# Patient Record
Sex: MALE | Race: WHITE | HISPANIC OR LATINO | Employment: FULL TIME | URBAN - METROPOLITAN AREA
[De-identification: names, ages, dates, MRNs, and addresses within clinical notes are randomized per-mention and may not be internally consistent; named-entity substitution may affect disease eponyms.]

---

## 2017-01-12 ENCOUNTER — APPOINTMENT (OUTPATIENT)
Dept: LAB | Facility: HOSPITAL | Age: 57
End: 2017-01-12
Payer: COMMERCIAL

## 2017-01-12 DIAGNOSIS — I10 ESSENTIAL (PRIMARY) HYPERTENSION: ICD-10-CM

## 2017-01-12 LAB
ALBUMIN SERPL BCP-MCNC: 3.4 G/DL (ref 3.5–5)
ALP SERPL-CCNC: 100 U/L (ref 46–116)
ALT SERPL W P-5'-P-CCNC: 37 U/L (ref 12–78)
ANION GAP SERPL CALCULATED.3IONS-SCNC: 10 MMOL/L (ref 4–13)
AST SERPL W P-5'-P-CCNC: 29 U/L (ref 5–45)
BASOPHILS # BLD AUTO: 0.02 THOUSANDS/ΜL (ref 0–0.1)
BASOPHILS NFR BLD AUTO: 1 % (ref 0–1)
BILIRUB SERPL-MCNC: 0.62 MG/DL (ref 0.2–1)
BUN SERPL-MCNC: 15 MG/DL (ref 5–25)
CALCIUM SERPL-MCNC: 8.8 MG/DL (ref 8.3–10.1)
CHLORIDE SERPL-SCNC: 109 MMOL/L (ref 100–108)
CHOLEST SERPL-MCNC: 141 MG/DL (ref 50–200)
CO2 SERPL-SCNC: 24 MMOL/L (ref 21–32)
CREAT SERPL-MCNC: 1.18 MG/DL (ref 0.6–1.3)
EOSINOPHIL # BLD AUTO: 0.15 THOUSAND/ΜL (ref 0–0.61)
EOSINOPHIL NFR BLD AUTO: 4 % (ref 0–6)
ERYTHROCYTE [DISTWIDTH] IN BLOOD BY AUTOMATED COUNT: 14.9 % (ref 11.6–15.1)
GFR SERPL CREATININE-BSD FRML MDRD: >60 ML/MIN/1.73SQ M
GLUCOSE SERPL-MCNC: 86 MG/DL (ref 65–140)
HCT VFR BLD AUTO: 44.4 % (ref 36.5–49.3)
HDLC SERPL-MCNC: 47 MG/DL (ref 40–60)
HGB BLD-MCNC: 15 G/DL (ref 12–17)
LDLC SERPL CALC-MCNC: 68 MG/DL (ref 0–100)
LYMPHOCYTES # BLD AUTO: 2.01 THOUSANDS/ΜL (ref 0.6–4.47)
LYMPHOCYTES NFR BLD AUTO: 51 % (ref 14–44)
MCH RBC QN AUTO: 30.7 PG (ref 26.8–34.3)
MCHC RBC AUTO-ENTMCNC: 33.8 G/DL (ref 31.4–37.4)
MCV RBC AUTO: 91 FL (ref 82–98)
MONOCYTES # BLD AUTO: 0.49 THOUSAND/ΜL (ref 0.17–1.22)
MONOCYTES NFR BLD AUTO: 12 % (ref 4–12)
NEUTROPHILS # BLD AUTO: 1.27 THOUSANDS/ΜL (ref 1.85–7.62)
NEUTS SEG NFR BLD AUTO: 32 % (ref 43–75)
PLATELET # BLD AUTO: 253 THOUSANDS/UL (ref 149–390)
PMV BLD AUTO: 11.3 FL (ref 8.9–12.7)
POTASSIUM SERPL-SCNC: 4.3 MMOL/L (ref 3.5–5.3)
PROT SERPL-MCNC: 7.1 G/DL (ref 6.4–8.2)
RBC # BLD AUTO: 4.89 MILLION/UL (ref 3.88–5.62)
SODIUM SERPL-SCNC: 143 MMOL/L (ref 136–145)
TRIGL SERPL-MCNC: 130 MG/DL
WBC # BLD AUTO: 3.94 THOUSAND/UL (ref 4.31–10.16)

## 2017-01-12 PROCEDURE — 36415 COLL VENOUS BLD VENIPUNCTURE: CPT

## 2017-01-12 PROCEDURE — 80061 LIPID PANEL: CPT

## 2017-01-12 PROCEDURE — 80053 COMPREHEN METABOLIC PANEL: CPT

## 2017-01-12 PROCEDURE — 85025 COMPLETE CBC W/AUTO DIFF WBC: CPT

## 2017-01-16 ENCOUNTER — TRANSCRIBE ORDERS (OUTPATIENT)
Dept: ADMINISTRATIVE | Facility: HOSPITAL | Age: 57
End: 2017-01-16

## 2017-01-16 ENCOUNTER — ALLSCRIPTS OFFICE VISIT (OUTPATIENT)
Dept: OTHER | Facility: OTHER | Age: 57
End: 2017-01-16

## 2017-01-16 DIAGNOSIS — I10 ESSENTIAL HYPERTENSION, MALIGNANT: ICD-10-CM

## 2017-01-16 DIAGNOSIS — R07.9 CHEST PAIN, UNSPECIFIED: Primary | ICD-10-CM

## 2017-01-16 DIAGNOSIS — R06.9 ABNORMAL RESPIRATORY RATE: ICD-10-CM

## 2017-01-24 ENCOUNTER — HOSPITAL ENCOUNTER (OUTPATIENT)
Dept: MRI IMAGING | Facility: HOSPITAL | Age: 57
Discharge: HOME | End: 2017-01-24
Payer: COMMERCIAL

## 2017-01-24 DIAGNOSIS — M75.42 IMPINGEMENT SYNDROME OF LEFT SHOULDER: ICD-10-CM

## 2017-01-24 PROCEDURE — 73221 MRI JOINT UPR EXTREM W/O DYE: CPT

## 2017-02-01 ENCOUNTER — HOSPITAL ENCOUNTER (OUTPATIENT)
Dept: NON INVASIVE DIAGNOSTICS | Facility: CLINIC | Age: 57
Discharge: HOME | End: 2017-02-01
Payer: COMMERCIAL

## 2017-02-01 DIAGNOSIS — R07.9 CHEST PAIN, UNSPECIFIED: ICD-10-CM

## 2017-02-01 DIAGNOSIS — R06.9 ABNORMAL RESPIRATORY RATE: ICD-10-CM

## 2017-02-01 DIAGNOSIS — I10 ESSENTIAL HYPERTENSION, MALIGNANT: ICD-10-CM

## 2017-02-01 PROCEDURE — 93350 STRESS TTE ONLY: CPT

## 2017-02-03 ENCOUNTER — HOSPITAL ENCOUNTER (EMERGENCY)
Facility: HOSPITAL | Age: 57
Discharge: HOME | End: 2017-02-03
Attending: EMERGENCY MEDICINE | Admitting: EMERGENCY MEDICINE
Payer: COMMERCIAL

## 2017-02-03 ENCOUNTER — APPOINTMENT (EMERGENCY)
Dept: RADIOLOGY | Facility: HOSPITAL | Age: 57
End: 2017-02-03
Payer: COMMERCIAL

## 2017-02-03 VITALS
DIASTOLIC BLOOD PRESSURE: 90 MMHG | RESPIRATION RATE: 15 BRPM | TEMPERATURE: 97.7 F | SYSTOLIC BLOOD PRESSURE: 154 MMHG | HEART RATE: 72 BPM | WEIGHT: 135 LBS | OXYGEN SATURATION: 98 %

## 2017-02-03 DIAGNOSIS — R07.9 CHEST PAIN: Primary | ICD-10-CM

## 2017-02-03 LAB
ANION GAP SERPL CALCULATED.3IONS-SCNC: 6 MMOL/L (ref 4–13)
ATRIAL RATE: 63 BPM
BASOPHILS # BLD AUTO: 0.01 THOUSANDS/ΜL (ref 0–0.1)
BASOPHILS NFR BLD AUTO: 0 % (ref 0–1)
BUN SERPL-MCNC: 21 MG/DL (ref 5–25)
CALCIUM SERPL-MCNC: 8.8 MG/DL (ref 8.3–10.1)
CHLORIDE SERPL-SCNC: 108 MMOL/L (ref 100–108)
CO2 SERPL-SCNC: 27 MMOL/L (ref 21–32)
CREAT SERPL-MCNC: 1.14 MG/DL (ref 0.6–1.3)
EOSINOPHIL # BLD AUTO: 0.13 THOUSAND/ΜL (ref 0–0.61)
EOSINOPHIL NFR BLD AUTO: 3 % (ref 0–6)
ERYTHROCYTE [DISTWIDTH] IN BLOOD BY AUTOMATED COUNT: 14.5 % (ref 11.6–15.1)
GFR SERPL CREATININE-BSD FRML MDRD: >60 ML/MIN/1.73SQ M
GLUCOSE SERPL-MCNC: 103 MG/DL (ref 65–140)
HCT VFR BLD AUTO: 40.4 % (ref 36.5–49.3)
HGB BLD-MCNC: 14.1 G/DL (ref 12–17)
LYMPHOCYTES # BLD AUTO: 1.73 THOUSANDS/ΜL (ref 0.6–4.47)
LYMPHOCYTES NFR BLD AUTO: 44 % (ref 14–44)
MCH RBC QN AUTO: 31.5 PG (ref 26.8–34.3)
MCHC RBC AUTO-ENTMCNC: 34.9 G/DL (ref 31.4–37.4)
MCV RBC AUTO: 90 FL (ref 82–98)
MONOCYTES # BLD AUTO: 0.36 THOUSAND/ΜL (ref 0.17–1.22)
MONOCYTES NFR BLD AUTO: 9 % (ref 4–12)
NEUTROPHILS # BLD AUTO: 1.75 THOUSANDS/ΜL (ref 1.85–7.62)
NEUTS SEG NFR BLD AUTO: 44 % (ref 43–75)
NRBC BLD AUTO-RTO: 0 /100 WBCS
P AXIS: 49 DEGREES
PLATELET # BLD AUTO: 217 THOUSANDS/UL (ref 149–390)
PMV BLD AUTO: 11 FL (ref 8.9–12.7)
POTASSIUM SERPL-SCNC: 4 MMOL/L (ref 3.5–5.3)
PR INTERVAL: 200 MS
QRS AXIS: 44 DEGREES
QRSD INTERVAL: 78 MS
QT INTERVAL: 400 MS
QTC INTERVAL: 409 MS
RBC # BLD AUTO: 4.48 MILLION/UL (ref 3.88–5.62)
SODIUM SERPL-SCNC: 141 MMOL/L (ref 136–145)
SPECIMEN SOURCE: NORMAL
T WAVE AXIS: 50 DEGREES
TROPONIN I BLD-MCNC: 0.02 NG/ML (ref 0–0.08)
VENTRICULAR RATE: 63 BPM
WBC # BLD AUTO: 3.98 THOUSAND/UL (ref 4.31–10.16)

## 2017-02-03 PROCEDURE — 71020 HB CHEST X-RAY 2VW FRONTAL&LATL: CPT

## 2017-02-03 PROCEDURE — 84484 ASSAY OF TROPONIN QUANT: CPT

## 2017-02-03 PROCEDURE — 36415 COLL VENOUS BLD VENIPUNCTURE: CPT | Performed by: EMERGENCY MEDICINE

## 2017-02-03 PROCEDURE — 93005 ELECTROCARDIOGRAM TRACING: CPT | Performed by: EMERGENCY MEDICINE

## 2017-02-03 PROCEDURE — 80048 BASIC METABOLIC PNL TOTAL CA: CPT | Performed by: EMERGENCY MEDICINE

## 2017-02-03 PROCEDURE — 85025 COMPLETE CBC W/AUTO DIFF WBC: CPT | Performed by: EMERGENCY MEDICINE

## 2017-02-03 PROCEDURE — 99285 EMERGENCY DEPT VISIT HI MDM: CPT

## 2017-03-24 ENCOUNTER — ALLSCRIPTS OFFICE VISIT (OUTPATIENT)
Dept: OTHER | Facility: OTHER | Age: 57
End: 2017-03-24

## 2017-03-24 DIAGNOSIS — S76.319A STRAIN OF MUSCLE, FASCIA AND TENDON OF THE POSTERIOR MUSCLE GROUP AT THIGH LEVEL, UNSPECIFIED THIGH, INITIAL ENCOUNTER: ICD-10-CM

## 2017-03-24 DIAGNOSIS — K63.5 POLYP OF COLON: ICD-10-CM

## 2017-03-24 DIAGNOSIS — H81.10 BENIGN PAROXYSMAL VERTIGO: ICD-10-CM

## 2017-04-10 ENCOUNTER — ALLSCRIPTS OFFICE VISIT (OUTPATIENT)
Dept: OTHER | Facility: OTHER | Age: 57
End: 2017-04-10

## 2017-05-09 ENCOUNTER — GENERIC CONVERSION - ENCOUNTER (OUTPATIENT)
Dept: OTHER | Facility: OTHER | Age: 57
End: 2017-05-09

## 2017-05-12 ENCOUNTER — APPOINTMENT (EMERGENCY)
Dept: RADIOLOGY | Facility: HOSPITAL | Age: 57
End: 2017-05-12
Payer: COMMERCIAL

## 2017-05-12 ENCOUNTER — HOSPITAL ENCOUNTER (EMERGENCY)
Facility: HOSPITAL | Age: 57
Discharge: HOME | End: 2017-05-12
Admitting: EMERGENCY MEDICINE
Payer: COMMERCIAL

## 2017-05-12 VITALS
OXYGEN SATURATION: 99 % | HEART RATE: 58 BPM | RESPIRATION RATE: 14 BRPM | WEIGHT: 130 LBS | DIASTOLIC BLOOD PRESSURE: 76 MMHG | TEMPERATURE: 97.9 F | SYSTOLIC BLOOD PRESSURE: 127 MMHG

## 2017-05-12 DIAGNOSIS — R07.9 CHEST PAIN, UNSPECIFIED TYPE: Primary | ICD-10-CM

## 2017-05-12 LAB
ALBUMIN SERPL BCP-MCNC: 3.3 G/DL (ref 3.5–5)
ALP SERPL-CCNC: 105 U/L (ref 46–116)
ALT SERPL W P-5'-P-CCNC: 31 U/L (ref 12–78)
AMPHETAMINES SERPL QL SCN: NEGATIVE
ANION GAP SERPL CALCULATED.3IONS-SCNC: 9 MMOL/L (ref 4–13)
APTT PPP: 31 SECONDS (ref 23–35)
AST SERPL W P-5'-P-CCNC: 29 U/L (ref 5–45)
BARBITURATES UR QL: NEGATIVE
BASOPHILS # BLD AUTO: 0.02 THOUSANDS/ΜL (ref 0–0.1)
BASOPHILS NFR BLD AUTO: 1 % (ref 0–1)
BENZODIAZ UR QL: NEGATIVE
BILIRUB SERPL-MCNC: 0.39 MG/DL (ref 0.2–1)
BUN SERPL-MCNC: 13 MG/DL (ref 5–25)
CALCIUM SERPL-MCNC: 8.8 MG/DL (ref 8.3–10.1)
CHLORIDE SERPL-SCNC: 109 MMOL/L (ref 100–108)
CK SERPL-CCNC: 109 U/L (ref 39–308)
CO2 SERPL-SCNC: 24 MMOL/L (ref 21–32)
COCAINE UR QL: NEGATIVE
CREAT SERPL-MCNC: 1.15 MG/DL (ref 0.6–1.3)
DEPRECATED D DIMER PPP: <270 NG/ML (FEU) (ref 0–424)
EOSINOPHIL # BLD AUTO: 0.16 THOUSAND/ΜL (ref 0–0.61)
EOSINOPHIL NFR BLD AUTO: 4 % (ref 0–6)
ERYTHROCYTE [DISTWIDTH] IN BLOOD BY AUTOMATED COUNT: 14.6 % (ref 11.6–15.1)
GFR SERPL CREATININE-BSD FRML MDRD: >60 ML/MIN/1.73SQ M
GLUCOSE SERPL-MCNC: 102 MG/DL (ref 65–140)
HCT VFR BLD AUTO: 46 % (ref 36.5–49.3)
HGB BLD-MCNC: 15.1 G/DL (ref 12–17)
INR PPP: 0.9 (ref 0.86–1.16)
LYMPHOCYTES # BLD AUTO: 2.11 THOUSANDS/ΜL (ref 0.6–4.47)
LYMPHOCYTES NFR BLD AUTO: 48 % (ref 14–44)
MAGNESIUM SERPL-MCNC: 1.9 MG/DL (ref 1.6–2.6)
MCH RBC QN AUTO: 30.9 PG (ref 26.8–34.3)
MCHC RBC AUTO-ENTMCNC: 32.8 G/DL (ref 31.4–37.4)
MCV RBC AUTO: 94 FL (ref 82–98)
METHADONE UR QL: NEGATIVE
MONOCYTES # BLD AUTO: 0.41 THOUSAND/ΜL (ref 0.17–1.22)
MONOCYTES NFR BLD AUTO: 9 % (ref 4–12)
NEUTROPHILS # BLD AUTO: 1.65 THOUSANDS/ΜL (ref 1.85–7.62)
NEUTS SEG NFR BLD AUTO: 38 % (ref 43–75)
NRBC BLD AUTO-RTO: 0 /100 WBCS
OPIATES UR QL SCN: NEGATIVE
PCP UR QL: NEGATIVE
PLATELET # BLD AUTO: 223 THOUSANDS/UL (ref 149–390)
PMV BLD AUTO: 11.9 FL (ref 8.9–12.7)
POTASSIUM SERPL-SCNC: 4 MMOL/L (ref 3.5–5.3)
PROT SERPL-MCNC: 6.9 G/DL (ref 6.4–8.2)
PROTHROMBIN TIME: 12.1 SECONDS (ref 12.1–14.4)
RBC # BLD AUTO: 4.88 MILLION/UL (ref 3.88–5.62)
SODIUM SERPL-SCNC: 142 MMOL/L (ref 136–145)
THC UR QL: NEGATIVE
TROPONIN I SERPL-MCNC: <0.02 NG/ML
TROPONIN I SERPL-MCNC: <0.02 NG/ML
WBC # BLD AUTO: 4.35 THOUSAND/UL (ref 4.31–10.16)

## 2017-05-12 PROCEDURE — 93005 ELECTROCARDIOGRAM TRACING: CPT | Performed by: NURSE PRACTITIONER

## 2017-05-12 PROCEDURE — 82550 ASSAY OF CK (CPK): CPT | Performed by: NURSE PRACTITIONER

## 2017-05-12 PROCEDURE — 85730 THROMBOPLASTIN TIME PARTIAL: CPT | Performed by: NURSE PRACTITIONER

## 2017-05-12 PROCEDURE — 71020 HB CHEST X-RAY 2VW FRONTAL&LATL: CPT

## 2017-05-12 PROCEDURE — 85025 COMPLETE CBC W/AUTO DIFF WBC: CPT | Performed by: NURSE PRACTITIONER

## 2017-05-12 PROCEDURE — 83735 ASSAY OF MAGNESIUM: CPT | Performed by: NURSE PRACTITIONER

## 2017-05-12 PROCEDURE — 85379 FIBRIN DEGRADATION QUANT: CPT | Performed by: NURSE PRACTITIONER

## 2017-05-12 PROCEDURE — 80053 COMPREHEN METABOLIC PANEL: CPT | Performed by: NURSE PRACTITIONER

## 2017-05-12 PROCEDURE — 85610 PROTHROMBIN TIME: CPT | Performed by: NURSE PRACTITIONER

## 2017-05-12 PROCEDURE — 80307 DRUG TEST PRSMV CHEM ANLYZR: CPT | Performed by: NURSE PRACTITIONER

## 2017-05-12 PROCEDURE — 36415 COLL VENOUS BLD VENIPUNCTURE: CPT | Performed by: NURSE PRACTITIONER

## 2017-05-12 PROCEDURE — 84484 ASSAY OF TROPONIN QUANT: CPT | Performed by: NURSE PRACTITIONER

## 2017-05-12 PROCEDURE — 99285 EMERGENCY DEPT VISIT HI MDM: CPT

## 2017-05-12 RX ORDER — ASPIRIN 81 MG/1
324 TABLET, CHEWABLE ORAL ONCE
Status: COMPLETED | OUTPATIENT
Start: 2017-05-12 | End: 2017-05-12

## 2017-05-12 RX ADMIN — ASPIRIN 324 MG: 81 TABLET, CHEWABLE ORAL at 19:58

## 2017-05-13 LAB
ATRIAL RATE: 61 BPM
P AXIS: 51 DEGREES
PR INTERVAL: 200 MS
QRS AXIS: 59 DEGREES
QRSD INTERVAL: 84 MS
QT INTERVAL: 402 MS
QTC INTERVAL: 404 MS
T WAVE AXIS: 68 DEGREES
VENTRICULAR RATE: 61 BPM

## 2017-05-15 ENCOUNTER — ALLSCRIPTS OFFICE VISIT (OUTPATIENT)
Dept: OTHER | Facility: OTHER | Age: 57
End: 2017-05-15

## 2017-05-15 DIAGNOSIS — M25.512 PAIN IN LEFT SHOULDER: ICD-10-CM

## 2017-05-15 DIAGNOSIS — I10 ESSENTIAL (PRIMARY) HYPERTENSION: ICD-10-CM

## 2017-05-15 LAB
BILIRUB UR QL STRIP: NEGATIVE
CLARITY UR: NORMAL
COLOR UR: YELLOW
GLUCOSE (HISTORICAL): NEGATIVE
HGB UR QL STRIP.AUTO: NORMAL
KETONES UR STRIP-MCNC: NEGATIVE MG/DL
LEUKOCYTE ESTERASE UR QL STRIP: NEGATIVE
NITRITE UR QL STRIP: NEGATIVE
PH UR STRIP.AUTO: 7 [PH]
PROT UR STRIP-MCNC: NORMAL MG/DL
SP GR UR STRIP.AUTO: 1.01
UROBILINOGEN UR QL STRIP.AUTO: 0.2

## 2017-06-01 ENCOUNTER — APPOINTMENT (OUTPATIENT)
Dept: LAB | Facility: CLINIC | Age: 57
End: 2017-06-01
Payer: COMMERCIAL

## 2017-06-01 ENCOUNTER — TRANSCRIBE ORDERS (OUTPATIENT)
Dept: LAB | Facility: CLINIC | Age: 57
End: 2017-06-01

## 2017-06-01 DIAGNOSIS — I10 ESSENTIAL (PRIMARY) HYPERTENSION: ICD-10-CM

## 2017-06-01 LAB
ALBUMIN SERPL BCP-MCNC: 3.4 G/DL (ref 3.5–5)
ALP SERPL-CCNC: 89 U/L (ref 46–116)
ALT SERPL W P-5'-P-CCNC: 34 U/L (ref 12–78)
ANION GAP SERPL CALCULATED.3IONS-SCNC: 7 MMOL/L (ref 4–13)
AST SERPL W P-5'-P-CCNC: 30 U/L (ref 5–45)
BASOPHILS # BLD AUTO: 0.03 THOUSANDS/ΜL (ref 0–0.1)
BASOPHILS NFR BLD AUTO: 1 % (ref 0–1)
BILIRUB SERPL-MCNC: 0.75 MG/DL (ref 0.2–1)
BUN SERPL-MCNC: 19 MG/DL (ref 5–25)
CALCIUM SERPL-MCNC: 8.9 MG/DL (ref 8.3–10.1)
CHLORIDE SERPL-SCNC: 109 MMOL/L (ref 100–108)
CHOLEST SERPL-MCNC: 132 MG/DL (ref 50–200)
CO2 SERPL-SCNC: 26 MMOL/L (ref 21–32)
CREAT SERPL-MCNC: 1.09 MG/DL (ref 0.6–1.3)
EOSINOPHIL # BLD AUTO: 0.21 THOUSAND/ΜL (ref 0–0.61)
EOSINOPHIL NFR BLD AUTO: 6 % (ref 0–6)
ERYTHROCYTE [DISTWIDTH] IN BLOOD BY AUTOMATED COUNT: 15 % (ref 11.6–15.1)
GFR SERPL CREATININE-BSD FRML MDRD: >60 ML/MIN/1.73SQ M
GLUCOSE P FAST SERPL-MCNC: 72 MG/DL (ref 65–99)
HCT VFR BLD AUTO: 42.4 % (ref 36.5–49.3)
HDLC SERPL-MCNC: 48 MG/DL (ref 40–60)
HGB BLD-MCNC: 14.3 G/DL (ref 12–17)
LDLC SERPL CALC-MCNC: 68 MG/DL (ref 0–100)
LYMPHOCYTES # BLD AUTO: 1.87 THOUSANDS/ΜL (ref 0.6–4.47)
LYMPHOCYTES NFR BLD AUTO: 49 % (ref 14–44)
MCH RBC QN AUTO: 30.8 PG (ref 26.8–34.3)
MCHC RBC AUTO-ENTMCNC: 33.7 G/DL (ref 31.4–37.4)
MCV RBC AUTO: 91 FL (ref 82–98)
MONOCYTES # BLD AUTO: 0.43 THOUSAND/ΜL (ref 0.17–1.22)
MONOCYTES NFR BLD AUTO: 11 % (ref 4–12)
NEUTROPHILS # BLD AUTO: 1.23 THOUSANDS/ΜL (ref 1.85–7.62)
NEUTS SEG NFR BLD AUTO: 33 % (ref 43–75)
NRBC BLD AUTO-RTO: 0 /100 WBCS
PLATELET # BLD AUTO: 245 THOUSANDS/UL (ref 149–390)
PMV BLD AUTO: 11.7 FL (ref 8.9–12.7)
POTASSIUM SERPL-SCNC: 4.4 MMOL/L (ref 3.5–5.3)
PROT SERPL-MCNC: 7.1 G/DL (ref 6.4–8.2)
RBC # BLD AUTO: 4.65 MILLION/UL (ref 3.88–5.62)
SODIUM SERPL-SCNC: 142 MMOL/L (ref 136–145)
TRIGL SERPL-MCNC: 80 MG/DL
WBC # BLD AUTO: 3.77 THOUSAND/UL (ref 4.31–10.16)

## 2017-06-01 PROCEDURE — 36415 COLL VENOUS BLD VENIPUNCTURE: CPT

## 2017-06-01 PROCEDURE — 80061 LIPID PANEL: CPT

## 2017-06-01 PROCEDURE — 80053 COMPREHEN METABOLIC PANEL: CPT

## 2017-06-01 PROCEDURE — 85025 COMPLETE CBC W/AUTO DIFF WBC: CPT

## 2017-06-07 ENCOUNTER — ALLSCRIPTS OFFICE VISIT (OUTPATIENT)
Dept: OTHER | Facility: OTHER | Age: 57
End: 2017-06-07

## 2017-06-07 ENCOUNTER — HOSPITAL ENCOUNTER (OUTPATIENT)
Dept: RADIOLOGY | Facility: OTHER | Age: 57
Discharge: HOME | End: 2017-06-07
Payer: COMMERCIAL

## 2017-06-07 DIAGNOSIS — M25.512 PAIN IN LEFT SHOULDER: ICD-10-CM

## 2017-06-07 PROCEDURE — 73030 X-RAY EXAM OF SHOULDER: CPT

## 2017-06-13 ENCOUNTER — ALLSCRIPTS OFFICE VISIT (OUTPATIENT)
Dept: OTHER | Facility: OTHER | Age: 57
End: 2017-06-13

## 2017-06-13 ENCOUNTER — TRANSCRIBE ORDERS (OUTPATIENT)
Dept: ADMINISTRATIVE | Facility: HOSPITAL | Age: 57
End: 2017-06-13

## 2017-06-13 DIAGNOSIS — R10.2 PERINEUM PAIN, MALE: Primary | ICD-10-CM

## 2017-06-13 LAB
CLARITY UR: NORMAL
COLOR UR: YELLOW
GLUCOSE (HISTORICAL): NORMAL
HGB UR QL STRIP.AUTO: NORMAL
KETONES UR STRIP-MCNC: NORMAL MG/DL
LEUKOCYTE ESTERASE UR QL STRIP: NORMAL
NITRITE UR QL STRIP: NORMAL
PH UR STRIP.AUTO: 6.5 [PH]
PROT UR STRIP-MCNC: NORMAL MG/DL
SP GR UR STRIP.AUTO: 1.02

## 2017-06-16 ENCOUNTER — HOSPITAL ENCOUNTER (OUTPATIENT)
Dept: ULTRASOUND IMAGING | Facility: HOSPITAL | Age: 57
Discharge: HOME | End: 2017-06-16
Payer: COMMERCIAL

## 2017-06-16 ENCOUNTER — APPOINTMENT (OUTPATIENT)
Dept: PHYSICAL THERAPY | Facility: CLINIC | Age: 57
End: 2017-06-16
Payer: COMMERCIAL

## 2017-06-16 DIAGNOSIS — R10.2 PERINEUM PAIN, MALE: ICD-10-CM

## 2017-06-16 PROCEDURE — 97112 NEUROMUSCULAR REEDUCATION: CPT

## 2017-06-16 PROCEDURE — 97110 THERAPEUTIC EXERCISES: CPT

## 2017-06-16 PROCEDURE — 97010 HOT OR COLD PACKS THERAPY: CPT

## 2017-06-16 PROCEDURE — 51798 US URINE CAPACITY MEASURE: CPT

## 2017-06-16 PROCEDURE — 97162 PT EVAL MOD COMPLEX 30 MIN: CPT

## 2017-06-23 ENCOUNTER — APPOINTMENT (OUTPATIENT)
Dept: PHYSICAL THERAPY | Facility: CLINIC | Age: 57
End: 2017-06-23
Payer: COMMERCIAL

## 2017-06-23 ENCOUNTER — GENERIC CONVERSION - ENCOUNTER (OUTPATIENT)
Dept: OTHER | Facility: OTHER | Age: 57
End: 2017-06-23

## 2017-06-23 PROCEDURE — 97110 THERAPEUTIC EXERCISES: CPT

## 2017-06-30 ENCOUNTER — APPOINTMENT (OUTPATIENT)
Dept: PHYSICAL THERAPY | Facility: CLINIC | Age: 57
End: 2017-06-30
Payer: COMMERCIAL

## 2017-06-30 PROCEDURE — 97140 MANUAL THERAPY 1/> REGIONS: CPT

## 2017-06-30 PROCEDURE — 97110 THERAPEUTIC EXERCISES: CPT

## 2017-07-07 ENCOUNTER — ALLSCRIPTS OFFICE VISIT (OUTPATIENT)
Dept: OTHER | Facility: OTHER | Age: 57
End: 2017-07-07

## 2017-07-20 ENCOUNTER — ALLSCRIPTS OFFICE VISIT (OUTPATIENT)
Dept: OTHER | Facility: OTHER | Age: 57
End: 2017-07-20

## 2017-07-21 ENCOUNTER — APPOINTMENT (OUTPATIENT)
Dept: PHYSICAL THERAPY | Facility: CLINIC | Age: 57
End: 2017-07-21
Payer: COMMERCIAL

## 2017-07-21 PROCEDURE — 97110 THERAPEUTIC EXERCISES: CPT

## 2017-07-28 ENCOUNTER — APPOINTMENT (OUTPATIENT)
Dept: PHYSICAL THERAPY | Facility: CLINIC | Age: 57
End: 2017-07-28
Payer: COMMERCIAL

## 2017-07-28 PROCEDURE — 97110 THERAPEUTIC EXERCISES: CPT

## 2017-07-28 PROCEDURE — 97140 MANUAL THERAPY 1/> REGIONS: CPT

## 2017-08-07 ENCOUNTER — ALLSCRIPTS OFFICE VISIT (OUTPATIENT)
Dept: OTHER | Facility: OTHER | Age: 57
End: 2017-08-07

## 2017-08-07 DIAGNOSIS — N50.819 PAIN IN TESTICLE: ICD-10-CM

## 2017-08-07 DIAGNOSIS — M54.16 RADICULOPATHY OF LUMBAR REGION: ICD-10-CM

## 2017-08-07 LAB
BILIRUB UR QL STRIP: NORMAL
CLARITY UR: NORMAL
COLOR UR: YELLOW
GLUCOSE (HISTORICAL): NORMAL
HGB UR QL STRIP.AUTO: NORMAL
KETONES UR STRIP-MCNC: NORMAL MG/DL
LEUKOCYTE ESTERASE UR QL STRIP: NORMAL
NITRITE UR QL STRIP: NORMAL
PH UR STRIP.AUTO: 5 [PH]
PROT UR STRIP-MCNC: NORMAL MG/DL
SP GR UR STRIP.AUTO: 1.02
UROBILINOGEN UR QL STRIP.AUTO: 0.2

## 2017-08-09 ENCOUNTER — TRANSCRIBE ORDERS (OUTPATIENT)
Dept: ADMINISTRATIVE | Facility: HOSPITAL | Age: 57
End: 2017-08-09

## 2017-08-09 DIAGNOSIS — N50.819 ORCHIALGIA: ICD-10-CM

## 2017-08-09 DIAGNOSIS — M54.16 LUMBAR RADICULOPATHY: Primary | ICD-10-CM

## 2017-08-09 DIAGNOSIS — N50.819 TESTES PAIN: ICD-10-CM

## 2017-08-18 ENCOUNTER — HOSPITAL ENCOUNTER (OUTPATIENT)
Dept: MRI IMAGING | Facility: HOSPITAL | Age: 57
Discharge: HOME | End: 2017-08-18
Payer: COMMERCIAL

## 2017-08-18 DIAGNOSIS — N50.819 PAIN IN TESTICLE: ICD-10-CM

## 2017-08-18 DIAGNOSIS — M54.16 RADICULOPATHY OF LUMBAR REGION: ICD-10-CM

## 2017-08-18 PROCEDURE — 72148 MRI LUMBAR SPINE W/O DYE: CPT

## 2017-08-30 ENCOUNTER — HOSPITAL ENCOUNTER (EMERGENCY)
Facility: HOSPITAL | Age: 57
Discharge: HOME | End: 2017-08-30
Admitting: EMERGENCY MEDICINE
Payer: COMMERCIAL

## 2017-08-30 VITALS
RESPIRATION RATE: 16 BRPM | SYSTOLIC BLOOD PRESSURE: 126 MMHG | TEMPERATURE: 98.1 F | OXYGEN SATURATION: 98 % | HEART RATE: 66 BPM | DIASTOLIC BLOOD PRESSURE: 70 MMHG

## 2017-08-30 DIAGNOSIS — M54.32 LEFT SIDED SCIATICA: Primary | ICD-10-CM

## 2017-08-30 PROCEDURE — 99283 EMERGENCY DEPT VISIT LOW MDM: CPT

## 2017-08-30 PROCEDURE — 96372 THER/PROPH/DIAG INJ SC/IM: CPT

## 2017-08-30 RX ORDER — KETOROLAC TROMETHAMINE 30 MG/ML
30 INJECTION, SOLUTION INTRAMUSCULAR; INTRAVENOUS ONCE
Status: COMPLETED | OUTPATIENT
Start: 2017-08-30 | End: 2017-08-30

## 2017-08-30 RX ORDER — CYCLOBENZAPRINE HCL 10 MG
10 TABLET ORAL ONCE
Status: COMPLETED | OUTPATIENT
Start: 2017-08-30 | End: 2017-08-30

## 2017-08-30 RX ORDER — NAPROXEN 500 MG/1
500 TABLET ORAL 2 TIMES DAILY WITH MEALS
Qty: 14 TABLET | Refills: 0 | Status: SHIPPED | OUTPATIENT
Start: 2017-08-30 | End: 2018-04-05

## 2017-08-30 RX ADMIN — CYCLOBENZAPRINE HYDROCHLORIDE 10 MG: 10 TABLET, FILM COATED ORAL at 18:37

## 2017-08-30 RX ADMIN — KETOROLAC TROMETHAMINE 30 MG: 30 INJECTION, SOLUTION INTRAMUSCULAR at 18:37

## 2017-08-31 ENCOUNTER — GENERIC CONVERSION - ENCOUNTER (OUTPATIENT)
Dept: OTHER | Facility: OTHER | Age: 57
End: 2017-08-31

## 2017-09-29 ENCOUNTER — GENERIC CONVERSION - ENCOUNTER (OUTPATIENT)
Dept: OTHER | Facility: OTHER | Age: 57
End: 2017-09-29

## 2017-10-12 ENCOUNTER — GENERIC CONVERSION - ENCOUNTER (OUTPATIENT)
Dept: OTHER | Facility: OTHER | Age: 57
End: 2017-10-12

## 2017-11-21 ENCOUNTER — ALLSCRIPTS OFFICE VISIT (OUTPATIENT)
Dept: OTHER | Facility: OTHER | Age: 57
End: 2017-11-21

## 2017-11-30 ENCOUNTER — TRANSCRIBE ORDERS (OUTPATIENT)
Dept: LAB | Facility: CLINIC | Age: 57
End: 2017-11-30

## 2017-11-30 ENCOUNTER — APPOINTMENT (OUTPATIENT)
Dept: LAB | Facility: CLINIC | Age: 57
End: 2017-11-30
Payer: COMMERCIAL

## 2017-11-30 DIAGNOSIS — I10 ESSENTIAL HYPERTENSION, MALIGNANT: ICD-10-CM

## 2017-11-30 DIAGNOSIS — N40.0 BENIGN PROSTATIC HYPERPLASIA, UNSPECIFIED WHETHER LOWER URINARY TRACT SYMPTOMS PRESENT: Primary | ICD-10-CM

## 2017-11-30 DIAGNOSIS — N40.0 BENIGN PROSTATIC HYPERPLASIA, UNSPECIFIED WHETHER LOWER URINARY TRACT SYMPTOMS PRESENT: ICD-10-CM

## 2017-11-30 DIAGNOSIS — R73.09 OTHER ABNORMAL GLUCOSE: Primary | ICD-10-CM

## 2017-11-30 DIAGNOSIS — R31.29 MICROSCOPIC HEMATURIA: ICD-10-CM

## 2017-11-30 DIAGNOSIS — Z12.5 SPECIAL SCREENING FOR MALIGNANT NEOPLASM OF PROSTATE: ICD-10-CM

## 2017-11-30 DIAGNOSIS — R31.29 HEMATURIA, MICROSCOPIC: ICD-10-CM

## 2017-11-30 LAB
ALBUMIN SERPL BCP-MCNC: 3.3 G/DL (ref 3.5–5)
ALP SERPL-CCNC: 92 U/L (ref 46–116)
ALT SERPL W P-5'-P-CCNC: 37 U/L (ref 12–78)
ANION GAP SERPL CALCULATED.3IONS-SCNC: 5 MMOL/L (ref 4–13)
AST SERPL W P-5'-P-CCNC: 27 U/L (ref 5–45)
BACTERIA UR QL AUTO: ABNORMAL /HPF
BASOPHILS # BLD AUTO: 0.01 THOUSANDS/ΜL (ref 0–0.1)
BASOPHILS NFR BLD AUTO: 0 % (ref 0–1)
BILIRUB SERPL-MCNC: 0.75 MG/DL (ref 0.2–1)
BILIRUB UR QL STRIP: NEGATIVE
BUN SERPL-MCNC: 22 MG/DL (ref 5–25)
CALCIUM SERPL-MCNC: 8.5 MG/DL (ref 8.3–10.1)
CHLORIDE SERPL-SCNC: 110 MMOL/L (ref 100–108)
CHOLEST SERPL-MCNC: 138 MG/DL (ref 50–200)
CLARITY UR: CLEAR
CO2 SERPL-SCNC: 25 MMOL/L (ref 21–32)
COLOR UR: YELLOW
CREAT SERPL-MCNC: 1.15 MG/DL (ref 0.6–1.3)
EOSINOPHIL # BLD AUTO: 0.17 THOUSAND/ΜL (ref 0–0.61)
EOSINOPHIL NFR BLD AUTO: 5 % (ref 0–6)
ERYTHROCYTE [DISTWIDTH] IN BLOOD BY AUTOMATED COUNT: 14.4 % (ref 11.6–15.1)
GFR SERPL CREATININE-BSD FRML MDRD: 70 ML/MIN/1.73SQ M
GLUCOSE P FAST SERPL-MCNC: 83 MG/DL (ref 65–99)
GLUCOSE UR STRIP-MCNC: NEGATIVE MG/DL
HCT VFR BLD AUTO: 41.4 % (ref 36.5–49.3)
HDLC SERPL-MCNC: 53 MG/DL (ref 40–60)
HGB BLD-MCNC: 14.2 G/DL (ref 12–17)
HGB UR QL STRIP.AUTO: NEGATIVE
HYALINE CASTS #/AREA URNS LPF: ABNORMAL /LPF
KETONES UR STRIP-MCNC: NEGATIVE MG/DL
LDLC SERPL CALC-MCNC: 71 MG/DL (ref 0–100)
LEUKOCYTE ESTERASE UR QL STRIP: NEGATIVE
LYMPHOCYTES # BLD AUTO: 2.02 THOUSANDS/ΜL (ref 0.6–4.47)
LYMPHOCYTES NFR BLD AUTO: 59 % (ref 14–44)
MCH RBC QN AUTO: 30.5 PG (ref 26.8–34.3)
MCHC RBC AUTO-ENTMCNC: 34.3 G/DL (ref 31.4–37.4)
MCV RBC AUTO: 89 FL (ref 82–98)
MONOCYTES # BLD AUTO: 0.31 THOUSAND/ΜL (ref 0.17–1.22)
MONOCYTES NFR BLD AUTO: 9 % (ref 4–12)
NEUTROPHILS # BLD AUTO: 0.91 THOUSANDS/ΜL (ref 1.85–7.62)
NEUTS SEG NFR BLD AUTO: 27 % (ref 43–75)
NITRITE UR QL STRIP: NEGATIVE
NON-SQ EPI CELLS URNS QL MICRO: ABNORMAL /HPF
NRBC BLD AUTO-RTO: 0 /100 WBCS
PH UR STRIP.AUTO: 6 [PH] (ref 4.5–8)
PLATELET # BLD AUTO: 263 THOUSANDS/UL (ref 149–390)
PMV BLD AUTO: 11.1 FL (ref 8.9–12.7)
POTASSIUM SERPL-SCNC: 4 MMOL/L (ref 3.5–5.3)
PROT SERPL-MCNC: 7 G/DL (ref 6.4–8.2)
PROT UR STRIP-MCNC: ABNORMAL MG/DL
PSA SERPL-MCNC: 0.4 NG/ML (ref 0–4)
RBC # BLD AUTO: 4.66 MILLION/UL (ref 3.88–5.62)
RBC #/AREA URNS AUTO: ABNORMAL /HPF
SODIUM SERPL-SCNC: 140 MMOL/L (ref 136–145)
SP GR UR STRIP.AUTO: 1.03 (ref 1–1.03)
TRIGL SERPL-MCNC: 68 MG/DL
UROBILINOGEN UR QL STRIP.AUTO: 0.2 E.U./DL
WBC # BLD AUTO: 3.42 THOUSAND/UL (ref 4.31–10.16)
WBC #/AREA URNS AUTO: ABNORMAL /HPF

## 2017-11-30 PROCEDURE — 36415 COLL VENOUS BLD VENIPUNCTURE: CPT

## 2017-11-30 PROCEDURE — 88112 CYTOPATH CELL ENHANCE TECH: CPT

## 2017-11-30 PROCEDURE — 85025 COMPLETE CBC W/AUTO DIFF WBC: CPT

## 2017-11-30 PROCEDURE — 80053 COMPREHEN METABOLIC PANEL: CPT

## 2017-11-30 PROCEDURE — 80061 LIPID PANEL: CPT

## 2017-11-30 PROCEDURE — 81001 URINALYSIS AUTO W/SCOPE: CPT

## 2017-11-30 PROCEDURE — G0103 PSA SCREENING: HCPCS

## 2017-12-06 ENCOUNTER — ALLSCRIPTS OFFICE VISIT (OUTPATIENT)
Dept: OTHER | Facility: OTHER | Age: 57
End: 2017-12-06

## 2017-12-07 ENCOUNTER — GENERIC CONVERSION - ENCOUNTER (OUTPATIENT)
Dept: OTHER | Facility: OTHER | Age: 57
End: 2017-12-07

## 2018-01-11 NOTE — MISCELLANEOUS
Provider Comments  Provider Comments:   Patient was a no show for his 10:40 am appointment today 7/13/2016        Signatures   Electronically signed by : TRUDI Rush; Jul 13 2016 11:10AM EST                       (Author)    Electronically signed by : JACOBO Church ; Jul 13 2016  3:25PM EST

## 2018-01-12 VITALS
SYSTOLIC BLOOD PRESSURE: 112 MMHG | DIASTOLIC BLOOD PRESSURE: 80 MMHG | BODY MASS INDEX: 25.52 KG/M2 | WEIGHT: 144 LBS | TEMPERATURE: 97.5 F | HEIGHT: 63 IN | HEART RATE: 78 BPM | OXYGEN SATURATION: 99 % | RESPIRATION RATE: 18 BRPM

## 2018-01-12 VITALS
HEART RATE: 84 BPM | HEIGHT: 63 IN | SYSTOLIC BLOOD PRESSURE: 110 MMHG | RESPIRATION RATE: 18 BRPM | TEMPERATURE: 97.6 F | DIASTOLIC BLOOD PRESSURE: 74 MMHG | WEIGHT: 140.31 LBS | BODY MASS INDEX: 24.86 KG/M2 | OXYGEN SATURATION: 98 %

## 2018-01-12 NOTE — PROGRESS NOTES
Assessment    1  Varicocele (456 4) (I86 1)   2  Pain in joint of left shoulder (719 41) (M25 512)    Plan  Pain in joint of left shoulder    · *1 - [facility]  Consult  Status:  Active  Requested for: 57ZSU2544  Care Summary provided  : Yes    Discussion/Summary    Recommend PT of shoulder  I explained to him that PT can evaluate his shoulder and recommend exercises based on exam  I do not recommend MRI at this  I discussed with him that there was a small varicocele on the left  I explained to him what it was  He wants to have this surgically corrected  I discussed with him that it is not often corrected unless fertility is an issue  He will discuss this further with urology next week  The patient was counseled regarding instructions for management  Chief Complaint  follow up testicular pain and review labs      History of Present Illness  HPI: 64 y/o M here for follow up of testicle heaviness  He was found to have a small varicocele on the left shoulder pain  His left shoulder was evaluated by work and they didn't think it was connected to his job  He notes pain in anterior shoulder and has had pain in the shoulder blade in the past  NO numbness and no weakness  He has an appointment with urology next week for testicular pain  He notes it feel uncomfortable  No problem with urination  He feels a deep ache and heavy sensation of both testicles  Hospital Based Practices Required Assessment:   Pain Assessment   the patient states they do not have pain  Abuse And Domestic Violence Screen    Yes, the patient is safe at home  The patient states no one is hurting them  Depression And Suicide Screen  No, the patient has not had thoughts of hurting themself  No, the patient has not felt depressed in the past 7 days  Review of Systems    Constitutional: no fever or chills, feels well, no tiredness, no recent weight loss or weight gain     ENT: no complaints of earache, no loss of hearing, no nosebleeds or nasal discharge, no sore throat or hoarseness  Cardiovascular: no complaints of slow or fast heart rate, no chest pain, no palpitations, no leg claudication or lower extremity edema  Respiratory: no complaints of shortness of breath, no wheezing or cough, no dyspnea on exertion, no orthopnea or PND  Gastrointestinal: no complaints of abdominal pain, no constipation, no nausea or vomiting, no diarrhea or bloody stools  Genitourinary: as noted in HPI  Musculoskeletal: as noted in HPI  Integumentary: no complaints of skin rash or lesion, no itching or dry skin, no skin wounds  Neurological: no complaints of headache, no confusion, no numbness or tingling, no dizziness or fainting  Active Problems    1  Abnormal glucose (790 29) (R73 09)   2  Abnormal weight loss (783 21) (R63 4)   3  Anxiety disorder (300 00) (F41 9)   4  Benign essential hypertension (401 1) (I10)   5  Cervicalgia (723 1) (M54 2)   6  Chest pain (786 50) (R07 9)   7  Chronic constipation (564 00) (K59 00)   8  Cough (786 2) (R05)   9  Fatigue (780 79) (R53 83)   10  Headache (784 0) (R51)   11  Hematuria (599 70) (R31 9)   12  Hypertension (401 9) (I10)   13  Impingement syndrome, shoulder, left (726 2) (M75 42)   14  Laboratory examination ordered as part of a routine general medical examination    (V72 62) (Z00 00)   15  Low back pain (724 2) (M54 5)   16  Lumbar radiculopathy (724 4) (M54 16)   17  Lyme disease (088 81) (A69 20)   18  Medial epicondylitis (726 31) (M77 00)   19  Microscopic hematuria (599 72) (R31 2)   20  Mild vitamin D deficiency (268 9) (E55 9)   21  Need for pneumococcal vaccine (V03 82) (Z23)   22  Need for prophylactic vaccination and inoculation against influenza (V04 81) (Z23)   23  Numbness (782 0) (R20 0)   24  Pain in joint of left shoulder (719 41) (M25 512)   25  Pain in joint of right hip (719 45) (M25 551)   26   Palpitations (785 1) (R00 2)   27  Proteinuria (791 0) (R80 9)   28  Renal calculus, right (592 0) (N20 0)   29  Tension type headache (339 10) (G44 209)   30  Testicle pain (608 9) (N50 8)   31  Tinea corporis (110 5) (B35 4)   32  Upper back pain on left side (724 5) (M54 9)   33  Urinary frequency (788 41) (R35 0)    Family History    1  No pertinent family history    2  Family history of Diabetes Mellitus (V18 0)    3  Family history of Diabetes Mellitus (V18 0)   4  Denied: Family history of arthritis   5  Denied: Family history of malignant neoplasm   6  Denied: Family history of Osteopetrosis    Social History    · Former smoker (Z62 99) (U25 527)    Surgical History    1  History of Diagnostic Cystoscopy   2  Denied: History Of Prior Surgery   3  History of Oral Surgery Tooth Extraction    Current Meds   1  Amitriptyline HCl - 25 MG Oral Tablet; TAKE 1 TABLET DAILY AT BEDTIME; Therapy: 27WYR1673 to (Evaluate:31Jan2016)  Requested for: 41Pug5791; Last   Rx:38Hfm0505 Ordered   2  Blood Pressure Monitor KIT; Check blood pressure regularly  Blood pressure should be   under 140/80; Therapy: 46TZW7733 to (Evaluate:04Nov2016); Last Rx:05Nov2015 Ordered   3  Lisinopril 5 MG Oral Tablet; Take 1 tablet daily; Therapy: 04DQD8157 to (Nikki Plummer)  Requested for: 04MWI5674; Last   Rx:23Nov2015 Ordered   4  Vitamin D3 2000 UNIT Oral Capsule; TAKE 1 CAPSULE DAILY IN THE MORNING; Therapy: 28IPQ1304 to (Evaluate:68Rzq8505)  Requested for: 73EAX4444; Last   Rx:10Jun2015 Ordered    Allergies    1  No Known Drug Allergies    Vitals   Recorded: 12Jan2016 10:16AM   Temperature 97 F   Heart Rate 71   Respiration 18   Systolic 487   Diastolic 70   Height 5 ft 3 in   Weight 138 lb    BMI Calculated 24 45   BSA Calculated 1 65   O2 Saturation 99     Physical Exam    Constitutional   General appearance: No acute distress, well appearing and well nourished  Eyes   Conjunctiva and lids: No swelling, erythema, or discharge      Ears, Nose, Mouth, and Throat   External inspection of ears and nose: Normal     Pulmonary   Respiratory effort: No increased work of breathing or signs of respiratory distress  Auscultation of lungs: Clear to auscultation, equal breath sounds bilaterally, no wheezes, no rales, no rhonci  Cardiovascular   Palpation of heart: Normal PMI, no thrills  Auscultation of heart: Normal rate and rhythm, normal S1 and S2, without murmurs  Lymphatic   Palpation of lymph nodes in neck: No lymphadenopathy  Musculoskeletal   Gait and station: Normal     Inspection/palpation of joints, bones, and muscles: Abnormal   (full rom left shoulder with no tenderness, - empty can and drop arm, - yergasons)   Skin   Skin and subcutaneous tissue: Normal without rashes or lesions      Psychiatric   Orientation to person, place and time: Normal     Mood and affect: Normal          Future Appointments    Date/Time Provider Specialty Site   01/19/2016 09:45 AM Stacey [facility] [address]   12/09/2016 09:45 AM Stacey [facility] [address]     Signatures   Electronically signed by : TRUDI Mcdaniel; Jan 12 2016 11:50AM EST                       (Author)    Electronically signed by : JACOBO Cuadra ; Jan 12 2016 12:44PM EST

## 2018-01-13 VITALS
HEIGHT: 63 IN | OXYGEN SATURATION: 99 % | DIASTOLIC BLOOD PRESSURE: 80 MMHG | SYSTOLIC BLOOD PRESSURE: 112 MMHG | RESPIRATION RATE: 18 BRPM | BODY MASS INDEX: 24.89 KG/M2 | TEMPERATURE: 97 F | WEIGHT: 140.5 LBS | HEART RATE: 89 BPM

## 2018-01-13 VITALS
BODY MASS INDEX: 24.88 KG/M2 | HEART RATE: 70 BPM | WEIGHT: 140.38 LBS | HEIGHT: 63 IN | SYSTOLIC BLOOD PRESSURE: 128 MMHG | DIASTOLIC BLOOD PRESSURE: 76 MMHG

## 2018-01-14 VITALS
HEART RATE: 60 BPM | WEIGHT: 140 LBS | BODY MASS INDEX: 24.8 KG/M2 | SYSTOLIC BLOOD PRESSURE: 118 MMHG | DIASTOLIC BLOOD PRESSURE: 82 MMHG | HEIGHT: 63 IN

## 2018-01-14 VITALS
BODY MASS INDEX: 25.52 KG/M2 | RESPIRATION RATE: 16 BRPM | DIASTOLIC BLOOD PRESSURE: 72 MMHG | TEMPERATURE: 97.9 F | OXYGEN SATURATION: 97 % | SYSTOLIC BLOOD PRESSURE: 116 MMHG | HEIGHT: 63 IN | WEIGHT: 144.06 LBS | HEART RATE: 80 BPM

## 2018-01-14 VITALS
SYSTOLIC BLOOD PRESSURE: 134 MMHG | BODY MASS INDEX: 25.25 KG/M2 | WEIGHT: 142.5 LBS | HEART RATE: 57 BPM | HEIGHT: 63 IN | DIASTOLIC BLOOD PRESSURE: 82 MMHG

## 2018-01-14 VITALS
TEMPERATURE: 96.6 F | RESPIRATION RATE: 18 BRPM | HEART RATE: 72 BPM | OXYGEN SATURATION: 99 % | WEIGHT: 139 LBS | DIASTOLIC BLOOD PRESSURE: 78 MMHG | SYSTOLIC BLOOD PRESSURE: 112 MMHG | HEIGHT: 63 IN | BODY MASS INDEX: 24.63 KG/M2

## 2018-01-14 VITALS
WEIGHT: 146 LBS | DIASTOLIC BLOOD PRESSURE: 90 MMHG | RESPIRATION RATE: 19 BRPM | SYSTOLIC BLOOD PRESSURE: 120 MMHG | HEART RATE: 79 BPM | BODY MASS INDEX: 25.86 KG/M2

## 2018-01-15 NOTE — MISCELLANEOUS
Provider Comments  Provider Comments:   Patient was a now show for his appointment  Signatures   Electronically signed by : TRUDI Pablo;  Aug 31 2016 11:58AM EST                       (Author)

## 2018-01-17 NOTE — PROGRESS NOTES
Assessment    1  Encounter for preventive health examination (V70 0) (Z00 00)   2  Atypical chest pain (786 59) (R07 89)   3  Benign essential hypertension (401 1) (I10)    Plan  Atypical chest pain    · EKG/ECG- POC; Status:Complete - Retrospective By Protocol Authorization;   Done:  09ELA0597 11:44AM    Discussion/Summary  health maintenance visit eats an adequate diet Currently, he has an inadequate exercise regimen  Prostate cancer screening: PSA was ordered  Testicular cancer screening: testicular cancer screening is current  Colorectal cancer screening: the next colonoscopy is due 5/2019  Screening lab work includes glucose and lipid profile  Advice and education were given regarding nutrition and aerobic exercise  EKG normal and likely due to anxiety  Discussed diet changes and making sure to exercise  Possible side effects of new medications were reviewed with the patient/guardian today  The treatment plan was reviewed with the patient/guardian  The patient/guardian understands and agrees with the treatment plan     Self Referrals: No      Chief Complaint  Patient presents today for his yearly physical       History of Present Illness  HM, Adult Male: The patient is being seen for a health maintenance evaluation  The last health maintenance visit was 1 year(s) ago  General Health: The patient's health since the last visit is described as good  He has regular dental visits  He denies vision problems  He denies hearing loss  Immunizations status: up to date  Lifestyle:  He does not have a healthy diet  He does not have any weight concerns  He does not exercise regularly  He does not use tobacco  He denies alcohol use  He denies drug use  Screening: cancer screening reviewed and current  metabolic screening reviewed and current  risk screening reviewed and current  HPI: 60 y/o M here for annual physical  C/o chest pain and wants to get his heart checked   He has had this chest pain for many years and it is unchanged  Stress tests, EKGs and ECHOs all normal      Review of Systems    Constitutional: No fever or chills, feels well, no tiredness, no recent weight gain or weight loss  Eyes: No complaints of eye pain, no red eyes, no discharge from eyes, no itchy eyes  ENT: no complaints of earache, no hearing loss, no nosebleeds, no nasal discharge, no sore throat, no hoarseness  Cardiovascular: No complaints of slow heart rate, no fast heart rate, no chest pain, no palpitations, no leg claudication, no lower extremity  Respiratory: No complaints of shortness of breath, no wheezing, no cough, no SOB on exertion, no orthopnea or PND  Gastrointestinal: No complaints of abdominal pain, no constipation, no nausea or vomiting, no diarrhea or bloody stools  Genitourinary: No complaints of dysuria, no incontinence, no hesitancy, no nocturia, no genital lesion, no testicular pain  Musculoskeletal: No complaints of arthralgia, no myalgias, no joint swelling or stiffness, no limb pain or swelling  Integumentary: a rash, but No complaints of skin rash or skin lesions, no itching, no skin wound, no dry skin  Neurological: No compliants of headache, no confusion, no convulsions, no numbness or tingling, no dizziness or fainting, no limb weakness, no difficulty walking  Psychiatric: Is not suicidal, no sleep disturbances, no anxiety or depression, no change in personality, no emotional problems  Endocrine: No complaints of proptosis, no hot flashes, no muscle weakness, no erectile dysfunction, no deepening of the voice, no feelings of weakness  Hematologic/Lymphatic: No complaints of swollen glands, no swollen glands in the neck, does not bleed easily, no easy bruising  Active Problems    1  Abnormal glucose (790 29) (R73 09)   2  Abnormal weight loss (783 21) (R63 4)   3  Anxiety disorder (300 00) (F41 9)   4  Atypical chest pain (786 59) (R07 89)   5  Benign colon polyp (211 3) (K63 5)   6  Benign essential hypertension (401 1) (I10)   7  Contact dermatitis (692 9) (L25 9)   8  Depression screening (V79 0) (Z13 89)   9  Encounter for prostate cancer screening (V76 44) (Z12 5)   10  Enlarged prostate without lower urinary tract symptoms (luts) (600 00) (N40 0)   11  Hematuria (599 70) (R31 9)   12  History of chronic constipation (V12 79) (Z87 19)   13  History of neck pain (V13 59) (Z87 39)   14  Hypertension (401 9) (I10)   15  Impingement syndrome, shoulder, left (726 2) (M75 42)   16  Laboratory examination ordered as part of a routine general medical examination    (V72 62) (Z00 00)   17  Left shoulder pain (719 41) (M25 512)   18  Lyme disease (088 81) (A69 20)   19  Medial epicondylitis (726 31) (M77 00)   20  Microscopic hematuria (599 72) (R31 29)   21  Mild vitamin D deficiency (268 9) (E55 9)   22  Need for influenza vaccination (V04 81) (Z23)   23  Need for pneumococcal vaccine (V03 82) (Z23)   24  Need for prophylactic vaccination and inoculation against influenza (V04 81) (Z23)   25  Numbness (782 0) (R20 0)   26  Numbness and tingling in left upper extremity (782 0) (R20 0,R20 2)   27  Otitis externa of left ear (380 10) (H60 92)   28  Pain in joint of left shoulder (719 41) (M25 512)   29  Pain in joint of right hip (719 45) (M25 551)   30  Palpitations (785 1) (R00 2)   31  Proteinuria (791 0) (R80 9)   32  Pustular folliculitis (300 4) (R52 37)   33  Renal calculus, right (592 0) (N20 0)   34  Skin rash (782 1) (R21)   35  Suprapubic pressure (789 09) (R10 2)   36  Tension type headache (339 10) (G44 209)   37  Testicle pain (608 9) (N50 819)   38  Tinea corporis (110 5) (B35 4)   39  Urinary frequency (788 41) (R35 0)   40  Urinary hesitancy (788 64) (R39 11)   41   Varicocele (456 4) (I86 1)    Past Medical History    · History of chronic constipation (V12 79) (Z87 19)   · History of neck pain (V13 59) (Z87 39)    Surgical History    · History of Diagnostic Cystoscopy   · Denied: History Of Prior Surgery   · History of Oral Surgery Tooth Extraction    Family History  Mother    · Denied: Family history of substance abuse   · No family history of mental disorder  Father    · Denied: Family history of substance abuse   · No family history of mental disorder  Paternal Grandmother    · Family history of Diabetes Mellitus (V18 0)  Family History    · Family history of Diabetes Mellitus (V18 0)   · Denied: Family history of arthritis   · Denied: Family history of malignant neoplasm   · Denied: Family history of Osteopetrosis    Social History    · Former smoker (V15 82) (U39 733)   · No alcohol use   · No drug use   · No preference on Spiritism beliefs   · Social history reviewed, unchanged    Current Meds   1  Betamethasone Dipropionate 0 05 % External Cream; APPLY SPARINGLY TO   AFFECTED AREA(S) TWICE DAILY; Therapy: 27VCQ1801 to (Evaluate:89Vpn4789)  Requested for: 97WPF7367; Last   Rx:23Yri4208 Ordered   2  HydrOXYzine HCl - 25 MG Oral Tablet; TAKE 1 TABLET 3 TO 4 TIMES DAILY AS   NEEDED FOR ITCHING; Therapy: 23JTG7773 to (Evaluate:30Nov2017)  Requested for: 09TQV4243; Last   Rx:14Nov2017 Ordered   3  Lisinopril 5 MG Oral Tablet; Take 1 tablet daily; Therapy: 53KOE6642 to (Evaluate:11Nov2017)  Requested for: 26ODP5391; Last   Rx:94Jar6896 Ordered   4  Mupirocin 2 % External Ointment; APPLY SPARINGLY TO AFFECTED AREA(S) 3 TIMES   A DAY FOR 10 DAYS; Therapy: 75SEN6312 to (Ama Escobedo)  Requested for: 20FLH4251; Last   Rx:27Oct2017 Ordered   5  Naproxen 500 MG Oral Tablet; TAKE 1 TABLET TWICE DAILY AFTER MEALS AS   NEEDED; Therapy: 09Gvy1051 to (Evaluate:80Pvf0373)  Requested for: 90Suk5055; Last   Rx:88Jmi9896 Ordered    Allergies    1   No Known Drug Allergies    Vitals   Recorded: 30TGK3830 11:01AM   Temperature 98 2 F   Heart Rate 84   Respiration 18   Systolic 907   Diastolic 64   Height 5 ft 3 in   Weight 134 lb 7 oz   BMI Calculated 23 81   BSA Calculated 1 63   O2 Saturation 97     Physical Exam    Constitutional   General appearance: No acute distress, well appearing and well nourished  Eyes   Conjunctiva and lids: No swelling, erythema, or discharge  Pupils and irises: Equal, round and reactive to light  Ears, Nose, Mouth, and Throat   External inspection of ears and nose: Normal     Otoscopic examination: Tympanic membrance translucent with normal light reflex  Canals patent without erythema  Oropharynx: Normal with no erythema, edema, exudate or lesions  Pulmonary   Respiratory effort: No increased work of breathing or signs of respiratory distress  Auscultation of lungs: Clear to auscultation  Cardiovascular   Palpation of heart: Normal PMI, no thrills  Auscultation of heart: Normal rate and rhythm, normal S1 and S2, without murmurs  Examination of extremities for edema and/or varicosities: Normal     Abdomen   Abdomen: Non-tender, no masses  Liver and spleen: No hepatomegaly or splenomegaly  Lymphatic   Palpation of lymph nodes in neck: No lymphadenopathy  Musculoskeletal   Gait and station: Normal     Digits and nails: Normal without clubbing or cyanosis  Inspection/palpation of joints, bones, and muscles: Normal     Skin   Skin and subcutaneous tissue: Normal without rashes or lesions  Neurologic   Cranial nerves: Cranial nerves 2-12 intact  Sensation: No sensory loss  Psychiatric   Orientation to person, place and time: Normal     Mood and affect: Normal        Results/Data  EKG/ECG- POC 00MOB6592 11:44AM Torsten Scott     Test Name Result Flag Reference   EKG/ECG 11/21/2017       A 12 lead ECG was performed and was normal  No acute ischemia  Rhythm and rate: normal sinus rhythm        Future Appointments    Date/Time Provider Specialty Site   07/11/2018 09:45 AM [facility]uro Shilo, [address] [facility]     Signatures   Electronically signed by : TRUDI Paz; Nov 21 2017 12:55PM EST (Author)    Electronically signed by : JACOBO Danielle ; Nov 21 2017  1:55PM EST

## 2018-01-21 PROBLEM — M25.512 LEFT SHOULDER PAIN: Status: ACTIVE | Noted: 2017-06-07

## 2018-01-21 PROBLEM — K63.5 BENIGN COLON POLYP: Status: ACTIVE | Noted: 2017-03-24

## 2018-01-22 VITALS
BODY MASS INDEX: 23.82 KG/M2 | HEIGHT: 63 IN | OXYGEN SATURATION: 97 % | TEMPERATURE: 98.2 F | HEART RATE: 84 BPM | SYSTOLIC BLOOD PRESSURE: 112 MMHG | DIASTOLIC BLOOD PRESSURE: 64 MMHG | WEIGHT: 134.44 LBS | RESPIRATION RATE: 18 BRPM

## 2018-01-22 VITALS
SYSTOLIC BLOOD PRESSURE: 110 MMHG | RESPIRATION RATE: 18 BRPM | HEIGHT: 63 IN | HEART RATE: 78 BPM | DIASTOLIC BLOOD PRESSURE: 64 MMHG | WEIGHT: 138 LBS | OXYGEN SATURATION: 99 % | TEMPERATURE: 96.7 F | BODY MASS INDEX: 24.45 KG/M2

## 2018-01-22 VITALS
TEMPERATURE: 97.8 F | BODY MASS INDEX: 24.1 KG/M2 | HEART RATE: 78 BPM | RESPIRATION RATE: 18 BRPM | DIASTOLIC BLOOD PRESSURE: 64 MMHG | SYSTOLIC BLOOD PRESSURE: 112 MMHG | HEIGHT: 63 IN | WEIGHT: 136 LBS | OXYGEN SATURATION: 100 %

## 2018-01-23 NOTE — MISCELLANEOUS
SECOND NO-SHOW WARNING LETTER  GEMA AVI DE "NO-SHOW"  Dear Rod Sonoma :    Robbi Jacome have had _2_ No-Show appointments at our office  10/07/16 340PM, 12/06/17 1040AM   A No-Show is defined as any patient who fails to arrive for a scheduled appointment without canceling the appointment prior to the scheduled time  A patient who has more than  THREE No-Shows may be dismissed from an [address] [place] [address] practice  Please call in advance to cancel appointments  If you continue to No-Show for scheduled appointments, you may be dismissed from our practice  Thank You  Usted ha tenido _ 2_ No-Show citas en nuestra oficina 10/07/16 340PM, 12/06/17 1040AM  Un No-Show se define melvin cualquier paciente que no llega a aggie najma programada sin cancelar  la najma antes de la hora programada  Un paciente que tiene más de KRISTINA No-Show puede ser despedido de un [facility] práctica  Por favor llame  con anticipación para cancelar citas  Si continúa la "No-Show" para las citas programadas, usted puede ser despedido de nuestra práctica  Riley

## 2018-01-24 ENCOUNTER — OFFICE VISIT (OUTPATIENT)
Dept: FAMILY MEDICINE CLINIC | Facility: CLINIC | Age: 58
End: 2018-01-24
Payer: COMMERCIAL

## 2018-01-24 VITALS
RESPIRATION RATE: 18 BRPM | BODY MASS INDEX: 25.23 KG/M2 | TEMPERATURE: 97.5 F | SYSTOLIC BLOOD PRESSURE: 108 MMHG | HEIGHT: 63 IN | OXYGEN SATURATION: 98 % | HEART RATE: 80 BPM | DIASTOLIC BLOOD PRESSURE: 64 MMHG | WEIGHT: 142.4 LBS

## 2018-01-24 DIAGNOSIS — M25.562 ACUTE PAIN OF BOTH KNEES: Primary | ICD-10-CM

## 2018-01-24 DIAGNOSIS — M25.561 ACUTE PAIN OF BOTH KNEES: Primary | ICD-10-CM

## 2018-01-24 DIAGNOSIS — R51.9 ACUTE NONINTRACTABLE HEADACHE, UNSPECIFIED HEADACHE TYPE: ICD-10-CM

## 2018-01-24 PROCEDURE — 99214 OFFICE O/P EST MOD 30 MIN: CPT | Performed by: PHYSICIAN ASSISTANT

## 2018-01-24 RX ORDER — MELOXICAM 15 MG/1
15 TABLET ORAL DAILY
Qty: 30 TABLET | Refills: 1 | Status: SHIPPED | OUTPATIENT
Start: 2018-01-24 | End: 2018-04-05

## 2018-01-24 NOTE — PROGRESS NOTES
Assessment/Plan:    No problem-specific Assessment & Plan notes found for this encounter  Problem List Items Addressed This Visit     Acute pain of both knees - Primary    Relevant Medications    meloxicam (MOBIC) 15 mg tablet    Other Relevant Orders    Ambulatory referral to Physical Therapy            Subjective:      Patient ID: Bill John is a 61 y o  male      HPI    ***    Review of Systems      Objective:     Physical Exam

## 2018-01-24 NOTE — PATIENT INSTRUCTIONS
Start physical therapy for knee pain  Take meloxicam daily if having pain    Recommend eye exam and if vision is good we will have you see neurology for headaches

## 2018-01-24 NOTE — PROGRESS NOTES
Assessment/Plan:     No problem-specific Assessment & Plan notes found for this encounter  Diagnoses and all orders for this visit:    Acute pain of both knees  -     Ambulatory referral to Physical Therapy; Future  -     meloxicam (MOBIC) 15 mg tablet; Take 1 tablet by mouth daily for 30 days    Acute nonintractable headache, unspecified headache type          Subjective:      Patient ID: La Wilson is a 61 y o  male  62 y/o M here for knee pain and headache  Both have been going on for 1 month  Pain is knees is bilateral and daily  No injury  No swelling  Pain feeels equal in both knees  He has not tried anything for pain  He is worried because he gets kicked often in soccer  He also notes that he has headache in the back of his head for 1 month  Headache is daily and constant  NO meds tried  Headache radiates from occipital area to right eye  No photophobia, n/v   No vision changes  He does wear glasses and last eye exam 9/16  He did have an old head injury but has also had normal studies after this  Knee Pain    There was no injury mechanism  The pain is present in the right knee and left knee  The quality of the pain is described as aching  The pain is at a severity of 7/10  The pain is moderate  The pain has been constant since onset  Pertinent negatives include no inability to bear weight, loss of motion, loss of sensation, muscle weakness, numbness or tingling  The symptoms are aggravated by movement and weight bearing  He has tried nothing for the symptoms  Headache    This is a new problem  The current episode started more than 1 month ago  The problem occurs constantly  The problem has been unchanged  The pain is located in the occipital and bilateral region  Radiates to: Right retroobital area  The pain quality is not similar to prior headaches  The quality of the pain is described as boring and aching  The pain is at a severity of 7/10  The pain is moderate   Associated symptoms include eye pain, neck pain and scalp tenderness  Pertinent negatives include no coughing, fever, nausea, numbness, phonophobia, photophobia, sinus pressure, tingling, visual change or vomiting  Nothing aggravates the symptoms  He has tried nothing for the symptoms  His past medical history is significant for hypertension  The following portions of the patient's history were reviewed and updated as appropriate:   He  has a past medical history of History of chronic constipation; History of neck pain (11/21/2017); and Hypertension  He  does not have any pertinent problems on file  He  has a past surgical history that includes Cystoscopy (05/29/2014) and Tooth extraction  His family history includes Arthritis in his family; Cancer in his family and sister; Diabetes in his family and paternal grandmother  He  reports that he has quit smoking  He does not have any smokeless tobacco history on file  He reports that he drinks alcohol  He reports that he does not use drugs  Current Outpatient Prescriptions   Medication Sig Dispense Refill    lisinopril (ZESTRIL) 5 mg tablet Take 5 mg by mouth daily   meloxicam (MOBIC) 15 mg tablet Take 1 tablet by mouth daily for 30 days 30 tablet 1    naproxen (EC NAPROSYN) 500 MG EC tablet Take 1 tablet by mouth 2 (two) times a day with meals for 7 days 14 tablet 0     No current facility-administered medications for this visit  Current Outpatient Prescriptions on File Prior to Visit   Medication Sig    lisinopril (ZESTRIL) 5 mg tablet Take 5 mg by mouth daily   naproxen (EC NAPROSYN) 500 MG EC tablet Take 1 tablet by mouth 2 (two) times a day with meals for 7 days     No current facility-administered medications on file prior to visit  He has No Known Allergies       Review of Systems   Constitutional: Negative for chills, fever and unexpected weight change  HENT: Negative for congestion and sinus pressure  Eyes: Positive for pain   Negative for photophobia  Respiratory: Negative for cough  Cardiovascular: Positive for chest pain  Gastrointestinal: Negative for nausea and vomiting  Genitourinary: Positive for testicular pain  Musculoskeletal: Positive for neck pain  Negative for joint swelling  Shoulder pain   Neurological: Positive for headaches  Negative for tingling and numbness  Objective:     Physical Exam   Constitutional: He is oriented to person, place, and time  He appears well-developed and well-nourished  HENT:   Head: Normocephalic and atraumatic  Eyes: Conjunctivae and EOM are normal  Pupils are equal, round, and reactive to light  Neck: Normal range of motion  Neck supple  No thyromegaly present  Cardiovascular: Normal rate, regular rhythm and normal heart sounds  No murmur heard  Pulmonary/Chest: Effort normal and breath sounds normal    Musculoskeletal: Normal range of motion  He exhibits tenderness  Right knee: He exhibits normal range of motion, no swelling, no effusion, no deformity and no bony tenderness  Tenderness found  Medial joint line tenderness noted  Left knee: He exhibits normal range of motion, no swelling, no effusion, no ecchymosis, no deformity and normal alignment  No tenderness found  Lymphadenopathy:     He has no cervical adenopathy  Neurological: He is alert and oriented to person, place, and time  Skin: Skin is warm  Psychiatric: He has a normal mood and affect  His behavior is normal  Judgment and thought content normal    Nursing note and vitals reviewed

## 2018-01-24 NOTE — PROGRESS NOTES
Assessment    1  Testicle pain (608 9) (N50 819)   2  Suprapubic pressure (789 09) (R10 2)    Plan  Suprapubic pressure    · US KIDNEY AND BLADDER WITH PVR; Status:Active; Requested DGI:[id number];    Perform:[facility]; Order Comments:and b/l jets please; Due:18Jun2017;Ordered; For:Suprapubic pressure; Ordered By:Giovanni Escobar;   · Follow-up visit in 2 weeks Evaluation and Treatment  Follow-up  Status: Hold For -  Scheduling  Requested for: 58WXQ3895   Ordered; For: Suprapubic pressure; Ordered By: Adis To Performed:  Due: 56CJH1535  Testicle pain    · Urine Dip Non-Automated- POC; Status:Complete - Retrospective By Protocol  Authorization;   Done: 40MDM3874 08:31AM   Performed: In Office; Due:13Jun2018; Last Updated By:Chris Mccullough; 6/13/2017 8:32:48 AM;Ordered; For:Testicle pain; Ordered By:Giovanni Escobar; Discussion/Summary  Discussion Summary:   Deena Villasenor is a 61year old gentleman here for suprapubic discomfort managed by Dr Jerson Upton  Minimal discomfort with varicocele, only with touching  Advised compression, warm soaks and NSAIDs  Increase water intake  UA does not demonstrate infection  He will proceed with an ultrasound of the kidney and bladder  If stones or other concerns we may need to perform CT scan  He will follow up in 1-2 weeks to discuss the results  He knows to call with any problems or concerns  Chief Complaint  Chief Complaint Free Text Note Form: patient presents for testicular pain; h/o L varicocele      History of Present Illness  HPI: Deena Villasenor is a 61year old gentleman who presents for groin pain  He complains of urgency, dysuria and incomplete bladder emptying  He states that he has discomfort around his bladder constantly for the past 2-3 months  Denies any hematuria  Stream is fair to poor  Does complains of some leakage of urine  Denies any nocturia  Still has some intermittent scrotal pain  No interval health changes        Review of Systems  Complete-Male Urology:   Constitutional: No fever or chills, feels well, no tiredness, no recent weight gain or weight loss  Respiratory: No complaints of shortness of breath, no wheezing, no cough, no SOB on exertion, no orthopnea or PND  Cardiovascular: No complaints of slow heart rate, no fast heart rate, no chest pain, no palpitations, no leg claudication, no lower extremity  Gastrointestinal: No complaints of abdominal pain, no constipation, no nausea or vomiting, no diarrhea or bloody stools  Genitourinary: dysuria, urinary hesitancy, incontinence, feelings of urinary urgency and stream quality fair, but No complaints of dysuria, no incontinence, no hesitancy, no nocturia, no genital lesion, no testicular pain, no hematuria, no empty sensation and no nocturia  Musculoskeletal: No complaints of arthralgia, no myalgias, no joint swelling or stiffness, no limb pain or swelling  Integumentary: No complaints of skin rash or skin lesions, no itching, no skin wound, no dry skin  Hematologic/Lymphatic: No complaints of swollen glands, no swollen glands in the neck, does not bleed easily, no easy bruising  Neurological: No compliants of headache, no confusion, no convulsions, no numbness or tingling, no dizziness or fainting, no limb weakness, no difficulty walking  ROS Reviewed:   ROS reviewed  Active Problems    1  Abnormal glucose (790 29) (R73 09)   2  Abnormal weight loss (783 21) (R63 4)   3  Acute bronchitis (466 0) (J20 9)   4  Anxiety disorder (300 00) (F41 9)   5  Atypical chest pain (786 59) (R07 89)   6  Benign colon polyp (211 3) (K63 5)   7  Benign essential hypertension (401 1) (I10)   8  Benign positional vertigo (386 11) (H81 10)   9  Bilateral hearing loss (389 9) (H91 93)   10  Bilateral impacted cerumen (380 4) (H61 23)   11  Cervicalgia (723 1) (M54 2)   12  Chronic constipation (564 00) (K59 09)   13  Cough (786 2) (R05)   14  Depression screening (V79 0) (Z13 89)   15   Dyspnea on exertion (786 09) (R06 09)   16  Dysuria (788 1) (R30 0)   17  Encounter for prostate cancer screening (V76 44) (Z12 5)   18  Enlarged prostate without lower urinary tract symptoms (luts) (600 00) (N40 0)   19  Fatigue (780 79) (R53 83)   20  Hamstring strain (843 8) (S76 319A)   21  Headache (784 0) (R51)   22  Hematuria (599 70) (R31 9)   23  Hypertension (401 9) (I10)   24  Impingement syndrome, shoulder, left (726 2) (M75 42)   25  Laboratory examination ordered as part of a routine general medical examination    (V72 62) (Z00 00)   26  Left shoulder pain (719 41) (M25 512)   27  Low back pain (724 2) (M54 5)   28  Lumbar radiculopathy (724 4) (M54 16)   29  Lyme disease (088 81) (A69 20)   30  Medial epicondylitis (726 31) (M77 00)   31  Microscopic hematuria (599 72) (R31 29)   32  Mild vitamin D deficiency (268 9) (E55 9)   33  Need for influenza vaccination (V04 81) (Z23)   34  Need for pneumococcal vaccine (V03 82) (Z23)   35  Need for prophylactic vaccination and inoculation against influenza (V04 81) (Z23)   36  Numbness (782 0) (R20 0)   37  Numbness and tingling in left upper extremity (782 0) (R20 0,R20 2)   38  Pain in joint of left shoulder (719 41) (M25 512)   39  Pain in joint of right hip (719 45) (M25 551)   40  Palpitations (785 1) (R00 2)   41  Proteinuria (791 0) (R80 9)   42  Renal calculus, right (592 0) (N20 0)   43  Tension type headache (339 10) (G44 209)   44  Testicle pain (608 9) (N50 819)   45  Tinea corporis (110 5) (B35 4)   46  Upper back pain on left side (724 5) (M54 9)   47  Urinary frequency (788 41) (R35 0)   48  Urinary hesitancy (788 64) (R39 11)   49  Varicocele (456 4) (I86 1)    Past Medical History  Active Problems And Past Medical History Reviewed: The active problems and past medical history were reviewed and updated today  Surgical History    1  History of Diagnostic Cystoscopy   2  Denied: History Of Prior Surgery   3   History of Oral Surgery Tooth Extraction  Surgical History Reviewed: The surgical history was reviewed and updated today  Family History  Mother    1  Denied: Family history of substance abuse   2  No family history of mental disorder  Father    3  Denied: Family history of substance abuse   4  No family history of mental disorder  Paternal Grandmother    11  Family history of Diabetes Mellitus (V18 0)  Family History    6  Family history of Diabetes Mellitus (V18 0)   7  Denied: Family history of arthritis   8  Denied: Family history of malignant neoplasm   9  Denied: Family history of Osteopetrosis  Family History Reviewed: The family history was reviewed and updated today  Social History    · Former smoker (J15 20) (I15 997)   · No alcohol use   · No drug use   · No preference on Judaism beliefs   · Social history reviewed, unchanged  Social History Reviewed: The social history was reviewed and updated today  The social history was reviewed and is unchanged  Current Meds   1  Lisinopril 5 MG Oral Tablet; Take 1 tablet daily; Therapy: [id number] to (Evaluate:11Nov2017)  Requested for: 99XCU8418; Last   Rx:[id number] Ordered  Medication List Reviewed: The medication list was reviewed and updated today  Allergies    1  No Known Drug Allergies    Vitals  Vital Signs    Recorded: 13Jun2017 08:29AM   Heart Rate 60   Systolic 320   Diastolic 82   Height 5 ft 3 in   Weight 140 lb    BMI Calculated 24 8   BSA Calculated 1 66     Physical Exam    Constitutional   General appearance: No acute distress, well appearing and well nourished  Head and Face   Head and face: Normal     Eyes   Conjunctiva and lids: No erythema, swelling or discharge  Ears, Nose, Mouth, and Throat   Hearing: Normal     Pulmonary   Respiratory effort: No increased work of breathing or signs of respiratory distress  Cardiovascular   Examination of extremities for edema and/or varicosities: Normal     Abdomen   Abdomen: Non-tender, no masses  Examination for hernias: No hernias appreciated  Genitourinary   Scrotum contents: Normal size, no masses  Epididymis: Normal, no masses  Testes: Normal testes, no masses  Urethral meatus: Normal, no lesions  Penis: Normal, no lesions  uncircumcised  Musculoskeletal   Gait and station: Normal     Skin   Skin and subcutaneous tissue: Normal without rashes or lesions  Neurologic   Cranial nerves: Cranial nerves 2-12 intact  Psychiatric   Judgment and insight: Normal     Orientation to person, place and time: Normal     Recent and remote memory: Intact  Mood and affect: Normal        Results/Data  AUA Symptom Score 16YWY0008 08:25AM User, s     Test Name Result Flag Reference   AUA Symptom Score (for prostate disease) 25     Incomplete emptying: Almost always (5)  Frequency: Almost always (5)  Intermittency: Not at all (0)  Urgency: Almost always (5)  Weak-stream: Almost always (5)  Straining: Almost always (5)  Nocturia: Not at all (0)   AUA Symptom Score (for prostate disease) - Quality of Life Due to Urinary Symptoms Unhappy     AUA Symptom Score (for prostate disease) - Score Category Severe         Procedure    Procedure: Bladder Ultrasound Post Void Residual    Test indication: urinary frequency and dysuria  The procedure's were discussed with the patient  Equipment And Procedure: The patient voided  U/S Findings: bladder scan = 17ml  Future Appointments    Date/Time Provider Specialty Site   07/26/2017 09:10 AM JACOBO Clemons   Orthopedic Surgery [facility]   12/04/2017 01:15 PM Darwin Paz [facility] [address]     Signatures   Electronically signed by : Oliverio Vincent [facility]; Jun 13 2017  8:51AM EST                       (Author)    Electronically signed by : JACOBO Bedoya ; Jun 14 2017 11:19PM EST

## 2018-02-09 ENCOUNTER — OFFICE VISIT (OUTPATIENT)
Dept: FAMILY MEDICINE CLINIC | Facility: CLINIC | Age: 58
End: 2018-02-09
Payer: COMMERCIAL

## 2018-02-09 VITALS
OXYGEN SATURATION: 97 % | SYSTOLIC BLOOD PRESSURE: 98 MMHG | BODY MASS INDEX: 26.15 KG/M2 | DIASTOLIC BLOOD PRESSURE: 70 MMHG | TEMPERATURE: 97.6 F | HEIGHT: 63 IN | HEART RATE: 70 BPM | RESPIRATION RATE: 18 BRPM | WEIGHT: 147.6 LBS

## 2018-02-09 DIAGNOSIS — I10 BENIGN ESSENTIAL HYPERTENSION: Primary | ICD-10-CM

## 2018-02-09 PROCEDURE — 3074F SYST BP LT 130 MM HG: CPT | Performed by: PHYSICIAN ASSISTANT

## 2018-02-09 PROCEDURE — 99213 OFFICE O/P EST LOW 20 MIN: CPT | Performed by: PHYSICIAN ASSISTANT

## 2018-02-09 PROCEDURE — 3078F DIAST BP <80 MM HG: CPT | Performed by: PHYSICIAN ASSISTANT

## 2018-02-09 NOTE — PROGRESS NOTES
Assessment/Plan:    No problem-specific Assessment & Plan notes found for this encounter  Problem List Items Addressed This Visit     Benign essential hypertension - Primary          Blood pressure controlled  Could have been inaccurate pharmacy reading  Recommend increasing sleep at nighttime for fatigue  Subjective:      Patient ID: Arnold Gallagher is a 61 y o  male  60 y/o M here for follow up of HTN  He took BP yesterday at a pharmacy and it was elevated 170/90s  He did not feel any symptoms at the time  He did not feel anxiety or anger at the time  No diet changes  He notes he does feel tired all the time  Sleeping 4 5-5 hours  He sleeps well at night and does not snore  When he gets tired it is typicaly after work when he is comfortable and he takes a nap  The following portions of the patient's history were reviewed and updated as appropriate:   He  has a past medical history of History of chronic constipation; History of neck pain (11/21/2017); and Hypertension  He  does not have any pertinent problems on file  He  has a past surgical history that includes Cystoscopy (05/29/2014) and Tooth extraction  His family history includes Arthritis in his family; Cancer in his family and sister; Diabetes in his family and paternal grandmother  He  reports that he has quit smoking  He has never used smokeless tobacco  He reports that he drinks alcohol  He reports that he does not use drugs  Current Outpatient Prescriptions   Medication Sig Dispense Refill    lisinopril (ZESTRIL) 5 mg tablet Take 5 mg by mouth daily   meloxicam (MOBIC) 15 mg tablet Take 1 tablet by mouth daily for 30 days 30 tablet 1    naproxen (EC NAPROSYN) 500 MG EC tablet Take 1 tablet by mouth 2 (two) times a day with meals for 7 days 14 tablet 0     No current facility-administered medications for this visit        Current Outpatient Prescriptions on File Prior to Visit   Medication Sig    lisinopril (ZESTRIL) 5 mg tablet Take 5 mg by mouth daily   meloxicam (MOBIC) 15 mg tablet Take 1 tablet by mouth daily for 30 days    naproxen (EC NAPROSYN) 500 MG EC tablet Take 1 tablet by mouth 2 (two) times a day with meals for 7 days     No current facility-administered medications on file prior to visit  He has No Known Allergies       Review of Systems   Constitutional: Positive for fatigue  Negative for activity change, appetite change, fever and unexpected weight change  HENT: Negative for dental problem, ear pain, hearing loss and sore throat  Eyes: Negative for visual disturbance  Respiratory: Negative for cough and wheezing  Gastrointestinal: Negative for abdominal pain, constipation, diarrhea and vomiting  Genitourinary: Negative for difficulty urinating and dysuria  Musculoskeletal: Negative for arthralgias, back pain and myalgias  Skin: Negative for rash  Neurological: Negative for dizziness and headaches  Psychiatric/Behavioral: Negative for behavioral problems  Objective:    Vitals:    02/09/18 1452   BP: 98/70   Pulse: 70   Resp: 18   Temp: 97 6 °F (36 4 °C)   SpO2: 97%        Physical Exam   Constitutional: He is oriented to person, place, and time  He appears well-developed and well-nourished  HENT:   Head: Normocephalic and atraumatic  Eyes: Conjunctivae are normal  Pupils are equal, round, and reactive to light  Neck: Normal range of motion  Neck supple  Cardiovascular: Normal rate and regular rhythm  Pulmonary/Chest: Effort normal and breath sounds normal    Neurological: He is alert and oriented to person, place, and time  Skin: Skin is warm and dry  Psychiatric: He has a normal mood and affect  His behavior is normal    Nursing note and vitals reviewed

## 2018-02-09 NOTE — PATIENT INSTRUCTIONS
Insomnio   LO QUE NECESITA SABER:   El insomnio es aggie afección que hace difícil conciliar el sueño o mantenerse dormido  La falta de sueño puede conllevar a problemas de atención o de la memoria demond el día  Usted también podría estar de malhumor, deprimido, torpe o tener fe de [place]  INSTRUCCIONES SOBRE EL KARLA HOSPITALARIA:   Pregúntele a harrison [place] vitaminas y minerales son adecuados para usted  · Berenice síntomas no mejoran o [place]  · Lanora Nestle a usar drogas o alcohol para conciliar el sueño  · Usted tiene preguntas o inquietudes acerca de harrison condición o cuidado  Medicamentos:   · Medicamentos,  podrían ayudarle a dormir con más regularidad o ayudarlo a sentirse menos ansioso  · [place] berenice medicamentos melvin se le haya indicado  Consulte con harrison médico si usted jade que harrison medicamento no le está ayudando o si presenta efectos secundarios  Infórmele si es alérgico a cualquier medicamento  Mantenga aggie lista actualizada de los [place], las vitaminas y los productos herbales que yogi  Incluya los siguientes datos de los medicamentos: cantidad, frecuencia y motivo de administración  Traiga con usted la lista o los envases de la píldoras a berenice citas de seguimiento  Lleve la lista de los medicamentos con usted en gail de aggie emergencia  Qué puede hacer para mejorar el sueño:   · Establezca un horario para dormir  [place] le ayudará a formar aggie rutina de sueño  Mantenga un registro de los patrones de sueño y cualquier problema para dormir que tenga  Lleve el registro a las citas de seguimiento con berenice médicos  · No tome siestas  Las siestas podrían dificultarle que se quede dormido a la hora de acostarse por la noche  · Mantenga harrison habitación fresca, sin ruidos y [place]  [facility] un ruido o fab reese melvin el del ventilador, para que lo relaje  No utilice harrison cama para ninguna actividad que lo mantenga despierto   No eze ni beto ejercicio, ni coma ni evans televisión en harrison habitación  · [place] de la cama si no se queda dormido dentro de 20 minutos  [facility] a otra habitación y beto algo que lo relaje hasta que le de sueño  · Limite el consumo de cafeína, alcohol y de alimentos muy temprano en el día  Solo tome café en la mañana  No tome alcohol dentro de las 6 horas antes de [place]  No coma alimentos pesados melissa antes de acostarse  · Realice actividad física con regularidad  El ejercicio diario podría ayudarlo a dormir mejor  No se ejercite dentro de las 4 horas antes de [place]  Acuda a dewey consultas de control con harrison médico según le indicaron  Harrison médico podría referirlo a terapia cognitiva conductual  Un terapeuta de la conducta podría ayudarlo a encontrar [place], de disminuir el estrés y de mejorar el sueño  Anote dewey preguntas para que se acuerde de hacerlas demond dewey visitas  © 2017 [employer]  Information is for End User's use only and may not be sold, redistributed or otherwise used for commercial purposes  All illustrations and images included in CareNotes® are the copyrighted property of [employer]  or Leo Ewing  Esta información es sólo para uso en educación  Harrison intención no es darle un consejo médico sobre enfermedades o tratamientos  Colsulte con harrison [place] Ke[place] farmacéutico antes de seguir cualquier régimen médico para saber si es seguro y efectivo para usted

## 2018-03-19 ENCOUNTER — TELEPHONE (OUTPATIENT)
Dept: FAMILY MEDICINE CLINIC | Facility: CLINIC | Age: 58
End: 2018-03-19

## 2018-03-19 DIAGNOSIS — R07.89 OTHER CHEST PAIN: Primary | ICD-10-CM

## 2018-04-05 ENCOUNTER — OFFICE VISIT (OUTPATIENT)
Dept: CARDIOLOGY CLINIC | Facility: CLINIC | Age: 58
End: 2018-04-05
Payer: COMMERCIAL

## 2018-04-05 VITALS
HEART RATE: 61 BPM | DIASTOLIC BLOOD PRESSURE: 84 MMHG | BODY MASS INDEX: 26.4 KG/M2 | HEIGHT: 63 IN | WEIGHT: 149 LBS | RESPIRATION RATE: 16 BRPM | SYSTOLIC BLOOD PRESSURE: 112 MMHG

## 2018-04-05 DIAGNOSIS — R07.89 OTHER CHEST PAIN: Primary | ICD-10-CM

## 2018-04-05 PROCEDURE — 99213 OFFICE O/P EST LOW 20 MIN: CPT | Performed by: INTERNAL MEDICINE

## 2018-04-05 PROCEDURE — 93000 ELECTROCARDIOGRAM COMPLETE: CPT | Performed by: INTERNAL MEDICINE

## 2018-04-05 NOTE — PROGRESS NOTES
Cardiology Follow Up        Lena Coyle      1960      [id number]      Discussion/Summary:    1  Atypical chest pain:  Suspect noncardiac, with unremarkable multiple stress tests over the past several years with last stress echocardiogram 01/2017 unremarkable  Would not repeat ischemic evaluation, especially in light of low pretest probability and prior normal stress tests  May consider costochondritis on the differential diagnosis  Follow-up with PCP from here on  Heart healthy diet, exercise, and tobacco avoidance is recommended  Interval History: This is a 58-year-old male with chronic atypical chest pain  He was evaluated by Dr Delonte Zhao 01/2017  He has had stress tests in 2014, 2015, and thereafter in 2017 after evaluation which were all unremarkable  He presents today for follow-up  He has chest pain has not improved, now current almost daily on occasion  It is left-sided, always occurs at rest, never with physical exertion, and resolved spontaneously  Sometimes he has to pound or hit his chest with his fist for relief  It occasionally radiates into the left shoulder and back of his neck without any other symptoms  Vitals:  Vitals:    04/05/18 1531   BP: 112/84   BP Location: Left arm   Patient Position: Sitting   Cuff Size: Standard   Pulse: 61   Resp: 16   Weight: 67 6 kg (149 lb)   Height: 5' 3" (1 6 m)         Past Medical History:   Diagnosis Date    History of chronic constipation     History of neck pain 11/21/2017    Hypertension      Social History     Social History    Marital status: Single     Spouse name: N/A    Number of children: N/A    Years of education: N/A     Occupational History    Not on file       Social History Main Topics    Smoking status: Former Smoker    Smokeless tobacco: Never Used    Alcohol use Yes      Comment: weekly    Drug use: No    Sexual activity: Not on file     Other Topics Concern    Not on file     Social History Narrative    No preference or Evangelical beliefs      Family History   Problem Relation Age of Onset    Cancer Sister      malignant neoplasm of breast    Diabetes Paternal Grandmother     Diabetes Family     Arthritis Family     Cancer Family      malignant neoplasm    Substance Abuse Neg Hx      denied Mother and Father    Mental illness Neg Hx      no family history Mother and Father    Other Neg Hx      denied family history of Osteopertrosis     Past Surgical History:   Procedure Laterality Date    CYSTOSCOPY  05/29/2014    diagnostic managed by Jalen Mora    TOOTH EXTRACTION         Current Outpatient Prescriptions:     lisinopril (ZESTRIL) 5 mg tablet, Take 5 mg by mouth daily  , Disp: , Rfl:         Review of Systems:  Review of Systems   Respiratory: Negative  Cardiovascular: Positive for chest pain  All other systems reviewed and are negative  Physical Exam:  Physical Exam   Constitutional: He is oriented to person, place, and time  He appears well-developed and well-nourished  No distress  HENT:   Head: Normocephalic and atraumatic  Eyes: EOM are normal  Pupils are equal, round, and reactive to light  Right eye exhibits no discharge  No scleral icterus  Neck: Normal range of motion  Neck supple  No thyromegaly present  Cardiovascular: Normal rate, regular rhythm and normal heart sounds  Exam reveals no gallop and no friction rub  No murmur heard  Pulmonary/Chest: Effort normal and breath sounds normal    Abdominal: He exhibits no distension  There is no tenderness  There is no rebound and no guarding  Musculoskeletal: Normal range of motion  He exhibits no edema  Neurological: He is alert and oriented to person, place, and time  Coordination normal    Skin: Skin is warm and dry  No rash noted  He is not diaphoretic  No erythema  No pallor  Psychiatric: He has a normal mood and affect   His behavior is normal  Judgment and thought content normal

## 2018-05-11 ENCOUNTER — OFFICE VISIT (OUTPATIENT)
Dept: FAMILY MEDICINE CLINIC | Facility: CLINIC | Age: 58
End: 2018-05-11

## 2018-05-11 VITALS
HEIGHT: 63 IN | OXYGEN SATURATION: 99 % | RESPIRATION RATE: 18 BRPM | WEIGHT: 147.19 LBS | DIASTOLIC BLOOD PRESSURE: 70 MMHG | BODY MASS INDEX: 26.08 KG/M2 | HEART RATE: 86 BPM | TEMPERATURE: 97.8 F | SYSTOLIC BLOOD PRESSURE: 124 MMHG

## 2018-05-11 DIAGNOSIS — R53.83 FATIGUE, UNSPECIFIED TYPE: Primary | ICD-10-CM

## 2018-05-11 DIAGNOSIS — E55.9 VITAMIN D DEFICIENCY: ICD-10-CM

## 2018-05-11 DIAGNOSIS — B35.1 TOENAIL FUNGUS: ICD-10-CM

## 2018-05-11 PROCEDURE — 99213 OFFICE O/P EST LOW 20 MIN: CPT | Performed by: PHYSICIAN ASSISTANT

## 2018-05-11 NOTE — PROGRESS NOTES
Assessment/Plan:    No problem-specific Assessment & Plan notes found for this encounter  Problem List Items Addressed This Visit     None      Visit Diagnoses     Fatigue, unspecified type    -  Primary    Relevant Orders    CBC and differential    Comprehensive metabolic panel    TSH, 3rd generation with T4 reflex    Vitamin B12    Vitamin D 25 hydroxy    Toenail fungus        Relevant Medications    Efinaconazole (JUBLIA) 10 % SOLN    Vitamin D deficiency        Relevant Orders    Vitamin D 25 hydroxy            Subjective:      Patient ID: Nenita Billy is a 61 y o  male  HPI here for fatigue x 1 month  He wakes up with energy from sleep  He is now sleeping 8 hours a day  No activity changes, med changes or diet changes  Mood has been good  He has had toenail fungus  He has had dark, cracking and thick nails with white powder d/c under nails on both feet  Worse on great toes  Present a long time but getting worse    The following portions of the patient's history were reviewed and updated as appropriate:   He  has a past medical history of History of chronic constipation; History of neck pain (11/21/2017); and Hypertension  He   Patient Active Problem List    Diagnosis Date Noted    Acute pain of both knees 01/24/2018    Acute nonintractable headache 01/24/2018    Left shoulder pain 06/07/2017    Benign colon polyp 03/24/2017    Enlarged prostate without lower urinary tract symptoms (luts) 07/18/2016    Varicocele 01/12/2016    Mild vitamin D deficiency 06/10/2015    Testicle pain 07/02/2014    Impingement syndrome, shoulder, left 05/16/2014    Pain in joint of right hip 02/04/2014    Microscopic hematuria 11/19/2013    Renal calculus, right 11/19/2013    Atypical chest pain 06/19/2013    Benign essential hypertension 10/10/2012    Abnormal glucose 10/10/2012     He  has a past surgical history that includes Cystoscopy (05/29/2014) and Tooth extraction    His family history includes Arthritis in his family; Cancer in his family and sister; Diabetes in his family and paternal grandmother  He  reports that he has quit smoking  He has never used smokeless tobacco  He reports that he drinks alcohol  He reports that he does not use drugs  Current Outpatient Prescriptions   Medication Sig Dispense Refill    lisinopril (ZESTRIL) 5 mg tablet Take 5 mg by mouth daily   Efinaconazole (JUBLIA) 10 % SOLN Apply to nails once a day for 48 weeks  1 Bottle 2     No current facility-administered medications for this visit  Current Outpatient Prescriptions on File Prior to Visit   Medication Sig    lisinopril (ZESTRIL) 5 mg tablet Take 5 mg by mouth daily  No current facility-administered medications on file prior to visit  He has No Known Allergies       Review of Systems   Constitutional: Positive for fatigue  Negative for activity change, appetite change, fever and unexpected weight change  HENT: Negative for dental problem, ear pain, hearing loss and sore throat  Eyes: Negative for visual disturbance  Respiratory: Negative for cough and wheezing  Gastrointestinal: Negative for abdominal pain, constipation, diarrhea and vomiting  Genitourinary: Negative for difficulty urinating and dysuria  Musculoskeletal: Negative for arthralgias, back pain and myalgias  Skin: Negative for rash  Neurological: Negative for dizziness and headaches  Psychiatric/Behavioral: Negative for behavioral problems  Objective:      /70 (BP Location: Left arm, Patient Position: Sitting, Cuff Size: Standard)   Pulse 86   Temp 97 8 °F (36 6 °C) (Tympanic)   Resp 18   Ht 5' 3" (1 6 m)   Wt 66 8 kg (147 lb 3 oz)   SpO2 99%   BMI 26 07 kg/m²          Physical Exam   Constitutional: He is oriented to person, place, and time  He appears well-developed and well-nourished  No distress  HENT:   Head: Normocephalic and atraumatic     Right Ear: External ear normal    Left Ear: External ear normal    Eyes: Conjunctivae are normal    Neck: Normal range of motion  Neck supple  Cardiovascular: Normal rate, regular rhythm and normal heart sounds  No murmur heard  Pulmonary/Chest: Effort normal and breath sounds normal  No respiratory distress  He has no wheezes  Lymphadenopathy:     He has no cervical adenopathy  Neurological: He is alert and oriented to person, place, and time  Skin:   Only 5th toe without thick, yellow/grey crusted nails   Psychiatric: He has a normal mood and affect  His behavior is normal    Nursing note and vitals reviewed

## 2018-05-11 NOTE — PATIENT INSTRUCTIONS
Next colonoscopy due at the earliest 5/16/2019  Last one with Dr Yamil Shultz:   Ever Patel es un agotamiento mental y físico que no mejora con el descanso  La fatiga puede dificultar las actividades cotidianas o causar somnolencia extrema  Es normal sentirse cansado algunas veces, damian la fatiga por un tiempo prolongado puede ser un signo de aggie enfermedad grave  INSTRUCCIONES [facility]BRE [facility]:   Busque atención médica de inmediato si:   · Usted tiene dolor en el pecho  · Usted tiene dificultad para respirar  Pregúntele a harrison [employer] vitaminas y minerales son adecuados para usted  · Usted tiene tos que empeora o no desaparece  · Usted nota demetra en harrison orina o en dewey deposiciones  · Usted tiene la sensación de adormecimiento o cosquilleo alrededor de harrison boca o en un brazo o pierna  · Usted se desmaya, se siente mareado o tiene [employer] visión  · Usted tiene los ganglios linfáticos inflamados  · Usted es aggie mary y tiene sangrado vaginal que no es normal para usted o que no esperaba  · Usted pierde peso sin proponérselo o tiene dificultad para comer  · Usted se siente débil o tiene [employer]  · Usted tiene dolor o inflamación en las articulaciones  · Usted tiene preguntas o inquietudes acerca de harrison condición o cuidado  Acuda a dewey consultas de control con harrison médico según le indicaron  Es posible que necesite más exámenes  Harrison médico también podría derivarlo a un especialista  Anote dewey preguntas para que se acuerde de hacerlas demond dewey visitas  Controle la fatiga:   · Mantenga un diario de la fatiga  Incluya cualquier cosa que lo beto sentir más cansado o menos cansado  Lleve harrison diario a las consultas de seguimiento con harrison médico     · Ejercítese según indicaciones  El ejercicio puede ayudarlo a sentirse más alerta  El ejercicio físico también puede ayudarlo a controlar el estrés o aliviar la depresión  Trate de hacer unos 30 minutos de actividad física la mayoría de los días de la [place]  · Mantenga un horario regular de sueño  Acuéstese y levántese a la misma hora todos los días  [place] siestas de aggie hora cada día  Aggie siesta puede mejorar la fatiga, damian aggie siesta larga puede dificultar dormir a la noche  · Planifique y limite dewey actividades  Limite la cantidad de actividades cada día, tales melvin hacer las compras y la limpieza  Si es posible, intente repartir los viajes que realiza en la semana  Planifique con anticipación para no tener que apresurarse a hacer algo  Realice únicamente actividades para las que tiene la energía suficiente  [place] [employer]  Pida ayuda cuando la necesite  Otra persona puede conducir o ayudarlo con las actividades cotidianas  · Consuma alimentos saludables y variados  Los alimentos saludables incluyen frutas, verduras, pan integral, productos lácteos bajos en grasa, frijoles, chaparro magras y pescado  Aggie buena nutrición puede ayudar a manejar la fatiga  · Limite el consumo de cafeína y alcohol  Estos productos pueden dificultar el dormir y el quedarse dormido  Las mujeres deberían limitar el consumo de alcohol a 1 bebida por día  Los hombres deberían limitar el consumo de alcohol a 2 tragos al día  Un trago equivale a 12 onzas de cerveza, 5 onzas de vino o 1 onza y ½ de licor  Pregunte a harrison médico cuánta cafeína es adecuada para usted  · No fume  La nicotina y otros químicos presentes en los cigarrillos y cigarros pueden causar daño en los pulmones e incrementar la fatiga  Pida información a harrison médico si usted actualmente fuma y necesita ayuda para dejar de fumar  Los cigarrillos electrónicos o tabaco sin humo todavía contienen nicotina  Consulte con harrison médico antes de [employer]  © 2017 [address] [employer] Information is for End User's use only and may not be sold, redistributed or otherwise used for commercial purposes  All illustrations and images included in CareNotes® are the copyrighted property of A D A M , Inc  or Leo Ewing  Esta información es sólo para uso en educación  Harrison intención no es darle un consejo médico sobre enfermedades o tratamientos  Colsulte con harrison Bhanu Schulter farmacéutico antes de seguir cualquier régimen médico para saber si es seguro y efectivo para usted

## 2018-05-17 ENCOUNTER — LAB (OUTPATIENT)
Dept: LAB | Facility: CLINIC | Age: 58
End: 2018-05-17
Payer: COMMERCIAL

## 2018-05-17 ENCOUNTER — OFFICE VISIT (OUTPATIENT)
Dept: FAMILY MEDICINE CLINIC | Facility: CLINIC | Age: 58
End: 2018-05-17

## 2018-05-17 ENCOUNTER — TRANSCRIBE ORDERS (OUTPATIENT)
Dept: LAB | Facility: CLINIC | Age: 58
End: 2018-05-17

## 2018-05-17 VITALS
OXYGEN SATURATION: 100 % | HEART RATE: 80 BPM | DIASTOLIC BLOOD PRESSURE: 82 MMHG | BODY MASS INDEX: 25.73 KG/M2 | RESPIRATION RATE: 18 BRPM | WEIGHT: 145.19 LBS | TEMPERATURE: 97.3 F | SYSTOLIC BLOOD PRESSURE: 120 MMHG | HEIGHT: 63 IN

## 2018-05-17 DIAGNOSIS — E55.9 VITAMIN D DEFICIENCY: ICD-10-CM

## 2018-05-17 DIAGNOSIS — R05.8 ALLERGIC COUGH: ICD-10-CM

## 2018-05-17 DIAGNOSIS — R49.0 HOARSENESS OF VOICE: Primary | ICD-10-CM

## 2018-05-17 DIAGNOSIS — R53.83 FATIGUE, UNSPECIFIED TYPE: ICD-10-CM

## 2018-05-17 LAB
25(OH)D3 SERPL-MCNC: 23.2 NG/ML (ref 30–100)
ALBUMIN SERPL BCP-MCNC: 3.4 G/DL (ref 3.5–5)
ALP SERPL-CCNC: 87 U/L (ref 46–116)
ALT SERPL W P-5'-P-CCNC: 34 U/L (ref 12–78)
ANION GAP SERPL CALCULATED.3IONS-SCNC: 3 MMOL/L (ref 4–13)
AST SERPL W P-5'-P-CCNC: 25 U/L (ref 5–45)
BASOPHILS # BLD AUTO: 0.02 THOUSANDS/ΜL (ref 0–0.1)
BASOPHILS NFR BLD AUTO: 1 % (ref 0–1)
BILIRUB SERPL-MCNC: 0.79 MG/DL (ref 0.2–1)
BUN SERPL-MCNC: 14 MG/DL (ref 5–25)
CALCIUM SERPL-MCNC: 9 MG/DL (ref 8.3–10.1)
CHLORIDE SERPL-SCNC: 111 MMOL/L (ref 100–108)
CO2 SERPL-SCNC: 28 MMOL/L (ref 21–32)
CREAT SERPL-MCNC: 1.19 MG/DL (ref 0.6–1.3)
EOSINOPHIL # BLD AUTO: 0.19 THOUSAND/ΜL (ref 0–0.61)
EOSINOPHIL NFR BLD AUTO: 5 % (ref 0–6)
ERYTHROCYTE [DISTWIDTH] IN BLOOD BY AUTOMATED COUNT: 14.7 % (ref 11.6–15.1)
GFR SERPL CREATININE-BSD FRML MDRD: 67 ML/MIN/1.73SQ M
GLUCOSE P FAST SERPL-MCNC: 88 MG/DL (ref 65–99)
HCT VFR BLD AUTO: 44.7 % (ref 36.5–49.3)
HGB BLD-MCNC: 14.6 G/DL (ref 12–17)
LYMPHOCYTES # BLD AUTO: 1.63 THOUSANDS/ΜL (ref 0.6–4.47)
LYMPHOCYTES NFR BLD AUTO: 44 % (ref 14–44)
MCH RBC QN AUTO: 29.9 PG (ref 26.8–34.3)
MCHC RBC AUTO-ENTMCNC: 32.7 G/DL (ref 31.4–37.4)
MCV RBC AUTO: 91 FL (ref 82–98)
MONOCYTES # BLD AUTO: 0.38 THOUSAND/ΜL (ref 0.17–1.22)
MONOCYTES NFR BLD AUTO: 10 % (ref 4–12)
NEUTROPHILS # BLD AUTO: 1.49 THOUSANDS/ΜL (ref 1.85–7.62)
NEUTS SEG NFR BLD AUTO: 40 % (ref 43–75)
NRBC BLD AUTO-RTO: 0 /100 WBCS
PLATELET # BLD AUTO: 247 THOUSANDS/UL (ref 149–390)
PMV BLD AUTO: 11.5 FL (ref 8.9–12.7)
POTASSIUM SERPL-SCNC: 4.5 MMOL/L (ref 3.5–5.3)
PROT SERPL-MCNC: 7 G/DL (ref 6.4–8.2)
RBC # BLD AUTO: 4.89 MILLION/UL (ref 3.88–5.62)
SODIUM SERPL-SCNC: 142 MMOL/L (ref 136–145)
TSH SERPL DL<=0.05 MIU/L-ACNC: 2.65 UIU/ML (ref 0.36–3.74)
VIT B12 SERPL-MCNC: 271 PG/ML (ref 100–900)
WBC # BLD AUTO: 3.72 THOUSAND/UL (ref 4.31–10.16)

## 2018-05-17 PROCEDURE — 84443 ASSAY THYROID STIM HORMONE: CPT

## 2018-05-17 PROCEDURE — 82306 VITAMIN D 25 HYDROXY: CPT

## 2018-05-17 PROCEDURE — 85025 COMPLETE CBC W/AUTO DIFF WBC: CPT

## 2018-05-17 PROCEDURE — 82607 VITAMIN B-12: CPT

## 2018-05-17 PROCEDURE — 36415 COLL VENOUS BLD VENIPUNCTURE: CPT

## 2018-05-17 PROCEDURE — 99213 OFFICE O/P EST LOW 20 MIN: CPT | Performed by: PHYSICIAN ASSISTANT

## 2018-05-17 PROCEDURE — 80053 COMPREHEN METABOLIC PANEL: CPT

## 2018-05-17 RX ORDER — LORATADINE 10 MG/1
10 TABLET ORAL DAILY
Qty: 30 TABLET | Refills: 0 | Status: SHIPPED | OUTPATIENT
Start: 2018-05-17 | End: 2018-10-21

## 2018-05-17 NOTE — PROGRESS NOTES
Assessment/Plan:    Hoarseness of voice  Trial of loratadine to see if this resolves cough and hoarseness  If no change then to schedule ENT consult         Problem List Items Addressed This Visit        Other    Hoarseness of voice - Primary     Trial of loratadine to see if this resolves cough and hoarseness  If no change then to schedule ENT consult         Relevant Medications    loratadine (CLARITIN) 10 mg tablet    Other Relevant Orders    Ambulatory Referral to Otolaryngology      Other Visit Diagnoses     Allergic cough        Relevant Medications    loratadine (CLARITIN) 10 mg tablet            Subjective:      Patient ID: Marilin Bucio is a 61 y o  male  HPI here for hoarseness x 1 month  He has been losing his voice when at work daily  He does do a lot of talking at work but has never had this problem  No acid reflux and no allergy sytmpoms but he did get a cough that is dry starting 2 weeks ago  No fever and no URI sytmposm  The following portions of the patient's history were reviewed and updated as appropriate:   He  has a past medical history of History of chronic constipation; History of neck pain (11/21/2017); and Hypertension    He   Patient Active Problem List    Diagnosis Date Noted    Hoarseness of voice 05/17/2018    Acute pain of both knees 01/24/2018    Acute nonintractable headache 01/24/2018    Left shoulder pain 06/07/2017    Benign colon polyp 03/24/2017    Enlarged prostate without lower urinary tract symptoms (luts) 07/18/2016    Varicocele 01/12/2016    Mild vitamin D deficiency 06/10/2015    Testicle pain 07/02/2014    Impingement syndrome, shoulder, left 05/16/2014    Pain in joint of right hip 02/04/2014    Microscopic hematuria 11/19/2013    Renal calculus, right 11/19/2013    Atypical chest pain 06/19/2013    Benign essential hypertension 10/10/2012    Abnormal glucose 10/10/2012     He  has a past surgical history that includes Cystoscopy (05/29/2014) and Tooth extraction  His family history includes Arthritis in his family; Cancer in his family and sister; Diabetes in his family and paternal grandmother  He  reports that he has quit smoking  He has never used smokeless tobacco  He reports that he drinks alcohol  He reports that he does not use drugs  Current Outpatient Prescriptions   Medication Sig Dispense Refill    Efinaconazole (JUBLIA) 10 % SOLN Apply to nails once a day for 48 weeks  1 Bottle 2    lisinopril (ZESTRIL) 5 mg tablet Take 5 mg by mouth daily   loratadine (CLARITIN) 10 mg tablet Take 1 tablet (10 mg total) by mouth daily 30 tablet 0     No current facility-administered medications for this visit  Current Outpatient Prescriptions on File Prior to Visit   Medication Sig    Efinaconazole (JUBLIA) 10 % SOLN Apply to nails once a day for 48 weeks   lisinopril (ZESTRIL) 5 mg tablet Take 5 mg by mouth daily  No current facility-administered medications on file prior to visit  He has No Known Allergies       Review of Systems   Constitutional: Positive for fatigue  Negative for activity change, appetite change, fever and unexpected weight change  HENT: Negative for dental problem, ear pain, hearing loss and sore throat  Eyes: Negative for visual disturbance  Respiratory: Positive for cough  Negative for wheezing  Cardiovascular: Negative for chest pain  Gastrointestinal: Negative for abdominal pain, constipation, diarrhea and vomiting  Genitourinary: Negative for difficulty urinating and dysuria  Musculoskeletal: Negative for arthralgias, back pain and myalgias  Skin: Negative for rash  Neurological: Negative for dizziness and headaches  Psychiatric/Behavioral: Negative for behavioral problems           Objective:      /82 (BP Location: Left arm, Patient Position: Sitting, Cuff Size: Standard)   Pulse 80   Temp (!) 97 3 °F (36 3 °C) (Tympanic)   Resp 18   Ht 5' 3" (1 6 m)   Wt 65 9 kg (145 lb 3 oz) SpO2 100%   BMI 25 72 kg/m²          Physical Exam   Constitutional: He is oriented to person, place, and time  He appears well-developed and well-nourished  No distress  HENT:   Head: Normocephalic and atraumatic  Right Ear: External ear normal    Left Ear: External ear normal    Eyes: Conjunctivae are normal    Neck: Normal range of motion  Neck supple  No thyromegaly present  Cardiovascular: Normal rate, regular rhythm and normal heart sounds  No murmur heard  Pulmonary/Chest: Effort normal and breath sounds normal  No respiratory distress  He has no wheezes  Lymphadenopathy:     He has no cervical adenopathy  Neurological: He is alert and oriented to person, place, and time  Psychiatric: He has a normal mood and affect  His behavior is normal    Nursing note and vitals reviewed

## 2018-05-17 NOTE — PATIENT INSTRUCTIONS
Recommend loratadine which can be bought over the counter for 2 weeks and if not getting better to call for ear, nose and throat specialist    Rinitis alérgica   LO QUE NECESITA SABER:   La rinitis alérgica o fiebre del heno es la inflamación del interior de la [place]  La inflamación es aggie reacción a los alérgenos que se encuentran en el aire  Un alérgeno puede ser cualquier cosa que cause aggie reacción alérgica  Las alergias a diferentes tipos [employer], [employer], Foradian o moho frecuentemente causan la rinitis alérgica  Los [employer] Items del polvo, las cucarachas, el pelo de las mascotas o el moho del interior de las berg también pueden causar rinitis alérgica  INSTRUCCIONES SOBRE [facility]:   Llame al 911 en gail de presentar lo siguiente:   · Usted tiene dolor en el pecho o falta de aire  Busque atención médica de inmediato si:   · Usted tiene dolor intenso  · Usted expectora demetra  Pregúntele a bhagat [place] vitaminas y minerales son adecuados para usted  · Usted tiene fiebre  · Usted tiene dolor de oído o sinusitis, o dolor de [place]  · Dewey síntomas empeoran, aún después del [place]  · A usted le sale de la nariz moco amarillo, verdoso, café o con [place]  · Bhagat nariz le sangra o le duele por dentro  · Usted tiene dificultad para dormir debido a dewey síntomas  · Usted tiene preguntas o inquietudes acerca de bhagat condición o cuidado  Medicamentos:   · Medicamentos,  ayudan a disminuir dewey síntomas y aclarar bhagat congestión nasal     · [employer] dewey medicamentos melvin se le haya indicado  Consulte con bhagat médico si usted jade que bhagat medicamento no le está ayudando o si presenta efectos secundarios  Infórmele si es alérgico a algún medicamento  Mantenga aggie lista actualizada de los [place], las vitaminas y los productos herbales que yogi  Incluya los siguientes datos de los medicamentos: cantidad, frecuencia y motivo de administración   Mandy Jen con usted la lista o los envases de la píldoras a dewey citas de seguimiento  Lleve la lista de los medicamentos con usted en gail de aggie emergencia  Cómo manejar la rinitis alérgica:  La mejor forma de manejar la rinitis alérgica es evitar alérgenos que puedan provocar dewey síntomas  Cualquiera de los siguientes puede llegar a disminuir dewey síntomas:  · Enjuáguese la Britney Achille and [place] y los senos paranasales  con aggie solución de agua con sal o use un espray nasal de agua salina  [place] ayudará a diluir la mucosidad en la nariz eliminando el polen y la suciedad  También ayudará a reducir la inflamación para que usted pueda respirar normalmente  Pregúntele a harrison médico con cuánta frecuencia debe usted enjuagarse la nariz con el espray  · Reduzca los ácaros del polvo  Francies Vail y toallas en [place] aggie vez por semana  [place] dewey almohadas y colchones con fundas libres de alérgenos  Limite el número de [address] de esvin y muñecos blandos que tiene harrison sharon  Lave periódicamente en [place] los juguetes de harrison sharon  Use aggie aspiradora que tenga filtro de aire y aspire semanalmente  Si es posible, deshágase de alfombras y munira  Estas recogen el polvo y los ácaros del polvo  · Reduzca el polen  Messedamm 28 y aruna cerradas en la casa y alexis  Permanezca adentro cuando el conteo de polen esté muy elevado  Jaziel Nicole harrison aire acondicionado en reciclar y [employer] filtros de aire con frecuencia  Báñese y lávese el keegan antes de dormir todas las noches para carlos el polen que haya acumulado en el keegan  · Reduzca la caspa animal   Si es posible, no tenga gatos, perros, pájaros u otras mascotas  Si ya tiene mascotas en el hogar, manténgalas lejos de los dormitorios y habitaciones alfombradas  Báñelos con frecuencia  · [employer]  No pase tiempo en sótanos  Compre plantas artificiales en vez de plantas de verdad  Mantenga la humedad de harrison hogar a menos de 45%  Asegúrese que no haya estanques y charcos en harrison casa o patio  · No fume  Evite estar con otras personas que fumen  Pida información a bhagat médico si usted actualmente fuma y necesita ayuda para dejar de fumar  Acuda a dewey consultas de control con bhagat médico según le indicaron  Es probable que usted tenga que gregg un especialista en alergias frecuentemente y para controlar dewey síntomas  Anote dewey preguntas para que se acuerde de hacerlas demond dewey visitas  © 2017 26[address] [employer]arma Information is for End User's use only and may not be sold, redistributed or otherwise used for commercial purposes  All illustrations and images included in CareNotes® are the copyrighted property of A D A M , Inc  or Leo Ewing  Esta información es sólo para uso en educación  Bhagat intención no es darle un consejo médico sobre enfermedades o tratamientos  Colsulte con bhagat Dewain Racer farmacéutico antes de seguir cualquier régimen médico para saber si es seguro y efectivo para usted

## 2018-05-17 NOTE — ASSESSMENT & PLAN NOTE
Trial of loratadine to see if this resolves cough and hoarseness   If no change then to schedule ENT consult

## 2018-05-18 NOTE — PROGRESS NOTES
Labs looked good except his vitamin levels  His vitamin d was low and his vitamin B12 while it was normal is getting lwoer  Recommend multivitamin daily to increase his vitamin b12 and taking 2000 IU vitamin d daily  This could be reason for him being tired

## 2018-05-27 DIAGNOSIS — I10 BENIGN HYPERTENSION: Primary | ICD-10-CM

## 2018-05-28 RX ORDER — LISINOPRIL 5 MG/1
TABLET ORAL
Qty: 90 TABLET | Refills: 1 | Status: SHIPPED | OUTPATIENT
Start: 2018-05-28 | End: 2018-11-21 | Stop reason: SDUPTHER

## 2018-05-30 ENCOUNTER — TELEPHONE (OUTPATIENT)
Dept: FAMILY MEDICINE CLINIC | Facility: CLINIC | Age: 58
End: 2018-05-30

## 2018-05-30 NOTE — TELEPHONE ENCOUNTER
Pt called stating he has not received a call from pharmacy to  his medications  As per pharmacy they were on hold but processed when I called  Pt has to pay out of pocket for loratadine/ Claritan $12   Lisinopril will be ready in 2 hours      Left pt mode NIXON

## 2018-05-31 ENCOUNTER — OFFICE VISIT (OUTPATIENT)
Dept: URGENT CARE | Facility: MEDICAL CENTER | Age: 58
End: 2018-05-31
Payer: COMMERCIAL

## 2018-05-31 VITALS
BODY MASS INDEX: 26.22 KG/M2 | DIASTOLIC BLOOD PRESSURE: 79 MMHG | HEART RATE: 65 BPM | SYSTOLIC BLOOD PRESSURE: 126 MMHG | RESPIRATION RATE: 16 BRPM | WEIGHT: 148 LBS | OXYGEN SATURATION: 100 % | TEMPERATURE: 97.2 F | HEIGHT: 63 IN

## 2018-05-31 DIAGNOSIS — S39.012A STRAIN OF LUMBAR REGION, INITIAL ENCOUNTER: Primary | ICD-10-CM

## 2018-05-31 PROCEDURE — 99213 OFFICE O/P EST LOW 20 MIN: CPT | Performed by: FAMILY MEDICINE

## 2018-05-31 RX ORDER — CYCLOBENZAPRINE HCL 10 MG
10 TABLET ORAL
Qty: 30 TABLET | Refills: 0 | Status: SHIPPED | OUTPATIENT
Start: 2018-05-31 | End: 2018-06-28 | Stop reason: SDUPTHER

## 2018-05-31 RX ORDER — PREDNISONE 20 MG/1
TABLET ORAL
Qty: 18 TABLET | Refills: 0 | Status: SHIPPED | OUTPATIENT
Start: 2018-05-31 | End: 2018-06-28

## 2018-05-31 NOTE — PROGRESS NOTES
[facility] [employer] [facility]        NAME: Joseline Dc is a 61 y o  male  : 1960    MRN: [id number]  DATE: May 31, 2018  TIME: 11:34 AM    Assessment and Plan   Strain of lumbar region, initial encounter [S39 012A]  1  Strain of lumbar region, initial encounter  cyclobenzaprine (FLEXERIL) 10 mg tablet    predniSONE (DELTASONE) 20 mg tablet         Patient Instructions       Follow up with PCP in 3-5 days  Proceed to  ER if symptoms worsen  Chief Complaint     Chief Complaint   Patient presents with    Back Pain     Patient relates started with left lower back pain radiating to left buttock since Saturday  Denies injury  Had same pain x1 year ago seen in ER told muscular  Denies fever  Denies urinary symptoms  History of Present Illness       Patient complaining of low back pain for the last 5 days  Pain seems to be located in the left lower back and radiates the left buttocks  Denies any tingling at the present time  He had similar problem last year and was seen in the emergency room  He has been taking some over-the-counter ibuprofen with mild improvement  Symptoms seem to be aggravated when sitting up standing for prolonged periods of time  Feels better when he lies supine  Describes the pain at the present time is constant  Denies any recent fall or heavy lifting which could have aggravated the symptoms  He currently works as a fork  and operates the machine standing  Review of Systems   Review of Systems   Constitutional: Negative  Musculoskeletal: Positive for arthralgias  Neurological: Negative  Current Medications       Current Outpatient Prescriptions:     Efinaconazole (JUBLIA) 10 % SOLN, Apply to nails once a day for 48 weeks  , Disp: 1 Bottle, Rfl: 2    lisinopril (ZESTRIL) 5 mg tablet, TAKE 1 TABLET BY MOUTH EVERY DAY, Disp: 90 tablet, Rfl: 1    loratadine (CLARITIN) 10 mg tablet, Take 1 tablet (10 mg total) by mouth daily, Disp: 30 tablet, Rfl: 0    cyclobenzaprine (FLEXERIL) 10 mg tablet, Take 1 tablet (10 mg total) by mouth daily at bedtime, Disp: 30 tablet, Rfl: 0    predniSONE (DELTASONE) 20 mg tablet, Take 3 tablets for 3 days then 2 tablets for 3 days then 1 tablet for 3 days then stop, Disp: 18 tablet, Rfl: 0    Current Allergies     Allergies as of 05/31/2018    (No Known Allergies)            The following portions of the patient's history were reviewed and updated as appropriate: allergies, current medications, past family history, past medical history, past social history, past surgical history and problem list      Past Medical History:   Diagnosis Date    History of chronic constipation     History of neck pain 11/21/2017    Hypertension        Past Surgical History:   Procedure Laterality Date    CYSTOSCOPY  05/29/2014    diagnostic managed by Jalen Mora    TOOTH EXTRACTION         Family History   Problem Relation Age of Onset    Cancer Sister      malignant neoplasm of breast    Diabetes Paternal Grandmother     Diabetes Family     Arthritis Family     Cancer Family      malignant neoplasm    Substance Abuse Neg Hx      denied Mother and Father    Mental illness Neg Hx      no family history Mother and Father    Other Neg Hx      denied family history of Osteopertrosis         Medications have been verified  Objective   /79   Pulse 65   Temp (!) 97 2 °F (36 2 °C) (Tympanic)   Resp 16   Ht 5' 3" (1 6 m)   Wt 67 1 kg (148 lb)   SpO2 100%   BMI 26 22 kg/m²        Physical Exam     Physical Exam   Constitutional: He is oriented to person, place, and time  Musculoskeletal: Normal range of motion  LS spine-flexion to 45°, extension to 20°, lateral rotation and side bending 45° bilaterally  Minimal tenderness upon palpation of the left paraspinal muscle  Extremities:  Lower-good strength and tone, 5/5  Neurological: He is alert and oriented to person, place, and time  He displays normal reflexes  Nursing note and vitals reviewed

## 2018-05-31 NOTE — PATIENT INSTRUCTIONS
History and physical today suggests lumbar strain with mild radicular pain  I placed patient cyclobenzaprine 10 mg q h s , prednisone tapering from 60 down to 20 mg over 9 days  He was advised to apply heating pad to affected area perform light stretching exercises  Strongly advised patient to follow-up per primary care provider to request physical therapy  He expressed understanding  Ejercicios para fortalecer los músculos del tronco   LO QUE NECESITA SABER:   El tronco incluye los músculos de la parte baja de la espalda, la cadera, la pelvis y el abdomen  Los ejercicios para fortalecer los músculos del tronco ayudan a sanar y fortalecer estos músculos  [place] ayuda a prevenir otra lesión y [place] pelvis, la columna vertebral y la cadera en la posición correcta  INSTRUCCIONES SOBRE EL [facility]ITAL[facility]:   Pregúntele a harrison Wenda Gamma vitaminas y minerales son adecuados para usted  · Tiene dolor sukhdeep o creciente cuando hace ejercico o está en reposo  · Tiene preguntas o inquietudes acerca de los ejercicios de hombros   Consejos de seguridad:  Consulte a harrison médico antes de empezar un régimen de ejercicios  Un fisioterapeuta puede enseñarle a hacer ejercicios para fortalecer los músculos del tronco de forma nielsen  · [employer] ejercicios sobre aggie colchoneta o superficie firme  Aggie superficie sostendrá la columna vertebral y evitará el dolor de espalda  No beto estos ejercicios en aggie cama  · Muévase lenta y suavemente  Evite movimientos rápidos o bruscos  · Deténgase si siente dolor  Los ejercicios para fortalecer los músculos del tronco no deben ser dolorosos  Deténgase si siente dolor  · Respire normalmente demond los ejercicios  No contenga la respiración  [place] puede aumentar la presión arterial y evitar el fortalecimiento muscular  Harrison médico le dirá cuándo inhalar y exhalar demond el ejercicio       · Comience todos dewey ejercicios con tonificación abdominal   La tonificación abdominal ayuda a calentar los músculos del tronco  Usted también puede practicar la tonificación o fortalecimiento abdominal demond todo el día mientras está sentado o de pie  Acuéstese boca arriba con dewey rodillas dobladas y dewey pies planos sobre el piso  Coloque dewey brazos en aggie posición relajada junto a harrison cuerpo  Ponga tensos dewey músculos abdominales  Contraiga el ombligo hacia adentro en dirección a la columna  Sostenga está posición por 5 segundos  Relaje dewey músculos  Repita 10 veces  Ejercicios para fortalecer los músculos del tronco:  Harrison médico le indicará con que frecuencia hacer estos ejercicios  También le dirá cuántas repeticiones de cada ejercicio debe hacer  Veronica cada ejercicio demond 5 segundos o según lo indicado  A medida que se fortalezca, aumente a 10 a 15 segundos  Puede hacer algunos de estos ejercicios sobre aggie pelota de estabilidad o con un peso  Pregunte a harrison médico cómo utilizar aggie pelota de estabilidad o un peso para estos ejercicios:  · Banuelos lateral con pierna doblada:  Acuéstese de lado con dewey piernas, caderas y hombros en línea recta  Apóyese en harrison antebrazo a fin de que harrison codo esté directamente debajo de harrison hombro  Doble las rodillas hasta 90°  Levante las caderas del suelo  Balancéese sobre el antebrazo y sobre el lado de la rodilla  Mantenga esta posición  Repita en el otro lado  · Banuelos lateral con pierna estirada:  Acuéstese de lado con dewey piernas, caderas y hombros en línea recta  Apóyese en harrison antebrazo a fin de que harrison codo esté directamente debajo de harrison hombro  Puede apoyar la pierna de arriba frente a la pierna de abajo para el apoyo  Levante las caderas del suelo  Balancéese sobre el antebrazo y sobre la parte externa del pie flexionado  No permita que harrison tobillo se doble de lado  Mantenga esta posición  Repita en el otro lado  · Superman:  Acuéstese boca abajo  Extienda los brazos hacia adelante en el piso   Apriete los músculos abdominales y levante la mano derecha y la pierna izquierda del suelo  Mantenga esta posición  Despacio regrese a la posición inicial  Apriete los músculos abdominales y levante la mano izquierda y la pierna derecha del suelo  Mantenga esta posición  Despacio regrese a la posición inicial            · Posición de acurrucarse:  Acuéstese boca arriba con berenice rodillas dobladas y berenice pies planos sobre el piso  Coloque las yissel con las leif hacia abajo por debajo de la parte baja de harrison espalda  Después, con los codos [employer], levante los hombros y el pecho de 2 a 3 pulgadas del piso  Mantenga harrison denis a la misma altura de berenice hombros  Mantenga esta posición  Despacio regrese a la posición inicial            · Levantamiento de la pierna estirada:  Acuéstese boca arriba con aggie pierna estirada  Doble la otra rodilla y coloque el pie en posición plana sobre el piso  Ponga tensos berenice músculos abdominales  Mantenga la pierna recta y levántela lentamente de 6 a 12 pulgadas del piso  Mantenga esta posición  Baje harrison pierna lentamente  Veronica tantas repeticiones melvin le indicaron de amanda lado  Repita el ejercicio con la otra pierna  · Tablón:  Acuéstese boca abajo  Doble los codos y [employer] antebrazos en posición plana sobre el suelo  Levante harrison pecho, el estómago y las rodillas del suelo  Asegúrese de [employer] codos estén por debajo de los hombros  Harrison cuerpo debe estar en línea recta  No deje que las caderas o la parte baja de la espalda se hundan hacia el suelo  Contraiga los músculos abdominales y sostenga demond 15 segundos  Para hacer amanda ejercicio más difícil, sostenga demond 30 segundos o levante 1 pierna a la vez  · Bicicletas:  Acuéstese boca arriba  Doble ambas rodillas y llévelas hacia harrison pecho  Berenice pantorrillas deben estar paralelas al [employer]  [address] [place] [address] en la parte posterior de la denis  Estire la pierna derecha y manténgala levantada 2 pulgadas del piso [place] la denis y los hombros del piso y gire hacia la izquierda  Mantenga la denis y los hombros levantados  Doble la rodilla derecha mientras endereza la pierna izquierda  Mantenga la pierna izquierda 2 pulgadas sobre el piso  Gire la denis y el pecho hacia la pierna izquierda  Siga enderezando 1 pierna a la vez y gire  Acuda a dewey consultas de control con bhagat médico según le indicaron  Anote dewey preguntas para que se acuerde de hacerlas demond dewey visitas  © 2017 [address] [employer] Information is for End User's use only and may not be sold, redistributed or otherwise used for commercial purposes  All illustrations and images included in CareNotes® are the copyrighted property of A D A M , Inc  or Leo Ewing  Esta información es sólo para uso en educación  Bhagat intención no es darle un consejo médico sobre enfermedades o tratamientos  Colsulte con bhagat Deborah Cobos farmacéutico antes de seguir cualquier régimen médico para saber si es seguro y efectivo para usted  Dolor sukhdeep en la parte baja de la espalda, cuidados ambulatorios   INFORMACIÓN GENERAL:   El dolor sukhdeep en la parte baja de la espalda  es aggie molestia en el área baja de la espalda que dura menos de 12 semanas  La palabra sukhdeep se [place] para describir un dolor que empieza repentinamente, empeora rápidamente y dura por un periodo corto de [place]    Síntomas comunes incluyen los siguientes:   · Rigidez o espasmos en la espalda    · Dolor que baja por la espalda o el lado de aggie pierna    · Usted se mantiene en aggie posición o postura inusual para disminuir el dolor de espalda    · Usted no puede encontrar aggie posición cómoda para sentarse     · Bhagat dolor aumenta lentamente de 24 a 50 horas después de ventura forzado bhagat espalda    · Sensibilidad en la parte baja de la espalda o dolor sukhdeep cuando mueve bhagat espalda  Busque cuidados inmediatos para los siguientes síntomas:   · Dolor sukhdeep    · Derrek Kil y pesadez repentina en ambos glúteos y bajando a ambas piernas     · Adormecimiento o debilidad en aggie pierna o dolor en ambas piernas    · Adormecimiento en dewey genitales o a través de la parte baja de harrison espalda    · Usted no puede controlar harrison orina o heces  El tratamiento para el dolor sukhdeep en la parte baja de la espalda  puede incluir cualquiera de los siguientes:  · Medicamentos:      ¨ Los antiiflamatorios no esteroideos (AINEs) ayudan a reducir inflamación y dolor o fiebre  Amanda medicamento esta disponible con o sin aggie receta médica  Los AINEs podrían causar sangrado estomacal o problemas en los riñones en [employer]  Si usted esta tomando un anticoágulante, siempre  pregunte a harrison proveedor de cecy si los AINEs son seguros para usted  Antes de [employer], eze siempre la etiqueta de información y siga dewey indicaciones  ¨ Los relajantes musculares  ayudan a disminuir el dolor de los espasmos musculares  ¨ Los medicamentos para el dolor con receta  también podrían ser [employer]  Pregunte cómo beatriz amanda medicamento de aggie forma nielsen  · La cirugía  puede ser necesaria si harrison dolor es sukhdeep y otros tratamientos no funcionan  Es probable que se necesite de [place] para condiciones de la columna dorsal, melvin un disco herniado o estenosis de la columna dorsal   Maneje dewey síntomas:   · Duerma sobre un colchón firme  Si usted no tiene un [employer], pídale a alguien que pase harrison colchón al suelo por unos días  También puede colocar aggie lámina de palma delgada por debajo del colchón para que éste sea [place]  · Aplique hielo  a la parte baja de harrison espalda por 15 a 20 minutos cada hora o según indicaciones  Use aggie compresa fría o ponga hielo triturado en aggie [place] la bolsa con aggie toalla  El hielo ayuda a prevenir daños a los tejidos y disminuye la inflamación y el dolor  Usted puede [place] frío y calor      · Aplique calor  a la parte baja de harrison espalda por 20 a 30 minutos cada 2 horas por los días indicados  El calor ayuda a disminuir el dolor y los espasmos musculares  · Vaya a terapia física  Un terapeuta físico le enseñará ejercicios para ayudar a mejorar el movimiento y la fuerza, y también para disminuir el dolor  Prevenga el dolor sukhdeep en la parte baja de la espalda:   · Use movimientos correctos para harrison cuerpo  ¨ Cuando alza [place], dóblese a nivel de dewey caderas y rodillas  No se doble a nivel de la cintura  Use los músculos de dewey piernas cuando [employer]  No use harrison espalda  Mantenga el objeto cerca de harrison pecho mientras lo levanta  Trate de no doblarse o levantar nada por encima de harrison cintura  ¨ [place] de posición frecuentemente cuando está de pie por períodos largos de tiempo  Descanse un pie sobre aggie caja pequeña o banco y luego cambie al otro pie con frecuencia  ¨ Trate de no sentarse por largos periodos de tiempo  Cuando sí lo tenga que [place], siéntese en aggie silla con el respaldar recto con dewey pies planos sobre el suelo  No trate de alcanzar, jalar o empujar algo mientras está sentado  · Ejercítese con regularidad  [place] antes de ejercitarse  Veronica ejercicios que fortalecen los músculos de harrison espalda  Pregunte sobre el mejor plan de ejercicios para usted  · Mantenga un peso saludable  Pregúntele a harrison proveedor de cecy cuánto debe pesar usted y pídale que le ayude a crear un plan de pérdida de peso si usted tiene [employer]  Programe aggie najma con harrison proveedor de cecy según indicaciones:  Regrese a harrison najma de seguimiento si usted todavía tiene dolor después de 1 a 3 semanas de [address]  Es probable que necesite ir donde un ortopédico si harrison dolor de espalda dura más de 6 a 12 semanas  Anote dewey preguntas para que las recuerde demond dewey citas  ACUERDOS SOBRE HARRISON CUIDADO:   Usted tiene el derecho de participar en la planificación de harrison cuidado  Aprenda todo lo que pueda sobre harrison condición y melvin darle tratamiento   [address] con Courtney [place] opciones de tratamiento para juntos decidir el cuidado que usted quiere recibir  Usted siempre tiene el derecho a rechazar bhagat tratamiento  Esta información es sólo para uso en educación  Bhagat intención no es darle un consejo médico sobre enfermedades o tratamientos  Colsulte con bhagat [place] farmacéutico antes de seguir cualquier régimen médico para saber si es seguro y efectivo para usted  © 2014 [address] is for End User's use only and may not be sold, redistributed or otherwise used for commercial purposes  All illustrations and images included in CareNotes® are the copyrighted property of [employer] CH[employer]STIANSON [employer]  or Leo Ewing

## 2018-05-31 NOTE — LETTER
May 31, 2018     Patient: Vipin Pelaez   YOB: 1960   Date of Visit: 5/31/2018       To Whom It May Concern: It is my medical opinion that Rubin Plata may return to work on 6/2/2018  If you have any questions or concerns, please don't hesitate to call           Sincerely,        Katlyn Babcock MD    CC: No Recipients

## 2018-06-28 ENCOUNTER — TELEPHONE (OUTPATIENT)
Dept: FAMILY MEDICINE CLINIC | Facility: CLINIC | Age: 58
End: 2018-06-28

## 2018-06-28 ENCOUNTER — OFFICE VISIT (OUTPATIENT)
Dept: FAMILY MEDICINE CLINIC | Facility: CLINIC | Age: 58
End: 2018-06-28

## 2018-06-28 ENCOUNTER — HOSPITAL ENCOUNTER (EMERGENCY)
Facility: HOSPITAL | Age: 58
Discharge: HOME | End: 2018-06-28
Payer: COMMERCIAL

## 2018-06-28 VITALS
SYSTOLIC BLOOD PRESSURE: 113 MMHG | OXYGEN SATURATION: 99 % | BODY MASS INDEX: 25.69 KG/M2 | HEART RATE: 80 BPM | DIASTOLIC BLOOD PRESSURE: 60 MMHG | TEMPERATURE: 98.1 F | RESPIRATION RATE: 16 BRPM | WEIGHT: 145 LBS

## 2018-06-28 VITALS
TEMPERATURE: 98 F | DIASTOLIC BLOOD PRESSURE: 60 MMHG | BODY MASS INDEX: 25.16 KG/M2 | RESPIRATION RATE: 18 BRPM | WEIGHT: 142 LBS | HEART RATE: 100 BPM | SYSTOLIC BLOOD PRESSURE: 106 MMHG | HEIGHT: 63 IN

## 2018-06-28 DIAGNOSIS — S39.012A STRAIN OF LUMBAR REGION, INITIAL ENCOUNTER: ICD-10-CM

## 2018-06-28 DIAGNOSIS — M54.41 RIGHT-SIDED LOW BACK PAIN WITH RIGHT-SIDED SCIATICA, UNSPECIFIED CHRONICITY: Primary | ICD-10-CM

## 2018-06-28 DIAGNOSIS — M54.41 RIGHT-SIDED LOW BACK PAIN WITH RIGHT-SIDED SCIATICA: Primary | ICD-10-CM

## 2018-06-28 LAB
BACTERIA UR QL AUTO: ABNORMAL /HPF
BILIRUB UR QL STRIP: NEGATIVE
CLARITY UR: CLEAR
COLOR UR: YELLOW
GLUCOSE UR STRIP-MCNC: NEGATIVE MG/DL
HGB UR QL STRIP.AUTO: ABNORMAL
KETONES UR STRIP-MCNC: NEGATIVE MG/DL
LEUKOCYTE ESTERASE UR QL STRIP: NEGATIVE
NITRITE UR QL STRIP: NEGATIVE
NON-SQ EPI CELLS URNS QL MICRO: ABNORMAL /HPF
PH UR STRIP.AUTO: 5 [PH] (ref 4.5–8)
PROT UR STRIP-MCNC: ABNORMAL MG/DL
RBC #/AREA URNS AUTO: ABNORMAL /HPF
SP GR UR STRIP.AUTO: 1.02 (ref 1–1.03)
UROBILINOGEN UR QL STRIP.AUTO: 0.2 E.U./DL
WBC #/AREA URNS AUTO: ABNORMAL /HPF

## 2018-06-28 PROCEDURE — 81001 URINALYSIS AUTO W/SCOPE: CPT

## 2018-06-28 PROCEDURE — 99214 OFFICE O/P EST MOD 30 MIN: CPT | Performed by: PHYSICIAN ASSISTANT

## 2018-06-28 PROCEDURE — 99283 EMERGENCY DEPT VISIT LOW MDM: CPT

## 2018-06-28 RX ORDER — LIDOCAINE 50 MG/G
1 PATCH TOPICAL DAILY
Qty: 30 PATCH | Refills: 0 | Status: SHIPPED | OUTPATIENT
Start: 2018-06-28 | End: 2018-10-21

## 2018-06-28 RX ORDER — CYCLOBENZAPRINE HCL 10 MG
10 TABLET ORAL
Qty: 30 TABLET | Refills: 0 | Status: SHIPPED | OUTPATIENT
Start: 2018-06-28 | End: 2018-07-03

## 2018-06-28 RX ORDER — METHOCARBAMOL 750 MG/1
750 TABLET, FILM COATED ORAL EVERY 6 HOURS PRN
Qty: 30 TABLET | Refills: 0 | Status: SHIPPED | OUTPATIENT
Start: 2018-06-28 | End: 2018-10-21

## 2018-06-28 RX ORDER — IBUPROFEN 400 MG/1
400 TABLET ORAL EVERY 6 HOURS PRN
Qty: 30 TABLET | Refills: 0 | Status: SHIPPED | OUTPATIENT
Start: 2018-06-28 | End: 2018-10-21

## 2018-06-28 RX ORDER — ACETAMINOPHEN 500 MG
500 TABLET ORAL EVERY 6 HOURS PRN
Qty: 30 TABLET | Refills: 0 | Status: SHIPPED | OUTPATIENT
Start: 2018-06-28 | End: 2018-10-21

## 2018-06-28 RX ORDER — ACETAMINOPHEN 500 MG
500 TABLET ORAL EVERY 6 HOURS PRN
Qty: 30 TABLET | Refills: 0 | Status: SHIPPED | OUTPATIENT
Start: 2018-06-28 | End: 2018-06-28

## 2018-06-28 NOTE — TELEPHONE ENCOUNTER
Pt stated the Flexiril is not working- he had been taking the medication previously  He said if there is any other medication you can order and if you can give him an order for an xray or other analysis for his back pain?      Pt was seen by you today

## 2018-06-28 NOTE — ED PROVIDER NOTES
History  Chief Complaint   Patient presents with    Back Pain     Pt c/o left sided back pain started since Saturday, reports pain radiates down right leg  Denies any injury  Pt denies any blood in urine but does report pain with urination  Denies fevers at home  Reports " I want to know if it is a kidney stone"       Back Pain   Location:  Gluteal region and lumbar spine  Quality:  Aching  Radiates to:  R posterior upper leg and R foot  Pain severity:  Moderate  Onset quality:  Gradual  Timing:  Constant  Progression:  Unchanged  Chronicity:  Chronic  Relieved by:  Nothing  Worsened by:  Nothing  Ineffective treatments:  None tried  Associated symptoms: leg pain    Associated symptoms: no abdominal pain, no bladder incontinence, no bowel incontinence, no dysuria, no fever, no numbness, no perianal numbness, no tingling and no weakness    Risk factors: no hx of cancer        Prior to Admission Medications   Prescriptions Last Dose Informant Patient Reported? Taking?    cyclobenzaprine (FLEXERIL) 10 mg tablet   No Yes   Sig: Take 1 tablet (10 mg total) by mouth daily at bedtime   lisinopril (ZESTRIL) 5 mg tablet   No Yes   Sig: TAKE 1 TABLET BY MOUTH EVERY DAY   loratadine (CLARITIN) 10 mg tablet   No Yes   Sig: Take 1 tablet (10 mg total) by mouth daily      Facility-Administered Medications: None       Past Medical History:   Diagnosis Date    History of chronic constipation     History of neck pain 11/21/2017    Hypertension        Past Surgical History:   Procedure Laterality Date    CYSTOSCOPY  05/29/2014    diagnostic managed by Jalen Mora    TOOTH EXTRACTION         Family History   Problem Relation Age of Onset    Cancer Sister         malignant neoplasm of breast    Diabetes Paternal Grandmother     Diabetes Family     Arthritis Family     Cancer Family         malignant neoplasm    Substance Abuse Neg Hx         denied Mother and Father    Mental illness Neg Hx         no family history Mother and Father    Other Neg Hx         denied family history of Osteopertrosis     I have reviewed and agree with the history as documented  Social History   Substance Use Topics    Smoking status: Former Smoker    Smokeless tobacco: Never Used    Alcohol use Yes      Comment: weekly        Review of Systems   Constitutional: Negative for fever  HENT: Negative for dental problem  Eyes: Negative for pain  Respiratory: Negative for cough  Gastrointestinal: Negative for abdominal pain and bowel incontinence  Endocrine: Negative for polydipsia  Genitourinary: Negative for bladder incontinence, dysuria and enuresis  Musculoskeletal: Positive for back pain  Allergic/Immunologic: Negative for food allergies  Neurological: Negative for tingling, weakness and numbness  Hematological: Negative for adenopathy  Psychiatric/Behavioral: Negative for behavioral problems  Physical Exam  Physical Exam   Constitutional: He is oriented to person, place, and time  He appears well-developed and well-nourished  HENT:   Head: Normocephalic and atraumatic  Eyes: Conjunctivae are normal    Neck: Neck supple  No JVD present  Cardiovascular: Normal rate  Pulmonary/Chest: Effort normal    Abdominal: He exhibits no distension  Genitourinary:   Genitourinary Comments: No complaints deferred  Musculoskeletal: He exhibits no edema, tenderness or deformity  Back:    Lymphadenopathy:     He has no cervical adenopathy  Neurological: He is alert and oriented to person, place, and time  Normal gait on exam   No evidence of footdrop  Skin: Skin is dry  Capillary refill takes less than 2 seconds         Vital Signs  ED Triage Vitals [06/28/18 1908]   Temperature Pulse Respirations Blood Pressure SpO2   98 1 °F (36 7 °C) 80 16 113/60 99 %      Temp Source Heart Rate Source Patient Position - Orthostatic VS BP Location FiO2 (%)   Temporal Monitor Sitting Right arm --      Pain Score       8 Vitals:    06/28/18 1908   BP: 113/60   Pulse: 80   Patient Position - Orthostatic VS: Sitting       Visual Acuity      ED Medications  Medications - No data to display    Diagnostic Studies  Results Reviewed     Procedure Component Value Units Date/Time    Urine Microscopic [[id number]]  (Abnormal) Collected:  06/28/18 2000    Lab Status:  Final result Specimen:  Urine from Urine, Clean Catch Updated:  06/28/18 2015     RBC, UA None Seen /hpf      WBC, UA 0-1 (A) /hpf      Epithelial Cells None Seen /hpf      Bacteria, UA Occasional /hpf     POCT urinalysis dipstick [[id number]]  (Abnormal) Resulted:  06/28/18 1955    Lab Status:  Final result Specimen:  Urine Updated:  06/28/18 1956    ED Urine Macroscopic [[id number]]  (Abnormal) Collected:  06/28/18 2000    Lab Status:  Final result Specimen:  Urine Updated:  06/28/18 1955     Color, UA Yellow     Clarity, UA Clear     pH, UA 5 0     Leukocytes, UA Negative     Nitrite, UA Negative     Protein, UA 30 (1+) (A) mg/dl      Glucose, UA Negative mg/dl      Ketones, UA Negative mg/dl      Urobilinogen, UA 0 2 E U /dl      Bilirubin, UA Negative     Blood, UA Small (A)     Specific [place], UA 1 020    Narrative:       CLINITEK RESULT                 No orders to display              Procedures  Procedures       Phone Contacts  ED Phone Contact    ED Course                               MDM  Number of Diagnoses or Management Options  Right-sided low back pain with right-sided sciatica:   Diagnosis management comments: Low back pain right side radiates right buttock  Patient does admit to intermittent right radicular symptoms into the lateral right foot  No foot drop  Patient actually seen evaluated by his primary care this day  Will refer patient to comprehensive Pain Specialist   Educated patient of diagnosis and home management  Encourage patient to follow up with his known primary care  Also encourage patient to call pain management    Educated patient of diagnosis and home management encourage close follow-up  Educated on persistent or worsening signs symptoms and to follow up with primary care pain management Sports Medicine and/or return to the emergency department  Patient admits understanding and agreement  Patient was discharged in ambulatory stable condition  Did not feel lab or radiographic data would aid in diagnosis or management  CritCare Time    Disposition  Final diagnoses:   Right-sided low back pain with right-sided sciatica     Time reflects when diagnosis was documented in both MDM as applicable and the Disposition within this note     Time User Action Codes Description Comment    6/28/2018  8:27 PM Queta Shankar Add [M54 41] Right-sided low back pain with right-sided sciatica       ED Disposition     ED Disposition Condition Comment    Discharge  Joseline Dao discharge to home/self care      Condition at discharge: Stable        Follow-up Information     Follow up With Specialties Details Why Contact Scott Kee DO Pain Medicine   [address] [place] [address]  [phone]            Discharge Medication List as of 6/28/2018  8:36 PM      START taking these medications    Details   ibuprofen (MOTRIN) 400 mg tablet Take 1 tablet (400 mg total) by mouth every 6 (six) hours as needed for mild pain, Starting u 6/28/2018, Print      lidocaine (LIDODERM) 5 % Place 1 patch on the skin daily Remove & Discard patch within 12 hours or as directed by MD, Starting Thu 6/28/2018, Print      methocarbamol (ROBAXIN) 750 mg tablet Take 1 tablet (750 mg total) by mouth every 6 (six) hours as needed for muscle spasms, Starting u 6/28/2018, Print      acetaminophen (TYLENOL) 500 mg tablet Take 1 tablet (500 mg total) by mouth every 6 (six) hours as needed for mild pain, moderate pain, headaches or fever, Starting Thu 6/28/2018, Normal         CONTINUE these medications which have NOT CHANGED    Details   cyclobenzaprine (FLEXERIL) 10 mg tablet Take 1 tablet (10 mg total) by mouth daily at bedtime, Starting Thu 6/28/2018, Normal      lisinopril (ZESTRIL) 5 mg tablet TAKE 1 TABLET BY MOUTH EVERY DAY, Normal      loratadine (CLARITIN) 10 mg tablet Take 1 tablet (10 mg total) by mouth daily, Starting Thu 5/17/2018, Normal           No discharge procedures on file      ED Provider  Electronically Signed by           Tano Wisdom PA-C  06/29/18 2054

## 2018-06-28 NOTE — PROGRESS NOTES
Assessment/Plan:    Patient Instructions   Flexeril 3 times daily as needed as directed  Avoid the medication with working or driving because the drowsy side effects  Heat 3 times daily for 10 min as needed  Recommend physical therapy in the importance of doing a home exercise program has also been discussed  Follow-up if any symptoms increase or there is no improvement with physical therapy in 6-8 weeks  M*Modal software was used to dictate this note  It may contain errors with dictating incorrect words/spelling  Please contact provider directly for any questions  Diagnoses and all orders for this visit:    Right-sided low back pain with right-sided sciatica, unspecified chronicity  -     Ambulatory referral to Physical Therapy; Future  -     cyclobenzaprine (FLEXERIL) 10 mg tablet; Take 1 tablet (10 mg total) by mouth daily at bedtime    Strain of lumbar region, initial encounter          Subjective:      Patient ID: Landy Alarcon is a 61 y o  male  Patient presents today for an acute visit with right-sided low back pain that started about a week ago  He denies any specific injury  He states he does notice some radiation of pain down his right posterior calf to his knee  He denies any numbness or tingling or loss of bladder or bowel control  Denies any saddle paresthesias  He states he is taking medication over-the-counter at this point but can't remember the name of the medicine  He states he had similar pain maybe about a year ago  He has never had any form of physical therapy for his pain  The following portions of the patient's history were reviewed and updated as appropriate:   He  has a past medical history of History of chronic constipation; History of neck pain (11/21/2017); and Hypertension    He   Patient Active Problem List    Diagnosis Date Noted    Right-sided low back pain with right-sided sciatica 06/28/2018    Hoarseness of voice 05/17/2018    Acute pain of both knees 01/24/2018    Acute nonintractable headache 01/24/2018    Left shoulder pain 06/07/2017    Benign colon polyp 03/24/2017    Enlarged prostate without lower urinary tract symptoms (luts) 07/18/2016    Varicocele 01/12/2016    Mild vitamin D deficiency 06/10/2015    Testicle pain 07/02/2014    Impingement syndrome, shoulder, left 05/16/2014    Pain in joint of right hip 02/04/2014    Microscopic hematuria 11/19/2013    Renal calculus, right 11/19/2013    Atypical chest pain 06/19/2013    Benign essential hypertension 10/10/2012    Abnormal glucose 10/10/2012     He  has a past surgical history that includes Cystoscopy (05/29/2014) and Tooth extraction  His family history includes Arthritis in his family; Cancer in his family and sister; Diabetes in his family and paternal grandmother  He  reports that he has quit smoking  He has never used smokeless tobacco  He reports that he drinks alcohol  He reports that he does not use drugs  Current Outpatient Prescriptions   Medication Sig Dispense Refill    cyclobenzaprine (FLEXERIL) 10 mg tablet Take 1 tablet (10 mg total) by mouth daily at bedtime 30 tablet 0    Efinaconazole (JUBLIA) 10 % SOLN Apply to nails once a day for 48 weeks  1 Bottle 2    lisinopril (ZESTRIL) 5 mg tablet TAKE 1 TABLET BY MOUTH EVERY DAY 90 tablet 1    loratadine (CLARITIN) 10 mg tablet Take 1 tablet (10 mg total) by mouth daily 30 tablet 0    predniSONE (DELTASONE) 20 mg tablet Take 3 tablets for 3 days then 2 tablets for 3 days then 1 tablet for 3 days then stop 18 tablet 0     No current facility-administered medications for this visit  Current Outpatient Prescriptions on File Prior to Visit   Medication Sig    Efinaconazole (JUBLIA) 10 % SOLN Apply to nails once a day for 48 weeks      lisinopril (ZESTRIL) 5 mg tablet TAKE 1 TABLET BY MOUTH EVERY DAY    loratadine (CLARITIN) 10 mg tablet Take 1 tablet (10 mg total) by mouth daily    predniSONE (DELTASONE) 20 mg tablet Take 3 tablets for 3 days then 2 tablets for 3 days then 1 tablet for 3 days then stop    [DISCONTINUED] cyclobenzaprine (FLEXERIL) 10 mg tablet Take 1 tablet (10 mg total) by mouth daily at bedtime     No current facility-administered medications on file prior to visit  He has No Known Allergies       Review of Systems   Gastrointestinal:        As stated in HPI   Genitourinary:        As stated in HPI   Musculoskeletal:        As stated in HPI         Objective:      /60   Pulse 100   Temp 98 °F (36 7 °C)   Resp 18   Ht 5' 3" (1 6 m)   Wt 64 4 kg (142 lb)   BMI 25 15 kg/m²          Physical Exam   Constitutional: He appears well-developed and well-nourished  No distress  Musculoskeletal:   Lumbar spine:  No specific tenderness, pain with forward flexion, negative straight leg raise bilaterally  Strength of the lower extremities is 5+/5 and equal bilaterally  Reflexes are 2+ at the knees and ankles

## 2018-06-28 NOTE — PATIENT INSTRUCTIONS
Flexeril 3 times daily as needed as directed  Avoid the medication with working or driving because the drowsy side effects  Over-the-counter extra-strength Tylenol as needed for pain  Heat 3 times daily for 10 min as needed  Recommend physical therapy in the importance of doing a home exercise program has also been discussed  Follow-up if any symptoms increase or there is no improvement with physical therapy in 6-8 weeks

## 2018-06-29 NOTE — DISCHARGE INSTRUCTIONS
Radiculopatía lumbar   LO QUE NECESITA SABER:   La radiculopatía lumbar es aggie enfermedad dolorosa que sucede cuando un nervio de harrison ludivina lumbar (parte baja de la espalda) se pinza o se irrita  Los nervios controlan las sensaciones y movimientos de harrison cuerpo  Usted podría tener adormecimiento o dolor desde la parte inferior de la espalda hacia dweey pies  INSTRUCCIONES SOBRE EL [facility]ITAL[facility]IA:   Medicamentos:   · Medicamentos:     ¨ AINEs (Analgésicos antiinflamatorios no esteroides) melvin el ibuprofeno, ayudan a disminuir la inflamación, el dolor y la fiebre  Amanda medicamento esta disponible con o sin aggie receta médica  Los AINEs pueden causar sangrado estomacal o problemas renales en ciertas personas  Si usted yogi un medicamento anticoagulante, siempre pregúntele a harrison médico si los JOSHUA son seguros para usted  Siempre eze la etiqueta de amanda medicamento y [place] instrucciones  ¨ Relajantes musculares  ayudan a reducir dolor y espasmos musculares  ¨ Opioides: Estos son medicamentos muy florian que se administran para reducir el dolor severo  También se les conoce melvin medicamentos narcóticos para el dolor  [place] el medicamento exactamente melvin le indicó harrison médico     ¨ Esteroides orales: Los esteroides se administran para reducir la inflamación y el dolor  ¨ [place] dewey medicamentos melvin se le haya indicado  Consulte con harrison médico si usted jade que harrison medicamento no le está ayudando o si presenta efectos secundarios  Infórmele si es alérgico a algún medicamento  Mantenga aggie lista actualizada de los [place], las vitaminas y los productos herbales que yogi  Incluya los siguientes datos de los medicamentos: cantidad, frecuencia y motivo de administración  Traiga con usted la lista o los envases de la píldoras a dewey citas de seguimiento  Lleve la lista de los medicamentos con usted en gail de aggie emergencia      Programe aggie najma con harrison médico o especialista en columna dentro de 1 a 3 semanas: Después de harrison primera najma de seguimiento, vuelva a harrison médico o especialista en columna cada 2 semanas hasta que se haya curado  Solicitar información acerca de fisioterapia para harrison problema  Anote dewey preguntas para que se acuerde de hacerlas demond dewey visitas  Fisioterapia:  Usted podría necesitar fisioterapia para ayudar a mejorar harrison condición  Harrison fisioterapeuta le podría enseñar ejercicios para ayudar a mejorar harrison postura (la forma que usted se para y se sienta), flexibilidad, y fuerza en la parte inferior de harrison espalda  Cuidado personal:   · Manténgase activo: Es mejor ser activo cuando se padece de radiculopatía lumbar  Harrison fisioterapeuta o médico probablemente le recomendarán que salga a caminar para poco a poco regresar a harrison rutina diaria  Evite guardar cama por largos periodos de [place]  Guardar cama podría empeorar dewey síntomas  No se mueva en formas que empeoran harrison dolor  Pregúntele a harrison médico por más información sobre la mejor forma de permanecer activo  · Uso de compresas frías o calientes:  Use compresas frías o calientes en el área adolorida para disminuir el dolor y la inflamación  Ponga hielo en aggie bolsa plástica y cúbrala con aggie toalla y colóquela sobre la parte de abajo de harrison espalda  [place] las compresas calientes con aggie toalla para evitar quemaduras  Use el hielo melvin se lo indican  · Evite levantar objetos pesados: Harrison condición podría empeorar si usted levanta cosas pesadas  Si es posible evite alzar del todo  · Mantenga un peso saludable:  El peso en exceso podría torcer harrison espalda  Hable con harrison médico sobre formas de adelgazar si usted sufre de [place]  Comuníquese con harrison médico o especialista en columna si:   · El dolor que siente no mejora dentro de 1 a 3 semanas después del [place]  · El dolor y la debilidad que siente evitan que usted lleve a cabo dewey actividades normales en el [place], casa o escuela      · Usted baja más de 10 libras en 6 meses sin proponérselo  · Usted está deprimido o harish debido al dolor que siente  · Usted tiene preguntas o inquietudes acerca de bhagat condición o cuidado  Regrese a la dana de emergencias si:   · Tiene temperatura de más de 100 4°F por más de 2 días  · Tiene un dolor de espalda o pierna severo que no sentía antes, o el dolor se propaga a ambas piernas  · Presenta señales nuevas de adormecimiento o debilidad, sobre todo en la parte de abajo de la espalda, piernas, brazos o genitales  · Tiene problemas nuevos para controlar la [place] y heces  · Usted siente melvin si bhagat vejiga no se vaciara cuando orina  © 2017 [employer] Information is for End User's use only and may not be sold, redistributed or otherwise used for commercial purposes  All illustrations and images included in CareNotes® are the copyrighted property of A D A M , Inc  or Leo Ewing  Esta información es sólo para uso en educación  Bhagat intención no es darle un consejo médico sobre enfermedades o tratamientos  Colsulte con bhagat [place] farmacéutico antes de seguir cualquier régimen médico para saber si es seguro y efectivo para usted

## 2018-07-03 ENCOUNTER — TELEPHONE (OUTPATIENT)
Dept: FAMILY MEDICINE CLINIC | Facility: CLINIC | Age: 58
End: 2018-07-03

## 2018-07-03 DIAGNOSIS — M54.41 RIGHT-SIDED LOW BACK PAIN WITH RIGHT-SIDED SCIATICA, UNSPECIFIED CHRONICITY: Primary | ICD-10-CM

## 2018-07-03 RX ORDER — BACLOFEN 10 MG/1
10 TABLET ORAL 3 TIMES DAILY
Qty: 30 TABLET | Refills: 1 | Status: SHIPPED | OUTPATIENT
Start: 2018-07-03 | End: 2018-10-21

## 2018-07-12 ENCOUNTER — EVALUATION (OUTPATIENT)
Dept: PHYSICAL THERAPY | Facility: CLINIC | Age: 58
End: 2018-07-12
Payer: COMMERCIAL

## 2018-07-12 DIAGNOSIS — M54.41 RIGHT-SIDED LOW BACK PAIN WITH RIGHT-SIDED SCIATICA, UNSPECIFIED CHRONICITY: Primary | ICD-10-CM

## 2018-07-12 PROCEDURE — 97110 THERAPEUTIC EXERCISES: CPT | Performed by: PHYSICAL THERAPIST

## 2018-07-12 PROCEDURE — 97161 PT EVAL LOW COMPLEX 20 MIN: CPT | Performed by: PHYSICAL THERAPIST

## 2018-07-12 PROCEDURE — G8990 OTHER PT/OT CURRENT STATUS: HCPCS | Performed by: PHYSICAL THERAPIST

## 2018-07-12 PROCEDURE — G8991 OTHER PT/OT GOAL STATUS: HCPCS | Performed by: PHYSICAL THERAPIST

## 2018-07-12 NOTE — PROGRESS NOTES
PT Evaluation     Today's date: 2018  Patient name: Maricel Oro  : 1960  MRN: [id number]  Referring provider: Bronson Saleem PA-C  Dx:   Encounter Diagnosis     ICD-10-CM    1  Right-sided low back pain with right-sided sciatica, unspecified chronicity M54 41 Ambulatory referral to Physical Therapy                  Assessment  Impairments: abnormal coordination, abnormal or restricted ROM, activity intolerance, impaired physical strength and pain with function    Assessment details: Pt is a pleasant 61 y o  male presenting to outpatient physical therapy with Right-sided low back pain with right-sided sciatica, unspecified chronicity  (primary encounter diagnosis)   Pt presents with pain, decreased range of motion, decreased strength, and decreased tolerance to activity  Displays probable movement impairment diagnosis of right LE adverse neural tension  Responds favorably to to manual interventions  Issued HEP  Pt would benefit from skilled physical therapy to address limitations and to achieve goals  Thank you for this referral    Understanding of Dx/Px/POC: good   Prognosis: good    Goals  ST  Patient will report 25% decrease in pain in 4 weeks  2  Patient will demonstrate 25% improvement in ROM in 4 weeks  3  Patient will demonstrate 1/2 grade improvement in strength in 4 weeks  LT  Patient will be able to perform IADLS without restriction or pain by discharge  2  Patient will be independent in HEP by discharge  3  Patient will be able to return to recreational/work duties without restriction or pain by discharge        Plan  Patient would benefit from: PT eval and skilled PT  Planned modality interventions: cryotherapy and thermotherapy: hydrocollator packs  Planned therapy interventions: IADL retraining, body mechanics training, flexibility, functional ROM exercises, home exercise program, neuromuscular re-education, manual therapy, postural training, strengthening, stretching, therapeutic activities, therapeutic exercise and joint mobilization  Frequency: 2x week  Duration in visits: 8  Duration in weeks: 4  Treatment plan discussed with: patient        Subjective Evaluation    History of Present Illness  Mechanism of injury: Pt reports several month history of low back and right LE pain  Pt reports low back has been feeling better lately, however, continues to have pain down posterior right LE  Reports he also experiences numbness in right foot when sitting and watching TV  Denies diagnostic scans  Pt denies bowel or bladder dysfunction, saddle anesthesia, fever, chills, nausea, or vomiting  Pt also denies pain at night or recent unexplained weight loss  He always has discomfort in leg, however, wost when lifting leg  Denies increase in pain with walking, standing, or performing IADLS     Pain  No pain reported  Current pain ratin  At best pain ratin  At worst pain rating: 10  Location: posterior right LE  Quality: sharp      Diagnostic Tests  No diagnostic tests performed  Patient Goals  Patient goals for therapy: independence with ADLs/IADLs, decreased pain and increased motion          Objective     Special Questions  Negative for night pain, disturbed sleep, bladder dysfunction and bowel dysfunction    Postural Observations  Seated posture: good  Standing posture: good        Neurological Testing     Additional Neurological Details  LOWER EXTREMITY MYOTOMES: WNL, symmetrical, and intact L1-S2       Active Range of Motion     Lumbar   Flexion: 70 degrees with pain  Extension: 35 degrees with pain  Left lateral flexion: 30 degrees   Right lateral flexion: 20 degrees     Additional Active Range of Motion Details  LUMBAR AROM -  Flexion, extension, bilat lateral flexion measured with bubble inclinometer at L1; Rotation measured with goniometer and patient seated      Strength/Myotome Testing     Left Hip   Planes of Motion   Flexion: 5  Extension: 4+  Abduction: 4+  External rotation: 5  Internal rotation: 5    Right Hip   Planes of Motion   Flexion: 4  Extension: 4+  Abduction: 4+  External rotation: 4+  Internal rotation: 4+    Tests       Thoracic   Positive slump  Lumbar   Positive slumped  Left   Negative crossed SLR and passive SLR  Right   Positive passive SLR  Negative crossed SLR  Left Pelvic Girdle/Sacrum   Negative: sacrum compression and gapping  Right Pelvic Girdle/Sacrum   Negative: sacrum compression and gapping  Right Hip   Negative EDGARDO, EUGENIA, Dayo, piriformis, scour, SI compression, SI distraction and sign of the buttock       Additional Tests Details  At IE: ( - ) EDGARDO REED Mobility testing - mild-mod hypomobility L2-5, denies pain  SLR 70 deg bilat          Precautions: HTN    Daily Treatment Diary     Manual  7/12            Sidelying lumbar rot mobs Gr IV                                                                    Exercise Diary              bike                          Slump sliders 30 x1            Seated H/S str                          Piriformis str 20"X4            90/90 sliders 30 pumps x1            H/S Strap str             SLR flex, abd                                                                                                                                                                Modalities

## 2018-07-13 ENCOUNTER — TELEPHONE (OUTPATIENT)
Dept: FAMILY MEDICINE CLINIC | Facility: CLINIC | Age: 58
End: 2018-07-13

## 2018-07-16 ENCOUNTER — OFFICE VISIT (OUTPATIENT)
Dept: PHYSICAL THERAPY | Facility: CLINIC | Age: 58
End: 2018-07-16
Payer: COMMERCIAL

## 2018-07-16 DIAGNOSIS — M54.41 RIGHT-SIDED LOW BACK PAIN WITH RIGHT-SIDED SCIATICA, UNSPECIFIED CHRONICITY: Primary | ICD-10-CM

## 2018-07-16 PROCEDURE — 97110 THERAPEUTIC EXERCISES: CPT | Performed by: PHYSICAL THERAPIST

## 2018-07-16 PROCEDURE — 97140 MANUAL THERAPY 1/> REGIONS: CPT | Performed by: PHYSICAL THERAPIST

## 2018-07-16 NOTE — PROGRESS NOTES
Daily Note     Today's date: 2018  Patient name: Shelli Mercer  : 1960  MRN: [id number]  Referring provider: Jacob Haas PA-C  Dx:   Encounter Diagnosis     ICD-10-CM    1  Right-sided low back pain with right-sided sciatica, unspecified chronicity M54 41                   Subjective: Pt comes to therapy denying significant changes since last session  Reports he feels discomfort in back when performing lifting activities  Objective: See treatment diary below    Precautions: HTN    Daily Treatment Diary     Manual             Sidelying lumbar rot mobs Gr IV                                                                    Exercise Diary            bike  5 min                      Slump sliders 30 x1 30 pumps x2          Seated H/S str                        Piriformis str 20"X4 30"x3          90/90 sliders 30 pumps x1 30 pumps x2          H/S Strap str  30"x3          SLR flex, abd  3"x15                      LTR  15"x5          SKTC  15"x5                                                                                                              Modalities                                                         Assessment: Tolerated treatment well  Patient exhibited good technique with therapeutic exercises and would benefit from continued PT      Plan: Progress treatment as tolerated

## 2018-07-17 ENCOUNTER — APPOINTMENT (OUTPATIENT)
Dept: PHYSICAL THERAPY | Facility: CLINIC | Age: 58
End: 2018-07-17
Payer: COMMERCIAL

## 2018-07-23 ENCOUNTER — APPOINTMENT (OUTPATIENT)
Dept: PHYSICAL THERAPY | Facility: CLINIC | Age: 58
End: 2018-07-23
Payer: COMMERCIAL

## 2018-07-24 ENCOUNTER — APPOINTMENT (OUTPATIENT)
Dept: PHYSICAL THERAPY | Facility: CLINIC | Age: 58
End: 2018-07-24
Payer: COMMERCIAL

## 2018-07-24 DIAGNOSIS — M54.41 RIGHT-SIDED LOW BACK PAIN WITH RIGHT-SIDED SCIATICA, UNSPECIFIED CHRONICITY: ICD-10-CM

## 2018-07-24 RX ORDER — CYCLOBENZAPRINE HCL 10 MG
10 TABLET ORAL
Qty: 30 TABLET | Refills: 0 | Status: SHIPPED | OUTPATIENT
Start: 2018-07-24 | End: 2018-10-21

## 2018-07-25 NOTE — TELEPHONE ENCOUNTER
I spoke with pt and I explained pt about muscle relaxers duplicate therapy  Pt states he is just taking cyclobenzaprine(flexeril) was d/c methocarbamol and baclofen   Also I schedule pt's hieu for f/u for 8/30 7 pm

## 2018-07-30 ENCOUNTER — APPOINTMENT (OUTPATIENT)
Dept: PHYSICAL THERAPY | Facility: CLINIC | Age: 58
End: 2018-07-30
Payer: COMMERCIAL

## 2018-07-31 ENCOUNTER — APPOINTMENT (OUTPATIENT)
Dept: PHYSICAL THERAPY | Facility: CLINIC | Age: 58
End: 2018-07-31
Payer: COMMERCIAL

## 2018-08-07 NOTE — PROGRESS NOTES
8/9/2018    [facility]  1960  [id number]      Assessment  -Routine prostate cancer screening  -Microscopic hematuria  -Varicocele    Discussion/Plan  Chong Eden is a 61 y o  male being managed by Dr Shaila Page        -We reviewed results of recent PSA and urine testing  PSA from 11/2017 was 0 4, urine showed small amount of blood, but none noted in microscopic     -We discussed patient's lower urinary tract symptoms in detail  He states that his weak stream and straining with urination has become more bothersome  We discussed starting patient on tamsulosin 0 4 mg daily  I reviewed mechanism of action, and possible side effects of lightheadedness/dizziness/retrograde ejaculation     -Patient will go for repeat scrotal ultrasound, due to ongoing discomfort  Last ultrasound performed in 2016  We reviewed conservative management with anti inflammatories as needed, warm baths, and scrotal support  We discussed that if patient is noted to have varicocele as previously identified, and has ongoing discomfort, will consider referring to Dr Olaf Marinelli for varicocelectomy  -Follow up in the next 4-6 weeks to re-evaluate efficacy of Flomax, review ultrasound results, and obtain PSA at that time for routine prostate cancer screening and perform JENN   -All questions answered, patient agrees with plan      History of Present Illness  61 y o  male with a history of routine prostate cancer screening, microscopic hematuria s/p negative hematuria workup in 2014, and varicocele presents today for follow up  Patient states that he continues to have persistent lower urinary tract symptoms of weak urinary stream, hesitancy, frequency, and difficulty postponing  He denies any episodes of gross hematuria or dysuria, and feels he empties bladder to completion  Patient states that he has had ongoing intermittent bilateral scrotal discomfort, primarily on the left side  Patient states that he is a  during the day    No overall changes in health since last office visit  Last ultrasound from 6/2017 showed left upper pole 9mm simple cyst, otherwise no abnormal findings  Last PSA from 08/25/2016 was 0 4  He denies any strong family history of prostate cancer  Scrotal ultrasound from 9/2016 identified small left varicocele  Review of Systems  Review of Systems   Constitutional: Negative  HENT: Negative  Respiratory: Negative  Cardiovascular: Negative  Gastrointestinal: Negative  Genitourinary: Positive for frequency, testicular pain and urgency  Negative for decreased urine volume, difficulty urinating, dysuria, flank pain, hematuria and scrotal swelling  Musculoskeletal: Negative  Skin: Negative  Neurological: Negative  Psychiatric/Behavioral: Negative  AUA SYMPTOM SCORE      Most Recent Value   AUA SYMPTOM SCORE   How often have you had a sensation of not emptying your bladder completely after you finished urinating? 5   How often have you had to urinate again less than two hours after you finished urinating? 5   How often have you found you stopped and started again several times when you urinate? 5   How often have you found it difficult to postpone urination? 5   How often have you had a weak urinary stream?  5   How often have you had to push or strain to begin urination? 5   How many times did you most typically get up to urinate from the time you went to bed at night until the time you got up in the morning?   1   Quality of Life: If you were to spend the rest of your life with your urinary condition just the way it is now, how would you feel about that?  6   AUA SYMPTOM SCORE  31            Past Medical History  Past Medical History:   Diagnosis Date    History of chronic constipation     History of neck pain 11/21/2017    Hypertension        Past Social History  Past Surgical History:   Procedure Laterality Date    CYSTOSCOPY  05/29/2014    diagnostic managed by Karo Montez  TOOTH EXTRACTION         Past Family History  Family History   Problem Relation Age of Onset    Cancer Sister         malignant neoplasm of breast    Diabetes Paternal Grandmother     Diabetes Family     Arthritis Family     Cancer Family         malignant neoplasm    Substance Abuse Neg Hx         denied Mother and Father    Mental illness Neg Hx         no family history Mother and Father    Other Neg Hx         denied family history of Osteopertrosis       Past Social history  Social History     Social History    Marital status: Single     Spouse name: N/A    Number of children: N/A    Years of education: N/A     Occupational History    Not on file       Social History Main Topics    Smoking status: Former Smoker    Smokeless tobacco: Never Used    Alcohol use Yes      Comment: weekly    Drug use: No    Sexual activity: Not on file     Other Topics Concern    Not on file     Social History Narrative    No preference or Yarsanism beliefs       Current Medications  Current Outpatient Prescriptions   Medication Sig Dispense Refill    acetaminophen (TYLENOL) 500 mg tablet Take 1 tablet (500 mg total) by mouth every 6 (six) hours as needed for mild pain, moderate pain, headaches or fever 30 tablet 0    baclofen 10 mg tablet Take 1 tablet (10 mg total) by mouth 3 (three) times a day 30 tablet 1    cyclobenzaprine (FLEXERIL) 10 mg tablet TAKE 1 TABLET (10 MG TOTAL) BY MOUTH DAILY AT BEDTIME 30 tablet 0    ibuprofen (MOTRIN) 400 mg tablet Take 1 tablet (400 mg total) by mouth every 6 (six) hours as needed for mild pain 30 tablet 0    lidocaine (LIDODERM) 5 % Place 1 patch on the skin daily Remove & Discard patch within 12 hours or as directed by MD 30 patch 0    lisinopril (ZESTRIL) 5 mg tablet TAKE 1 TABLET BY MOUTH EVERY DAY 90 tablet 1    loratadine (CLARITIN) 10 mg tablet Take 1 tablet (10 mg total) by mouth daily 30 tablet 0    methocarbamol (ROBAXIN) 750 mg tablet Take 1 tablet (750 mg total) by mouth every 6 (six) hours as needed for muscle spasms 30 tablet 0     No current facility-administered medications for this visit  Allergies  No Known Allergies    Past Medical History, Social History, Family History, medications and allergies were reviewed  Vitals  There were no vitals filed for this visit  Physical Exam  Physical Exam   Constitutional: He is oriented to person, place, and time  He appears well-developed and well-nourished  HENT:   Head: Normocephalic  Eyes: Pupils are equal, round, and reactive to light  Neck: Normal range of motion  Cardiovascular: Normal rate and regular rhythm  Pulmonary/Chest: Effort normal    Abdominal: Soft  Normal appearance  There is no CVA tenderness  Genitourinary:   Genitourinary Comments: Scrotum and scrotal contents nl, mild left sided varicocele palpated, nontender, no erythema, edema noted   Musculoskeletal: Normal range of motion  Neurological: He is alert and oriented to person, place, and time  He has normal reflexes  Skin: Skin is warm and dry  Psychiatric: He has a normal mood and affect  His behavior is normal  Judgment and thought content normal        Results    I have personally reviewed all pertinent lab results and reviewed with patient  Lab Results   Component Value Date    PSA 0 4 11/30/2017    PSA 0 4 08/25/2016    PSA 0 4 08/10/2015     Lab Results   Component Value Date    GLUCOSE 102 05/12/2017    CALCIUM 9 0 05/17/2018     05/17/2018    K 4 5 05/17/2018    CO2 28 05/17/2018     (H) 05/17/2018    BUN 14 05/17/2018    CREATININE 1 19 05/17/2018     Lab Results   Component Value Date    WBC 3 72 (L) 05/17/2018    HGB 14 6 05/17/2018    HCT 44 7 05/17/2018    MCV 91 05/17/2018     05/17/2018     No results found for this or any previous visit (from the past 1 hour(s))

## 2018-08-09 ENCOUNTER — OFFICE VISIT (OUTPATIENT)
Dept: UROLOGY | Facility: AMBULATORY SURGERY CENTER | Age: 58
End: 2018-08-09

## 2018-08-09 VITALS
DIASTOLIC BLOOD PRESSURE: 76 MMHG | HEIGHT: 63 IN | SYSTOLIC BLOOD PRESSURE: 118 MMHG | WEIGHT: 139.6 LBS | BODY MASS INDEX: 24.73 KG/M2 | HEART RATE: 72 BPM

## 2018-08-09 DIAGNOSIS — I86.1 VARICOCELE: ICD-10-CM

## 2018-08-09 DIAGNOSIS — N40.1 BENIGN PROSTATIC HYPERPLASIA WITH WEAK URINARY STREAM: ICD-10-CM

## 2018-08-09 DIAGNOSIS — R39.12 BENIGN PROSTATIC HYPERPLASIA WITH WEAK URINARY STREAM: ICD-10-CM

## 2018-08-09 DIAGNOSIS — Z12.5 SCREENING FOR PROSTATE CANCER: Primary | ICD-10-CM

## 2018-08-09 PROCEDURE — 99213 OFFICE O/P EST LOW 20 MIN: CPT | Performed by: NURSE PRACTITIONER

## 2018-08-09 RX ORDER — TAMSULOSIN HYDROCHLORIDE 0.4 MG/1
0.4 CAPSULE ORAL
Qty: 30 CAPSULE | Refills: 1 | Status: SHIPPED | OUTPATIENT
Start: 2018-08-09 | End: 2018-10-16 | Stop reason: SDUPTHER

## 2018-08-14 ENCOUNTER — HOSPITAL ENCOUNTER (OUTPATIENT)
Dept: ULTRASOUND IMAGING | Facility: HOSPITAL | Age: 58
Discharge: HOME | End: 2018-08-14
Payer: COMMERCIAL

## 2018-08-14 DIAGNOSIS — I86.1 VARICOCELE: ICD-10-CM

## 2018-08-14 PROCEDURE — 76870 US EXAM SCROTUM: CPT

## 2018-09-12 ENCOUNTER — HOSPITAL ENCOUNTER (EMERGENCY)
Facility: HOSPITAL | Age: 58
Discharge: HOME | End: 2018-09-12
Attending: EMERGENCY MEDICINE

## 2018-09-12 VITALS
BODY MASS INDEX: 24.6 KG/M2 | HEART RATE: 77 BPM | WEIGHT: 138.89 LBS | TEMPERATURE: 98.1 F | OXYGEN SATURATION: 99 % | DIASTOLIC BLOOD PRESSURE: 76 MMHG | RESPIRATION RATE: 16 BRPM | SYSTOLIC BLOOD PRESSURE: 139 MMHG

## 2018-09-12 DIAGNOSIS — R03.0 ELEVATED BLOOD PRESSURE READING: ICD-10-CM

## 2018-09-12 DIAGNOSIS — I10 HYPERTENSION, UNSPECIFIED TYPE: Primary | ICD-10-CM

## 2018-09-12 PROCEDURE — 99283 EMERGENCY DEPT VISIT LOW MDM: CPT

## 2018-09-13 NOTE — ED PROVIDER NOTES
History  Chief Complaint   Patient presents with    Hypertension     PT reports " I felt really hot and I took my BP at home and it was 128/112, I got concerned because it was high"  Pt denies any chest pain, or dizziness at this time  This is a 61year old male who states he felt hot all day today  Denies CP, SOB, N/V/D, dizziness  He states that he went to a pharmacy and took his BP on a machine and it was 128/112  He asked the pharmacist what was wrong with him and the pharmacist told him to come to the ED  Pt BP is now 139/76 and he denies being hot, denies any pain or symptoms at this time  He states he feels better and reassured now that his BP is normal  He states he take Lisinopril every day  He has  PCP  He states he is scheduled to see colorectal on Friday  History provided by:  Patient and medical records   used: No    Hypertension   Severity:  Mild  Onset quality:  Sudden      Prior to Admission Medications   Prescriptions Last Dose Informant Patient Reported?  Taking?   acetaminophen (TYLENOL) 500 mg tablet  Self No No   Sig: Take 1 tablet (500 mg total) by mouth every 6 (six) hours as needed for mild pain, moderate pain, headaches or fever   baclofen 10 mg tablet  Self No No   Sig: Take 1 tablet (10 mg total) by mouth 3 (three) times a day   cyclobenzaprine (FLEXERIL) 10 mg tablet  Self No No   Sig: TAKE 1 TABLET (10 MG TOTAL) BY MOUTH DAILY AT BEDTIME   ibuprofen (MOTRIN) 400 mg tablet  Self No No   Sig: Take 1 tablet (400 mg total) by mouth every 6 (six) hours as needed for mild pain   lidocaine (LIDODERM) 5 %  Self No No   Sig: Place 1 patch on the skin daily Remove & Discard patch within 12 hours or as directed by MD   lisinopril (ZESTRIL) 5 mg tablet  Self No No   Sig: TAKE 1 TABLET BY MOUTH EVERY DAY   loratadine (CLARITIN) 10 mg tablet  Self No No   Sig: Take 1 tablet (10 mg total) by mouth daily   methocarbamol (ROBAXIN) 750 mg tablet  Self No No   Sig: Take 1 tablet (750 mg total) by mouth every 6 (six) hours as needed for muscle spasms   tamsulosin (FLOMAX) 0 4 mg   No No   Sig: Take 1 capsule (0 4 mg total) by mouth daily with dinner      Facility-Administered Medications: None       Past Medical History:   Diagnosis Date    History of chronic constipation     History of neck pain 11/21/2017    Hypertension        Past Surgical History:   Procedure Laterality Date    CYSTOSCOPY  05/29/2014    diagnostic managed by Jalen Mora    TOOTH EXTRACTION         Family History   Problem Relation Age of Onset    Cancer Sister         malignant neoplasm of breast    Diabetes Paternal Grandmother     Diabetes Family     Arthritis Family     Cancer Family         malignant neoplasm    Substance Abuse Neg Hx         denied Mother and Father    Mental illness Neg Hx         no family history Mother and Father    Other Neg Hx         denied family history of Osteopertrosis     I have reviewed and agree with the history as documented  Social History   Substance Use Topics    Smoking status: Former Smoker    Smokeless tobacco: Never Used    Alcohol use Yes      Comment: weekly        Review of Systems   All other systems reviewed and are negative  Physical Exam  Physical Exam   Constitutional: He is oriented to person, place, and time  He appears well-developed and well-nourished  No distress  Pt is looking at his cell phone   HENT:   Head: Normocephalic and atraumatic  Right Ear: External ear normal    Left Ear: External ear normal    Nose: Nose normal    Mouth/Throat: Oropharynx is clear and moist  No oropharyngeal exudate  Eyes: EOM are normal  Pupils are equal, round, and reactive to light  Neck: Normal range of motion  Neck supple  Cardiovascular: Normal rate, regular rhythm and normal heart sounds  Pulmonary/Chest: Effort normal and breath sounds normal    Abdominal: Soft  Bowel sounds are normal  He exhibits no distension   There is no tenderness  Musculoskeletal: Normal range of motion  Neurological: He is alert and oriented to person, place, and time  He displays normal reflexes  No cranial nerve deficit or sensory deficit  He exhibits normal muscle tone  Coordination normal    Skin: Skin is warm and dry  Capillary refill takes less than 2 seconds  He is not diaphoretic  Psychiatric: He has a normal mood and affect  His behavior is normal  Judgment and thought content normal    Nursing note and vitals reviewed  Vital Signs  ED Triage Vitals [09/12/18 2230]   Temperature Pulse Respirations Blood Pressure SpO2   98 1 °F (36 7 °C) 77 16 139/76 99 %      Temp Source Heart Rate Source Patient Position - Orthostatic VS BP Location FiO2 (%)   Temporal Monitor Sitting Right arm --      Pain Score       No Pain           Vitals:    09/12/18 2230   BP: 139/76   Pulse: 77   Patient Position - Orthostatic VS: Sitting       Visual Acuity      ED Medications  Medications - No data to display    Diagnostic Studies  Results Reviewed     None                 No orders to display              Procedures  Procedures       Phone Contacts  ED Phone Contact    ED Course                               MDM  Number of Diagnoses or Management Options  Elevated blood pressure reading:   Hypertension, unspecified type:   Diagnosis management comments: Pt has a history of HTN and is on Lisinopril  He states he takes every day  Pt denies any symptoms at this time and is agreeable for discharge and states he will follow up  Pt states he feels fine for discharge      Pt verbalizes understanding of dc instructions and follow up        Amount and/or Complexity of Data Reviewed  Review and summarize past medical records: yes      CritCare Time    Disposition  Final diagnoses:   Hypertension, unspecified type   Elevated blood pressure reading - at outside facility     Time reflects when diagnosis was documented in both MDM as applicable and the Disposition within this note     Time User Action Codes Description Comment    9/12/2018 10:39 PM Lewis Eller Add [I10] Hypertension, unspecified type     9/12/2018 10:40 PM Lewis Eller Add [R03 0] Elevated blood pressure reading     9/12/2018 10:40 PM Lewis Eller Modify [R03 0] Elevated blood pressure reading at outside facility      ED Disposition     ED Disposition Condition Comment    Discharge  [place] discharge to home/self care      Condition at discharge: Good        Follow-up Information     Follow up With Specialties Details Why Contact Info Additional Information    Torsten Scott PA-C Family Medicine, Physician Assistant Schedule an appointment as soon as possible for a visit in 1 day  [address] [facility] [address]       [address] [facility] Emergency Medicine  If symptoms worsen Shauna Monsalve [address] [facility], [address] [place], [place], [address]          Discharge Medication List as of 9/12/2018 10:41 PM      CONTINUE these medications which have NOT CHANGED    Details   acetaminophen (TYLENOL) 500 mg tablet Take 1 tablet (500 mg total) by mouth every 6 (six) hours as needed for mild pain, moderate pain, headaches or fever, Starting Thu 6/28/2018, Print      baclofen 10 mg tablet Take 1 tablet (10 mg total) by mouth 3 (three) times a day, Starting Tue 7/3/2018, Normal      cyclobenzaprine (FLEXERIL) 10 mg tablet TAKE 1 TABLET (10 MG TOTAL) BY MOUTH DAILY AT BEDTIME, Starting Tue 7/24/2018, Normal      ibuprofen (MOTRIN) 400 mg tablet Take 1 tablet (400 mg total) by mouth every 6 (six) hours as needed for mild pain, Starting Thu 6/28/2018, Print      lidocaine (LIDODERM) 5 % Place 1 patch on the skin daily Remove & Discard patch within 12 hours or as directed by MD, Starting Thu 6/28/2018, Print      lisinopril (ZESTRIL) 5 mg tablet TAKE 1 TABLET BY MOUTH EVERY DAY, Normal      loratadine (CLARITIN) 10 mg tablet Take 1 tablet (10 mg total) by mouth daily, Starting u 5/17/2018, Normal      methocarbamol (ROBAXIN) 750 mg tablet Take 1 tablet (750 mg total) by mouth every 6 (six) hours as needed for muscle spasms, Starting Thu 6/28/2018, Print      tamsulosin (FLOMAX) 0 4 mg Take 1 capsule (0 4 mg total) by mouth daily with dinner, Starting u 8/9/2018, Normal           No discharge procedures on file      ED Provider  Electronically Signed by           Guilherme Landin [place]  09/12/18 5330

## 2018-09-13 NOTE — DISCHARGE INSTRUCTIONS
Your blood pressure reading today was 139/76 - this is good  You are to continue your Lisinopril as taking and follow up with your PCP  Return to the ED if symptoms worsen  Hipertensión crónica, Cuidados ambulatorios   INFORMACIÓN GENERAL:   La hipertensión crónica  es aggie condición a tiarra plazo en la cual harrison presión arterial es más bj de lo normal  La presión arterial es la fuerza que ejerce la demetra contra las burgos de las arterias  La hipertensión es aggie presión arterial de 140/90 o mucho más elevada  Los siguientes son los síntomas más comunes:   · Dolor de denis     · Visión borrosa     · Dolor en el pecho     · [employer] o debilidad    · Dificultad para respirar     · Sangrados nasales  Busque atención médica inmediata al presentar los siguientes síntomas:   · Abigail dolor de denis o pérdida de la visión    · Debilidad en un brazo o aggie pierna     · Confusión o tiene dificultad para hablar claramente    · Siente aggie molestia en harrison pecho que parece melvin si lo estuvieran apretando, aggie presión, aggie pesadez o dolor     · De repente se siente mareado o tiene dificultad para respirar    · Dolor o incomodidad en harrison espalda, leonel, mandíbula, estómago o brazo  El tratamiento para la hipertensión crónica  puede incluir un medicamento para baja la presión arterial  También necesitará hacer un cambio en harrison estilo de mora  Se debe beatriz dewey medicamentos exactamente melvin se lo indicaron  Controle la hipertensión crónica:   · Tómese la presión arterial en harrison casa  Siéntese y descanse por 5 minutos antes de tomarse la presión arterial  Extienda harrison brazo y apóyelo en aggie superficie plana  Harrison brazo debe estar a la misma altura que harrison corazón  Siga las instrucciones que vienen con el monitor para la presión arterial o tensiómetro  Si es posible tome por lo menos 2 lecturas de la presión cada vez   Tómese la presión arterial por lo MyDoc al día a la misma hora todos los días, por ejemplo aggie en Brandt Garrett y la otra en la noche  Mantenga un registro de dewey lecturas de harrison presión arterial y llévelo consigo a dewey consultas de control  · Consuma menos sodio  No le añada sodio a los alimentos  Limete el consumo de alimentos que contienen un alto nivel de sodio, melvin los alimentos [place], papitas saladas de [employer], y chaparro para sandwich  Harrison proveedor de cecy puede recomendarle que siga el régimen de alimentación denominada enfoque dietético para disminuir la hipertensión (o DASH, por dewey siglas en inglés)  El régimen es bajo en sodio, grasas saturadas y las grasas en total  Es bj en potasio, calcio y [place]  · Nichole Kohut regular  Gin actividad física que sobrepase los 30 minutos al día rick todos los días de la [place]  [place]ah ayudará a bajar harrison presión arterial  Solicítele a harrison proveedor de [employer] recomiende el plan de actividad física que se ajuste a harrison situación personal      · Limite el consumo de bebidas alcohólicas  Las mujeres deberían limitar harrison consumo de alcohol a 1 trago por día  Los hombres deberían limitar harrison consumo de alcohol a 2 tragos por día  Se considera un trago de alcohol 12 onzas (350 ml) de cerveza, 5 onzas (150 ml) de vino o 1 ½ onza (45 ml) de licor  · No fume  Si usted fuma, nunca es tarde para dejar de fumar  El tabaco puede aumentar harrison presión arterial  El tabaquismo también puede empeorar otras afecciones de cecy que usted padezca las cuales pueden aumentar harrison riesgo de presentar hipertensión  Solicite a harrison proveedor de [employer] información si requiere ayuda para dejar de fumar  Tory Glory a harrison najma de control con harrison proveedor de [employer] se lo indicaron:  Usted necesitará regresar para que le revisen harrison presión arterial y para que le ordenen otros exámenes de laboratorio  Escriba las preguntas que tenga para que recuerde hacerlas demond dewey consultas médicas     ACUERDOS SOBRE HARRISON CUIDADO:   Tracie tiene el derecho de participar en la planificación de harrison cuidado  Aprenda todo lo que pueda sobre harrison condición y melvin darle tratamiento  Discuta con deewy médicos dewey opciones de tratamiento para juntos decidir el cuidado que usted quiere recibir  Usted siempre tiene el derecho a rechazar harrison tratamiento  Esta información es sólo para uso en educación  Harrison intención no es darle un consejo médico sobre enfermedades o tratamientos  Colsulte con harrison Dorna Sam farmacéutico antes de seguir cualquier régimen médico para saber si es seguro y efectivo para usted  © 2014 [address] is for End User's use only and may not be sold, redistributed or otherwise used for commercial purposes  All illustrations and images included in CareNotes® are the copyrighted property of A D A M , Inc  or Leo Ewing

## 2018-09-18 ENCOUNTER — TRANSCRIBE ORDERS (OUTPATIENT)
Dept: LAB | Facility: CLINIC | Age: 58
End: 2018-09-18

## 2018-09-18 ENCOUNTER — APPOINTMENT (OUTPATIENT)
Dept: LAB | Facility: CLINIC | Age: 58
End: 2018-09-18

## 2018-09-18 DIAGNOSIS — R31.29 OTHER MICROSCOPIC HEMATURIA: ICD-10-CM

## 2018-09-18 DIAGNOSIS — Z12.5 ENCOUNTER FOR SCREENING FOR MALIGNANT NEOPLASM OF PROSTATE: ICD-10-CM

## 2018-09-18 DIAGNOSIS — Z12.5 SCREENING FOR PROSTATE CANCER: ICD-10-CM

## 2018-09-18 LAB
BACTERIA UR QL AUTO: ABNORMAL /HPF
BILIRUB UR QL STRIP: NEGATIVE
CLARITY UR: CLEAR
COLOR UR: YELLOW
GLUCOSE UR STRIP-MCNC: NEGATIVE MG/DL
HGB UR QL STRIP.AUTO: NEGATIVE
HYALINE CASTS #/AREA URNS LPF: ABNORMAL /LPF
KETONES UR STRIP-MCNC: NEGATIVE MG/DL
LEUKOCYTE ESTERASE UR QL STRIP: NEGATIVE
NITRITE UR QL STRIP: NEGATIVE
NON-SQ EPI CELLS URNS QL MICRO: ABNORMAL /HPF
PH UR STRIP.AUTO: 5.5 [PH] (ref 4.5–8)
PROT UR STRIP-MCNC: ABNORMAL MG/DL
PSA SERPL-MCNC: 0.4 NG/ML (ref 0–4)
RBC #/AREA URNS AUTO: ABNORMAL /HPF
SP GR UR STRIP.AUTO: 1.02 (ref 1–1.03)
UROBILINOGEN UR QL STRIP.AUTO: 0.2 E.U./DL
WBC #/AREA URNS AUTO: ABNORMAL /HPF

## 2018-09-18 PROCEDURE — 81001 URINALYSIS AUTO W/SCOPE: CPT

## 2018-09-18 PROCEDURE — 36415 COLL VENOUS BLD VENIPUNCTURE: CPT

## 2018-09-18 PROCEDURE — G0103 PSA SCREENING: HCPCS

## 2018-09-20 NOTE — PROGRESS NOTES
9/24/2018    Landy Alarcon  1960  [id number]      Assessment  -Routine prostate cancer screening  -Microscopic hematuria s/p negative workup (2014)  -Epididymal cysts    Discussion/Plan  Joey Tiwari is a 61 y o  male being managed by Dr Brian Rivera        -We reviewed results of recent PSA which is 0 4, previously 0 4  Ultrasound was unable to appreciate previously noted left varicocele, bilateral epididymal cysts were identified   -Patient states that he continues to have persistent lower urinary tract symptoms despite starting Flomax, which he finds bothersome  AUA score 28  We will proceed with scheduling patient for in office flexible cystoscopy  Reviewed procedure including all risks and benefits  He will continue to take Flomax daily       -Will schedule cystoscopy for next office visit with Dr Brian Rivera  -All questions answered, patient agrees with plan      History of Present Illness  61 y o  male with a history of microscopic hematuria s/p negative hematuria workup (2014), varicocele, and routine prostate cancer screening presents today for follow up  At last office visit he was prescribed Flomax for weak stream and straining with urination  Patient states that since starting medication the he is not seen a significant improvement  He continues to have persistent urinary frequency, weak stream, and nocturia  Patient reports that bilateral groin pain has not subsided, however he does state that pain is initiated from bilateral lower extremities which refers to groin  Patient did not mention this at last office visit  He denies any episodes of gross hematuria or dysuria  Last cystoscopy performed on 05/29/2014 identified relatively normal appearing prostate with mild enlargement  Recent PSA from 11/30/2017 was stable at 0 4  Patient denies any strong family history of prostate cancer  Review of Systems  Review of Systems   Constitutional: Negative  HENT: Negative  Respiratory: Negative  Cardiovascular: Negative  Gastrointestinal: Negative  Genitourinary: Positive for decreased urine volume, difficulty urinating, frequency and urgency  Negative for dysuria, flank pain and hematuria  Musculoskeletal: Negative  Skin: Negative  Neurological: Negative  Psychiatric/Behavioral: Negative  AUA SYMPTOM SCORE      Most Recent Value   AUA SYMPTOM SCORE   How often have you had a sensation of not emptying your bladder completely after you finished urinating? 3   How often have you had to urinate again less than two hours after you finished urinating? 5   How often have you found you stopped and started again several times when you urinate? 3   How often have you found it difficult to postpone urination? 5   How often have you had a weak urinary stream?  5   How often have you had to push or strain to begin urination? 5   How many times did you most typically get up to urinate from the time you went to bed at night until the time you got up in the morning?   2   Quality of Life: If you were to spend the rest of your life with your urinary condition just the way it is now, how would you feel about that?  5   AUA SYMPTOM SCORE  28            Past Medical History  Past Medical History:   Diagnosis Date    History of chronic constipation     History of neck pain 11/21/2017    Hypertension        Past Social History  Past Surgical History:   Procedure Laterality Date    CYSTOSCOPY  05/29/2014    diagnostic managed by Jalen Mora    TOOTH ADRIAN         Past Family History  Family History   Problem Relation Age of Onset    Cancer Sister         malignant neoplasm of breast    Diabetes Paternal Grandmother     Diabetes Family     Arthritis Family     Cancer Family         malignant neoplasm    Substance Abuse Neg Hx         denied Mother and Father    Mental illness Neg Hx         no family history Mother and Father    Other Neg Hx         denied family history of Osteopertrosis       Past Social history  Social History     Social History    Marital status: Single     Spouse name: N/A    Number of children: N/A    Years of education: N/A     Occupational History    Not on file  Social History Main Topics    Smoking status: Former Smoker    Smokeless tobacco: Never Used    Alcohol use Yes      Comment: weekly    Drug use: No    Sexual activity: Not on file     Other Topics Concern    Not on file     Social History Narrative    No preference or Protestant beliefs       Current Medications  Current Outpatient Prescriptions   Medication Sig Dispense Refill    acetaminophen (TYLENOL) 500 mg tablet Take 1 tablet (500 mg total) by mouth every 6 (six) hours as needed for mild pain, moderate pain, headaches or fever 30 tablet 0    baclofen 10 mg tablet Take 1 tablet (10 mg total) by mouth 3 (three) times a day 30 tablet 1    cyclobenzaprine (FLEXERIL) 10 mg tablet TAKE 1 TABLET (10 MG TOTAL) BY MOUTH DAILY AT BEDTIME 30 tablet 0    ibuprofen (MOTRIN) 400 mg tablet Take 1 tablet (400 mg total) by mouth every 6 (six) hours as needed for mild pain 30 tablet 0    lidocaine (LIDODERM) 5 % Place 1 patch on the skin daily Remove & Discard patch within 12 hours or as directed by MD 30 patch 0    lisinopril (ZESTRIL) 5 mg tablet TAKE 1 TABLET BY MOUTH EVERY DAY 90 tablet 1    loratadine (CLARITIN) 10 mg tablet Take 1 tablet (10 mg total) by mouth daily 30 tablet 0    methocarbamol (ROBAXIN) 750 mg tablet Take 1 tablet (750 mg total) by mouth every 6 (six) hours as needed for muscle spasms 30 tablet 0    tamsulosin (FLOMAX) 0 4 mg Take 1 capsule (0 4 mg total) by mouth daily with dinner 30 capsule 1     No current facility-administered medications for this visit  Allergies  No Known Allergies    Past Medical History, Social History, Family History, medications and allergies were reviewed  Vitals  There were no vitals filed for this visit      Physical Exam  Physical Exam   Constitutional: He is oriented to person, place, and time  He appears well-developed and well-nourished  HENT:   Head: Normocephalic  Eyes: Pupils are equal, round, and reactive to light  Neck: Normal range of motion  Cardiovascular: Normal rate and regular rhythm  Pulmonary/Chest: Effort normal    Abdominal: Soft  Normal appearance  He exhibits no distension  There is no tenderness  There is no CVA tenderness  Genitourinary:   Genitourinary Comments: Negative suprapubic discomfort/distension   Musculoskeletal: Normal range of motion  Neurological: He is alert and oriented to person, place, and time  Skin: Skin is warm and dry  Psychiatric: He has a normal mood and affect  His behavior is normal  Judgment and thought content normal        Results    I have personally reviewed all pertinent lab results and reviewed with patient  Lab Results   Component Value Date    PSA 0 4 09/18/2018    PSA 0 4 11/30/2017    PSA 0 4 08/25/2016     Lab Results   Component Value Date    GLUCOSE 113 09/15/2015    CALCIUM 9 0 05/17/2018     05/17/2018    K 4 5 05/17/2018    CO2 28 05/17/2018     (H) 05/17/2018    BUN 14 05/17/2018    CREATININE 1 19 05/17/2018     Lab Results   Component Value Date    WBC 3 72 (L) 05/17/2018    HGB 14 6 05/17/2018    HCT 44 7 05/17/2018    MCV 91 05/17/2018     05/17/2018     No results found for this or any previous visit (from the past 1 hour(s))

## 2018-09-24 ENCOUNTER — OFFICE VISIT (OUTPATIENT)
Dept: UROLOGY | Facility: AMBULATORY SURGERY CENTER | Age: 58
End: 2018-09-24

## 2018-09-24 VITALS — HEIGHT: 63 IN

## 2018-09-24 DIAGNOSIS — N50.3 EPIDIDYMAL CYST: ICD-10-CM

## 2018-09-24 DIAGNOSIS — R31.29 MICROSCOPIC HEMATURIA: ICD-10-CM

## 2018-09-24 DIAGNOSIS — N40.1 BENIGN PROSTATIC HYPERPLASIA WITH URINARY FREQUENCY: Primary | ICD-10-CM

## 2018-09-24 DIAGNOSIS — R35.0 BENIGN PROSTATIC HYPERPLASIA WITH URINARY FREQUENCY: Primary | ICD-10-CM

## 2018-09-24 PROCEDURE — 99213 OFFICE O/P EST LOW 20 MIN: CPT | Performed by: NURSE PRACTITIONER

## 2018-09-24 NOTE — PATIENT INSTRUCTIONS
Cystoscopy   AMBULATORY CARE:   A cystoscopy  is a procedure to look inside of your urethra and bladder using a cystoscope  A cystoscope is a small tube with a light and magnifying camera on the end  The procedure is used to diagnose and treat conditions of the bladder, urethra, and prostate  The procedure is also done to remove stones or blood clots from the urethra or bladder  Your healthcare provider may do other tests, such as ureteroscopy, during a cystoscopy  Prepare for your cystoscopy: You may need to stop smoking several days before your procedure, if you are having general anesthesia  Tell your healthcare provider what medicines you take  Your healthcare provider will tell you what medicines to take and not to take on the day of your procedure  You may need to stop taking medicines such as anticoagulants, aspirin, and ibuprofen several days before your procedure  He may tell you stop eating after midnight the night before your procedure  You may be asked to drink a large amount of liquids before your procedure  Make plans for someone to drive you home after your procedure  During your cystoscopy:   · You may be given general anesthesia to keep you asleep and pain free during your procedure  Your healthcare provider may give you anesthesia in your spine  With spinal anesthesia the lower part of your body will be numb  You will not feel pain during your procedure  Your healthcare provider may instead use local anesthesia that is put into your urethra and bladder  You will not feel pain, but you may be able to feel some pressure during your procedure  With local anesthesia, you may feel burning or need to urinate when the cystoscope is put in and removed  · You will be placed on your back and your feet may be placed in stirrups  The cystoscope will be will be placed through your urethra and into your bladder   The urologist will look at the walls of your urethra as the scope goes through to your bladder  Your bladder may be filled with an irrigation liquid to help your urologist see inside of your bladder more clearly   Special tools may used to remove tissue or stones  Your urologist may use a special tool to stop bleeding in your bladder  If there are blood clots in your bladder, your healthcare provider will inject an irrigation fluid into your bladder  Then he will use suction to remove the fluid and blood clots  After a cystoscopy:  After you are fully awake, you will go home  After your cystoscopy, it is normal to have pink-colored urine  It is also normal to have an increased need to urinate  You may have burning when you urinate  If you had general anesthesia, it may take at least 24 hours before you feel like your usual self  Risks of a cystoscopy: You may bleed more than expected or develop an infection  Swelling caused by the cystoscopy may cause a blockage or slow urine flow  Seek care immediately if:   · Your urine turns from pink to red, or you have clots in your urine  · You cannot urinate and your bladder feels full  · Your pain or burning becomes worse or lasts longer than 2 days  Contact your healthcare provider or urologist if:   · Your urine stays pink for longer than 3 days  · Your pain or burning becomes worse  · Your skin is itchy, swollen, or has a new rash  · You have a fever and chills  · You have questions or concerns about your condition or care  After your cystoscopy: It is normal to have pink-colored urine  It is also normal to have an increased need to urinate and burning when you urinate  If you had general anesthesia, it may take at least 24 hours before you feel like your usual self  · Drink at least 3 to 4 glasses of water daily for 2 days after your procedure  Avoid acidic juices such as orange juice and lemonade  Water can help prevent blood clots from forming   It can also help decrease the amount of acid in your urine that can cause burning  · Sit in a warm tub of water  Warm baths relieve pain and bladder spasms  · Do not have sex  until your healthcare provider tells you it is okay  Sex may increase your risk for a urinary tract infection  Medicines: You may  be given any of the following:  · Antibiotics  help treat or prevent a bacterial infection  · Acetaminophen  decreases pain and fever  It is available without a doctor's order  Ask how much to take and how often to take it  Follow directions  Read the labels of all other medicines you are using to see if they also contain acetaminophen, or ask your doctor or pharmacist  Acetaminophen can cause liver damage if not taken correctly  Do not use more than 4 grams (4,000 milligrams) total of acetaminophen in one day  · Take your medicine as directed  Contact your healthcare provider if you think your medicine is not helping or if you have side effects  Tell him or her if you are allergic to any medicine  Keep a list of the medicines, vitamins, and herbs you take  Include the amounts, and when and why you take them  Bring the list or the pill bottles to follow-up visits  Carry your medicine list with you in case of an emergency  Follow up with your healthcare provider as directed: You may need to have another cystoscopy  Write down your questions so you remember to ask them during your visits  © 2017 [employer]  Information is for End User's use only and may not be sold, redistributed or otherwise used for commercial purposes  All illustrations and images included in CareNotes® are the copyrighted property of [employer]  or [employer]  The above information is an  only  It is not intended as medical advice for individual conditions or treatments  Talk to your doctor, nurse or pharmacist before following any medical regimen to see if it is safe and effective for you

## 2018-10-03 ENCOUNTER — OFFICE VISIT (OUTPATIENT)
Dept: FAMILY MEDICINE CLINIC | Facility: CLINIC | Age: 58
End: 2018-10-03

## 2018-10-03 VITALS
RESPIRATION RATE: 18 BRPM | SYSTOLIC BLOOD PRESSURE: 116 MMHG | DIASTOLIC BLOOD PRESSURE: 74 MMHG | TEMPERATURE: 96.7 F | HEART RATE: 84 BPM | OXYGEN SATURATION: 99 % | WEIGHT: 146.25 LBS | BODY MASS INDEX: 25.91 KG/M2 | HEIGHT: 63 IN

## 2018-10-03 DIAGNOSIS — H61.21 CERUMEN DEBRIS ON TYMPANIC MEMBRANE OF RIGHT EAR: ICD-10-CM

## 2018-10-03 DIAGNOSIS — M54.41 RIGHT-SIDED LOW BACK PAIN WITH RIGHT-SIDED SCIATICA, UNSPECIFIED CHRONICITY: ICD-10-CM

## 2018-10-03 DIAGNOSIS — H69.82 DYSFUNCTION OF LEFT EUSTACHIAN TUBE: Primary | ICD-10-CM

## 2018-10-03 PROCEDURE — 99214 OFFICE O/P EST MOD 30 MIN: CPT | Performed by: PHYSICIAN ASSISTANT

## 2018-10-03 RX ORDER — FLUTICASONE PROPIONATE 50 MCG
2 SPRAY, SUSPENSION (ML) NASAL DAILY
Qty: 16 G | Refills: 0 | Status: SHIPPED | OUTPATIENT
Start: 2018-10-03 | End: 2018-10-21

## 2018-10-03 NOTE — PATIENT INSTRUCTIONS
Ejercicios para la espalda baja   LO QUE NECESITA SABER:   ¿Qué necesito saber acerca de los ejercicios de la espalda baja? Los ejercicios de la espalda baja ayudan a sanar y a fortalecer los músculos de harrison espalda para evitar otra Duwaine Cassia  Pregúntele a harrison médico si usted necesita acudir con un fisioterapeuta para que le indique ejercicios más avanzado  · Beto dewey ejercicios sobre aggie colchoneta o superficie firme  (no en la cama) para luis alfredo soporte a la columna y evitar dolor en la parte baja de la espalda  · Muévase lenta y suavemente  Evite movimientos rápidos o bruscos  · Respire normalmente  No contenga la respiración  · Deténgase si siente dolor  Es normal que sienta cierta molestia al principio  Practicar los ejercicios con regularidad ayudará a disminuir harrison incomodidad con el paso del [place]  ¿Cómo realizo los ejercicios para la espalda baja de Dorcas nielsen? Harrison médico podría recomendarle que realice ejercicios para la espalda de 10 a 30 minutos cada día  También podría recomendarle que beto ejercicios 1 a 3 veces cada día  Pregunte a harrison médico cuáles ejercicios son los mejores para usted y con qué frecuencia hacerlos  · Bombeo del tobillo:  Acuéstese boca arriba  Levante harrison pie (con dewey dedos apuntando hacia harrison denis)  Luego, baje harrison pie (con los dedos apuntando lejos de usted)  Repita amanda ejercicio 10 veces en cada lado  · Deslizamiento de talón:  Acuéstese boca arriba  Muy despacio doble aggie pierna y luego enderécela  Luego, doble la otra pierna y enderécela  Repita 10 veces en cada lado  · Inclinación pélvica:  Acuéstese boca arriba con dewey rodillas dobladas y dewey pies planos sobre el piso  Coloque dewey brazos en aggie posición relajada junto a harrison cuerpo  Contraiga los músculos de harrison abdomen y aplane harrison espalda contra el piso  Sostenga está posición por 5 segundos  Repita 5 veces             · Estiramiento de la espalda:  Acuéstese boca arriba con dewey yissel detrás de harrison dneis  Doble dewey rodillas y gire la mitad de harrison cuerpo hacia un lado  Mantenga esta posición por 10 segundos  Repita 3 veces en cada lado  · Levantamiento de la pierna estirada:  Acuéstese calia Karen Creed con aggie pierna estirada  Doble la otra rodilla  Contraiga harrison abdomen y luego levante lentamente la pierna estirada entre 6 a 12 pulgadas del piso  Mantenga esta posición por 1 a 5 segundos  Baje harrison pierna lentamente  Repita 10 veces en cada pierna  · Rodillas al pecho:  Acuéstese boca arriba con dewey rodillas dobladas y dewey pies planos sobre el piso  Braden Miguel aggie de las rodillas hacia harrison pecho y sosténgala por 5 segundos  Regrese harrison pierna a la posición inicial  Levante la otra rodilla hacia el pecho y sosténgala por 5 segundos  Veronica esto 5 veces en cada lado  · Posición vern camello:  Coloque dewey yissel y [employer]  Arquee harrison espalda Anna Walter, hacia el techo y [facility]  Arquee harrison ludivina dorsal lo más posible  Sostenga está posición por 5 segundos  Levante harrison denis hacia arriba y baje harrison pecho hacia el piso  Sostenga está posición por 5 segundos  Veronica 3 series o melvin se le indique  · Posición de cuclillas contra la pared:  Párese con harrison espalda contra la pared  Contraiga los músculos de harrison abdomen  Lentamente deslice harrison cuerpo hasta que dewey rodillas queden dobladas en un ángulo de 45 grados  Mantenga esta posición por 5 segundos  Deslice lentamente harrison espalda hacia arriba hasta quedar de pie  Repita 10 veces  · Posición de acurrucarse:  Acuéstese boca arriba con dewey rodillas dobladas y dewey pies planos sobre el piso  Coloque dewey yissel con las leif hacia abajo debajo de la curva de la parte baja de harrison espalda  Después, con dewey codos [facility], levante dewey hombros y [facility] 2 a 3 pulgadas  Mantenga harrison denis a la misma altura de dewey hombros  Mantenga esta posición por 5 segundos   Cuando usted pueda hacer amanda ejercicio sin sentir dolor por 10 a 15 segundos, puede entonces añadir aggie rotación  Mientras dewey hombros y sahu pecho estén levantados del piso, voltee levemente hacia la izquierda y [place]  Repita en el otro lado  · Ejercicio pájaro hola:  Coloque dewey yissel y rodillas sobre el piso  Mantenga dewey muñecas directamente debajo de dewey hombros y dewey rodillas directamente debajo de dewey caderas  Contraiga sahu ombligo hacia adentro en dirección a sahu columna  No estire ni arquee sahu espalda  Ponga tensos dewey músculos abdominales  Levante un brazo extendido para que se alinee con sahu denis  Luego, levante la pierna opuesta a sahu brazo  Mantenga esta posición por 15 segundos  Baje sahu Orestes Tello y pierna lentamente y nunavut de lado  Veronica 5 series  ¿Cuándo jaret buscar atención inmediata? · Usted tiene dolor severo que le impide moverse  ¿Cuándo jaret comunicarme con mi médico?   · Sahu dolor empeora  · Usted tiene un dolor nuevo  · Usted tiene preguntas o inquietudes acerca de sahu condición o cuidado  ACUERDOS SOBRE SAHU CUIDADO:   Usted tiene el derecho de ayudar a planear sahu cuidado  Aprenda todo lo que pueda sobre sahu condición y melvin darle tratamiento  Discuta dewey opciones de tratamiento con dewey médicos para decidir el cuidado que usted desea recibir  Usted siempre tiene el derecho de rechazar el tratamiento  Esta información es sólo para uso en educación  Sahu intención no es darle un consejo médico sobre enfermedades o tratamientos  Colsulte con sahu Ladena Creed farmacéutico antes de seguir cualquier régimen médico para saber si es seguro y efectivo para usted  © 2017 [address] [employer] Information is for End User's use only and may not be sold, redistributed or otherwise used for commercial purposes  All illustrations and images included in CareNotes® are the copyrighted property of A D A M , Inc  or Leo Lanierercicios para fortalecer los músculos del tronco   CUIDADO AMBULATORIO:   Lo que debe saber sobre los ejercicios para fortalecer los músculos del tronco:  El tronco incluye los músculos de la parte baja de la espalda, la cadera, la pelvis y el abdomen  Los ejercicios para fortalecer los músculos del tronco ayudan a sanar y fortalecer estos músculos  Colon ayuda a prevenir otra lesión y [employer]is[employer] pelvis, la columna vertebral y la cadera en la posición correcta  Pregúntele a harrison Leamon Handsome vitaminas y minerales son adecuados para usted  · Tiene dolor sukhdeep o creciente cuando hace ejercico o está en reposo  · Tiene preguntas o inquietudes acerca de los ejercicios de hombros   Consejos de seguridad:  Consulte a harrison médico antes de empezar un régimen de ejercicios  Un fisioterapeuta puede enseñarle a hacer ejercicios para fortalecer los músculos del tronco de forma nielsen  · [employer] ejercicios sobre aggie colchoneta o superficie firme  Aggie superficie sostendrá la columna vertebral y evitará el dolor de espalda  No beto estos ejercicios en aggie cama  · Muévase lenta y suavemente  Evite movimientos rápidos o bruscos  · Deténgase si siente dolor  Los ejercicios para fortalecer los músculos del tronco no deben ser dolorosos  Deténgase si siente dolor  · Respire normalmente demond los ejercicios  No contenga la respiración  Colon puede aumentar la presión arterial y evitar el fortalecimiento muscular  Harrison médico le dirá cuándo inhalar y exhalar demond el ejercicio  · Comience todos dewey ejercicios con tonificación abdominal   La tonificación abdominal ayuda a calentar los músculos del tronco  Usted también puede practicar la tonificación o fortalecimiento abdominal demond todo el día mientras está sentado o de pie  Acuéstese boca arriba con dewey rodillas dobladas y dewey pies planos sobre el piso  Coloque dewey brazos en aggie posición relajada junto a harrison cuerpo  Ponga tensos dewey músculos abdominales  Contraiga el ombligo hacia adentro en dirección a la columna  Sostenga está posición por 5 segundos  Relaje dewey músculos  Repita 10 veces  Ejercicios para fortalecer los músculos del tronco:  Harrison médico le indicará con que frecuencia hacer estos ejercicios  También le dirá cuántas repeticiones de cada ejercicio debe hacer  Veronica cada ejercicio demond 5 segundos o según lo indicado  A medida que se fortalezca, aumente a 10 a 15 segundos  Puede hacer algunos de estos ejercicios sobre aggie pelota de estabilidad o con un peso  Pregunte a harrison médico cómo utilizar aggie pelota de estabilidad o un peso para estos ejercicios:  · Banuelos lateral con pierna doblada:  Acuéstese de lado con dewey piernas, caderas y hombros en línea recta  Apóyese en harrison antebrazo a fin de que harrison codo esté directamente debajo de harrison hombro  Doble las rodillas hasta 90°  Levante las caderas del suelo  Balancéese sobre el antebrazo y sobre el lado de la rodilla  Mantenga esta posición  Repita en el otro lado  · Banuelos lateral con pierna estirada:  Acuéstese de lado con dewey piernas, caderas y hombros en línea recta  Apóyese en harrison antebrazo a fin de que harrison codo esté directamente debajo de harrison hombro  Puede apoyar la pierna de arriba frente a la pierna de abajo para el apoyo  Levante las caderas del suelo  Balancéese sobre el antebrazo y sobre la parte externa del pie flexionado  No permita que harrison tobillo se doble de lado  Mantenga esta posición  Repita en el otro lado  · Superman:  Acuéstese boca abajo  Extienda los brazos hacia adelante en el piso  Apriete los músculos abdominales y levante la mano derecha y la pierna izquierda del suelo  Mantenga esta posición  Despacio regrese a la posición inicial  Apriete los músculos abdominales y levante la mano izquierda y la pierna derecha del suelo  Mantenga esta posición  Despacio regrese a la posición inicial            · Posición de acurrucarse:  Acuéstese boca arriba con dewey rodillas dobladas y dewey pies planos sobre el piso   Coloque las yissel con las leif hacia abajo por debajo de la parte baja de harrison espalda  Después, con los codos [employer], levante los hombros y el pecho de 2 a 3 pulgadas del piso  Mantenga harrison denis a la misma altura de berenice hombros  Mantenga esta posición  Despacio regrese a la posición inicial            · Levantamiento de la pierna estirada:  Acuéstese boca arriba con aggie pierna estirada  Doble la otra rodilla y coloque el pie en posición plana sobre el piso  Ponga tensos berenice músculos abdominales  Mantenga la pierna recta y levántela lentamente de 6 a 12 pulgadas del piso  Mantenga esta posición  Baje harrison pierna lentamente  Veronica tantas repeticiones melvin le indicaron de amanda lado  Repita el ejercicio con la otra pierna  · Tablón:  Acuéstese boca abajo  Doble los codos y [employer] antebrazos en posición plana sobre el suelo  Levante harrison pecho, el estómago y las rodillas del suelo  Asegúrese de [employer] codos estén por debajo de los hombros  Harrison cuerpo debe estar en línea recta  No deje que las caderas o la parte baja de la espalda se hundan hacia el suelo  Contraiga los músculos abdominales y sostenga demond 15 segundos  Para hacer amanda ejercicio más difícil, sostenga demond 30 segundos o levante 1 pierna a la vez  · Bicicletas:  Acuéstese boca arriba  Doble ambas rodillas y llévelas hacia harrison pecho  Berenice pantorrillas deben estar paralelas al [facility]  [id number] en la parte posterior de la denis  Estire la pierna derecha y manténgala levantada 2 pulgadas del piso  Levante la denis y los hombros del piso y gire hacia la izquierda  Mantenga la denis y los hombros levantados  Doble la rodilla derecha mientras endereza la pierna izquierda  Mantenga la pierna izquierda 2 pulgadas sobre el piso  Gire la denis y el pecho hacia la pierna izquierda  Siga enderezando 1 pierna a la vez y gire  Acuda a berenice consultas de control con harrison médico según le indicaron  Anote berenice preguntas para que se acuerde de hacerlas demond berenice visitas     © 2017 [address]  Information is for End User's use only and may not be sold, redistributed or otherwise used for commercial purposes  All illustrations and images included in CareNotes® are the copyrighted property of A MEGHAN A M , Inc  or Leo Ewing  Esta información es sólo para uso en educación  Harrison intención no es darle un consejo médico sobre enfermedades o tratamientos  Colsulte con harrison Shari Oakes farmacéutico antes de seguir cualquier régimen médico para saber si es seguro y efectivo para usted

## 2018-10-03 NOTE — PROGRESS NOTES
Assessment/Plan:    Right-sided low back pain with right-sided sciatica  He did have MRI last year  Recommend home exercises at this time since pain is very mild and he has not tried any treatments at this time  Problem List Items Addressed This Visit        Nervous and Auditory    Right-sided low back pain with right-sided sciatica     He did have MRI last year  Recommend home exercises at this time since pain is very mild and he has not tried any treatments at this time  Other Visit Diagnoses     Dysfunction of left eustachian tube    -  Primary    Relevant Medications    fluticasone (FLONASE) 50 mcg/act nasal spray    Cerumen debris on tympanic membrane of right ear                Subjective:      Patient ID: Ino Coleman is a 61 y o  male  HPI here for ear fullness bilaterally  No URI sytmptoms  He feels like there may be a liquid in his ear  NO fevers  Also c/o sciatica with pain radiating down BLE  Pain is mild about 1/10  He has not tried anything for this back pain  He does have a history of back pain and had MRI of lumbar spine  The following portions of the patient's history were reviewed and updated as appropriate:   He  has a past medical history of History of chronic constipation; History of neck pain (11/21/2017); and Hypertension    He   Patient Active Problem List    Diagnosis Date Noted    Right-sided low back pain with right-sided sciatica 06/28/2018    Hoarseness of voice 05/17/2018    Acute pain of both knees 01/24/2018    Acute nonintractable headache 01/24/2018    Left shoulder pain 06/07/2017    Benign colon polyp 03/24/2017    Enlarged prostate without lower urinary tract symptoms (luts) 07/18/2016    Varicocele 01/12/2016    Mild vitamin D deficiency 06/10/2015    Testicle pain 07/02/2014    Impingement syndrome, shoulder, left 05/16/2014    Pain in joint of right hip 02/04/2014    Microscopic hematuria 11/19/2013    Renal calculus, right 11/19/2013    Atypical chest pain 06/19/2013    Benign essential hypertension 10/10/2012    Abnormal glucose 10/10/2012     He  has a past surgical history that includes Cystoscopy (05/29/2014) and Tooth extraction  His family history includes Arthritis in his family; Cancer in his family and sister; Diabetes in his family and paternal grandmother  He  reports that he has quit smoking  He has never used smokeless tobacco  He reports that he drinks alcohol  He reports that he does not use drugs  Current Outpatient Prescriptions   Medication Sig Dispense Refill    acetaminophen (TYLENOL) 500 mg tablet Take 1 tablet (500 mg total) by mouth every 6 (six) hours as needed for mild pain, moderate pain, headaches or fever 30 tablet 0    baclofen 10 mg tablet Take 1 tablet (10 mg total) by mouth 3 (three) times a day 30 tablet 1    cyclobenzaprine (FLEXERIL) 10 mg tablet TAKE 1 TABLET (10 MG TOTAL) BY MOUTH DAILY AT BEDTIME 30 tablet 0    fluticasone (FLONASE) 50 mcg/act nasal spray 2 sprays into each nostril daily 16 g 0    ibuprofen (MOTRIN) 400 mg tablet Take 1 tablet (400 mg total) by mouth every 6 (six) hours as needed for mild pain 30 tablet 0    lidocaine (LIDODERM) 5 % Place 1 patch on the skin daily Remove & Discard patch within 12 hours or as directed by MD 30 patch 0    lisinopril (ZESTRIL) 5 mg tablet TAKE 1 TABLET BY MOUTH EVERY DAY 90 tablet 1    loratadine (CLARITIN) 10 mg tablet Take 1 tablet (10 mg total) by mouth daily 30 tablet 0    methocarbamol (ROBAXIN) 750 mg tablet Take 1 tablet (750 mg total) by mouth every 6 (six) hours as needed for muscle spasms 30 tablet 0    tamsulosin (FLOMAX) 0 4 mg Take 1 capsule (0 4 mg total) by mouth daily with dinner 30 capsule 1     No current facility-administered medications for this visit        Current Outpatient Prescriptions on File Prior to Visit   Medication Sig    acetaminophen (TYLENOL) 500 mg tablet Take 1 tablet (500 mg total) by mouth every 6 (six) hours as needed for mild pain, moderate pain, headaches or fever    baclofen 10 mg tablet Take 1 tablet (10 mg total) by mouth 3 (three) times a day    cyclobenzaprine (FLEXERIL) 10 mg tablet TAKE 1 TABLET (10 MG TOTAL) BY MOUTH DAILY AT BEDTIME    ibuprofen (MOTRIN) 400 mg tablet Take 1 tablet (400 mg total) by mouth every 6 (six) hours as needed for mild pain    lidocaine (LIDODERM) 5 % Place 1 patch on the skin daily Remove & Discard patch within 12 hours or as directed by MD    lisinopril (ZESTRIL) 5 mg tablet TAKE 1 TABLET BY MOUTH EVERY DAY    loratadine (CLARITIN) 10 mg tablet Take 1 tablet (10 mg total) by mouth daily    methocarbamol (ROBAXIN) 750 mg tablet Take 1 tablet (750 mg total) by mouth every 6 (six) hours as needed for muscle spasms    tamsulosin (FLOMAX) 0 4 mg Take 1 capsule (0 4 mg total) by mouth daily with dinner     No current facility-administered medications on file prior to visit  He has No Known Allergies       Review of Systems   Constitutional: Negative for activity change, appetite change, fatigue, fever and unexpected weight change  HENT: Positive for ear discharge  Negative for congestion, dental problem, ear pain, hearing loss and sore throat  Eyes: Negative for visual disturbance  Respiratory: Negative for cough and wheezing  Cardiovascular: Positive for chest pain (chronic)  Gastrointestinal: Negative for abdominal pain, constipation, diarrhea and vomiting  Genitourinary: Positive for difficulty urinating  Negative for dysuria  Musculoskeletal: Positive for back pain  Negative for arthralgias and myalgias  Skin: Negative for rash  Neurological: Negative for dizziness and headaches  Psychiatric/Behavioral: Negative for behavioral problems           Objective:      /74 (BP Location: Right arm, Patient Position: Sitting, Cuff Size: Standard)   Pulse 84   Temp (!) 96 7 °F (35 9 °C) (Tympanic)   Resp 18   Ht 5' 3" (1 6 m)   Wt 66 3 kg (146 lb 4 oz)   SpO2 99%   BMI 25 91 kg/m²            Physical Exam   Constitutional: He is oriented to person, place, and time  He appears well-developed and well-nourished  No distress  HENT:   Head: Normocephalic and atraumatic  Left Ear: External ear normal    Impacted right ext canal and left ear with increased fluid  No redness     Eyes: Conjunctivae are normal    Neck: Normal range of motion  Neck supple  No thyromegaly present  Cardiovascular: Normal rate, regular rhythm and normal heart sounds  No murmur heard  Pulmonary/Chest: Effort normal and breath sounds normal  No respiratory distress  He has no wheezes  Lymphadenopathy:     He has no cervical adenopathy  Neurological: He is alert and oriented to person, place, and time  Psychiatric: He has a normal mood and affect  His behavior is normal    Nursing note and vitals reviewed  Ear cerumen removal  Date/Time: 10/5/2018 8:11 AM  Performed by: Yuli Motley  Authorized by: Yuli Motley     Consent:     Consent obtained:  Verbal    Consent given by:  Patient    Alternatives discussed:  No treatment  Procedure details:     Local anesthetic:  None    Location:  R ear    Procedure type: curette      Approach:  External  Post-procedure details:     Complication:  None    Hearing quality:  Improved    Patient tolerance of procedure:   Tolerated well, no immediate complications

## 2018-10-05 PROCEDURE — 69210 REMOVE IMPACTED EAR WAX UNI: CPT | Performed by: PHYSICIAN ASSISTANT

## 2018-10-05 NOTE — ASSESSMENT & PLAN NOTE
He did have MRI last year  Recommend home exercises at this time since pain is very mild and he has not tried any treatments at this time

## 2018-10-12 NOTE — PROGRESS NOTES
Cystoscopy  Date/Time: 10/16/2018 10:42 AM  Performed by: Tamar Eagle by: Alicia Donovan     Procedure details: cystoscopy    Patient tolerance: Patient tolerated the procedure well with no immediate complications          61 y o  male with a history of microscopic hematuria s/p negative hematuria workup (2014), varicocele, and routine prostate cancer screening presents today for follow up  At last office visit he was prescribed Flomax for weak stream and straining with urination  Patient states that since starting medication the he is not seen a significant improvement  He continues to have persistent urinary frequency, weak stream, and nocturia  Patient reports that bilateral groin pain has not subsided, however he does state that pain is initiated from bilateral lower extremities which refers to groin  Patient did not mention this at last office visit  He denies any episodes of gross hematuria or dysuria  Last cystoscopy performed on 05/29/2014 identified relatively normal appearing prostate with mild enlargement  Recent PSA from 11/30/2017 was stable at 0 4  Patient denies any strong family history of prostate cancer  Component      Latest Ref Rng & Units 8/25/2016 11/30/2017 9/18/2018   PROSTATE SPECIFIC ANTIGEN      0 0 - 4 0 ng/mL 0 4 0 4 0 4     SCROTAL ULTRASOUND     INDICATION:    I86 1: Scrotal varices      COMPARISON: Scrotal sonogram 9/13/2016     TECHNIQUE:   Ultrasound the scrotal contents was performed with a high frequency linear transducer utilizing volumetric sweep imaging as well as standard still image techniques  Imaging performed in longitudinal and transverse orientation  Color and   spectral Doppler evaluation also performed bilaterally      FINDINGS:     TESTES:   Testes are symmetric and normal in size      RIGHT testis = 3 8 x 2 x 2 6 cm   Normal contour with homogeneous smooth echotexture    No intratesticular mass lesion or calcifications      LEFT testis = 3 9 x 2 x 2 1 cm  Normal contour with homogeneous smooth echotexture  No intratesticular mass lesion or calcifications  0 4 x 0 4 x 0 2 cm testicular appendix      Doppler flow within both testes is present and symmetric      EPIDIDYMIDES:   Normal Size  Doppler ultrasound demonstrates normal blood flow  There are small epididymal cyst(s) bilaterally  This measures 0 8 x 0 5 x 0 4 cm on the right and 0 4 x 0 4 x 0 4 cm and 0 4 x 0 3 x 0 3 cm on the left  Otherwise unremarkable      HYDROCELE:  No significant fluid present      VARICOCELE:  None present      SCROTUM:  Scrotal thickness and appearance within normal limits  No evidence for extratesticular mass or hernia demonstrated      IMPRESSION:     Previously noted left varicocele is not visualized on the current study      New subcentimeter bilateral epididymal cysts      Otherwise, normal scrotal sonogram      Workstation performed: [id number]     PROCEDURE- CYSTOSCOPY    With the patient properly identified and informed consent obtained he was placed supine in the procedure suite  He was sterilely prepped and draped in the usual fashion  10 cc of viscous lidocaine was administered per urethra  Flexible cystourethroscopy was performed with the 16 Western Miranda scope  This revealed a normal pendulous urethra  Prostate was +2 enlarged  Inspection of the bladder revealed no evidence of bladder tumor  Patient tolerated the procedure well  PLAN    Cystoscopy showed no significant findings other than mildly enlarged prostate  Patient's symptoms appear to be emanating from the lower back  We discussed a restarting Flomax at this time  Script provided  He will otherwise return in 1 year for routine evaluation

## 2018-10-16 ENCOUNTER — PROCEDURE VISIT (OUTPATIENT)
Dept: UROLOGY | Facility: AMBULATORY SURGERY CENTER | Age: 58
End: 2018-10-16

## 2018-10-16 VITALS
BODY MASS INDEX: 26.22 KG/M2 | SYSTOLIC BLOOD PRESSURE: 120 MMHG | HEART RATE: 84 BPM | WEIGHT: 148 LBS | DIASTOLIC BLOOD PRESSURE: 78 MMHG | HEIGHT: 63 IN

## 2018-10-16 DIAGNOSIS — R31.29 MICROHEMATURIA: Primary | ICD-10-CM

## 2018-10-16 DIAGNOSIS — R39.12 BENIGN PROSTATIC HYPERPLASIA WITH WEAK URINARY STREAM: ICD-10-CM

## 2018-10-16 DIAGNOSIS — N40.1 BENIGN PROSTATIC HYPERPLASIA WITH WEAK URINARY STREAM: ICD-10-CM

## 2018-10-16 LAB
SL AMB  POCT GLUCOSE, UA: NORMAL
SL AMB LEUKOCYTE ESTERASE,UA: NORMAL
SL AMB POCT BILIRUBIN,UA: NORMAL
SL AMB POCT BLOOD,UA: NORMAL
SL AMB POCT CLARITY,UA: CLEAR
SL AMB POCT COLOR,UA: YELLOW
SL AMB POCT KETONES,UA: NORMAL
SL AMB POCT NITRITE,UA: NORMAL
SL AMB POCT PH,UA: 5
SL AMB POCT SPECIFIC GRAVITY,UA: 1.01
SL AMB POCT URINE PROTEIN: NORMAL
SL AMB POCT UROBILINOGEN: NORMAL

## 2018-10-16 PROCEDURE — 81002 URINALYSIS NONAUTO W/O SCOPE: CPT | Performed by: UROLOGY

## 2018-10-16 PROCEDURE — 52000 CYSTOURETHROSCOPY: CPT | Performed by: UROLOGY

## 2018-10-16 RX ORDER — TAMSULOSIN HYDROCHLORIDE 0.4 MG/1
0.4 CAPSULE ORAL
Qty: 90 CAPSULE | Refills: 3 | Status: SHIPPED | OUTPATIENT
Start: 2018-10-16 | End: 2018-10-21

## 2018-10-21 ENCOUNTER — APPOINTMENT (EMERGENCY)
Dept: RADIOLOGY | Facility: HOSPITAL | Age: 58
End: 2018-10-21

## 2018-10-21 ENCOUNTER — HOSPITAL ENCOUNTER (EMERGENCY)
Facility: HOSPITAL | Age: 58
Discharge: HOME | End: 2018-10-21
Attending: EMERGENCY MEDICINE | Admitting: EMERGENCY MEDICINE

## 2018-10-21 VITALS
DIASTOLIC BLOOD PRESSURE: 69 MMHG | OXYGEN SATURATION: 100 % | WEIGHT: 145 LBS | BODY MASS INDEX: 25.69 KG/M2 | SYSTOLIC BLOOD PRESSURE: 113 MMHG | RESPIRATION RATE: 16 BRPM | HEART RATE: 66 BPM | TEMPERATURE: 97.6 F

## 2018-10-21 DIAGNOSIS — R07.9 CHEST PAIN: Primary | ICD-10-CM

## 2018-10-21 LAB
ALBUMIN SERPL BCP-MCNC: 3.2 G/DL (ref 3.5–5)
ALP SERPL-CCNC: 89 U/L (ref 46–116)
ALT SERPL W P-5'-P-CCNC: 38 U/L (ref 12–78)
ANION GAP SERPL CALCULATED.3IONS-SCNC: 7 MMOL/L (ref 4–13)
AST SERPL W P-5'-P-CCNC: 29 U/L (ref 5–45)
ATRIAL RATE: 59 BPM
BASOPHILS # BLD AUTO: 0.03 THOUSANDS/ΜL (ref 0–0.1)
BASOPHILS NFR BLD AUTO: 1 % (ref 0–1)
BILIRUB SERPL-MCNC: 0.5 MG/DL (ref 0.2–1)
BUN SERPL-MCNC: 18 MG/DL (ref 5–25)
CALCIUM SERPL-MCNC: 8.8 MG/DL (ref 8.3–10.1)
CHLORIDE SERPL-SCNC: 109 MMOL/L (ref 100–108)
CO2 SERPL-SCNC: 26 MMOL/L (ref 21–32)
CREAT SERPL-MCNC: 1.23 MG/DL (ref 0.6–1.3)
EOSINOPHIL # BLD AUTO: 0.15 THOUSAND/ΜL (ref 0–0.61)
EOSINOPHIL NFR BLD AUTO: 3 % (ref 0–6)
ERYTHROCYTE [DISTWIDTH] IN BLOOD BY AUTOMATED COUNT: 14.1 % (ref 11.6–15.1)
GFR SERPL CREATININE-BSD FRML MDRD: 64 ML/MIN/1.73SQ M
GLUCOSE SERPL-MCNC: 105 MG/DL (ref 65–140)
HCT VFR BLD AUTO: 43.9 % (ref 36.5–49.3)
HGB BLD-MCNC: 14.6 G/DL (ref 12–17)
IMM GRANULOCYTES # BLD AUTO: 0.01 THOUSAND/UL (ref 0–0.2)
IMM GRANULOCYTES NFR BLD AUTO: 0 % (ref 0–2)
LYMPHOCYTES # BLD AUTO: 1.91 THOUSANDS/ΜL (ref 0.6–4.47)
LYMPHOCYTES NFR BLD AUTO: 37 % (ref 14–44)
MCH RBC QN AUTO: 30 PG (ref 26.8–34.3)
MCHC RBC AUTO-ENTMCNC: 33.3 G/DL (ref 31.4–37.4)
MCV RBC AUTO: 90 FL (ref 82–98)
MONOCYTES # BLD AUTO: 0.5 THOUSAND/ΜL (ref 0.17–1.22)
MONOCYTES NFR BLD AUTO: 10 % (ref 4–12)
NEUTROPHILS # BLD AUTO: 2.62 THOUSANDS/ΜL (ref 1.85–7.62)
NEUTS SEG NFR BLD AUTO: 49 % (ref 43–75)
NRBC BLD AUTO-RTO: 0 /100 WBCS
P AXIS: 60 DEGREES
PLATELET # BLD AUTO: 234 THOUSANDS/UL (ref 149–390)
PMV BLD AUTO: 10.4 FL (ref 8.9–12.7)
POTASSIUM SERPL-SCNC: 3.9 MMOL/L (ref 3.5–5.3)
PR INTERVAL: 182 MS
PROT SERPL-MCNC: 6.9 G/DL (ref 6.4–8.2)
QRS AXIS: 73 DEGREES
QRSD INTERVAL: 84 MS
QT INTERVAL: 410 MS
QTC INTERVAL: 405 MS
RBC # BLD AUTO: 4.87 MILLION/UL (ref 3.88–5.62)
SODIUM SERPL-SCNC: 142 MMOL/L (ref 136–145)
T WAVE AXIS: 45 DEGREES
TROPONIN I SERPL-MCNC: <0.02 NG/ML
VENTRICULAR RATE: 59 BPM
WBC # BLD AUTO: 5.22 THOUSAND/UL (ref 4.31–10.16)

## 2018-10-21 PROCEDURE — 71046 X-RAY EXAM CHEST 2 VIEWS: CPT

## 2018-10-21 PROCEDURE — 36415 COLL VENOUS BLD VENIPUNCTURE: CPT | Performed by: EMERGENCY MEDICINE

## 2018-10-21 PROCEDURE — 93005 ELECTROCARDIOGRAM TRACING: CPT

## 2018-10-21 PROCEDURE — 99285 EMERGENCY DEPT VISIT HI MDM: CPT

## 2018-10-21 PROCEDURE — 85025 COMPLETE CBC W/AUTO DIFF WBC: CPT | Performed by: EMERGENCY MEDICINE

## 2018-10-21 PROCEDURE — 80053 COMPREHEN METABOLIC PANEL: CPT | Performed by: EMERGENCY MEDICINE

## 2018-10-21 PROCEDURE — 84484 ASSAY OF TROPONIN QUANT: CPT | Performed by: EMERGENCY MEDICINE

## 2018-10-21 PROCEDURE — 93010 ELECTROCARDIOGRAM REPORT: CPT | Performed by: INTERNAL MEDICINE

## 2018-10-21 RX ORDER — ASPIRIN 325 MG
325 TABLET ORAL ONCE
Status: COMPLETED | OUTPATIENT
Start: 2018-10-21 | End: 2018-10-21

## 2018-10-21 RX ADMIN — ASPIRIN 325 MG: 325 TABLET ORAL at 16:25

## 2018-10-21 NOTE — DISCHARGE INSTRUCTIONS
Dolor torácico, cuidados ambulatorios   INFORMACIÓN GENERAL:   El dolor torácico  puede ser causado por aggie variedad de condiciones, algunas no jordan serias y Ray[employer]eds que sí son mortales  También podría ser causado por un ataque cardíaco o un coágulo de demetra en un pulmón  En ocasiones el dolor torácico o la presión en el pecho pueden ser el resultado de aggie harrison circulación de la demetra al corazón (angina)  Aggie infección, inflamación o fractura en un hueso o cartílago en el tórax puede causar dolor o molestia  El dolor torácico también puede ser un síntoma de un problema digestivo, melvin el reflujo gastroesofágico ([place]) o aggie úlcera estomacal    [place] siguientes son los síntomas más comunes:   · Tanisha Campanile o sudoración     · Náuseas o vómito     · Falta del aire    · Yahoo o presión que se propaga del pecho a la espalda, mandíbula o brazo     · Ritmo cardíaco acelerado o lento     · Sensación de debilidad, cansancio o desmayo  Busque atención inmediata al presentar los siguientes síntomas:   · Alguno de los siguientes signos de un ataque cardíaco:      ¨ Estrechamiento, presión o dolor en el pecho que dura más de 5 minutos o regresa     ¨ Molestias o dolor en la espalda, [place], [place], estómago o brazo     ¨ Dificultad para respirar     ¨ Náuseas o vómito     ¨ Desvanecimiento o sudor frío y repentino, sobre todo con dificultad para respirar         · Incomodidad en el pecho que [place], aún con medicamentos    · Tos o vomita demetra    · Deposiciones negras o con demetra     · No puede dejar de vomitar o le duele al tragar  El tratamiento para el dolor torácico  puede incluir medicamentos para tratar dewey síntomas mientras se determina la causa de harrison dolor en el pecho  Usted podría necesitar alguno de los siguientes:  · Los antiplaquetarios , melvin la aspirina, [facility] a prevenir coagulas de [place]  [place] dewey antiplaquetarios exactamente mlevin se le haya indicado   Estos medicamentos podrían causar mas probabilidad para sangrado y para desarrollar moretones  Si le summers indicado el uso de aspirina, no tome acetaminofén o ibuprofeno en sahu lugar  · Le pueden recetar medicamento analgésico  para el dolor  Pregunte cuál es la cantidad que puede beatriz de forma nielsen  No fume:  Si usted fuma, nunca es tarde para dejar de fumar  El tabaquismo aumenta sahu riesgo de sufrir un ataque cardíaco y otras afecciones del corazón y el pulmón  Solicite a sahu proveedor de [employer] información si necesita ayuda para dejar de fumar  Acuda a la consulta de control con sahu proveedor de cecy según le indicaron:  Es posible que le ordenen más exámenes  Lo pueden remitir con un especialista, melvin el cardiólogo o el gastroenterólogo  Ok en aggie libreta las preguntas que tenga para que no olvide hacerlas demond dewey citas médicas  ACUERDOS SOBRE SAHU CUIDADO:   Usted tiene el derecho de participar en la planificación de sahu cuidado  Aprenda todo lo que pueda sobre sahu condición y melvin darle tratamiento  Discuta con dewey médicos dewey opciones de tratamiento para juntos decidir el cuidado que usted quiere recibir  Usted siempre tiene el derecho a rechazar sahu tratamiento  Esta información es sólo para uso en educación  Sahu intención no es darle un consejo médico sobre enfermedades o tratamientos  Colsulte con sahu Evelyn Revering farmacéutico antes de seguir cualquier régimen médico para saber si es seguro y efectivo para usted  © 2014 [address] is for End User's use only and may not be sold, redistributed or otherwise used for commercial purposes  All illustrations and images included in CareNotes® are the copyrighted property of A D A M , Inc  or Leo Kaylin

## 2018-10-21 NOTE — ED PROVIDER NOTES
History  Chief Complaint   Patient presents with    Chest Pain     Pt reports L sided chest pain x "a couple days", also reports cough and "my voice goes out", denies cardiac history  CP x "years " "Maybe" got worse a few days ago  Pt reports he has been evaluated for it multiple times and hospital admissions that "made me broke, but they didn't find nothing "  Pt is coming in today "just to be sure" and wants me to figure out why he has had years of CP  History provided by:  Patient   used: No    Chest Pain   Pain location:  L chest  Pain quality: dull    Pain radiates to:  Does not radiate  Pain radiates to the back: no    Timing:  Constant  Progression:  Worsening  Chronicity:  Chronic  Relieved by:  None tried  Worsened by:  Nothing tried  Ineffective treatments:  None tried  Associated symptoms: cough    Associated symptoms: no abdominal pain, no back pain, no diaphoresis, no dizziness, no fever, no nausea, no numbness, no palpitations, no shortness of breath, not vomiting and no weakness    Risk factors: hypertension    Risk factors: no coronary artery disease, no diabetes mellitus, no high cholesterol and no smoking        Prior to Admission Medications   Prescriptions Last Dose Informant Patient Reported?  Taking?   lisinopril (ZESTRIL) 5 mg tablet  Self No Yes   Sig: TAKE 1 TABLET BY MOUTH EVERY DAY      Facility-Administered Medications: None       Past Medical History:   Diagnosis Date    History of chronic constipation     History of neck pain 11/21/2017    Hypertension        Past Surgical History:   Procedure Laterality Date    CYSTOSCOPY  05/29/2014    diagnostic managed by Jalen Mora    TOOTH EXTRACTION         Family History   Problem Relation Age of Onset    Cancer Sister         malignant neoplasm of breast    Diabetes Paternal Grandmother     Diabetes Family     Arthritis Family     Cancer Family         malignant neoplasm    Substance Abuse Neg Hx denied Mother and Father    Mental illness Neg Hx         no family history Mother and Father    Other Neg Hx         denied family history of Osteopertrosis     I have reviewed and agree with the history as documented  Social History   Substance Use Topics    Smoking status: Former Smoker    Smokeless tobacco: Never Used    Alcohol use Yes      Comment: weekly        Review of Systems   Constitutional: Negative for chills, diaphoresis and fever  Respiratory: Positive for cough  Negative for chest tightness and shortness of breath  Cardiovascular: Positive for chest pain  Negative for palpitations and leg swelling  Gastrointestinal: Negative for abdominal pain, nausea and vomiting  Musculoskeletal: Negative for back pain and neck pain  Skin: Negative for pallor  Neurological: Negative for dizziness, syncope, facial asymmetry, speech difficulty, weakness, light-headedness and numbness  All other systems reviewed and are negative  Physical Exam  Physical Exam   Constitutional: He is oriented to person, place, and time  He appears well-developed and well-nourished  Non-toxic appearance  He does not have a sickly appearance  He does not appear ill  No distress  HENT:   Head: Normocephalic and atraumatic  Mouth/Throat: Oropharynx is clear and moist    Eyes: Pupils are equal, round, and reactive to light  EOM are normal    Neck: Normal range of motion  Neck supple  No JVD present  Cardiovascular: Normal rate, regular rhythm, S1 normal, S2 normal, normal heart sounds, intact distal pulses and normal pulses  Exam reveals no gallop and no friction rub  No murmur heard  Pulmonary/Chest: Effort normal and breath sounds normal  No respiratory distress  He has no wheezes  He has no rales  He exhibits tenderness  Abdominal: Soft  Bowel sounds are normal  He exhibits no distension  There is no tenderness  There is no rebound and no guarding  Musculoskeletal: He exhibits no edema  Neurological: He is alert and oriented to person, place, and time  He has normal strength  No cranial nerve deficit or sensory deficit  Skin: Skin is warm and dry  No rash noted  He is not diaphoretic  No pallor  Nursing note and vitals reviewed  Vital Signs  ED Triage Vitals [10/21/18 1521]   Temperature Pulse Respirations Blood Pressure SpO2   97 6 °F (36 4 °C) 67 18 151/73 98 %      Temp Source Heart Rate Source Patient Position - Orthostatic VS BP Location FiO2 (%)   Oral Monitor Lying Right arm --      Pain Score       5           Vitals:    10/21/18 1521 10/21/18 1703   BP: 151/73 113/69   Pulse: 67 66   Patient Position - Orthostatic VS: Lying Sitting       Visual Acuity      ED Medications  Medications   aspirin tablet 325 mg (325 mg Oral Given 10/21/18 1625)       Diagnostic Studies  Results Reviewed     Procedure Component Value Units Date/Time    Troponin I [[id number]]  (Normal) Collected:  10/21/18 1630    Lab Status:  Final result Specimen:  Blood from Arm, Left Updated:  10/21/18 1653     Troponin I <0 02 ng/mL     Comprehensive metabolic panel [[id number]]  (Abnormal) Collected:  10/21/18 1630    Lab Status:  Final result Specimen:  Blood from Arm, Left Updated:  10/21/18 1652     Sodium 142 mmol/L      Potassium 3 9 mmol/L      Chloride 109 (H) mmol/L      CO2 26 mmol/L      ANION GAP 7 mmol/L      BUN 18 mg/dL      Creatinine 1 23 mg/dL      Glucose 105 mg/dL      Calcium 8 8 mg/dL      AST 29 U/L      ALT 38 U/L      Alkaline Phosphatase 89 U/L      Total Protein 6 9 g/dL      Albumin 3 2 (L) g/dL      Total Bilirubin 0 50 mg/dL      eGFR 64 ml/min/1 73sq m     Narrative:         National Kidney Disease Education Program recommendations are as follows:  GFR calculation is accurate only with a steady state creatinine  Chronic Kidney disease less than 60 ml/min/1 73 sq  meters  Kidney failure less than 15 ml/min/1 73 sq  meters      CBC and differential [[id number]] Collected:  10/21/18 1630 Lab Status:  Final result Specimen:  Blood from Arm, Left Updated:  10/21/18 1637     WBC 5 22 Thousand/uL      RBC 4 87 Million/uL      Hemoglobin 14 6 g/dL      Hematocrit 43 9 %      MCV 90 fL      MCH 30 0 pg      MCHC 33 3 g/dL      RDW 14 1 %      MPV 10 4 fL      Platelets 866 Thousands/uL      nRBC 0 /100 WBCs      Neutrophils Relative 49 %      Immat GRANS % 0 %      Lymphocytes Relative 37 %      Monocytes Relative 10 %      Eosinophils Relative 3 %      Basophils Relative 1 %      Neutrophils Absolute 2 62 Thousands/µL      Immature Grans Absolute 0 01 Thousand/uL      Lymphocytes Absolute 1 91 Thousands/µL      Monocytes Absolute 0 50 Thousand/µL      Eosinophils Absolute 0 15 Thousand/µL      Basophils Absolute 0 03 Thousands/µL                  XR chest 2 views   ED Interpretation by Juan Pablo Lloyd 24, DO (10/21 1647)   No acute abnormalities                 Procedures  ECG 12 Lead Documentation  Date/Time: 10/21/2018 4:17 PM  Performed by: Raisa Thompson  Authorized by: Raisa Thompson     Indications / Diagnosis:  Chest pain  ECG reviewed by me, the ED Provider: yes    Patient location:  ED  Previous ECG:     Previous ECG:  Unavailable  Rate:     ECG rate:  59    ECG rate assessment: bradycardic    Rhythm:     Rhythm: sinus rhythm    Ectopy:     Ectopy: none    QRS:     QRS axis:  Normal    QRS intervals:  Normal  ST segments:     ST segments:  Normal  T waves:     T waves: normal             Phone Contacts  ED Phone Contact    ED Course  ED Course as of Oct 21 1744   Sun Oct 21, 2018   1703 Discussed results with pt  Instructed him to f/u with PCP for further evaluation            HEART Risk Score      Most Recent Value   History  0 Filed at: 10/21/2018 1704   ECG  0 Filed at: 10/21/2018 1704   Age  1 Filed at: 10/21/2018 1704   Risk Factors  1 Filed at: 10/21/2018 1704   Troponin  0 Filed at: 10/21/2018 1704   Heart Score Risk Calculator   History  0 Filed at: 10/21/2018 1704   ECG  0 Filed at: 10/21/2018 1704   Age  1 Filed at: 10/21/2018 1704   Risk Factors  1 Filed at: 10/21/2018 1704   Troponin  0 Filed at: 10/21/2018 1704   HEART Score  2 Filed at: 10/21/2018 1704   HEART Score  2 Filed at: 10/21/2018 1704                            [facility]  Number of Diagnoses or Management Options     Amount and/or Complexity of Data Reviewed  Clinical lab tests: reviewed and ordered  Tests in the radiology section of CPT®: ordered and reviewed  Tests in the medicine section of CPT®: ordered and reviewed      CritCare Time    Disposition  Final diagnoses:   Chest pain     Time reflects when diagnosis was documented in both MDM as applicable and the Disposition within this note     Time User Action Codes Description Comment    10/21/2018  4:57 PM Geoffrey Foy 48 [R07 9] Chest pain       ED Disposition     ED Disposition Condition Comment    Discharge  Magui Torrez discharge to home/self care  Condition at discharge: Good        Follow-up Information     Follow up With Specialties Details Why Contact Info    Torsten Scott PA-C Family Medicine, Physician Assistant Schedule an appointment as soon as possible for a visit  [address] [address]  [phone]            Discharge Medication List as of 10/21/2018  4:57 PM      CONTINUE these medications which have NOT CHANGED    Details   lisinopril (ZESTRIL) 5 mg tablet TAKE 1 TABLET BY MOUTH EVERY DAY, Normal           No discharge procedures on file      ED Provider  Electronically Signed by           Juan Pablo Lloyd 24, DO  10/21/18 1950

## 2018-11-20 ENCOUNTER — HOSPITAL ENCOUNTER (EMERGENCY)
Facility: HOSPITAL | Age: 58
Discharge: HOME | End: 2018-11-20
Attending: EMERGENCY MEDICINE | Admitting: EMERGENCY MEDICINE

## 2018-11-20 ENCOUNTER — APPOINTMENT (EMERGENCY)
Dept: RADIOLOGY | Facility: HOSPITAL | Age: 58
End: 2018-11-20

## 2018-11-20 VITALS
RESPIRATION RATE: 18 BRPM | TEMPERATURE: 97.7 F | HEART RATE: 64 BPM | OXYGEN SATURATION: 99 % | SYSTOLIC BLOOD PRESSURE: 144 MMHG | DIASTOLIC BLOOD PRESSURE: 82 MMHG

## 2018-11-20 DIAGNOSIS — R07.9 CHEST PAIN: Primary | ICD-10-CM

## 2018-11-20 LAB
ALBUMIN SERPL BCP-MCNC: 3.3 G/DL (ref 3.5–5)
ALP SERPL-CCNC: 81 U/L (ref 46–116)
ALT SERPL W P-5'-P-CCNC: 35 U/L (ref 12–78)
ANION GAP SERPL CALCULATED.3IONS-SCNC: 7 MMOL/L (ref 4–13)
AST SERPL W P-5'-P-CCNC: 29 U/L (ref 5–45)
BASOPHILS # BLD AUTO: 0.02 THOUSANDS/ΜL (ref 0–0.1)
BASOPHILS NFR BLD AUTO: 1 % (ref 0–1)
BILIRUB DIRECT SERPL-MCNC: 0.15 MG/DL (ref 0–0.2)
BILIRUB SERPL-MCNC: 0.74 MG/DL (ref 0.2–1)
BUN SERPL-MCNC: 19 MG/DL (ref 5–25)
CALCIUM SERPL-MCNC: 9 MG/DL (ref 8.3–10.1)
CHLORIDE SERPL-SCNC: 106 MMOL/L (ref 100–108)
CO2 SERPL-SCNC: 25 MMOL/L (ref 21–32)
CREAT SERPL-MCNC: 1.27 MG/DL (ref 0.6–1.3)
EOSINOPHIL # BLD AUTO: 0.08 THOUSAND/ΜL (ref 0–0.61)
EOSINOPHIL NFR BLD AUTO: 2 % (ref 0–6)
ERYTHROCYTE [DISTWIDTH] IN BLOOD BY AUTOMATED COUNT: 14.1 % (ref 11.6–15.1)
GFR SERPL CREATININE-BSD FRML MDRD: 61 ML/MIN/1.73SQ M
GLUCOSE SERPL-MCNC: 95 MG/DL (ref 65–140)
HCT VFR BLD AUTO: 44.1 % (ref 36.5–49.3)
HGB BLD-MCNC: 14.6 G/DL (ref 12–17)
IMM GRANULOCYTES # BLD AUTO: 0 THOUSAND/UL (ref 0–0.2)
IMM GRANULOCYTES NFR BLD AUTO: 0 % (ref 0–2)
LYMPHOCYTES # BLD AUTO: 1.32 THOUSANDS/ΜL (ref 0.6–4.47)
LYMPHOCYTES NFR BLD AUTO: 35 % (ref 14–44)
MAGNESIUM SERPL-MCNC: 2 MG/DL (ref 1.6–2.6)
MCH RBC QN AUTO: 30.2 PG (ref 26.8–34.3)
MCHC RBC AUTO-ENTMCNC: 33.1 G/DL (ref 31.4–37.4)
MCV RBC AUTO: 91 FL (ref 82–98)
MONOCYTES # BLD AUTO: 0.37 THOUSAND/ΜL (ref 0.17–1.22)
MONOCYTES NFR BLD AUTO: 10 % (ref 4–12)
NEUTROPHILS # BLD AUTO: 1.99 THOUSANDS/ΜL (ref 1.85–7.62)
NEUTS SEG NFR BLD AUTO: 52 % (ref 43–75)
NRBC BLD AUTO-RTO: 0 /100 WBCS
PLATELET # BLD AUTO: 225 THOUSANDS/UL (ref 149–390)
PMV BLD AUTO: 10.7 FL (ref 8.9–12.7)
POTASSIUM SERPL-SCNC: 4.3 MMOL/L (ref 3.5–5.3)
PROT SERPL-MCNC: 6.8 G/DL (ref 6.4–8.2)
RBC # BLD AUTO: 4.83 MILLION/UL (ref 3.88–5.62)
SODIUM SERPL-SCNC: 138 MMOL/L (ref 136–145)
TROPONIN I SERPL-MCNC: <0.02 NG/ML
TROPONIN I SERPL-MCNC: <0.02 NG/ML
WBC # BLD AUTO: 3.78 THOUSAND/UL (ref 4.31–10.16)

## 2018-11-20 PROCEDURE — 71046 X-RAY EXAM CHEST 2 VIEWS: CPT

## 2018-11-20 PROCEDURE — 80076 HEPATIC FUNCTION PANEL: CPT | Performed by: EMERGENCY MEDICINE

## 2018-11-20 PROCEDURE — 96374 THER/PROPH/DIAG INJ IV PUSH: CPT

## 2018-11-20 PROCEDURE — 80048 BASIC METABOLIC PNL TOTAL CA: CPT | Performed by: EMERGENCY MEDICINE

## 2018-11-20 PROCEDURE — 36415 COLL VENOUS BLD VENIPUNCTURE: CPT | Performed by: EMERGENCY MEDICINE

## 2018-11-20 PROCEDURE — 84484 ASSAY OF TROPONIN QUANT: CPT | Performed by: EMERGENCY MEDICINE

## 2018-11-20 PROCEDURE — 96361 HYDRATE IV INFUSION ADD-ON: CPT

## 2018-11-20 PROCEDURE — 83735 ASSAY OF MAGNESIUM: CPT | Performed by: EMERGENCY MEDICINE

## 2018-11-20 PROCEDURE — 93005 ELECTROCARDIOGRAM TRACING: CPT

## 2018-11-20 PROCEDURE — 85025 COMPLETE CBC W/AUTO DIFF WBC: CPT | Performed by: EMERGENCY MEDICINE

## 2018-11-20 PROCEDURE — 99285 EMERGENCY DEPT VISIT HI MDM: CPT

## 2018-11-20 RX ORDER — KETOROLAC TROMETHAMINE 30 MG/ML
INJECTION, SOLUTION INTRAMUSCULAR; INTRAVENOUS
Status: COMPLETED
Start: 2018-11-20 | End: 2018-11-20

## 2018-11-20 RX ORDER — NAPROXEN 500 MG/1
500 TABLET ORAL 2 TIMES DAILY WITH MEALS
Qty: 20 TABLET | Refills: 0 | Status: SHIPPED | OUTPATIENT
Start: 2018-11-20 | End: 2019-01-16

## 2018-11-20 RX ORDER — KETOROLAC TROMETHAMINE 30 MG/ML
15 INJECTION, SOLUTION INTRAMUSCULAR; INTRAVENOUS ONCE
Status: COMPLETED | OUTPATIENT
Start: 2018-11-20 | End: 2018-11-20

## 2018-11-20 RX ADMIN — KETOROLAC TROMETHAMINE 15 MG: 30 INJECTION, SOLUTION INTRAMUSCULAR at 13:05

## 2018-11-20 RX ADMIN — SODIUM CHLORIDE 1000 ML: 0.9 INJECTION, SOLUTION INTRAVENOUS at 13:05

## 2018-11-20 RX ADMIN — KETOROLAC TROMETHAMINE 15 MG: 30 INJECTION, SOLUTION INTRAMUSCULAR; INTRAVENOUS at 13:05

## 2018-11-20 NOTE — ED PROVIDER NOTES
History  Chief Complaint   Patient presents with    Chest Pain     left sided chest pain since 0800     59 YO male presents with Left sided chest pain starting ~4 hours PTA  States this was rapid in onset, sharp, radiating to the Left shoulder, constant, worse with stress  He denies nausea, palpitations, lightheadedness, diaphoresis  States there is no exertional component  Pt states he has had the pain in the past, he has had a stress test for this discomfort  Notes there has not a cause for her discomfort  This will recur intermittently  Pt denies any new or different symptoms  Pt denies SOB/F/C/N/V/D/C, no dysuria, burning on urination or blood in urine  History provided by:  Patient   used: No    Chest Pain   Pain location:  L chest  Pain quality: aching and sharp    Pain radiates to:  L shoulder  Pain radiates to the back: no    Pain severity:  Moderate  Onset quality:  Gradual  Duration:  4 hours  Timing:  Constant  Progression:  Unchanged  Chronicity:  Recurrent  Context: stress    Context: not breathing, not eating and no movement    Relieved by:  Nothing  Worsened by:  Nothing tried  Ineffective treatments:  None tried  Associated symptoms: no abdominal pain, no anxiety, no back pain, no cough, no dizziness, no fatigue, no fever, no nausea, no palpitations, no shortness of breath, no syncope, not vomiting and no weakness        Prior to Admission Medications   Prescriptions Last Dose Informant Patient Reported?  Taking?   lisinopril (ZESTRIL) 5 mg tablet  Self No Yes   Sig: TAKE 1 TABLET BY MOUTH EVERY DAY      Facility-Administered Medications: None       Past Medical History:   Diagnosis Date    History of chronic constipation     History of neck pain 11/21/2017    Hypertension        Past Surgical History:   Procedure Laterality Date    CYSTOSCOPY  05/29/2014    diagnostic managed by Jalen Mora    TOOTH ADRIAN         Family History   Problem Relation Age of Onset  Cancer Sister         malignant neoplasm of breast    Diabetes Paternal Grandmother     Diabetes Family     Arthritis Family     Cancer Family         malignant neoplasm    Substance Abuse Neg Hx         denied Mother and Father    Mental illness Neg Hx         no family history Mother and Father    Other Neg Hx         denied family history of Osteopertrosis     I have reviewed and agree with the history as documented  Social History   Substance Use Topics    Smoking status: Former Smoker    Smokeless tobacco: Never Used    Alcohol use Yes      Comment: weekly        Review of Systems   Constitutional: Negative for fatigue and fever  HENT: Negative for dental problem  Eyes: Negative for visual disturbance  Respiratory: Negative for cough and shortness of breath  Cardiovascular: Positive for chest pain  Negative for palpitations and syncope  Gastrointestinal: Negative for abdominal pain, nausea and vomiting  Genitourinary: Negative for dysuria and frequency  Musculoskeletal: Negative for back pain, neck pain and neck stiffness  Skin: Negative for rash  Neurological: Negative for dizziness, weakness and light-headedness  Psychiatric/Behavioral: Negative for agitation, behavioral problems and confusion  All other systems reviewed and are negative  Physical Exam  Physical Exam   Constitutional: He is oriented to person, place, and time  He appears well-developed and well-nourished  HENT:   Head: Normocephalic and atraumatic  Eyes: EOM are normal    Neck: Normal range of motion  Cardiovascular: Normal rate, regular rhythm and normal heart sounds  Pulmonary/Chest: Effort normal and breath sounds normal  He exhibits no tenderness  Abdominal: Soft  There is no tenderness  Musculoskeletal: Normal range of motion  Neurological: He is alert and oriented to person, place, and time  Skin: Skin is warm and dry  Psychiatric: He has a normal mood and affect   His behavior is normal    Nursing note and vitals reviewed  Vital Signs  ED Triage Vitals [11/20/18 1148]   Temperature Pulse Respirations Blood Pressure SpO2   97 7 °F (36 5 °C) 67 18 139/79 98 %      Temp Source Heart Rate Source Patient Position - Orthostatic VS BP Location FiO2 (%)   Oral Monitor Lying Right arm --      Pain Score       5           Vitals:    11/20/18 1148 11/20/18 1307 11/20/18 1411   BP: 139/79 152/81 144/82   Pulse: 67 57 64   Patient Position - Orthostatic VS: Lying Lying Lying       Visual Acuity      ED Medications  Medications   sodium chloride 0 9 % bolus 1,000 mL (0 mL Intravenous Stopped 11/20/18 1411)   ketorolac (TORADOL) injection 15 mg (15 mg Intravenous Given 11/20/18 1305)       Diagnostic Studies  Results Reviewed     Procedure Component Value Units Date/Time    Troponin I [[id number]]  (Normal) Collected:  11/20/18 1532    Lab Status:  Final result Specimen:  Blood from Arm, Right Updated:  11/20/18 1607     Troponin I <0 02 ng/mL     Troponin I [[id number]]  (Normal) Collected:  11/20/18 1304    Lab Status:  Final result Specimen:  Blood from Arm, Right Updated:  11/20/18 1341     Troponin I <0 02 ng/mL     Basic metabolic panel [[id number]] Collected:  11/20/18 1304    Lab Status:  Final result Specimen:  Blood from Arm, Right Updated:  11/20/18 1335     Sodium 138 mmol/L      Potassium 4 3 mmol/L      Chloride 106 mmol/L      CO2 25 mmol/L      ANION GAP 7 mmol/L      BUN 19 mg/dL      Creatinine 1 27 mg/dL      Glucose 95 mg/dL      Calcium 9 0 mg/dL      eGFR 61 ml/min/1 73sq m     Narrative:         National Kidney Disease Education Program recommendations are as follows:  GFR calculation is accurate only with a steady state creatinine  Chronic Kidney disease less than 60 ml/min/1 73 sq  meters  Kidney failure less than 15 ml/min/1 73 sq  meters      Hepatic function panel [[id number]]  (Abnormal) Collected:  11/20/18 1304    Lab Status:  Final result Specimen:  Blood from Arm, Right Updated:  11/20/18 1335     Total Bilirubin 0 74 mg/dL      Bilirubin, Direct 0 15 mg/dL      Alkaline Phosphatase 81 U/L      AST 29 U/L      ALT 35 U/L      Total Protein 6 8 g/dL      Albumin 3 3 (L) g/dL     Magnesium [[id number]]  (Normal) Collected:  11/20/18 1304    Lab Status:  Final result Specimen:  Blood from Arm, Right Updated:  11/20/18 1335     Magnesium 2 0 mg/dL     CBC and differential [[id number]]  (Abnormal) Collected:  11/20/18 1304    Lab Status:  Final result Specimen:  Blood from Arm, Right Updated:  11/20/18 1311     WBC 3 78 (L) Thousand/uL      RBC 4 83 Million/uL      Hemoglobin 14 6 g/dL      Hematocrit 44 1 %      MCV 91 fL      MCH 30 2 pg      MCHC 33 1 g/dL      RDW 14 1 %      MPV 10 7 fL      Platelets 522 Thousands/uL      nRBC 0 /100 WBCs      Neutrophils Relative 52 %      Immat GRANS % 0 %      Lymphocytes Relative 35 %      Monocytes Relative 10 %      Eosinophils Relative 2 %      Basophils Relative 1 %      Neutrophils Absolute 1 99 Thousands/µL      Immature Grans Absolute 0 00 Thousand/uL      Lymphocytes Absolute 1 32 Thousands/µL      Monocytes Absolute 0 37 Thousand/µL      Eosinophils Absolute 0 08 Thousand/µL      Basophils Absolute 0 02 Thousands/µL                  XR chest 2 views   Final Result by CAROL Rod MD (11/20 9790)      No acute cardiopulmonary disease              Workstation performed: [id number]                    Procedures  ECG 12 Lead Documentation  Date/Time: 11/20/2018 3:03 PM  Performed by: Macy Carrillo by: Eddie Orozco     ECG reviewed by me, the ED Provider: yes    Patient location:  ED  Interpretation:     Interpretation: normal    Rate:     ECG rate:  78    ECG rate assessment: normal    Rhythm:     Rhythm: sinus rhythm    QRS:     QRS axis:  Normal    QRS intervals:  Normal  Conduction:     Conduction: normal    ST segments:     ST segments:  Normal  T waves:     T waves: normal      ECG 12 Lead Documentation  Date/Time: 11/20/2018 3:50 PM  Performed by: Silverio Marquez  Authorized by: Silverio Marquez     ECG reviewed by me, the ED Provider: yes    Patient location:  ED  Interpretation:     Interpretation: normal    Rate:     ECG rate assessment: normal    Rhythm:     Rhythm: sinus rhythm    QRS:     QRS axis:  Normal    QRS intervals:  Normal  Conduction:     Conduction: normal    ST segments:     ST segments:  Normal  T waves:     T waves: normal             Phone Contacts  ED Phone Contact    ED Course                               MDM  Number of Diagnoses or Management Options  Chest pain: new and requires workup  Diagnosis management comments: 1  Chest pain - Pt with similar in the past, states he has had a stress test for evaluation of this pain  Will check ECG, troponin and delta of each  Will obtain electrolytes, CBC, CXR  Likely give slip for repeat CXR and troponin  Pt's 2nd troponin and ECG will be collected after 1500, this will put pt outside of 6 hours since symptom onset, he has no known cardiac issues and a rule out with troponin after 6 hours is sufficient  Pt had a stress echo 2/1/17, this was normal  He will be given a slip for a new stress test         Amount and/or Complexity of Data Reviewed  Clinical lab tests: ordered and reviewed  Tests in the radiology section of CPT®: ordered and reviewed  Review and summarize past medical records: yes  Independent visualization of images, tracings, or specimens: yes    Patient Progress  Patient progress: stable    CritCare Time    Disposition  Final diagnoses:   Chest pain     Time reflects when diagnosis was documented in both MDM as applicable and the Disposition within this note     Time User Action Codes Description Comment    11/20/2018  3:53 PM Ramona Morgan [R07 9] Chest pain       ED Disposition     ED Disposition Condition Comment    Discharge  Bill John discharge to home/self care      Condition at discharge: Stable Follow-up Information     Follow up With Specialties Details Why Xiao 176, DO Cardiology Schedule an appointment as soon as possible for a visit  [address] [facility] [address] [facility]  [phone]            Discharge Medication List as of 11/20/2018  3:59 PM      START taking these medications    Details   naproxen (NAPROSYN) 500 mg tablet Take 1 tablet (500 mg total) by mouth 2 (two) times a day with meals for 10 days, Starting Tue 11/20/2018, Until Fri 11/30/2018, Print         CONTINUE these medications which have NOT CHANGED    Details   lisinopril (ZESTRIL) 5 mg tablet TAKE 1 TABLET BY MOUTH EVERY DAY, Normal             Outpatient Discharge Orders  Stress test only, exercise   Standing Status: Future  Standing Exp   Date: 11/20/22         ED Provider  Electronically Signed by           Esdras Lainez MD  11/21/18 7457

## 2018-11-20 NOTE — DISCHARGE INSTRUCTIONS
Return to the ER if your chest pain changes in quality or location, or becomes associated with shortness of breath, lightheadedness, vomiting or sweating  Central Scheduling has been informed of your need for a stress test, they should call you to set up this appointment  If you have not heard from them by noon on Friday, call to set this up, the number is [phone]    The number for the cardiologist is included in these discharge instructions, call to be seen in the office for further evaluation and management  Dolor torácico, cuidados ambulatorios   INFORMACIÓN GENERAL:   El dolor torácico  puede ser causado por aggie variedad de condiciones, algunas no jordan serias y [place] [address]  que sí son mortales  También podría ser causado por un ataque cardíaco o un coágulo de demetra en un pulmón  En ocasiones el dolor torácico o la presión en el pecho pueden ser el resultado de aggie harrison circulación de la demetra al corazón (angina)  Aggie infección, inflamación o fractura en un hueso o cartílago en el tórax puede causar dolor o molestia   El dolor torácico también puede ser un síntoma de un problema digestivo, melvin el reflujo gastroesofágico ([place]) o aggie úlcera estomacal    [place] siguientes son los síntomas más comunes:   · Lilo [place] o sudoración     · Náuseas o vómito     · Falta del aire    · Molestia o presión que se propaga del pecho a la espalda, mandíbula o brazo     · Ritmo cardíaco acelerado o lento     · Sensación de debilidad, cansancio o desmayo  Busque atención inmediata al presentar los siguientes síntomas:   · Alguno de los siguientes signos de un ataque cardíaco:      ¨ Estrechamiento, presión o dolor en el pecho que dura más de 5 minutos o regresa     ¨ Molestias o dolor en la espalda, [place], [place], estómago o brazo     ¨ Dificultad para respirar     ¨ Náuseas o vómito     ¨ Desvanecimiento o sudor frío y repentino, sobre todo con dificultad para respirar         · Incomodidad en el pecho que [place], aún con medicamentos    · Tos o vomita demetra    · Deposiciones negras o con demetra     · No puede dejar de vomitar o le duele al tragar  El tratamiento para el dolor torácico  puede incluir medicamentos para tratar dewey síntomas mientras se determina la causa de sahu dolor en el pecho  Usted podría necesitar alguno de los siguientes:  · Los antiplaquetarios , melvin la aspirina, Sy[facility] a prevenir coagulas de [place]  [place] dewey antiplaquetarios exactamente melvin se le haya indicado  Estos medicamentos podrían causar mas probabilidad para sangrado y para desarrollar moretones  Si le summers indicado el uso de aspirina, no tome acetaminofén o ibuprofeno en sahu lugar  · Le pueden recetar medicamento analgésico  para el dolor  Pregunte cuál es la cantidad que puede beatriz de forma nielsen  No fume:  Si usted fuma, nunca es tarde para dejar de fumar  El tabaquismo aumenta sahu riesgo de sufrir un ataque cardíaco y otras afecciones del corazón y el pulmón  Solicite a sahu proveedor de [employer] información si necesita ayuda para dejar de fumar  Acuda a la consulta de control con sahu proveedor de cecy según le indicaron:  Es posible que le ordenen más exámenes  Lo pueden remitir con un especialista, melvin el cardiólogo o el gastroenterólogo  Ko en aggie libreta las preguntas que tenga para que no olvide hacerlas demond dewey citas médicas  ACUERDOS SOBRE SAHU CUIDADO:   Usted tiene el derecho de participar en la planificación de sahu cuidado  Aprenda todo lo que pueda sobre sahu condición y melvin darle tratamiento  Discuta con dewey médicos dewey opciones de tratamiento para juntos decidir el cuidado que usted quiere recibir  Usted siempre tiene el derecho a rechazar sahu tratamiento  Esta información es sólo para uso en educación  [place] intención no es darle un consejo médico sobre enfermedades o tratamientos  Colsulte con sahu Latrice Lesser farmacéutico antes de seguir cualquier régimen médico para saber si es seguro y efectivo para usted    © 2014 [address] is for End User's use only and may not be sold, redistributed or otherwise used for commercial purposes  All illustrations and images included in CareNotes® are the copyrighted property of A D A M , Inc  or Leo Ewing

## 2018-11-21 DIAGNOSIS — I10 BENIGN HYPERTENSION: ICD-10-CM

## 2018-11-21 LAB
ATRIAL RATE: 57 BPM
ATRIAL RATE: 70 BPM
P AXIS: 49 DEGREES
P AXIS: 53 DEGREES
PR INTERVAL: 178 MS
PR INTERVAL: 192 MS
QRS AXIS: 44 DEGREES
QRS AXIS: 54 DEGREES
QRSD INTERVAL: 70 MS
QRSD INTERVAL: 82 MS
QT INTERVAL: 384 MS
QT INTERVAL: 416 MS
QTC INTERVAL: 404 MS
QTC INTERVAL: 414 MS
T WAVE AXIS: 45 DEGREES
T WAVE AXIS: 51 DEGREES
VENTRICULAR RATE: 57 BPM
VENTRICULAR RATE: 70 BPM

## 2018-11-21 PROCEDURE — 93010 ELECTROCARDIOGRAM REPORT: CPT | Performed by: INTERNAL MEDICINE

## 2018-11-21 RX ORDER — LISINOPRIL 5 MG/1
5 TABLET ORAL DAILY
Qty: 90 TABLET | Refills: 0 | Status: SHIPPED | OUTPATIENT
Start: 2018-11-21 | End: 2019-02-18 | Stop reason: SDUPTHER

## 2018-12-24 ENCOUNTER — HOSPITAL ENCOUNTER (OUTPATIENT)
Dept: NON INVASIVE DIAGNOSTICS | Facility: CLINIC | Age: 58
Discharge: HOME | End: 2018-12-24

## 2018-12-24 ENCOUNTER — OFFICE VISIT (OUTPATIENT)
Dept: FAMILY MEDICINE CLINIC | Facility: CLINIC | Age: 58
End: 2018-12-24

## 2018-12-24 VITALS
TEMPERATURE: 97.2 F | BODY MASS INDEX: 25.55 KG/M2 | HEART RATE: 78 BPM | WEIGHT: 144.19 LBS | OXYGEN SATURATION: 97 % | HEIGHT: 63 IN | DIASTOLIC BLOOD PRESSURE: 70 MMHG | SYSTOLIC BLOOD PRESSURE: 116 MMHG | RESPIRATION RATE: 18 BRPM

## 2018-12-24 DIAGNOSIS — R49.0 HOARSENESS: Primary | ICD-10-CM

## 2018-12-24 DIAGNOSIS — I10 BENIGN ESSENTIAL HYPERTENSION: ICD-10-CM

## 2018-12-24 DIAGNOSIS — K08.89 PAIN, DENTAL: ICD-10-CM

## 2018-12-24 DIAGNOSIS — R49.0 HOARSENESS OF VOICE: ICD-10-CM

## 2018-12-24 DIAGNOSIS — R07.9 CHEST PAIN: ICD-10-CM

## 2018-12-24 DIAGNOSIS — R07.89 ATYPICAL CHEST PAIN: ICD-10-CM

## 2018-12-24 DIAGNOSIS — R68.84 JAW PAIN: ICD-10-CM

## 2018-12-24 PROBLEM — G44.229 CHRONIC TENSION HEADACHE: Status: ACTIVE | Noted: 2018-01-24

## 2018-12-24 PROCEDURE — 93018 CV STRESS TEST I&R ONLY: CPT | Performed by: INTERNAL MEDICINE

## 2018-12-24 PROCEDURE — 93016 CV STRESS TEST SUPVJ ONLY: CPT | Performed by: INTERNAL MEDICINE

## 2018-12-24 PROCEDURE — 93017 CV STRESS TEST TRACING ONLY: CPT

## 2018-12-24 PROCEDURE — 99214 OFFICE O/P EST MOD 30 MIN: CPT | Performed by: PHYSICIAN ASSISTANT

## 2018-12-24 NOTE — ASSESSMENT & PLAN NOTE
Discussed that headaches have many causes  He has no red flag symtpoms  Headaches are likely tension related  Recommend follow up if sytmpoms are becoming worse or getting more frequent headaches

## 2018-12-27 ENCOUNTER — TELEPHONE (OUTPATIENT)
Dept: FAMILY MEDICINE CLINIC | Facility: CLINIC | Age: 58
End: 2018-12-27

## 2018-12-27 NOTE — TELEPHONE ENCOUNTER
Called pt to let him know that he can buy otc tylenol, motrin or aleve and take as directed on bottle  Also can buy Anbesol per pica  Left message to call back

## 2018-12-27 NOTE — TELEPHONE ENCOUNTER
Pt called stating he has strong tooth pain for his lower front teet  Pt stated he called a dentist to schedule an appt but it is far out  Pt wants to know if there is any recommendations he can buy OTC to help alleviate the pain?

## 2018-12-28 LAB
CHEST PAIN STATEMENT: NORMAL
MAX DIASTOLIC BP: 82 MMHG
MAX HEART RATE: 173 BPM
MAX PREDICTED HEART RATE: 161 BPM
MAX. SYSTOLIC BP: 140 MMHG
PROTOCOL NAME: NORMAL
REASON FOR TERMINATION: NORMAL
TARGET HR FORMULA: NORMAL
TEST INDICATION: NORMAL
TIME IN EXERCISE PHASE: NORMAL

## 2019-01-07 ENCOUNTER — TELEPHONE (OUTPATIENT)
Dept: FAMILY MEDICINE CLINIC | Facility: CLINIC | Age: 59
End: 2019-01-07

## 2019-01-07 NOTE — TELEPHONE ENCOUNTER
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

## 2019-01-16 ENCOUNTER — APPOINTMENT (EMERGENCY)
Dept: CT IMAGING | Facility: HOSPITAL | Age: 59
End: 2019-01-16
Payer: COMMERCIAL

## 2019-01-16 ENCOUNTER — HOSPITAL ENCOUNTER (EMERGENCY)
Facility: HOSPITAL | Age: 59
Discharge: HOME | End: 2019-01-16
Attending: EMERGENCY MEDICINE | Admitting: EMERGENCY MEDICINE
Payer: COMMERCIAL

## 2019-01-16 VITALS
RESPIRATION RATE: 20 BRPM | HEART RATE: 60 BPM | TEMPERATURE: 99 F | HEIGHT: 63 IN | BODY MASS INDEX: 26.4 KG/M2 | OXYGEN SATURATION: 99 % | DIASTOLIC BLOOD PRESSURE: 83 MMHG | WEIGHT: 149 LBS | SYSTOLIC BLOOD PRESSURE: 133 MMHG

## 2019-01-16 DIAGNOSIS — I10 HYPERTENSION: ICD-10-CM

## 2019-01-16 DIAGNOSIS — R51.9 HEADACHE: Primary | ICD-10-CM

## 2019-01-16 LAB
ANION GAP SERPL CALCULATED.3IONS-SCNC: 7 MMOL/L (ref 4–13)
BACTERIA UR QL AUTO: ABNORMAL /HPF
BASOPHILS # BLD AUTO: 0.02 THOUSANDS/ΜL (ref 0–0.1)
BASOPHILS NFR BLD AUTO: 1 % (ref 0–1)
BILIRUB UR QL STRIP: NEGATIVE
BUN SERPL-MCNC: 22 MG/DL (ref 5–25)
CALCIUM SERPL-MCNC: 8.8 MG/DL (ref 8.3–10.1)
CHLORIDE SERPL-SCNC: 111 MMOL/L (ref 100–108)
CLARITY UR: CLEAR
CO2 SERPL-SCNC: 25 MMOL/L (ref 21–32)
COLOR UR: YELLOW
CREAT SERPL-MCNC: 1.25 MG/DL (ref 0.6–1.3)
EOSINOPHIL # BLD AUTO: 0.17 THOUSAND/ΜL (ref 0–0.61)
EOSINOPHIL NFR BLD AUTO: 5 % (ref 0–6)
ERYTHROCYTE [DISTWIDTH] IN BLOOD BY AUTOMATED COUNT: 14.6 % (ref 11.6–15.1)
GFR SERPL CREATININE-BSD FRML MDRD: 63 ML/MIN/1.73SQ M
GLUCOSE SERPL-MCNC: 86 MG/DL (ref 65–140)
GLUCOSE UR STRIP-MCNC: NEGATIVE MG/DL
HCT VFR BLD AUTO: 41.1 % (ref 36.5–49.3)
HGB BLD-MCNC: 13.5 G/DL (ref 12–17)
HGB UR QL STRIP.AUTO: ABNORMAL
IMM GRANULOCYTES # BLD AUTO: 0.01 THOUSAND/UL (ref 0–0.2)
IMM GRANULOCYTES NFR BLD AUTO: 0 % (ref 0–2)
KETONES UR STRIP-MCNC: NEGATIVE MG/DL
LEUKOCYTE ESTERASE UR QL STRIP: NEGATIVE
LYMPHOCYTES # BLD AUTO: 1.41 THOUSANDS/ΜL (ref 0.6–4.47)
LYMPHOCYTES NFR BLD AUTO: 39 % (ref 14–44)
MCH RBC QN AUTO: 30.2 PG (ref 26.8–34.3)
MCHC RBC AUTO-ENTMCNC: 32.8 G/DL (ref 31.4–37.4)
MCV RBC AUTO: 92 FL (ref 82–98)
MONOCYTES # BLD AUTO: 0.36 THOUSAND/ΜL (ref 0.17–1.22)
MONOCYTES NFR BLD AUTO: 10 % (ref 4–12)
NEUTROPHILS # BLD AUTO: 1.66 THOUSANDS/ΜL (ref 1.85–7.62)
NEUTS SEG NFR BLD AUTO: 45 % (ref 43–75)
NITRITE UR QL STRIP: NEGATIVE
NON-SQ EPI CELLS URNS QL MICRO: ABNORMAL /HPF
NRBC BLD AUTO-RTO: 0 /100 WBCS
PH UR STRIP.AUTO: 5.5 [PH] (ref 4.5–8)
PLATELET # BLD AUTO: 226 THOUSANDS/UL (ref 149–390)
PMV BLD AUTO: 10.6 FL (ref 8.9–12.7)
POTASSIUM SERPL-SCNC: 4 MMOL/L (ref 3.5–5.3)
PROT UR STRIP-MCNC: ABNORMAL MG/DL
RBC # BLD AUTO: 4.47 MILLION/UL (ref 3.88–5.62)
RBC #/AREA URNS AUTO: ABNORMAL /HPF
SODIUM SERPL-SCNC: 143 MMOL/L (ref 136–145)
SP GR UR STRIP.AUTO: 1.02 (ref 1–1.03)
UROBILINOGEN UR QL STRIP.AUTO: 0.2 E.U./DL
WBC # BLD AUTO: 3.63 THOUSAND/UL (ref 4.31–10.16)
WBC #/AREA URNS AUTO: ABNORMAL /HPF

## 2019-01-16 PROCEDURE — 36415 COLL VENOUS BLD VENIPUNCTURE: CPT | Performed by: EMERGENCY MEDICINE

## 2019-01-16 PROCEDURE — 85025 COMPLETE CBC W/AUTO DIFF WBC: CPT | Performed by: EMERGENCY MEDICINE

## 2019-01-16 PROCEDURE — 81001 URINALYSIS AUTO W/SCOPE: CPT

## 2019-01-16 PROCEDURE — 99284 EMERGENCY DEPT VISIT MOD MDM: CPT

## 2019-01-16 PROCEDURE — 80048 BASIC METABOLIC PNL TOTAL CA: CPT | Performed by: EMERGENCY MEDICINE

## 2019-01-16 PROCEDURE — 96374 THER/PROPH/DIAG INJ IV PUSH: CPT

## 2019-01-16 PROCEDURE — 70450 CT HEAD/BRAIN W/O DYE: CPT

## 2019-01-16 RX ORDER — KETOROLAC TROMETHAMINE 30 MG/ML
15 INJECTION, SOLUTION INTRAMUSCULAR; INTRAVENOUS ONCE
Status: COMPLETED | OUTPATIENT
Start: 2019-01-16 | End: 2019-01-16

## 2019-01-16 RX ORDER — ACETAMINOPHEN 325 MG/1
650 TABLET ORAL ONCE
Status: COMPLETED | OUTPATIENT
Start: 2019-01-16 | End: 2019-01-16

## 2019-01-16 RX ADMIN — KETOROLAC TROMETHAMINE 15 MG: 30 INJECTION, SOLUTION INTRAMUSCULAR at 13:19

## 2019-01-16 RX ADMIN — ACETAMINOPHEN 650 MG: 325 TABLET, FILM COATED ORAL at 13:18

## 2019-01-16 NOTE — ED PROVIDER NOTES
History  Chief Complaint   Patient presents with    Headache     Pt reports has a frontal headache today feels as though his BP is elevated but did not check it at home, has been taking meds as prescibed  denies weakness/numbness       History provided by:  Patient   used: No    Medical Problem - Major   Location:  Frontal headache and hypertension  Severity:  Moderate  Onset quality:  Sudden  Duration:  1 day  Timing:  Constant  Progression:  Unchanged  Chronicity:  New  Context:  No prior history of headaches  No photophobia  No fevers or chills  No neck pain  No weakness  No numbness  No speech or vision trouble  Relieved by:  Nothing  Worsened by:  Nothing  Ineffective treatments:  None tried  Associated symptoms: headaches    Associated symptoms: no abdominal pain, no chest pain, no congestion, no cough, no diarrhea, no fever, no nausea, no shortness of breath, no sore throat and no vomiting        Prior to Admission Medications   Prescriptions Last Dose Informant Patient Reported?  Taking?   lisinopril (ZESTRIL) 5 mg tablet   No Yes   Sig: Take 1 tablet (5 mg total) by mouth daily      Facility-Administered Medications: None       Past Medical History:   Diagnosis Date    History of chronic constipation     History of neck pain 11/21/2017    Hypertension        Past Surgical History:   Procedure Laterality Date    CYSTOSCOPY  05/29/2014    diagnostic managed by Jalen Mora    TOOTH EXTRACTION         Family History   Problem Relation Age of Onset    Cancer Sister         malignant neoplasm of breast    Diabetes Paternal Grandmother     Diabetes Family     Arthritis Family     Cancer Family         malignant neoplasm    Substance Abuse Neg Hx         denied Mother and Father    Mental illness Neg Hx         no family history Mother and Father    Other Neg Hx         denied family history of Osteopertrosis     I have reviewed and agree with the history as documented  Social History   Substance Use Topics    Smoking status: Former Smoker     Types: Cigarettes    Smokeless tobacco: Never Used    Alcohol use Yes      Comment: weekly        Review of Systems   Constitutional: Negative for chills and fever  HENT: Negative for congestion and sore throat  Respiratory: Negative for cough, chest tightness and shortness of breath  Cardiovascular: Negative for chest pain  Gastrointestinal: Negative for abdominal pain, diarrhea, nausea and vomiting  Genitourinary: Negative for dysuria  Musculoskeletal: Negative for neck pain  Neurological: Positive for headaches  Negative for dizziness, facial asymmetry, speech difficulty, weakness, light-headedness and numbness  All other systems reviewed and are negative  Physical Exam  Physical Exam   Constitutional: He appears well-developed and well-nourished  He is cooperative  Non-toxic appearance  He does not have a sickly appearance  He does not appear ill  No distress  HENT:   Head: Normocephalic and atraumatic  Right Ear: Hearing normal  No drainage or swelling  Left Ear: Hearing normal  No drainage or swelling  Mouth/Throat: Uvula is midline, oropharynx is clear and moist and mucous membranes are normal  No oropharyngeal exudate  Eyes: Pupils are equal, round, and reactive to light  Conjunctivae, EOM and lids are normal  Right eye exhibits no discharge  Left eye exhibits no discharge  Neck: Trachea normal and normal range of motion  Neck supple  No JVD present  Cardiovascular: Normal rate, regular rhythm, normal heart sounds, intact distal pulses and normal pulses  Exam reveals no gallop and no friction rub  No murmur heard  Pulmonary/Chest: Effort normal and breath sounds normal  No stridor  No respiratory distress  He has no wheezes  He has no rales  Abdominal: Soft  Normal appearance  He exhibits no ascites and no mass  There is no hepatosplenomegaly  There is no tenderness   There is no rebound, no guarding and no CVA tenderness  Musculoskeletal: Normal range of motion  He exhibits no edema  Lymphadenopathy:     He has no cervical adenopathy  Right: No inguinal adenopathy present  Left: No inguinal adenopathy present  Neurological: He is alert  He has normal strength  No cranial nerve deficit or sensory deficit  He exhibits normal muscle tone  Coordination and gait normal  GCS eye subscore is 4  GCS verbal subscore is 5  GCS motor subscore is 6  Skin: Skin is warm, dry and intact  No rash noted  He is not diaphoretic  No pallor  Psychiatric: He has a normal mood and affect  His speech is normal  Cognition and memory are normal    Nursing note and vitals reviewed        Vital Signs  ED Triage Vitals   Temperature Pulse Respirations Blood Pressure SpO2   01/16/19 1126 01/16/19 1126 01/16/19 1126 01/16/19 1126 01/16/19 1126   99 °F (37 2 °C) 67 18 145/83 99 %      Temp Source Heart Rate Source Patient Position - Orthostatic VS BP Location FiO2 (%)   01/16/19 1126 01/16/19 1126 01/16/19 1326 01/16/19 1126 --   Temporal Monitor Lying Right arm       Pain Score       01/16/19 1126       2           Vitals:    01/16/19 1126 01/16/19 1326   BP: 145/83 133/83   Pulse: 67 60   Patient Position - Orthostatic VS:  Lying       Visual Acuity      ED Medications  Medications   ketorolac (TORADOL) injection 15 mg (15 mg Intravenous Given 1/16/19 1319)   acetaminophen (TYLENOL) tablet 650 mg (650 mg Oral Given 1/16/19 1318)       Diagnostic Studies  Results Reviewed     Procedure Component Value Units Date/Time    Urine Microscopic [[id number]]  (Abnormal) Collected:  01/16/19 1341    Lab Status:  Final result Specimen:  Urine from Urine, Clean Catch Updated:  01/16/19 1403     RBC, UA 2-4 (A) /hpf      WBC, UA 2-4 (A) /hpf      Epithelial Cells Occasional /hpf      Bacteria, UA None Seen /hpf     Basic metabolic panel [[id number]]  (Abnormal) Collected:  01/16/19 1318    Lab Status: Final result Specimen:  Blood from Arm, Right Updated:  01/16/19 1342     Sodium 143 mmol/L      Potassium 4 0 mmol/L      Chloride 111 (H) mmol/L      CO2 25 mmol/L      ANION GAP 7 mmol/L      BUN 22 mg/dL      Creatinine 1 25 mg/dL      Glucose 86 mg/dL      Calcium 8 8 mg/dL      eGFR 63 ml/min/1 73sq m     Narrative:         National Kidney Disease Education Program recommendations are as follows:  GFR calculation is accurate only with a steady state creatinine  Chronic Kidney disease less than 60 ml/min/1 73 sq  meters  Kidney failure less than 15 ml/min/1 73 sq  meters      CBC and differential [[id number]]  (Abnormal) Collected:  01/16/19 1318    Lab Status:  Final result Specimen:  Blood from Arm, Right Updated:  01/16/19 1327     WBC 3 63 (L) Thousand/uL      RBC 4 47 Million/uL      Hemoglobin 13 5 g/dL      Hematocrit 41 1 %      MCV 92 fL      MCH 30 2 pg      MCHC 32 8 g/dL      RDW 14 6 %      MPV 10 6 fL      Platelets 753 Thousands/uL      nRBC 0 /100 WBCs      Neutrophils Relative 45 %      Immat GRANS % 0 %      Lymphocytes Relative 39 %      Monocytes Relative 10 %      Eosinophils Relative 5 %      Basophils Relative 1 %      Neutrophils Absolute 1 66 (L) Thousands/µL      Immature Grans Absolute 0 01 Thousand/uL      Lymphocytes Absolute 1 41 Thousands/µL      Monocytes Absolute 0 36 Thousand/µL      Eosinophils Absolute 0 17 Thousand/µL      Basophils Absolute 0 02 Thousands/µL     POCT urinalysis dipstick [[id number]]  (Abnormal) Resulted:  01/16/19 1325    Lab Status:  Final result Specimen:  Urine Updated:  01/16/19 1325    ED Urine Macroscopic [[id number]]  (Abnormal) Collected:  01/16/19 1341    Lab Status:  Final result Specimen:  Urine Updated:  01/16/19 1324     Color, UA Yellow     Clarity, UA Clear     pH, UA 5 5     Leukocytes, UA Negative     Nitrite, UA Negative     Protein,  (2+) (A) mg/dl      Glucose, UA Negative mg/dl      Ketones, UA Negative mg/dl      Urobilinogen, UA 0 2 E U /dl      Bilirubin, UA Negative     Blood, UA Trace (A)     Specific [place], UA 1 025    Narrative:       CLINITEK RESULT                 CT head without contrast   Final Result by Jeannie Olmstead MD (01/16 2310)      No acute intracranial abnormality  Workstation performed: [id number]                    Procedures  Procedures       Phone Contacts  ED Phone Contact    ED Course                               MDM  Number of Diagnoses or Management Options  Headache:   Hypertension:   Diagnosis management comments: Has some hypertension  No signs of end-organ damage  Neuro exam normal   CT head negative  Follow up with primary care  Amount and/or Complexity of Data Reviewed  Clinical lab tests: reviewed and ordered  Tests in the radiology section of CPT®: ordered and reviewed    Patient Progress  Patient progress: stable    CritCare Time    Disposition  Final diagnoses:   Headache   Hypertension     Time reflects when diagnosis was documented in both MDM as applicable and the Disposition within this note     Time User Action Codes Description Comment    1/16/2019  3:03 PM Jazmin Marino Add [R51] Headache     1/16/2019  3:03 PM Jazmin Marino Add [I10] Hypertension       ED Disposition     ED Disposition Condition Comment    Discharge  Guadalupe Lee discharge to home/self care      Condition at discharge: Good        Follow-up Information     Follow up With Specialties Details Why Contact Info    Torsten Scott PA-C Family Medicine, Physician Assistant Schedule an appointment as soon as possible for a visit in 2 days If symptoms worsen [address] [place]  [address] [facility] [address]  [phone]            Discharge Medication List as of 1/16/2019  3:03 PM      CONTINUE these medications which have NOT CHANGED    Details   lisinopril (ZESTRIL) 5 mg tablet Take 1 tablet (5 mg total) by mouth daily, Starting Wed 11/21/2018, Print           No discharge procedures on file     ED Provider  Electronically Signed by           Margaret Fisher MD  01/16/19 3072

## 2019-01-16 NOTE — DISCHARGE INSTRUCTIONS
Dolor de denis sukhdeep   CUIDADO AMBULATORIO:   Un dolor de denis sukhdeep  es un dolor o molestia que comienza de repente y empeora rápidamente  Usted puede tener un dolor de denis sukhdeep sólo cuando siente estrés o come ciertos alimentos  Otro tipo dolor de denis sukhdeep puede producirse todos los días y a veces varias veces al día  La causa de un dolor de denis sukhdeep puede no conocerse  Es probable que sea provocado por el estrés, fatiga, hormonas, alimentos o trauma  Tipos comunes de dolor de denis sukhdeep:   · El dolor de denis tensional  es el tipo de dolor de denis más común  Normalmente aparece por la tarde y se va al atardecer  El dolor generalmente es leve o moderado  La larry brillante o el ruido lalo podrían estorbarle  Generalmente el dolor se encuentra a través de la frente o en la parte posterior de la denis y con frecuencia sólo en un lado  Stacy dolor de denis podría ocurrir todos los días  · Las migrañas  provocan dolor de moderado a intenso  El dolor de denis dura de 1 a 3 días generalmente y [employer] es recurrente  El dolor tiende a estar sólo en un lado damian puede cambiar de [place]  La migraña se localiza en la sien o en la parte posterior de la denis o del hortensia  El dolor podría pulsar o estar sukhdeep y continuo  · Aggie migraña con aura  significa que usted ve o siente algo antes de [place]  Podría gregg aggie pequeña smooth rodeada de líneas brillantes en zigzag  Otros signos o síntomas podrían seguir al aura  · El dolor de denis en racimo  se siente solamente en un lado  A menudo causa dolor severo y puede durar de 30 minutos a 2 horas  Stacy dolor de denis podría ocurrir 1 vez o 2 veces al día, a menudo a la noche  El dolor puede despertarlo  Busque atención médica de inmediato si:   · Usted tiene dolor intenso  · Usted tiene entumecimiento en un lado de harrison abdirashid o cuerpo  · Usted tiene un dolor de denis que ocurre después de un golpe en la denis, aggie caída u otro trauma  · Tiene dolor de [place], está olvidadizo o confundido o tiene dificultad para hablar  · Tiene dolor de [place], rigidez en el leonel y [place]  Pregúntele a harrison Clovia Green vitaminas y minerales son adecuados para usted  · Usted tiene un dolor de denis robert y está vomitando  · Usted tiene dolor de denis todos los días y no se [place] aun después de tratarlo  · Berenice fe de [place] u ocurren nuevos síntomas cuando tiene dolor de [place]  · Usted tiene preguntas o inquietudes acerca de harrison condición o cuidado  Tratamiento:   · Medicamento  se pueden administrar para disminuir el dolor  El medicamento que recomienda harrison médico dependerá del tipo de dolor de denis que tenga  Usted necesitará beatriz medicamentos para el dolor de denis según las indicaciones para evitar un problema llamado dolor de denis de rebote  Estos fe de [place] ocurren con el uso regular de analgésicos para los trastornos de dolor de [place]  Los AINEs o el acetaminofeno pueden ayudar a aliviar algunos tipos de fe de [place]  · La bioretroalimentación  puede ayudar a aprender a [employer] al estrés  Por ejemplo, usted aprende a disminuir harrison latido cardíaco cuando se molesta  Puede que también aprenda cómo prevenir ciertos fe de denis al combinar el calor con el relajamiento  · La terapia cognitivo conductual,  o el manejo del estrés pueden ser utilizadas junto con otras terapias para prevenir fe de [place]  El manejo de harrison síntomas:   · Aplique hielo o calor  en la sharonda donde harrison hijo siente el dolor de denis  Utilice un paquete (compresa) de hielo o calor  Para un paquete de hielo, también puede colocar hielo molido en aggie bolsa plástica  Cubra el paquete de hielo o la bolsa con aggie toalla pequeña antes de aplicarla en la piel  Tanto el hielo melvin el calor ayudan a reducir el dolor, y el calor también contribuye a reducir los [employer]   Aplique calor demond 20 a 30 minutos cada 2 horas  Aplique hielo demond 15 a 20 minutos cada hora  Aplique calor o hielo demond el tiempo y la cantidad de días que se le indique  Usted puede alternar el calor y el hielo  · Relaje dewey músculos  Acuéstese en aggie posición cómoda y cierre dewey ojos  Relaje dewey músculos lentamente  Comience por los dedos de los pies y avance hacia arriba al chiara de harrison cuerpo  · Registre en un diario dewey fe de [place]  Escriba cuándo comienzan y terminan dewey migrañas  Maranda Christianity y qué estaba haciendo cuando comenzó la migraña  Registre lo que comió y lo que tomó las 24 horas previas al comienzo de harrison migraña  Charlotta Liana dolor y dónde le duele: Lleve un registro de lo que hizo para tratar harrison Geraline Rounds y si obtuvo un resultado satisfactorio  Cómo prevenir un dolor de denis sukhdeep:   · Evite cualquier cosa que provoque un dolor de denis sukhdeep  Los ejemplos incluyen la exposición a sustancias químicas, las grandes altitudes o no dormir lo suficiente  Tova aggie rutina para dormir  Acuéstese y [employer] días a la misma hora  No utilice aparatos electrónicos antes de acostarse  Pueden provocarle un dolor de denis o impedirle dormir ekaterina  · No fume  La nicotina y otras sustancias químicas en los cigarrillos y puros pueden desencadenar un dolor de denis sukhdeep o [place]  Pida información a harrison médico si usted actualmente fuma y necesita ayuda para dejar de fumar  Los cigarrillos electrónicos o tabaco sin humo todavía contienen nicotina  Consulte con harrison médico antes de [employer]  · Limite el consumo de alcohol según le indicaron  El alcohol puede provocar un dolor de denis sukhdeep o empeorarlo  Si usted tiene fe de [place] de racimo, no aniya alcohol demond un episodio  Para otros tipos de fe de [place], pregúntele a harrison proveedor de atención médica si es seguro para usted beber alcohol   Pregunte cuál es la cantidad nielsen que puede beber y con [place] frecuencia  · Ejercítese según indicaciones  El ejercicio puede reducir la tensión y [place] a aliviar el dolor de [place]  Propóngase hacer 30 minutos de [place] física rick todos los días de la [place]  Harrison médico puede ayudarle a crear un plan de ejercicios  · Consuma alimentos saludables y variados  [address] frutas, verduras, productos lácteos bajos en grasa, chaparro [place], pescado y frijoles cocidos  Harrison médico o dietista puede ayudarle a crear planes de comidas si desea evitar los alimentos que provocan fe de [place]  Acuda a dewey consultas de control con harrison médico según le indicaron  Traiga harrison registro de fe de denis con usted cuando visite a harrison médico  Anote dewey preguntas para que se acuerde de hacerlas demond dewey visitas  © 2017 [facility] [facility] Information is for End User's use only and may not be sold, redistributed or otherwise used for commercial purposes  All illustrations and images included in CareNotes® are the copyrighted property of A D A M , Inc  or [address]  Esta información es sólo para uso en educación  Harrison intención no es darle un consejo médico sobre enfermedades o tratamientos  Colsulte con harrison Mary Sh[place] farmacéutico antes de seguir cualquier régimen médico para saber si es seguro y efectivo para usted  Hipertensión crónica, Cuidados ambulatorios   INFORMACIÓN GENERAL:   La hipertensión crónica  es aggie condición a tiarra plazo en la cual harrison presión arterial es más bj de lo normal  La presión arterial es la fuerza que ejerce la demetra contra las burgos de las arterias  La hipertensión es aggie presión arterial de 140/90 o mucho más elevada     Los siguientes son los síntomas más comunes:   · Dolor de denis     · Visión borrosa     · Dolor en el pecho     · [employer] o debilidad    · Dificultad para respirar     · Sangrados nasales  Busque atención médica inmediata al presentar los siguientes síntomas: · Abigail dolor de denis o pérdida de la visión    · Debilidad en un brazo o aggie pierna     · Confusión o tiene dificultad para hablar claramente    · Siente aggie molestia en harrison pecho que parece melvin si lo estuvieran apretando, aggie presión, aggie pesadez o dolor     · De repente se siente mareado o tiene dificultad para respirar    · Dolor o incomodidad en harrison espalda, leonel, mandíbula, estómago o brazo  El tratamiento para la hipertensión crónica  puede incluir un medicamento para baja la presión arterial  También necesitará hacer un cambio en harrison estilo de mora  Se debe beatriz dewey medicamentos exactamente melvin se lo indicaron  Controle la hipertensión crónica:   · Tómese la presión arterial en harrison casa  Siéntese y descanse por 5 minutos antes de tomarse la presión arterial  Extienda harrison brazo y apóyelo en aggie superficie plana  Harrison brazo debe estar a la misma altura que harrison corazón  Siga las instrucciones que vienen con el monitor para la presión arterial o tensiómetro  Si es posible tome por lo menos 2 lecturas de la presión cada vez  Tómese la presión arterial por lo [employer] al día a la misma hora todos los días, por ejemplo aggie en la mañana y la otra en la noche  Mantenga un registro de dewey lecturas de harrison presión arterial y llévelo consigo a dewey consultas de control  · Consuma menos sodio  No le añada sodio a los alimentos  Limete el consumo de alimentos que contienen un alto nivel de sodio, melvin los alimentos [place], papitas saladas de [employer] force, y chaparro para sandwich  Harrison proveedor de cecy puede recomendarle que siga el régimen de alimentación denominada enfoque dietético para disminuir la hipertensión (o DASH, por dewey siglas en inglés)  El régimen es bajo en sodio, grasas saturadas y las grasas en total  Es bj en potasio, calcio y [place]  · Max General regular  Aggie actividad física que sobrepase los 30 minutos al día rick todos los días de la [place]   [place] ayudará a bajar harrison presión arterial  Solicítele a sahu proveedor de [employer] recomiende el plan de actividad física que se ajuste a sahu situación personal      · Limite el consumo de bebidas alcohólicas  Las mujeres deberían limitar sahu consumo de alcohol a 1 trago por día  Los hombres deberían limitar sahu consumo de alcohol a 2 tragos por día  Se considera un trago de alcohol 12 onzas (350 ml) de cerveza, 5 onzas (150 ml) de vino o 1 ½ onza (45 ml) de licor  · No fume  Si usted fuma, nunca es tarde para dejar de fumar  El tabaco puede aumentar sahu presión arterial  El tabaquismo también puede empeorar otras afecciones de cecy que usted padezca las cuales pueden aumentar sahu riesgo de presentar hipertensión  Solicite a sahu proveedor de [employer] información si requiere ayuda para dejar de fumar  Glenice Friday a sahu najma de control con sahu proveedor de [employer] se lo indicaron:  Usted necesitará regresar para que le revisen sahu presión arterial y para que le ordenen otros exámenes de laboratorio  Escriba las preguntas que tenga para que recuerde hacerlas demond dewey consultas médicas  ACUERDOS SOBRE SAHU CUIDADO:   Usted tiene el derecho de participar en la planificación de sahu cuidado  Aprenda todo lo que pueda sobre sahu condición y melvin darle tratamiento  Discuta con dewey médicos dewey opciones de tratamiento para juntos decidir el cuidado que usted quiere recibir  Usted siempre tiene el derecho a rechazar sahu tratamiento  Esta información es sólo para uso en educación  Sahu intención no es darle un consejo médico sobre enfermedades o tratamientos  Colsulte con sahu Danie Lauth farmacéutico antes de seguir cualquier régimen médico para saber si es seguro y efectivo para usted  © 2014 [address] Karla Cho is for End User's use only and may not be sold, redistributed or otherwise used for commercial purposes   All illustrations and images included in CareNotes® are the copyrighted property of A D A M , Inc  or [employer]

## 2019-01-21 ENCOUNTER — LAB (OUTPATIENT)
Dept: LAB | Facility: CLINIC | Age: 59
End: 2019-01-21
Payer: COMMERCIAL

## 2019-01-21 ENCOUNTER — OFFICE VISIT (OUTPATIENT)
Dept: FAMILY MEDICINE CLINIC | Facility: CLINIC | Age: 59
End: 2019-01-21

## 2019-01-21 ENCOUNTER — TRANSCRIBE ORDERS (OUTPATIENT)
Dept: LAB | Facility: CLINIC | Age: 59
End: 2019-01-21

## 2019-01-21 VITALS
HEART RATE: 74 BPM | DIASTOLIC BLOOD PRESSURE: 74 MMHG | SYSTOLIC BLOOD PRESSURE: 126 MMHG | HEIGHT: 63 IN | OXYGEN SATURATION: 98 % | BODY MASS INDEX: 26.75 KG/M2 | WEIGHT: 151 LBS | TEMPERATURE: 97.2 F | RESPIRATION RATE: 18 BRPM

## 2019-01-21 DIAGNOSIS — R10.31 RIGHT LOWER QUADRANT ABDOMINAL PAIN: Primary | ICD-10-CM

## 2019-01-21 DIAGNOSIS — R10.31 RIGHT LOWER QUADRANT ABDOMINAL PAIN: ICD-10-CM

## 2019-01-21 LAB
ALBUMIN SERPL BCP-MCNC: 3.6 G/DL (ref 3.5–5)
ALP SERPL-CCNC: 96 U/L (ref 46–116)
ALT SERPL W P-5'-P-CCNC: 36 U/L (ref 12–78)
AST SERPL W P-5'-P-CCNC: 29 U/L (ref 5–45)
BILIRUB DIRECT SERPL-MCNC: 0.15 MG/DL (ref 0–0.2)
BILIRUB SERPL-MCNC: 0.56 MG/DL (ref 0.2–1)
PROT SERPL-MCNC: 7.4 G/DL (ref 6.4–8.2)

## 2019-01-21 PROCEDURE — 80076 HEPATIC FUNCTION PANEL: CPT

## 2019-01-21 PROCEDURE — 99214 OFFICE O/P EST MOD 30 MIN: CPT | Performed by: PHYSICIAN ASSISTANT

## 2019-01-21 PROCEDURE — 36415 COLL VENOUS BLD VENIPUNCTURE: CPT

## 2019-01-21 NOTE — PROGRESS NOTES
Assessment/Plan:    No problem-specific Assessment & Plan notes found for this encounter  Problem List Items Addressed This Visit     None      Visit Diagnoses     Right lower quadrant abdominal pain    -  Primary    Relevant Orders    US abdomen limited    Hepatic function panel         Subjective:      Patient ID: Syed Martinez is a 60 y o  male  HPI 61 y/o M here for right lower quadrant pain x 2 months  He has had no constipation, diarrhea, changes or difficulty in urination  He did see urology in October and had ultrasound of testicles and cystoscopy which both were normal  Pain sometimes is in right upper quadrant as well and he is concerned for his liver  He has not had any acid reflux or bloating  He sometimes has mild pain in testicles  Food does not impact pain  The following portions of the patient's history were reviewed and updated as appropriate:   He  has a past medical history of History of chronic constipation; History of neck pain (11/21/2017); and Hypertension  He   Patient Active Problem List    Diagnosis Date Noted    Right-sided low back pain with right-sided sciatica 06/28/2018    Hoarseness of voice 05/17/2018    Acute pain of both knees 01/24/2018    Chronic tension headache 01/24/2018    Left shoulder pain 06/07/2017    Benign colon polyp 03/24/2017    Enlarged prostate without lower urinary tract symptoms (luts) 07/18/2016    Varicocele 01/12/2016    Mild vitamin D deficiency 06/10/2015    Testicle pain 07/02/2014    Impingement syndrome, shoulder, left 05/16/2014    Pain in joint of right hip 02/04/2014    Microscopic hematuria 11/19/2013    Renal calculus, right 11/19/2013    Atypical chest pain 06/19/2013    Benign essential hypertension 10/10/2012    Abnormal glucose 10/10/2012     He  has a past surgical history that includes Cystoscopy (05/29/2014) and Tooth extraction  His family history includes Arthritis in his family;  Cancer in his family and sister; Diabetes in his family and paternal grandmother  He  reports that he has quit smoking  His smoking use included Cigarettes  He has never used smokeless tobacco  He reports that he drinks alcohol  He reports that he does not use drugs  Current Outpatient Prescriptions   Medication Sig Dispense Refill    lisinopril (ZESTRIL) 5 mg tablet Take 1 tablet (5 mg total) by mouth daily 90 tablet 0     No current facility-administered medications for this visit  Current Outpatient Prescriptions on File Prior to Visit   Medication Sig    lisinopril (ZESTRIL) 5 mg tablet Take 1 tablet (5 mg total) by mouth daily     No current facility-administered medications on file prior to visit  He has No Known Allergies       Review of Systems   Constitutional: Negative for activity change, appetite change, fatigue, fever and unexpected weight change  HENT: Positive for voice change  Negative for dental problem, ear pain, hearing loss and sore throat  Eyes: Negative for visual disturbance  Respiratory: Negative for cough and wheezing  Cardiovascular: Negative for chest pain and leg swelling  Gastrointestinal: Positive for abdominal pain  Negative for constipation, diarrhea, nausea and vomiting  Genitourinary: Negative for difficulty urinating, dysuria, flank pain, frequency and testicular pain  Musculoskeletal: Positive for back pain  Negative for arthralgias and myalgias  Skin: Negative for rash  Neurological: Negative for dizziness and headaches (sometimes)  Psychiatric/Behavioral: Negative for behavioral problems  Objective:      /74 (BP Location: Right arm, Patient Position: Sitting, Cuff Size: Standard)   Pulse 74   Temp (!) 97 2 °F (36 2 °C) (Tympanic)   Resp 18   Ht 5' 3" (1 6 m)   Wt 68 5 kg (151 lb)   SpO2 98%   BMI 26 75 kg/m²          Physical Exam   Constitutional: He is oriented to person, place, and time  He appears well-developed and well-nourished     HENT: Head: Normocephalic and atraumatic  Eyes: Conjunctivae are normal    Cardiovascular: Normal rate, regular rhythm and intact distal pulses  Pulmonary/Chest: Effort normal and breath sounds normal  No respiratory distress  He has no wheezes  He has no rales  Abdominal: Soft  Bowel sounds are normal  He exhibits no distension and no mass  There is tenderness (mild tenderness right lower quadrant)  There is no rebound and no guarding  Neurological: He is alert and oriented to person, place, and time  Psychiatric: He has a normal mood and affect  His behavior is normal    Nursing note and vitals reviewed

## 2019-01-24 ENCOUNTER — HOSPITAL ENCOUNTER (OUTPATIENT)
Dept: ULTRASOUND IMAGING | Facility: HOSPITAL | Age: 59
Discharge: HOME | End: 2019-01-24
Payer: COMMERCIAL

## 2019-01-24 DIAGNOSIS — R10.31 RIGHT LOWER QUADRANT ABDOMINAL PAIN: ICD-10-CM

## 2019-01-24 PROCEDURE — 76705 ECHO EXAM OF ABDOMEN: CPT

## 2019-01-24 NOTE — PROGRESS NOTES
Ultrasound came back negative  It is possible that the pain could be related to a pulled muscle in that area  I recommend doing PRN tylenol for pain   Muscle strains can take up to 6 weeks to heal also

## 2019-02-18 DIAGNOSIS — I10 BENIGN HYPERTENSION: ICD-10-CM

## 2019-02-18 RX ORDER — LISINOPRIL 5 MG/1
TABLET ORAL
Qty: 90 TABLET | Refills: 1 | Status: SHIPPED | OUTPATIENT
Start: 2019-02-18 | End: 2019-08-20 | Stop reason: SDUPTHER

## 2019-03-15 ENCOUNTER — CONSULT (OUTPATIENT)
Dept: MULTI SPECIALTY CLINIC | Facility: CLINIC | Age: 59
End: 2019-03-15

## 2019-03-15 VITALS
SYSTOLIC BLOOD PRESSURE: 114 MMHG | DIASTOLIC BLOOD PRESSURE: 80 MMHG | HEIGHT: 63 IN | WEIGHT: 149.47 LBS | BODY MASS INDEX: 26.48 KG/M2

## 2019-03-15 DIAGNOSIS — R49.0 HOARSENESS: ICD-10-CM

## 2019-03-15 DIAGNOSIS — R49.0 MUSCLE TENSION DYSPHONIA: Primary | ICD-10-CM

## 2019-03-15 PROCEDURE — 99243 OFF/OP CNSLTJ NEW/EST LOW 30: CPT | Performed by: OTOLARYNGOLOGY

## 2019-03-15 NOTE — PROGRESS NOTES
Specialty Physician [facility]  [address] Otolaryngology      Otolaryngology -- Head and Neck Surgery New Patient Visit    Kandis Mccartney is a 60 y o  who presents with a chief complaint of hoarseness    Pertinent elements of the history include:  He presents with hoarseness for the past 5 months  Denies throat pain  Denies respiratory difficulty, dysphagia, hemoptysis  He is a former smoker, quit 2013 -- 1/3 ppd  Some dyspnea on exertion  Hoarseness continuous throughout the day  Denies weight loss  Energy level is mildly diminished  Denies head and neck surgery  Review of systems 10 point review of systems reviewed, as documented on a separate form  Results reviewed; images from any scan have been personally reviewed: The past medical, surgical, social and family history have been reviewed as documented in today's record  Physical exam: (abnormal findings appear in bold and supercede any conflicting normal findings listed below)    /80 (BP Location: Left arm, Patient Position: Sitting, Cuff Size: Adult)   Ht 5' 3" (1 6 m)   Wt 67 8 kg (149 lb 7 6 oz)   BMI 26 48 kg/m²     Constitutional:  Well developed, well nourished and groomed, in no acute distress  Eyes:  Extra-ocular movements intact, pupils equally round and reactive to light and accommodation, the lids and conjunctivae are normal in appearance  Head: Atraumatic, normocephalic, no visible scalp lesions, bony palpation unremarkable without stepoffs, parotid and submandibular salivary glands non-tender to palpation and without masses bilaterally  Ears:  Auricles normal in appearance bilaterally, mastoid prominence non-tender, external auditory canals clear bilaterally, tympanic membranes intact bilaterally without evidence of middle ear effusion or masses, normal appearing ossicles       Nose/Sinuses:  External appearance unremarkable, no maxillary or frontal sinus tenderness to palpation bilaterally  Anterior rhinoscopy reveals: normal mucosa     Oral Cavity:  Moist mucus membranes, gums and dentition unremarkable, no oral mucosal masses or lesions, floor of mouth soft, tongue mobile without masses or lesions  Oropharynx:  Base of tongue soft and without masses, tonsils bilaterally unremarkable, soft palate mucosa unremarkable  Neck:  No visible or palpable cervical lesions or lymphadenopathy, thyroid gland is normal in size and symmetry and without masses, normal laryngeal elevation with swallowing  Cardiovascular:  Normal rate and rhythm, no palpable thrills, no jugulovenous distension observed  Respiratory:  Normal respiratory effort without evidence of retractions or use of accessory muscles  Integument:  Normal appearing without observed masses or lesions  Neurologic:  Cranial nerves II-XII intact bilaterally  Psychiatric:  Alert and oriented to time, place and person, normal affect  Procedures  Flexible nasopharyngolaryngoscopy was performed after nasal topicalization with lidocaine and oxymetazoline  Findings demonstrate:    Nasal cavity:  Clear, no masses, exudates or polyps  Nasopharynx:  Clear, fossae of Rosenmuller clear bilaterally  Base of tongue:  Clear  Vallecula:   Empty  Epiglottis:  Crisp  Arytenoids/folds: Mild edema  Interaryntenoid: Mild edema  Postcricoid:  Mild edema  Glottis:  Normal vocal fold motion, no laryngeal masses; some accessory muscle use/supraglottic squeeze with phonation  Piriform sinuses: Clear      Assessment:   1  Muscle tension dysphonia  Ambulatory referral to Speech Therapy   2   Hoarseness  Ambulatory Referral to Otolaryngology       Orders  Orders Placed This Encounter   Procedures    Ambulatory referral to Speech Therapy     Standing Status:   Future     Standing Expiration Date:   9/15/2019     Referral Priority:   Routine     Referral Type:   Speech Pathology     Referral Reason:   Specialty Services Required     Requested Specialty:   Speech Pathology     Number of Visits Requested:   1     Expiration Date:   3/15/2020         Discussion/Plan:    1  On endoscopy I see no evidence of malignancy  There is mild mucosal inflammation suggestive of reflux related change but not sufficient to explain his degree of hoarseness on exam   Some vocal strain noted  I recommended referral to speech therapy  Follow-up again after completion of speech therapy if unimproved  Thank you for allowing me to participate in the care of your patient

## 2019-03-26 ENCOUNTER — OFFICE VISIT (OUTPATIENT)
Dept: URGENT CARE | Facility: MEDICAL CENTER | Age: 59
End: 2019-03-26
Payer: COMMERCIAL

## 2019-03-26 VITALS
RESPIRATION RATE: 16 BRPM | OXYGEN SATURATION: 97 % | HEIGHT: 63 IN | DIASTOLIC BLOOD PRESSURE: 76 MMHG | BODY MASS INDEX: 25.69 KG/M2 | SYSTOLIC BLOOD PRESSURE: 120 MMHG | HEART RATE: 74 BPM | WEIGHT: 145 LBS | TEMPERATURE: 97.5 F

## 2019-03-26 DIAGNOSIS — J06.9 ACUTE URI: Primary | ICD-10-CM

## 2019-03-26 DIAGNOSIS — J02.9 SORE THROAT: ICD-10-CM

## 2019-03-26 LAB — S PYO AG THROAT QL: NEGATIVE

## 2019-03-26 PROCEDURE — 87070 CULTURE OTHR SPECIMN AEROBIC: CPT | Performed by: FAMILY MEDICINE

## 2019-03-26 PROCEDURE — 87430 STREP A AG IA: CPT | Performed by: FAMILY MEDICINE

## 2019-03-26 PROCEDURE — 99214 OFFICE O/P EST MOD 30 MIN: CPT | Performed by: FAMILY MEDICINE

## 2019-03-26 PROCEDURE — S9088 SERVICES PROVIDED IN URGENT: HCPCS | Performed by: FAMILY MEDICINE

## 2019-03-26 RX ORDER — BROMPHENIRAMINE MALEATE, PSEUDOEPHEDRINE HYDROCHLORIDE, AND DEXTROMETHORPHAN HYDROBROMIDE 2; 30; 10 MG/5ML; MG/5ML; MG/5ML
5 SYRUP ORAL 4 TIMES DAILY PRN
Qty: 120 ML | Refills: 0 | Status: SHIPPED | OUTPATIENT
Start: 2019-03-26 | End: 2019-03-31

## 2019-03-26 NOTE — PROGRESS NOTES
[address]        NAME: Chilo Daley is a 60 y o  male  : 1960    MRN: [id number]  DATE: 2019  TIME: 1:53 PM    Assessment and Plan   Acute URI [J06 9]  1  Acute URI  brompheniramine-pseudoephedrine-DM 30-2-10 MG/5ML syrup         Patient Instructions       Follow up with PCP in 3-5 days  Proceed to  ER if symptoms worsen  Chief Complaint     Chief Complaint   Patient presents with    Cold Like Symptoms     Patient relates has been sick since yesterday with cough, congestion and sore throat  Denies fever  Needs a work excise for today  History of Present Illness       60year old with URI symptoms for the past 2 days    Nasal congestion +  Sinus Pressure neg  Post nasal drip neg  Ear pain neg  Cough +  Nausea, Vomiting and Diarrhea neg  Fevers and Chills neg  Sore throat +        Review of Systems   Review of Systems  As above     Current Medications       Current Outpatient Medications:     lisinopril (ZESTRIL) 5 mg tablet, TAKE 1 TABLET BY MOUTH EVERY DAY, Disp: 90 tablet, Rfl: 1    brompheniramine-pseudoephedrine-DM 30-2-10 MG/5ML syrup, Take 5 mL by mouth 4 (four) times a day as needed for congestion, cough or allergies for up to 5 days, Disp: 120 mL, Rfl: 0    Current Allergies     Allergies as of 2019    (No Known Allergies)            The following portions of the patient's history were reviewed and updated as appropriate: allergies, current medications, past family history, past medical history, past social history, past surgical history and problem list      Past Medical History:   Diagnosis Date    History of chronic constipation     History of neck pain 2017    Hypertension        Past Surgical History:   Procedure Laterality Date    CYSTOSCOPY  2014    diagnostic managed by Jalen Mora    TOOTH EXTRACTION         Family History   Problem Relation Age of Onset    Cancer Sister         malignant neoplasm of breast    Diabetes Paternal Grandmother     Diabetes Family     Arthritis Family     Cancer Family         malignant neoplasm    Substance Abuse Neg Hx         denied Mother and Father    Mental illness Neg Hx         no family history Mother and Father    Other Neg Hx         denied family history of Osteopertrosis         Medications have been verified  Objective   /76   Pulse 74   Temp 97 5 °F (36 4 °C) (Tympanic)   Resp 16   Ht 5' 3" (1 6 m)   Wt 65 8 kg (145 lb)   SpO2 97%   BMI 25 69 kg/m²        Physical Exam     Physical Exam   Constitutional: He is oriented to person, place, and time  He appears well-developed and well-nourished  HENT:   Head: Normocephalic and atraumatic  Mouth/Throat: Oropharynx is clear and moist    Eyes: Conjunctivae are normal    Neck: Neck supple  Cardiovascular: Normal rate, regular rhythm and normal heart sounds  Pulmonary/Chest: Effort normal and breath sounds normal  No respiratory distress  Abdominal: Soft  He exhibits no distension  Musculoskeletal: Normal range of motion  Neurological: He is alert and oriented to person, place, and time  Skin: Skin is warm and dry  No rash noted  Psychiatric: He has a normal mood and affect   His behavior is normal  Judgment and thought content normal

## 2019-03-28 LAB — BACTERIA THROAT CULT: NORMAL

## 2019-04-29 ENCOUNTER — TELEPHONE (OUTPATIENT)
Dept: FAMILY MEDICINE CLINIC | Facility: CLINIC | Age: 59
End: 2019-04-29

## 2019-05-13 ENCOUNTER — HOSPITAL ENCOUNTER (OUTPATIENT)
Dept: CT IMAGING | Facility: HOSPITAL | Age: 59
Discharge: HOME | End: 2019-05-13
Payer: COMMERCIAL

## 2019-05-13 ENCOUNTER — OFFICE VISIT (OUTPATIENT)
Dept: FAMILY MEDICINE CLINIC | Facility: CLINIC | Age: 59
End: 2019-05-13

## 2019-05-13 VITALS
OXYGEN SATURATION: 98 % | HEART RATE: 64 BPM | WEIGHT: 148 LBS | BODY MASS INDEX: 26.22 KG/M2 | HEIGHT: 63 IN | DIASTOLIC BLOOD PRESSURE: 70 MMHG | TEMPERATURE: 97.5 F | SYSTOLIC BLOOD PRESSURE: 122 MMHG | RESPIRATION RATE: 18 BRPM

## 2019-05-13 DIAGNOSIS — R10.32 ABDOMINAL PAIN, CHRONIC, LEFT LOWER QUADRANT: ICD-10-CM

## 2019-05-13 DIAGNOSIS — G89.29 ABDOMINAL PAIN, CHRONIC, LEFT LOWER QUADRANT: Primary | ICD-10-CM

## 2019-05-13 DIAGNOSIS — G89.29 ABDOMINAL PAIN, CHRONIC, LEFT LOWER QUADRANT: ICD-10-CM

## 2019-05-13 DIAGNOSIS — G89.29 ABDOMINAL PAIN, CHRONIC, RIGHT LOWER QUADRANT: ICD-10-CM

## 2019-05-13 DIAGNOSIS — R10.31 ABDOMINAL PAIN, CHRONIC, RIGHT LOWER QUADRANT: ICD-10-CM

## 2019-05-13 DIAGNOSIS — R10.32 ABDOMINAL PAIN, CHRONIC, LEFT LOWER QUADRANT: Primary | ICD-10-CM

## 2019-05-13 PROCEDURE — 74176 CT ABD & PELVIS W/O CONTRAST: CPT

## 2019-05-13 PROCEDURE — 99213 OFFICE O/P EST LOW 20 MIN: CPT | Performed by: FAMILY MEDICINE

## 2019-05-13 RX ORDER — DICYCLOMINE HYDROCHLORIDE 10 MG/1
10 CAPSULE ORAL
Qty: 120 CAPSULE | Refills: 1 | Status: SHIPPED | OUTPATIENT
Start: 2019-05-13 | End: 2019-05-29 | Stop reason: SDUPTHER

## 2019-05-13 RX ORDER — SENNOSIDES 8.6 MG
650 CAPSULE ORAL EVERY 8 HOURS PRN
Qty: 30 TABLET | Refills: 1 | Status: SHIPPED | OUTPATIENT
Start: 2019-05-13 | End: 2019-10-22

## 2019-05-24 ENCOUNTER — TELEPHONE (OUTPATIENT)
Dept: FAMILY MEDICINE CLINIC | Facility: CLINIC | Age: 59
End: 2019-05-24

## 2019-05-29 DIAGNOSIS — R10.32 ABDOMINAL PAIN, CHRONIC, LEFT LOWER QUADRANT: ICD-10-CM

## 2019-05-29 DIAGNOSIS — G89.29 ABDOMINAL PAIN, CHRONIC, LEFT LOWER QUADRANT: ICD-10-CM

## 2019-05-29 DIAGNOSIS — R10.31 ABDOMINAL PAIN, CHRONIC, RIGHT LOWER QUADRANT: Primary | ICD-10-CM

## 2019-05-29 DIAGNOSIS — G89.29 ABDOMINAL PAIN, CHRONIC, RIGHT LOWER QUADRANT: Primary | ICD-10-CM

## 2019-05-29 RX ORDER — DICYCLOMINE HYDROCHLORIDE 10 MG/1
10 CAPSULE ORAL
Qty: 120 CAPSULE | Refills: 0 | Status: SHIPPED | OUTPATIENT
Start: 2019-05-29 | End: 2019-10-22

## 2019-06-10 ENCOUNTER — HOSPITAL ENCOUNTER (EMERGENCY)
Facility: HOSPITAL | Age: 59
Discharge: HOME | End: 2019-06-10
Attending: EMERGENCY MEDICINE
Payer: COMMERCIAL

## 2019-06-10 VITALS
OXYGEN SATURATION: 99 % | RESPIRATION RATE: 16 BRPM | WEIGHT: 153 LBS | HEART RATE: 71 BPM | DIASTOLIC BLOOD PRESSURE: 76 MMHG | TEMPERATURE: 98.4 F | SYSTOLIC BLOOD PRESSURE: 131 MMHG | BODY MASS INDEX: 27.1 KG/M2

## 2019-06-10 DIAGNOSIS — R51.9 HEADACHE: Primary | ICD-10-CM

## 2019-06-10 PROCEDURE — 99283 EMERGENCY DEPT VISIT LOW MDM: CPT | Performed by: EMERGENCY MEDICINE

## 2019-06-10 PROCEDURE — 99283 EMERGENCY DEPT VISIT LOW MDM: CPT

## 2019-06-10 RX ORDER — ACETAMINOPHEN 325 MG/1
650 TABLET ORAL ONCE
Status: COMPLETED | OUTPATIENT
Start: 2019-06-10 | End: 2019-06-10

## 2019-06-10 RX ADMIN — ACETAMINOPHEN 650 MG: 325 TABLET ORAL at 15:38

## 2019-06-12 ENCOUNTER — TELEPHONE (OUTPATIENT)
Dept: FAMILY MEDICINE CLINIC | Facility: CLINIC | Age: 59
End: 2019-06-12

## 2019-06-13 ENCOUNTER — TELEPHONE (OUTPATIENT)
Dept: FAMILY MEDICINE CLINIC | Facility: CLINIC | Age: 59
End: 2019-06-13

## 2019-07-07 ENCOUNTER — APPOINTMENT (EMERGENCY)
Dept: CT IMAGING | Facility: HOSPITAL | Age: 59
End: 2019-07-07
Payer: COMMERCIAL

## 2019-07-07 ENCOUNTER — HOSPITAL ENCOUNTER (EMERGENCY)
Facility: HOSPITAL | Age: 59
Discharge: HOME | End: 2019-07-07
Attending: EMERGENCY MEDICINE | Admitting: EMERGENCY MEDICINE
Payer: COMMERCIAL

## 2019-07-07 VITALS
TEMPERATURE: 97.4 F | SYSTOLIC BLOOD PRESSURE: 131 MMHG | HEART RATE: 62 BPM | WEIGHT: 149.25 LBS | BODY MASS INDEX: 26.44 KG/M2 | OXYGEN SATURATION: 99 % | RESPIRATION RATE: 18 BRPM | DIASTOLIC BLOOD PRESSURE: 83 MMHG

## 2019-07-07 DIAGNOSIS — R10.31 RIGHT LOWER QUADRANT PAIN: Primary | ICD-10-CM

## 2019-07-07 DIAGNOSIS — N20.0 NEPHROLITHIASIS: ICD-10-CM

## 2019-07-07 LAB
ALBUMIN SERPL BCP-MCNC: 3 G/DL (ref 3.5–5)
ALP SERPL-CCNC: 102 U/L (ref 46–116)
ALT SERPL W P-5'-P-CCNC: 32 U/L (ref 12–78)
ANION GAP SERPL CALCULATED.3IONS-SCNC: 5 MMOL/L (ref 4–13)
AST SERPL W P-5'-P-CCNC: 24 U/L (ref 5–45)
BACTERIA UR QL AUTO: ABNORMAL /HPF
BASOPHILS # BLD AUTO: 0.02 THOUSANDS/ΜL (ref 0–0.1)
BASOPHILS NFR BLD AUTO: 1 % (ref 0–1)
BILIRUB SERPL-MCNC: 0.44 MG/DL (ref 0.2–1)
BILIRUB UR QL STRIP: NEGATIVE
BUN SERPL-MCNC: 20 MG/DL (ref 5–25)
CALCIUM SERPL-MCNC: 8.9 MG/DL (ref 8.3–10.1)
CHLORIDE SERPL-SCNC: 108 MMOL/L (ref 100–108)
CLARITY UR: CLEAR
CO2 SERPL-SCNC: 27 MMOL/L (ref 21–32)
COLOR UR: YELLOW
CREAT SERPL-MCNC: 1.21 MG/DL (ref 0.6–1.3)
EOSINOPHIL # BLD AUTO: 0.2 THOUSAND/ΜL (ref 0–0.61)
EOSINOPHIL NFR BLD AUTO: 5 % (ref 0–6)
ERYTHROCYTE [DISTWIDTH] IN BLOOD BY AUTOMATED COUNT: 14.3 % (ref 11.6–15.1)
GFR SERPL CREATININE-BSD FRML MDRD: 65 ML/MIN/1.73SQ M
GLUCOSE SERPL-MCNC: 115 MG/DL (ref 65–140)
GLUCOSE UR STRIP-MCNC: NEGATIVE MG/DL
HCT VFR BLD AUTO: 40.9 % (ref 36.5–49.3)
HGB BLD-MCNC: 13.6 G/DL (ref 12–17)
HGB UR QL STRIP.AUTO: ABNORMAL
IMM GRANULOCYTES # BLD AUTO: 0.01 THOUSAND/UL (ref 0–0.2)
IMM GRANULOCYTES NFR BLD AUTO: 0 % (ref 0–2)
KETONES UR STRIP-MCNC: NEGATIVE MG/DL
LEUKOCYTE ESTERASE UR QL STRIP: NEGATIVE
LIPASE SERPL-CCNC: 371 U/L (ref 73–393)
LYMPHOCYTES # BLD AUTO: 1.75 THOUSANDS/ΜL (ref 0.6–4.47)
LYMPHOCYTES NFR BLD AUTO: 41 % (ref 14–44)
MCH RBC QN AUTO: 30.9 PG (ref 26.8–34.3)
MCHC RBC AUTO-ENTMCNC: 33.3 G/DL (ref 31.4–37.4)
MCV RBC AUTO: 93 FL (ref 82–98)
MONOCYTES # BLD AUTO: 0.5 THOUSAND/ΜL (ref 0.17–1.22)
MONOCYTES NFR BLD AUTO: 12 % (ref 4–12)
NEUTROPHILS # BLD AUTO: 1.7 THOUSANDS/ΜL (ref 1.85–7.62)
NEUTS SEG NFR BLD AUTO: 41 % (ref 43–75)
NITRITE UR QL STRIP: NEGATIVE
NON-SQ EPI CELLS URNS QL MICRO: ABNORMAL /HPF
NRBC BLD AUTO-RTO: 0 /100 WBCS
PH UR STRIP.AUTO: 6 [PH] (ref 4.5–8)
PLATELET # BLD AUTO: 204 THOUSANDS/UL (ref 149–390)
PMV BLD AUTO: 11.1 FL (ref 8.9–12.7)
POTASSIUM SERPL-SCNC: 4.1 MMOL/L (ref 3.5–5.3)
PROT SERPL-MCNC: 6.3 G/DL (ref 6.4–8.2)
PROT UR STRIP-MCNC: ABNORMAL MG/DL
RBC # BLD AUTO: 4.4 MILLION/UL (ref 3.88–5.62)
RBC #/AREA URNS AUTO: ABNORMAL /HPF
SODIUM SERPL-SCNC: 140 MMOL/L (ref 136–145)
SP GR UR STRIP.AUTO: >=1.03 (ref 1–1.03)
UROBILINOGEN UR QL STRIP.AUTO: 0.2 E.U./DL
WBC # BLD AUTO: 4.18 THOUSAND/UL (ref 4.31–10.16)
WBC #/AREA URNS AUTO: ABNORMAL /HPF

## 2019-07-07 PROCEDURE — 36415 COLL VENOUS BLD VENIPUNCTURE: CPT | Performed by: EMERGENCY MEDICINE

## 2019-07-07 PROCEDURE — 99284 EMERGENCY DEPT VISIT MOD MDM: CPT | Performed by: EMERGENCY MEDICINE

## 2019-07-07 PROCEDURE — 96374 THER/PROPH/DIAG INJ IV PUSH: CPT

## 2019-07-07 PROCEDURE — 81001 URINALYSIS AUTO W/SCOPE: CPT

## 2019-07-07 PROCEDURE — 74177 CT ABD & PELVIS W/CONTRAST: CPT

## 2019-07-07 PROCEDURE — 80053 COMPREHEN METABOLIC PANEL: CPT | Performed by: EMERGENCY MEDICINE

## 2019-07-07 PROCEDURE — 83690 ASSAY OF LIPASE: CPT | Performed by: EMERGENCY MEDICINE

## 2019-07-07 PROCEDURE — 85025 COMPLETE CBC W/AUTO DIFF WBC: CPT | Performed by: EMERGENCY MEDICINE

## 2019-07-07 PROCEDURE — 96361 HYDRATE IV INFUSION ADD-ON: CPT

## 2019-07-07 PROCEDURE — 99284 EMERGENCY DEPT VISIT MOD MDM: CPT

## 2019-07-07 RX ORDER — KETOROLAC TROMETHAMINE 30 MG/ML
15 INJECTION, SOLUTION INTRAMUSCULAR; INTRAVENOUS ONCE
Status: COMPLETED | OUTPATIENT
Start: 2019-07-07 | End: 2019-07-07

## 2019-07-07 RX ORDER — NAPROXEN 500 MG/1
500 TABLET ORAL 2 TIMES DAILY WITH MEALS
Qty: 10 TABLET | Refills: 0 | Status: SHIPPED | OUTPATIENT
Start: 2019-07-07 | End: 2019-10-14 | Stop reason: SDUPTHER

## 2019-07-07 RX ADMIN — SODIUM CHLORIDE 1000 ML: 0.9 INJECTION, SOLUTION INTRAVENOUS at 20:22

## 2019-07-07 RX ADMIN — IOHEXOL 100 ML: 350 INJECTION, SOLUTION INTRAVENOUS at 21:28

## 2019-07-07 RX ADMIN — KETOROLAC TROMETHAMINE 15 MG: 30 INJECTION, SOLUTION INTRAMUSCULAR at 20:21

## 2019-07-08 NOTE — ED PROVIDER NOTES
History  Chief Complaint   Patient presents with    Abdominal Pain     right side abdomen pain, diarrhea two days ago  denies any N/V/D today  History provided by:  Patient   used: No    Abdominal Pain   Pain location:  RLQ  Pain radiates to:  Does not radiate  Timing:  Constant  Progression:  Unchanged  Chronicity:  New  Context: not previous surgeries    Relieved by:  None tried  Worsened by:  Nothing  Ineffective treatments:  None tried  Associated symptoms: diarrhea (2 days ago)    Associated symptoms: no chills, no constipation, no cough, no dysuria, no fever, no hematuria, no nausea, no sore throat and no vomiting        Prior to Admission Medications   Prescriptions Last Dose Informant Patient Reported?  Taking?   acetaminophen (TYLENOL) 650 mg CR tablet   No No   Sig: Take 1 tablet (650 mg total) by mouth every 8 (eight) hours as needed for mild pain or moderate pain   Patient not taking: Reported on 6/10/2019   dicyclomine (BENTYL) 10 mg capsule   No No   Sig: Take 1 capsule (10 mg total) by mouth 4 (four) times a day (before meals and at bedtime)   Patient not taking: Reported on 6/10/2019   lisinopril (ZESTRIL) 5 mg tablet   No No   Sig: TAKE 1 TABLET BY MOUTH EVERY DAY      Facility-Administered Medications: None       Past Medical History:   Diagnosis Date    History of chronic constipation     History of neck pain 11/21/2017    Hypertension        Past Surgical History:   Procedure Laterality Date    CYSTOSCOPY  05/29/2014    diagnostic managed by Jalen Mora    TOOTH EXTRACTION         Family History   Problem Relation Age of Onset    Cancer Sister         malignant neoplasm of breast    Diabetes Paternal Grandmother     Diabetes Family     Arthritis Family     Cancer Family         malignant neoplasm    Substance Abuse Neg Hx         denied Mother and Father    Mental illness Neg Hx         no family history Mother and Father    Other Neg Hx         denied family history of Osteopertrosis     I have reviewed and agree with the history as documented  Social History     Tobacco Use    Smoking status: Former Smoker     Types: Cigarettes    Smokeless tobacco: Never Used   Substance Use Topics    Alcohol use: Yes     Comment: weekly    Drug use: No        Review of Systems   Constitutional: Negative for chills and fever  HENT: Negative for rhinorrhea and sore throat  Respiratory: Negative for cough  Gastrointestinal: Positive for abdominal pain and diarrhea (2 days ago)  Negative for abdominal distention, anal bleeding, blood in stool, constipation, nausea, rectal pain and vomiting  Genitourinary: Negative for dysuria, flank pain, frequency, hematuria and urgency  Musculoskeletal: Negative for back pain  Skin: Negative for pallor and rash  All other systems reviewed and are negative  Physical Exam  Physical Exam   Constitutional: He is oriented to person, place, and time  He appears well-developed and well-nourished  No distress  HENT:   Head: Normocephalic  Mouth/Throat: Oropharynx is clear and moist and mucous membranes are normal    Neck: Normal range of motion  Neck supple  Cardiovascular: Normal rate, regular rhythm, normal heart sounds and intact distal pulses  Exam reveals no gallop and no friction rub  No murmur heard  Pulmonary/Chest: Effort normal and breath sounds normal  No respiratory distress  He has no wheezes  He has no rales  Abdominal: Soft  Normal appearance and bowel sounds are normal  He exhibits no distension and no mass  There is no hepatosplenomegaly  There is tenderness in the right lower quadrant  There is no rigidity, no rebound, no guarding, no CVA tenderness, no tenderness at McBurney's point and negative Muniz's sign  Lymphadenopathy:     He has no cervical adenopathy  Neurological: He is alert and oriented to person, place, and time  Skin: Skin is warm, dry and intact  No rash noted  No pallor  Nursing note and vitals reviewed        Vital Signs  ED Triage Vitals [07/07/19 1927]   Temperature Pulse Respirations Blood Pressure SpO2   (!) 97 4 °F (36 3 °C) 70 16 120/77 98 %      Temp Source Heart Rate Source Patient Position - Orthostatic VS BP Location FiO2 (%)   Temporal Monitor Sitting Right arm --      Pain Score       8           Vitals:    07/07/19 1927 07/07/19 2157   BP: 120/77 131/83   Pulse: 70 62   Patient Position - Orthostatic VS: Sitting Lying         Visual Acuity      ED Medications  Medications   sodium chloride 0 9 % bolus 1,000 mL (0 mL Intravenous Stopped 7/7/19 2116)   ketorolac (TORADOL) injection 15 mg (15 mg Intravenous Given 7/7/19 2021)   iohexol (OMNIPAQUE) 350 MG/ML injection (MULTI-DOSE) 100 mL (100 mL Intravenous Given 7/7/19 2128)       Diagnostic Studies  Results Reviewed     Procedure Component Value Units Date/Time    Urine Microscopic [[id number]]  (Abnormal) Collected:  07/07/19 2130    Lab Status:  Final result Specimen:  Urine, Clean Catch Updated:  07/07/19 2217     RBC, UA 2-4 /hpf      WBC, UA None Seen /hpf      Epithelial Cells Occasional /hpf      Bacteria, UA None Seen /hpf     POCT urinalysis dipstick [[id number]]  (Abnormal) Resulted:  07/07/19 2119    Lab Status:  Final result Specimen:  Urine Updated:  07/07/19 2121    ED Urine Macroscopic [[id number]]  (Abnormal) Collected:  07/07/19 2130    Lab Status:  Final result Specimen:  Urine Updated:  07/07/19 2119     Color, UA Yellow     Clarity, UA Clear     pH, UA 6 0     Leukocytes, UA Negative     Nitrite, UA Negative     Protein, UA Trace mg/dl      Glucose, UA Negative mg/dl      Ketones, UA Negative mg/dl      Urobilinogen, UA 0 2 E U /dl      Bilirubin, UA Negative     Blood, UA Small     Specific [place], UA >=1 030    Narrative:       CLINITEK RESULT    Comprehensive metabolic panel [[id number]]  (Abnormal) Collected:  07/07/19 2021    Lab Status:  Final result Specimen:  Blood from Arm, Right Updated: 07/07/19 2100     Sodium 140 mmol/L      Potassium 4 1 mmol/L      Chloride 108 mmol/L      CO2 27 mmol/L      ANION GAP 5 mmol/L      BUN 20 mg/dL      Creatinine 1 21 mg/dL      Glucose 115 mg/dL      Calcium 8 9 mg/dL      AST 24 U/L      ALT 32 U/L      Alkaline Phosphatase 102 U/L      Total Protein 6 3 g/dL      Albumin 3 0 g/dL      Total Bilirubin 0 44 mg/dL      eGFR 65 ml/min/1 73sq m     Narrative:       National Kidney Disease Foundation guidelines for Chronic Kidney Disease (CKD):     Stage 1 with normal or high GFR (GFR > 90 mL/min/1 73 square meters)    Stage 2 Mild CKD (GFR = 60-89 mL/min/1 73 square meters)    Stage 3A Moderate CKD (GFR = 45-59 mL/min/1 73 square meters)    Stage 3B Moderate CKD (GFR = 30-44 mL/min/1 73 square meters)    Stage 4 Severe CKD (GFR = 15-29 mL/min/1 73 square meters)    Stage 5 End Stage CKD (GFR <15 mL/min/1 73 square meters)  Note: GFR calculation is accurate only with a steady state creatinine    Lipase [[id number]]  (Normal) Collected:  07/07/19 2021    Lab Status:  Final result Specimen:  Blood from Arm, Right Updated:  07/07/19 2100     Lipase 371 u/L     CBC and differential [[id number]]  (Abnormal) Collected:  07/07/19 2021    Lab Status:  Final result Specimen:  Blood from Arm, Right Updated:  07/07/19 2034     WBC 4 18 Thousand/uL      RBC 4 40 Million/uL      Hemoglobin 13 6 g/dL      Hematocrit 40 9 %      MCV 93 fL      MCH 30 9 pg      MCHC 33 3 g/dL      RDW 14 3 %      MPV 11 1 fL      Platelets 571 Thousands/uL      nRBC 0 /100 WBCs      Neutrophils Relative 41 %      Immat GRANS % 0 %      Lymphocytes Relative 41 %      Monocytes Relative 12 %      Eosinophils Relative 5 %      Basophils Relative 1 %      Neutrophils Absolute 1 70 Thousands/µL      Immature Grans Absolute 0 01 Thousand/uL      Lymphocytes Absolute 1 75 Thousands/µL      Monocytes Absolute 0 50 Thousand/µL      Eosinophils Absolute 0 20 Thousand/µL      Basophils Absolute 0 02 Thousands/µL                  CT abdomen pelvis with contrast   Final Result by Kylah Roman DO (07/07 2252)   Right-sided nephrolithiasis but no hydronephrosis is seen  Bladder is partially distended  Mild to moderate circumferential bladder wall thickening, probably exaggerated by underdistention  Superimposed cystitis considered in the appropriate clinical setting  Normal caliber appendix  Other findings as above  Workstation performed: [id number]                    Procedures  Procedures       ED Course  ED Course as of Jul 07 2302   Adeola Karlie Jul 07, 2019   2256 Pt updated on results  MDM  Number of Diagnoses or Management Options     Amount and/or Complexity of Data Reviewed  Clinical lab tests: ordered and reviewed  Tests in the radiology section of CPT®: ordered and reviewed  Tests in the medicine section of CPT®: reviewed and ordered        Disposition  Final diagnoses:   Right lower quadrant pain     Time reflects when diagnosis was documented in both MDM as applicable and the Disposition within this note     Time User Action Codes Description Comment    7/7/2019 10:55 PM Geoffrey Foy 48 [R10 31] Right lower quadrant pain       ED Disposition     ED Disposition Condition Date/Time Comment    Discharge Stable Sun Jul 7, 2019 10:54 PM Mine Mccarthy discharge to home/self care  Follow-up Information     Follow up With Specialties Details Why Contact Info    Torsten Scott PA-C Family Medicine, Physician Assistant Schedule an appointment as soon as possible for a visit   [address]  [address] [facility] [address]             Patient's Medications   Discharge Prescriptions    NAPROXEN (NAPROSYN) 500 MG TABLET    Take 1 tablet (500 mg total) by mouth 2 (two) times a day with meals       Start Date: 7/7/2019  End Date: --       Order Dose: 500 mg       Quantity: 10 tablet    Refills: 0     No discharge procedures on file      ED Provider  Electronically Signed by           Juan Pablo Lloyd 24, DO  07/07/19 6022

## 2019-08-08 ENCOUNTER — OFFICE VISIT (OUTPATIENT)
Dept: FAMILY MEDICINE CLINIC | Facility: CLINIC | Age: 59
End: 2019-08-08

## 2019-08-08 VITALS
RESPIRATION RATE: 18 BRPM | WEIGHT: 148 LBS | DIASTOLIC BLOOD PRESSURE: 80 MMHG | BODY MASS INDEX: 26.22 KG/M2 | OXYGEN SATURATION: 98 % | SYSTOLIC BLOOD PRESSURE: 130 MMHG | HEIGHT: 63 IN | HEART RATE: 60 BPM

## 2019-08-08 DIAGNOSIS — N50.819 TESTICLE PAIN: ICD-10-CM

## 2019-08-08 DIAGNOSIS — N40.1 BENIGN PROSTATIC HYPERPLASIA WITH URINARY HESITANCY: ICD-10-CM

## 2019-08-08 DIAGNOSIS — R10.84 GENERALIZED ABDOMINAL PAIN: Primary | ICD-10-CM

## 2019-08-08 DIAGNOSIS — R39.11 BENIGN PROSTATIC HYPERPLASIA WITH URINARY HESITANCY: ICD-10-CM

## 2019-08-08 LAB
SL AMB  POCT GLUCOSE, UA: NEGATIVE
SL AMB LEUKOCYTE ESTERASE,UA: NEGATIVE
SL AMB POCT BILIRUBIN,UA: NEGATIVE
SL AMB POCT BLOOD,UA: ABNORMAL
SL AMB POCT CLARITY,UA: YELLOW
SL AMB POCT COLOR,UA: YELLOW
SL AMB POCT KETONES,UA: NEGATIVE
SL AMB POCT NITRITE,UA: NEGATIVE
SL AMB POCT PH,UA: 5
SL AMB POCT SPECIFIC GRAVITY,UA: 1.3
SL AMB POCT URINE PROTEIN: 15
SL AMB POCT UROBILINOGEN: 0.2

## 2019-08-08 PROCEDURE — 81002 URINALYSIS NONAUTO W/O SCOPE: CPT | Performed by: PHYSICIAN ASSISTANT

## 2019-08-08 PROCEDURE — 99051 MED SERV EVE/WKEND/HOLIDAY: CPT | Performed by: PHYSICIAN ASSISTANT

## 2019-08-08 PROCEDURE — 99213 OFFICE O/P EST LOW 20 MIN: CPT | Performed by: PHYSICIAN ASSISTANT

## 2019-08-08 PROCEDURE — 87086 URINE CULTURE/COLONY COUNT: CPT | Performed by: PHYSICIAN ASSISTANT

## 2019-08-08 PROCEDURE — 87147 CULTURE TYPE IMMUNOLOGIC: CPT | Performed by: PHYSICIAN ASSISTANT

## 2019-08-08 RX ORDER — TAMSULOSIN HYDROCHLORIDE 0.4 MG/1
0.4 CAPSULE ORAL
Qty: 90 CAPSULE | Refills: 0 | Status: SHIPPED | OUTPATIENT
Start: 2019-08-08 | End: 2019-10-23 | Stop reason: SDUPTHER

## 2019-08-08 NOTE — PROGRESS NOTES
Assessment/Plan:    With concern of patient's bloating symptoms, abdominal pain, and twice a day bowel movements, will order celiac panel to see if this could be a possible cause of patient's abdominal pain  Other possibility could be muscle strain  May consider muscle relaxer in the future, or possibly referral to physical therapy  Due to patient's UA in the office showing blood in protein, enlarged prostate on physical exam, and bladder wall thickening on CT, recommend starting Flomax to see if this will help with his urinary symptoms  This may also improve his testicular pain, may also be contributing to his abdominal pain  Make sure to follow up with Urology  Diagnoses and all orders for this visit:    Generalized abdominal pain  -     Cancel: Celiac HLA-DQ,blood; Future  -     Celiac Disease Antibody Profile; Future    Benign prostatic hyperplasia with urinary hesitancy  -     tamsulosin (FLOMAX) 0 4 mg; Take 1 capsule (0 4 mg total) by mouth daily with dinner    Testicle pain  -     POCT urine dip  -     Urine culture          Subjective:      Patient ID: Lizzie Mandujano is a 60 y o  male  48-year-old male presenting for follow-up of right-sided abdominal pain, and right testicular pain  Patient states that the pain has been going on for several months  Describes the pain as a dull ache mainly on the right side of his abdomen, but can occur throughout his whole abdomen  Does feel like he is bloated, and gassy at times  Has about 2 bowel movements a day  Denies any blood in the stool  Denies any nausea, vomiting, constipation  Is scheduled to have a colonoscopy next week  Patient had a CT done in the emergency room 1 month ago, at that time showed distended bladder wall  Patient does admit to having we can urinary stream, and having to strain when urinating  Does follow up with Urology, has an appointment in October        The following portions of the patient's history were reviewed and updated as appropriate:   He  has a past medical history of History of chronic constipation, History of neck pain (11/21/2017), and Hypertension  He   Patient Active Problem List    Diagnosis Date Noted    Abdominal pain, chronic, right lower quadrant 05/13/2019    Right-sided low back pain with right-sided sciatica 06/28/2018    Hoarseness of voice 05/17/2018    Acute pain of both knees 01/24/2018    Chronic tension headache 01/24/2018    Left shoulder pain 06/07/2017    Benign colon polyp 03/24/2017    Enlarged prostate without lower urinary tract symptoms (luts) 07/18/2016    Varicocele 01/12/2016    Mild vitamin D deficiency 06/10/2015    Testicle pain 07/02/2014    Impingement syndrome, shoulder, left 05/16/2014    Pain in joint of right hip 02/04/2014    Microscopic hematuria 11/19/2013    Renal calculus, right 11/19/2013    Atypical chest pain 06/19/2013    Benign essential hypertension 10/10/2012    Abnormal glucose 10/10/2012     He  has a past surgical history that includes Cystoscopy (05/29/2014) and Tooth extraction  His family history includes Arthritis in his family; Cancer in his family and sister; Diabetes in his family and paternal grandmother  He  reports that he has quit smoking  His smoking use included cigarettes  He has never used smokeless tobacco  He reports that he drinks alcohol  He reports that he does not use drugs    Current Outpatient Medications   Medication Sig Dispense Refill    lisinopril (ZESTRIL) 5 mg tablet TAKE 1 TABLET BY MOUTH EVERY DAY 90 tablet 1    acetaminophen (TYLENOL) 650 mg CR tablet Take 1 tablet (650 mg total) by mouth every 8 (eight) hours as needed for mild pain or moderate pain (Patient not taking: Reported on 6/10/2019) 30 tablet 1    dicyclomine (BENTYL) 10 mg capsule Take 1 capsule (10 mg total) by mouth 4 (four) times a day (before meals and at bedtime) (Patient not taking: Reported on 6/10/2019) 120 capsule 0    naproxen (NAPROSYN) 500 mg tablet Take 1 tablet (500 mg total) by mouth 2 (two) times a day with meals (Patient not taking: Reported on 8/8/2019) 10 tablet 0    tamsulosin (FLOMAX) 0 4 mg Take 1 capsule (0 4 mg total) by mouth daily with dinner 90 capsule 0     No current facility-administered medications for this visit  He has No Known Allergies       Review of Systems   Constitutional: Negative for chills, diaphoresis, fatigue and fever  Gastrointestinal: Positive for abdominal pain  Negative for constipation, diarrhea, nausea and vomiting  Bloating   Genitourinary: Positive for decreased urine volume, difficulty urinating and testicular pain  Negative for dysuria, flank pain, frequency, hematuria and urgency  Musculoskeletal: Negative for back pain  Neurological: Negative for dizziness, weakness, light-headedness, numbness and headaches  Psychiatric/Behavioral: Negative for dysphoric mood  The patient is not nervous/anxious  Objective:      /80 (BP Location: Right arm, Patient Position: Sitting, Cuff Size: Standard)   Pulse 60   Resp 18   Ht 5' 3" (1 6 m)   Wt 67 1 kg (148 lb)   SpO2 98%   BMI 26 22 kg/m²          Physical Exam   Constitutional: He is oriented to person, place, and time  He appears well-developed and well-nourished  No distress  Cardiovascular: Normal rate, regular rhythm and normal heart sounds  Exam reveals no gallop and no friction rub  No murmur heard  Pulmonary/Chest: Effort normal and breath sounds normal  No respiratory distress  He has no wheezes  He has no rales  Abdominal: Soft  Bowel sounds are normal  He exhibits no distension and no mass  There is no tenderness  There is no rebound and no guarding  No hernia  Genitourinary: Rectum normal and penis normal  Rectal exam shows guaiac negative stool  Prostate is enlarged  Prostate is not tender  Musculoskeletal: Normal range of motion  He exhibits no edema     Neurological: He is alert and oriented to person, place, and time  He displays normal reflexes  No cranial nerve deficit  He exhibits normal muscle tone  Coordination normal    Skin: He is not diaphoretic  Nursing note and vitals reviewed

## 2019-08-09 ENCOUNTER — APPOINTMENT (OUTPATIENT)
Dept: LAB | Facility: HOSPITAL | Age: 59
End: 2019-08-09
Payer: COMMERCIAL

## 2019-08-09 DIAGNOSIS — R10.84 GENERALIZED ABDOMINAL PAIN: ICD-10-CM

## 2019-08-09 PROCEDURE — 86255 FLUORESCENT ANTIBODY SCREEN: CPT

## 2019-08-09 PROCEDURE — 83516 IMMUNOASSAY NONANTIBODY: CPT

## 2019-08-09 PROCEDURE — 82784 ASSAY IGA/IGD/IGG/IGM EACH: CPT

## 2019-08-09 PROCEDURE — 36415 COLL VENOUS BLD VENIPUNCTURE: CPT

## 2019-08-10 LAB
ENDOMYSIUM IGA SER QL: NEGATIVE
GLIADIN PEPTIDE IGA SER-ACNC: 4 UNITS (ref 0–19)
GLIADIN PEPTIDE IGG SER-ACNC: 2 UNITS (ref 0–19)
IGA SERPL-MCNC: 469 MG/DL (ref 90–386)
TTG IGA SER-ACNC: <2 U/ML (ref 0–3)
TTG IGG SER-ACNC: <2 U/ML (ref 0–5)

## 2019-08-11 LAB
BACTERIA UR CULT: ABNORMAL
BACTERIA UR CULT: ABNORMAL

## 2019-08-12 DIAGNOSIS — R07.9 CHEST PAIN, UNSPECIFIED TYPE: Primary | ICD-10-CM

## 2019-08-19 ENCOUNTER — TELEPHONE (OUTPATIENT)
Dept: FAMILY MEDICINE CLINIC | Facility: CLINIC | Age: 59
End: 2019-08-19

## 2019-08-19 NOTE — TELEPHONE ENCOUNTER
Per pt he told Stanford Chambers about pain in the ribs  And for the referral I contact pt to get more information about it  LVM to call us back

## 2019-08-19 NOTE — TELEPHONE ENCOUNTER
Pt leave a msg stating that he want a referral for ENT  Also pt states that he needs a order for x ray of the ribs

## 2019-08-20 ENCOUNTER — HOSPITAL ENCOUNTER (OUTPATIENT)
Dept: RADIOLOGY | Facility: HOSPITAL | Age: 59
Discharge: HOME | End: 2019-08-20
Payer: COMMERCIAL

## 2019-08-20 DIAGNOSIS — R07.9 CHEST PAIN, UNSPECIFIED TYPE: ICD-10-CM

## 2019-08-20 DIAGNOSIS — R49.0 HOARSENESS: Primary | ICD-10-CM

## 2019-08-20 DIAGNOSIS — I10 BENIGN HYPERTENSION: ICD-10-CM

## 2019-08-20 PROCEDURE — 71046 X-RAY EXAM CHEST 2 VIEWS: CPT

## 2019-08-20 RX ORDER — LISINOPRIL 5 MG/1
TABLET ORAL
Qty: 90 TABLET | Refills: 1 | Status: SHIPPED | OUTPATIENT
Start: 2019-08-20 | End: 2020-02-10 | Stop reason: SDUPTHER

## 2019-08-20 NOTE — TELEPHONE ENCOUNTER
Pt has been explained the reason for chest xray and he understood  Pt was made aware of referral for ENT  Pt wanted to know when the appt was  I explained the referral process to him and advised him someone will call with appt details once referral is processed  He understood  Pt advised, when asked if he had any other questions or concerns, that he has many but will take it one by one and will see how this goes first   Pt was advised he can contact us at any time regarding any of his questions or concerns

## 2019-08-20 NOTE — TELEPHONE ENCOUNTER
Patient states for the referral for ENT is his voice is leaving and has a constant cough and doesn't know why  Patient states Dr Benedict Muñoz place an xray order for his chest and wasn't sure why if he has rib pain

## 2019-08-20 NOTE — TELEPHONE ENCOUNTER
He saw an [facility] specialist in March of last year so I put the referral in for them again    Also the chest x-ray looks at the ribs too so that is likely why he put the order in that way as with chest xray you can check both sides of the chest

## 2019-08-21 ENCOUNTER — TELEPHONE (OUTPATIENT)
Dept: FAMILY MEDICINE CLINIC | Facility: CLINIC | Age: 59
End: 2019-08-21

## 2019-08-21 NOTE — TELEPHONE ENCOUNTER
[facility] Int# [id number] gave pt all info     [facility]([phone]) is on 10/22/2019 at 11 am at [address], [place] [address], [place]

## 2019-09-05 ENCOUNTER — TELEPHONE (OUTPATIENT)
Dept: FAMILY MEDICINE CLINIC | Facility: CLINIC | Age: 59
End: 2019-09-05

## 2019-09-05 NOTE — TELEPHONE ENCOUNTER
[facility] Int# [id number] pt answered and said he was at work then hung up  Order mailed  Evgeny CASAS([phone]) is on 11/13/2019 at 9:40 am at [address] [facility] [address], [address], [place]

## 2019-10-14 ENCOUNTER — HOSPITAL ENCOUNTER (EMERGENCY)
Facility: HOSPITAL | Age: 59
Discharge: HOME | End: 2019-10-14
Attending: EMERGENCY MEDICINE
Payer: COMMERCIAL

## 2019-10-14 VITALS
SYSTOLIC BLOOD PRESSURE: 120 MMHG | RESPIRATION RATE: 18 BRPM | BODY MASS INDEX: 27.77 KG/M2 | WEIGHT: 156.75 LBS | TEMPERATURE: 98.3 F | DIASTOLIC BLOOD PRESSURE: 75 MMHG | OXYGEN SATURATION: 99 % | HEART RATE: 70 BPM

## 2019-10-14 DIAGNOSIS — R10.31 RIGHT LOWER QUADRANT PAIN: ICD-10-CM

## 2019-10-14 DIAGNOSIS — S83.90XA KNEE SPRAIN: Primary | ICD-10-CM

## 2019-10-14 DIAGNOSIS — I10 BENIGN HYPERTENSION: ICD-10-CM

## 2019-10-14 PROCEDURE — 99282 EMERGENCY DEPT VISIT SF MDM: CPT | Performed by: PHYSICIAN ASSISTANT

## 2019-10-14 PROCEDURE — 99283 EMERGENCY DEPT VISIT LOW MDM: CPT

## 2019-10-14 RX ORDER — NAPROXEN 500 MG/1
500 TABLET ORAL 2 TIMES DAILY WITH MEALS
Qty: 10 TABLET | Refills: 0 | Status: SHIPPED | OUTPATIENT
Start: 2019-10-14 | End: 2020-06-24

## 2019-10-14 NOTE — DISCHARGE INSTRUCTIONS
Rest and ice area  naproxen as needed for pain  Ace wrap for support  FU with your doctor/sports med  Return to the ED for worsening symptoms
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
## 2019-10-17 ENCOUNTER — TELEPHONE (OUTPATIENT)
Dept: UROLOGY | Facility: MEDICAL CENTER | Age: 59
End: 2019-10-17

## 2019-10-17 NOTE — TELEPHONE ENCOUNTER
Call placed to patient, advised he can keep 12:15pm appointment as originally scheduled  Patient verbalized understanding

## 2019-10-17 NOTE — TELEPHONE ENCOUNTER
Patient seen by Sydney Guzman at [facility]  Patient called in regard to schedule change  He can not make the 4:30 appt due to work schedule  He was originally scheduled at 12:15 - which is now cleared for a same day appt  He can make this original time  Please advise    He can be reached at [phone]  Thank you

## 2019-10-22 ENCOUNTER — OFFICE VISIT (OUTPATIENT)
Dept: FAMILY MEDICINE CLINIC | Facility: CLINIC | Age: 59
End: 2019-10-22

## 2019-10-22 ENCOUNTER — LAB (OUTPATIENT)
Dept: LAB | Facility: CLINIC | Age: 59
End: 2019-10-22
Payer: COMMERCIAL

## 2019-10-22 ENCOUNTER — TRANSCRIBE ORDERS (OUTPATIENT)
Dept: LAB | Facility: CLINIC | Age: 59
End: 2019-10-22

## 2019-10-22 VITALS
RESPIRATION RATE: 18 BRPM | TEMPERATURE: 98 F | SYSTOLIC BLOOD PRESSURE: 124 MMHG | DIASTOLIC BLOOD PRESSURE: 72 MMHG | BODY MASS INDEX: 27.46 KG/M2 | HEART RATE: 72 BPM | OXYGEN SATURATION: 99 % | HEIGHT: 63 IN | WEIGHT: 155 LBS

## 2019-10-22 DIAGNOSIS — N40.0 ENLARGED PROSTATE WITHOUT LOWER URINARY TRACT SYMPTOMS (LUTS): ICD-10-CM

## 2019-10-22 DIAGNOSIS — I10 BENIGN ESSENTIAL HYPERTENSION: ICD-10-CM

## 2019-10-22 DIAGNOSIS — R39.12 BENIGN PROSTATIC HYPERPLASIA WITH WEAK URINARY STREAM: ICD-10-CM

## 2019-10-22 DIAGNOSIS — R10.31 ABDOMINAL PAIN, CHRONIC, RIGHT LOWER QUADRANT: ICD-10-CM

## 2019-10-22 DIAGNOSIS — R10.31 RIGHT LOWER QUADRANT ABDOMINAL PAIN: ICD-10-CM

## 2019-10-22 DIAGNOSIS — G89.29 ABDOMINAL PAIN, CHRONIC, RIGHT LOWER QUADRANT: ICD-10-CM

## 2019-10-22 DIAGNOSIS — Z23 NEEDS FLU SHOT: Primary | ICD-10-CM

## 2019-10-22 DIAGNOSIS — R14.0 BLOATING: ICD-10-CM

## 2019-10-22 DIAGNOSIS — R10.13 EPIGASTRIC PAIN: ICD-10-CM

## 2019-10-22 DIAGNOSIS — N40.1 BENIGN PROSTATIC HYPERPLASIA WITH WEAK URINARY STREAM: ICD-10-CM

## 2019-10-22 LAB
ALBUMIN SERPL BCP-MCNC: 3.6 G/DL (ref 3.5–5)
ALP SERPL-CCNC: 95 U/L (ref 46–116)
ALT SERPL W P-5'-P-CCNC: 45 U/L (ref 12–78)
ANION GAP SERPL CALCULATED.3IONS-SCNC: 4 MMOL/L (ref 4–13)
AST SERPL W P-5'-P-CCNC: 29 U/L (ref 5–45)
BILIRUB SERPL-MCNC: 0.68 MG/DL (ref 0.2–1)
BUN SERPL-MCNC: 19 MG/DL (ref 5–25)
CALCIUM SERPL-MCNC: 9.1 MG/DL (ref 8.3–10.1)
CHLORIDE SERPL-SCNC: 110 MMOL/L (ref 100–108)
CHOLEST SERPL-MCNC: 152 MG/DL (ref 50–200)
CO2 SERPL-SCNC: 27 MMOL/L (ref 21–32)
CREAT SERPL-MCNC: 1.37 MG/DL (ref 0.6–1.3)
GFR SERPL CREATININE-BSD FRML MDRD: 56 ML/MIN/1.73SQ M
GLUCOSE P FAST SERPL-MCNC: 96 MG/DL (ref 65–99)
HDLC SERPL-MCNC: 50 MG/DL
LDLC SERPL CALC-MCNC: 81 MG/DL (ref 0–100)
NONHDLC SERPL-MCNC: 102 MG/DL
POTASSIUM SERPL-SCNC: 4.3 MMOL/L (ref 3.5–5.3)
PROT SERPL-MCNC: 7.4 G/DL (ref 6.4–8.2)
PSA SERPL-MCNC: 0.4 NG/ML (ref 0–4)
SODIUM SERPL-SCNC: 141 MMOL/L (ref 136–145)
TRIGL SERPL-MCNC: 104 MG/DL

## 2019-10-22 PROCEDURE — 80061 LIPID PANEL: CPT

## 2019-10-22 PROCEDURE — 99213 OFFICE O/P EST LOW 20 MIN: CPT | Performed by: PHYSICIAN ASSISTANT

## 2019-10-22 PROCEDURE — 84153 ASSAY OF PSA TOTAL: CPT

## 2019-10-22 PROCEDURE — 80053 COMPREHEN METABOLIC PANEL: CPT

## 2019-10-22 PROCEDURE — 90682 RIV4 VACC RECOMBINANT DNA IM: CPT | Performed by: PHYSICIAN ASSISTANT

## 2019-10-22 PROCEDURE — 3078F DIAST BP <80 MM HG: CPT | Performed by: PHYSICIAN ASSISTANT

## 2019-10-22 PROCEDURE — 3074F SYST BP LT 130 MM HG: CPT | Performed by: PHYSICIAN ASSISTANT

## 2019-10-22 PROCEDURE — 90471 IMMUNIZATION ADMIN: CPT | Performed by: PHYSICIAN ASSISTANT

## 2019-10-22 PROCEDURE — 36415 COLL VENOUS BLD VENIPUNCTURE: CPT

## 2019-10-22 RX ORDER — FAMOTIDINE 20 MG/1
20 TABLET, FILM COATED ORAL 2 TIMES DAILY
Qty: 30 TABLET | Refills: 0 | Status: SHIPPED | OUTPATIENT
Start: 2019-10-22 | End: 2019-10-22

## 2019-10-22 NOTE — ASSESSMENT & PLAN NOTE
Ultrasound CT were both negative  Does have an appointment with Urology and is going be seeing Gastroenterology another 2 weeks  Has tried Bentyl and simethicone without relief  He has also tried naproxen not helped    Will try Pepcid today due to the fact that he is having epigastric tenderness as well and does have a history of hoarse voice which she is seeing GI for period also due to complaints increased gas and bloating will try this

## 2019-10-23 ENCOUNTER — OFFICE VISIT (OUTPATIENT)
Dept: UROLOGY | Facility: AMBULATORY SURGERY CENTER | Age: 59
End: 2019-10-23
Payer: COMMERCIAL

## 2019-10-23 VITALS
HEIGHT: 63 IN | DIASTOLIC BLOOD PRESSURE: 84 MMHG | HEART RATE: 86 BPM | SYSTOLIC BLOOD PRESSURE: 122 MMHG | BODY MASS INDEX: 27.61 KG/M2 | WEIGHT: 155.8 LBS

## 2019-10-23 DIAGNOSIS — N40.0 BENIGN PROSTATIC HYPERPLASIA, UNSPECIFIED WHETHER LOWER URINARY TRACT SYMPTOMS PRESENT: Primary | ICD-10-CM

## 2019-10-23 DIAGNOSIS — R39.11 BENIGN PROSTATIC HYPERPLASIA WITH URINARY HESITANCY: ICD-10-CM

## 2019-10-23 DIAGNOSIS — N40.1 BENIGN PROSTATIC HYPERPLASIA WITH URINARY HESITANCY: ICD-10-CM

## 2019-10-23 DIAGNOSIS — Z12.5 PROSTATE CANCER SCREENING: ICD-10-CM

## 2019-10-23 DIAGNOSIS — R31.29 MICROSCOPIC HEMATURIA: ICD-10-CM

## 2019-10-23 LAB
POST-VOID RESIDUAL VOLUME, ML POC: 0 ML
SL AMB  POCT GLUCOSE, UA: NORMAL
SL AMB LEUKOCYTE ESTERASE,UA: NORMAL
SL AMB POCT BILIRUBIN,UA: NORMAL
SL AMB POCT BLOOD,UA: NORMAL
SL AMB POCT CLARITY,UA: CLEAR
SL AMB POCT COLOR,UA: YELLOW
SL AMB POCT KETONES,UA: NORMAL
SL AMB POCT NITRITE,UA: NORMAL
SL AMB POCT PH,UA: 6
SL AMB POCT SPECIFIC GRAVITY,UA: 1.02
SL AMB POCT URINE PROTEIN: NORMAL
SL AMB POCT UROBILINOGEN: 0.2

## 2019-10-23 PROCEDURE — 99214 OFFICE O/P EST MOD 30 MIN: CPT | Performed by: NURSE PRACTITIONER

## 2019-10-23 PROCEDURE — 81002 URINALYSIS NONAUTO W/O SCOPE: CPT | Performed by: NURSE PRACTITIONER

## 2019-10-23 PROCEDURE — 51798 US URINE CAPACITY MEASURE: CPT | Performed by: NURSE PRACTITIONER

## 2019-10-23 RX ORDER — TAMSULOSIN HYDROCHLORIDE 0.4 MG/1
0.4 CAPSULE ORAL
Qty: 90 CAPSULE | Refills: 3 | Status: SHIPPED | OUTPATIENT
Start: 2019-10-23 | End: 2021-02-01 | Stop reason: SDUPTHER

## 2019-10-23 NOTE — PATIENT INSTRUCTIONS
Tamsulosin 0 4 mg po daily  Drink a lot of fluid during the day upwards to 60 oz per day  Call the office for concerns fever, chills, blood in the urine  Follow up in 1 year for uroflow and PVR  Urine testing sent from the office today

## 2019-10-23 NOTE — PROGRESS NOTES
10/23/2019      Chief Complaint   Patient presents with    Follow-up    Prostate Check    Benign Prostatic Hypertrophy    epididymal cyst     Assessment and Plan    60 y o  male managed by Dr Vika Champagne    1  Routine prostate cancer screening  · PSA performed 10/22/2019 is 0 4  · JENN reveals a prostate of approximately 40 g smooth, nontender  No nodules noted  · Repeat PSA and JENN in 1 year    2  Microscopic hematuria  · Status post negative microscopic hematuria workup 2014    3  Epididymal cysts  · Asymptomatic  · No change in growth size    4  Benign Prostatic Hyperplasia  · Bladder Scan PVR 0 ml  · Bladder Scan PVR and Uroflow at next office visit  · Tamsulosin 0 4mg po daily- prescription provided     History of Present Illness  Javan Arredondo is a 60 y o  male here for follow up evaluation of  urinary symptoms secondary to benign prostatic hyperplasia, routine prostate cancer screening and microscopic hematuria  Patient also has a history of epididymal cysts which have not changed in size or given him any symptoms of pain and discomfort  Patient's most recent PSA performed 10/22/2019 is 0 4  PSA trend as below  Patient with a negative microscopic hematuria workup performed in 2014  He denies all lower urinary tract symptoms including dysuria, hematuria, urinary frequency and urgency  He denies any recent changes to his health  He is currently satisfied with his urinary health  He continues to take MiraLax avoid 4 mg p o  Daily with no side effects  Component       PSA, Total   Latest Ref Rng & Units       0 0 - 4 0 ng/mL   8/25/2016      11:37 AM 0 4   11/30/2017      9:45 AM 0 4   9/18/2018      10:39 AM 0 4   10/22/2019      9:23 AM 0 4       Review of Systems   Constitutional: Negative for chills and fever  Respiratory: Negative for cough and shortness of breath  Cardiovascular: Negative for chest pain     Gastrointestinal: Negative for abdominal distention, abdominal pain, blood in stool, nausea and vomiting  Genitourinary: Negative for difficulty urinating, dysuria, enuresis, flank pain, frequency, hematuria and urgency  Skin: Negative for rash  Neurological: Negative for dizziness       Past Medical History  Past Medical History:   Diagnosis Date    History of chronic constipation     History of neck pain 11/21/2017    Hypertension        Past Social History  Past Surgical History:   Procedure Laterality Date    CYSTOSCOPY  05/29/2014    diagnostic managed by Jalen Mora    TOOTH EXTRACTION       Social History     Tobacco Use   Smoking Status Former Smoker    Types: Cigarettes   Smokeless Tobacco Never Used       Past Family History  Family History   Problem Relation Age of Onset    Cancer Sister         malignant neoplasm of breast    Diabetes Paternal Grandmother     Diabetes Family     Arthritis Family     Cancer Family         malignant neoplasm    Substance Abuse Neg Hx         denied Mother and Father    Mental illness Neg Hx         no family history Mother and Father    Other Neg Hx         denied family history of Osteopertrosis       Past Social history  Social History     Socioeconomic History    Marital status: Single     Spouse name: Not on file    Number of children: Not on file    Years of education: Not on file    Highest education level: Not on file   Occupational History    Not on file   Social Needs    Financial resource strain: Not on file    Food insecurity:     Worry: Not on file     Inability: Not on file    Transportation needs:     Medical: Not on file     Non-medical: Not on file   Tobacco Use    Smoking status: Former Smoker     Types: Cigarettes    Smokeless tobacco: Never Used   Substance and Sexual Activity    Alcohol use: Yes     Comment: weekly    Drug use: No    Sexual activity: Not on file   Lifestyle    Physical activity:     Days per week: Not on file     Minutes per session: Not on file    Stress: Not on file   Relationships    Social connections:     Talks on phone: Not on file     Gets together: Not on file     Attends Hindu service: Not on file     Active member of club or organization: Not on file     Attends meetings of clubs or organizations: Not on file     Relationship status: Not on file    Intimate partner violence:     Fear of current or ex partner: Not on file     Emotionally abused: Not on file     Physically abused: Not on file     Forced sexual activity: Not on file   Other Topics Concern    Not on file   Social History Narrative    No preference or Hindu beliefs       Current Medications  Current Outpatient Medications   Medication Sig Dispense Refill    lisinopril (ZESTRIL) 5 mg tablet TAKE 1 TABLET BY MOUTH EVERY DAY 90 tablet 1    naproxen (NAPROSYN) 500 mg tablet Take 1 tablet (500 mg total) by mouth 2 (two) times a day with meals 10 tablet 0    omeprazole (PriLOSEC) 40 MG capsule Take 1 capsule (40 mg total) by mouth daily before breakfast 30 minutes prior to breakfast 30 capsule 6    ranitidine (ZANTAC) 300 MG tablet Take 1 tablet (300 mg total) by mouth daily at bedtime 30 tablet 6    tamsulosin (FLOMAX) 0 4 mg Take 1 capsule (0 4 mg total) by mouth daily with dinner 90 capsule 3    polyethylene glycol (MIRALAX) 17 g packet Take 17 g by mouth daily 14 each 0     No current facility-administered medications for this visit  Allergies  No Known Allergies      The following portions of the patient's history were reviewed and updated as appropriate: allergies, current medications, past medical history, past social history, past surgical history and problem list       Vitals  Vitals:    10/23/19 1219   BP: 122/84   BP Location: Left arm   Patient Position: Sitting   Cuff Size: Adult   Pulse: 86   Weight: 70 7 kg (155 lb 12 8 oz)   Height: 5' 3" (1 6 m)           Physical Exam  Physical Exam   Constitutional: He is oriented to person, place, and time  He appears well-nourished     Cardiovascular: Normal rate  Pulmonary/Chest: Effort normal and breath sounds normal  No respiratory distress  Genitourinary:   Genitourinary Comments: Prostate approximately 40 g smooth  No tendernes noted  No nodules noted   Musculoskeletal: Normal range of motion  Neurological: He is alert and oriented to person, place, and time  Skin: Skin is warm  Vitals reviewed  Results  No results found for this or any previous visit (from the past 1 hour(s)) ]  Lab Results   Component Value Date    PSA 0 4 10/22/2019    PSA 0 4 09/18/2018    PSA 0 4 11/30/2017     Lab Results   Component Value Date    GLUCOSE 113 09/15/2015    CALCIUM 9 3 11/03/2019     09/15/2015    K 4 2 11/03/2019    CO2 29 11/03/2019     11/03/2019    BUN 21 11/03/2019    CREATININE 1 35 (H) 11/03/2019     Lab Results   Component Value Date    WBC 4 26 (L) 11/03/2019    HGB 15 5 11/03/2019    HCT 47 1 11/03/2019    MCV 93 11/03/2019     11/03/2019     Orders  Orders Placed This Encounter   Procedures    Urine culture    PSA, Total Screen     This is a patient instruction: This test is non-fasting  Please drink two glasses of water morning of bloodwork          Standing Status:   Future     Standing Expiration Date:   4/23/2021    Urinalysis with microscopic    POCT Measure PVR    POCT urine dip       ALE Grace

## 2019-10-28 DIAGNOSIS — N28.9 ABNORMAL RENAL FUNCTION: Primary | ICD-10-CM

## 2019-10-28 NOTE — RESULT ENCOUNTER NOTE
Cholesterol was good but he does have a slightly decreased kidney function  I am not sure if he still taking naproxen but I would recommend stopping that  I would avoid taking any other anti-inflammatories as ibuprofen or Aleve  He can take Tylenol for gets pain  recommend that we recheck his level again in another month    When he had CT scan done in the emergency room this summer his kidneys had looked normal

## 2019-11-03 ENCOUNTER — APPOINTMENT (EMERGENCY)
Dept: CT IMAGING | Facility: HOSPITAL | Age: 59
End: 2019-11-03
Payer: COMMERCIAL

## 2019-11-03 ENCOUNTER — HOSPITAL ENCOUNTER (EMERGENCY)
Facility: HOSPITAL | Age: 59
Discharge: HOME | End: 2019-11-03
Attending: EMERGENCY MEDICINE | Admitting: EMERGENCY MEDICINE
Payer: COMMERCIAL

## 2019-11-03 VITALS
DIASTOLIC BLOOD PRESSURE: 89 MMHG | SYSTOLIC BLOOD PRESSURE: 121 MMHG | BODY MASS INDEX: 27.45 KG/M2 | HEART RATE: 71 BPM | TEMPERATURE: 98.4 F | WEIGHT: 154.98 LBS | OXYGEN SATURATION: 100 % | RESPIRATION RATE: 16 BRPM

## 2019-11-03 DIAGNOSIS — K59.00 CONSTIPATION: Primary | ICD-10-CM

## 2019-11-03 DIAGNOSIS — N20.0 RENAL CALCULUS, RIGHT: ICD-10-CM

## 2019-11-03 LAB
ALBUMIN SERPL BCP-MCNC: 3.5 G/DL (ref 3.5–5)
ALP SERPL-CCNC: 111 U/L (ref 46–116)
ALT SERPL W P-5'-P-CCNC: 42 U/L (ref 12–78)
ANION GAP SERPL CALCULATED.3IONS-SCNC: 4 MMOL/L (ref 4–13)
AST SERPL W P-5'-P-CCNC: 36 U/L (ref 5–45)
BACTERIA UR QL AUTO: ABNORMAL /HPF
BASOPHILS # BLD AUTO: 0.03 THOUSANDS/ΜL (ref 0–0.1)
BASOPHILS NFR BLD AUTO: 1 % (ref 0–1)
BILIRUB SERPL-MCNC: 0.63 MG/DL (ref 0.2–1)
BILIRUB UR QL STRIP: NEGATIVE
BUN SERPL-MCNC: 21 MG/DL (ref 5–25)
CALCIUM SERPL-MCNC: 9.3 MG/DL (ref 8.3–10.1)
CHLORIDE SERPL-SCNC: 104 MMOL/L (ref 100–108)
CLARITY UR: CLEAR
CO2 SERPL-SCNC: 29 MMOL/L (ref 21–32)
COLOR UR: YELLOW
CREAT SERPL-MCNC: 1.35 MG/DL (ref 0.6–1.3)
EOSINOPHIL # BLD AUTO: 0.22 THOUSAND/ΜL (ref 0–0.61)
EOSINOPHIL NFR BLD AUTO: 5 % (ref 0–6)
ERYTHROCYTE [DISTWIDTH] IN BLOOD BY AUTOMATED COUNT: 14.2 % (ref 11.6–15.1)
GFR SERPL CREATININE-BSD FRML MDRD: 57 ML/MIN/1.73SQ M
GLUCOSE SERPL-MCNC: 82 MG/DL (ref 65–140)
GLUCOSE UR STRIP-MCNC: NEGATIVE MG/DL
HCT VFR BLD AUTO: 47.1 % (ref 36.5–49.3)
HGB BLD-MCNC: 15.5 G/DL (ref 12–17)
HGB UR QL STRIP.AUTO: ABNORMAL
IMM GRANULOCYTES # BLD AUTO: 0.01 THOUSAND/UL (ref 0–0.2)
IMM GRANULOCYTES NFR BLD AUTO: 0 % (ref 0–2)
KETONES UR STRIP-MCNC: NEGATIVE MG/DL
LEUKOCYTE ESTERASE UR QL STRIP: NEGATIVE
LIPASE SERPL-CCNC: 285 U/L (ref 73–393)
LYMPHOCYTES # BLD AUTO: 1.76 THOUSANDS/ΜL (ref 0.6–4.47)
LYMPHOCYTES NFR BLD AUTO: 41 % (ref 14–44)
MCH RBC QN AUTO: 30.6 PG (ref 26.8–34.3)
MCHC RBC AUTO-ENTMCNC: 32.9 G/DL (ref 31.4–37.4)
MCV RBC AUTO: 93 FL (ref 82–98)
MONOCYTES # BLD AUTO: 0.39 THOUSAND/ΜL (ref 0.17–1.22)
MONOCYTES NFR BLD AUTO: 9 % (ref 4–12)
NEUTROPHILS # BLD AUTO: 1.85 THOUSANDS/ΜL (ref 1.85–7.62)
NEUTS SEG NFR BLD AUTO: 44 % (ref 43–75)
NITRITE UR QL STRIP: NEGATIVE
NON-SQ EPI CELLS URNS QL MICRO: ABNORMAL /HPF
NRBC BLD AUTO-RTO: 0 /100 WBCS
PH UR STRIP.AUTO: 7 [PH] (ref 4.5–8)
PLATELET # BLD AUTO: 248 THOUSANDS/UL (ref 149–390)
PMV BLD AUTO: 10.5 FL (ref 8.9–12.7)
POTASSIUM SERPL-SCNC: 4.2 MMOL/L (ref 3.5–5.3)
PROT SERPL-MCNC: 7.4 G/DL (ref 6.4–8.2)
PROT UR STRIP-MCNC: NEGATIVE MG/DL
RBC # BLD AUTO: 5.07 MILLION/UL (ref 3.88–5.62)
RBC #/AREA URNS AUTO: ABNORMAL /HPF
SODIUM SERPL-SCNC: 137 MMOL/L (ref 136–145)
SP GR UR STRIP.AUTO: 1.01 (ref 1–1.03)
UROBILINOGEN UR QL STRIP.AUTO: 0.2 E.U./DL
WBC # BLD AUTO: 4.26 THOUSAND/UL (ref 4.31–10.16)
WBC #/AREA URNS AUTO: ABNORMAL /HPF

## 2019-11-03 PROCEDURE — 96361 HYDRATE IV INFUSION ADD-ON: CPT

## 2019-11-03 PROCEDURE — 80053 COMPREHEN METABOLIC PANEL: CPT | Performed by: EMERGENCY MEDICINE

## 2019-11-03 PROCEDURE — 81001 URINALYSIS AUTO W/SCOPE: CPT

## 2019-11-03 PROCEDURE — 36415 COLL VENOUS BLD VENIPUNCTURE: CPT | Performed by: EMERGENCY MEDICINE

## 2019-11-03 PROCEDURE — 74177 CT ABD & PELVIS W/CONTRAST: CPT

## 2019-11-03 PROCEDURE — 99284 EMERGENCY DEPT VISIT MOD MDM: CPT | Performed by: EMERGENCY MEDICINE

## 2019-11-03 PROCEDURE — 83690 ASSAY OF LIPASE: CPT | Performed by: EMERGENCY MEDICINE

## 2019-11-03 PROCEDURE — 99284 EMERGENCY DEPT VISIT MOD MDM: CPT

## 2019-11-03 PROCEDURE — 85025 COMPLETE CBC W/AUTO DIFF WBC: CPT | Performed by: EMERGENCY MEDICINE

## 2019-11-03 PROCEDURE — 96360 HYDRATION IV INFUSION INIT: CPT

## 2019-11-03 RX ORDER — POLYETHYLENE GLYCOL 3350 17 G/17G
17 POWDER, FOR SOLUTION ORAL DAILY
Qty: 14 EACH | Refills: 0 | Status: SHIPPED | OUTPATIENT
Start: 2019-11-03 | End: 2019-12-26 | Stop reason: SDUPTHER

## 2019-11-03 RX ORDER — SUCRALFATE ORAL 1 G/10ML
1000 SUSPENSION ORAL ONCE
Status: COMPLETED | OUTPATIENT
Start: 2019-11-03 | End: 2019-11-03

## 2019-11-03 RX ADMIN — SUCRALFATE 1000 MG: 1 SUSPENSION ORAL at 10:50

## 2019-11-03 RX ADMIN — IODIXANOL 100 ML: 320 INJECTION, SOLUTION INTRAVASCULAR at 11:14

## 2019-11-03 RX ADMIN — SODIUM CHLORIDE 1000 ML: 0.9 INJECTION, SOLUTION INTRAVENOUS at 10:31

## 2019-11-03 NOTE — ED PROVIDER NOTES
History  Chief Complaint   Patient presents with    Abdominal Pain     Has had right sided abdominal pain for the past 5 months  Patient is a 60year old male with a past medical history significant for chronic right lower abdominal pain x 6 months who presents with acute abdominal pain different than baseline  Patient reports that over the last 6 months he has been having intermittent right lower quadrant abdominal pain that he described as sharp, radiating the to right flank sometimes, 6 out of 10 in intensity with a few episodes of dysuria  This week, the pain became more sharp and is now constant without radiation, just in the RLQ  Denies fevers, chills, abdominal distention, blood in the urine or stool, n/v/d  Review of medical records shows that patient was evaluated at [facility] and had CT imaging on 9/9/19 which was negative and notes state that he has a follow up appointment with gastroenterology scheduled  Impression:   No renal stone or obstruction  Normal appendix  No sign of acute abdomen and  pelvis  Prior to Admission Medications   Prescriptions Last Dose Informant Patient Reported?  Taking?   lisinopril (ZESTRIL) 5 mg tablet  Self No Yes   Sig: TAKE 1 TABLET BY MOUTH EVERY DAY   naproxen (NAPROSYN) 500 mg tablet Unknown at Unknown time Self No No   Sig: Take 1 tablet (500 mg total) by mouth 2 (two) times a day with meals   omeprazole (PriLOSEC) 40 MG capsule  Self No Yes   Sig: Take 1 capsule (40 mg total) by mouth daily before breakfast 30 minutes prior to breakfast   ranitidine (ZANTAC) 300 MG tablet  Self No Yes   Sig: Take 1 tablet (300 mg total) by mouth daily at bedtime   tamsulosin (FLOMAX) 0 4 mg   No Yes   Sig: Take 1 capsule (0 4 mg total) by mouth daily with dinner      Facility-Administered Medications: None       Past Medical History:   Diagnosis Date    History of chronic constipation     History of neck pain 11/21/2017    Hypertension        Past Surgical History: Procedure Laterality Date    CYSTOSCOPY  05/29/2014    diagnostic managed by Jalen Mora    TOOTH EXTRACTION         Family History   Problem Relation Age of Onset   [facility] Cancer Sister         malignant neoplasm of breast    Diabetes Paternal Grandmother     Diabetes Family     Arthritis Family     Cancer Family         malignant neoplasm    Substance Abuse Neg Hx         denied Mother and Father    Mental illness Neg Hx         no family history Mother and Father    Other Neg Hx         denied family history of Osteopertrosis     I have reviewed and agree with the history as documented  Social History     Tobacco Use    Smoking status: Former Smoker     Types: Cigarettes    Smokeless tobacco: Never Used   Substance Use Topics    Alcohol use: Yes     Comment: weekly    Drug use: No        Review of Systems   Constitutional: Negative for chills and fever  HENT: Negative for congestion and rhinorrhea  Eyes: Negative for photophobia and visual disturbance  Respiratory: Negative for cough and shortness of breath  Cardiovascular: Negative for chest pain and palpitations  Gastrointestinal: Positive for abdominal pain  Negative for abdominal distention, blood in stool, constipation, diarrhea, nausea and vomiting  Genitourinary: Positive for dysuria and flank pain  Negative for discharge, frequency, hematuria, penile pain and testicular pain  Musculoskeletal: Negative for back pain and neck pain  Skin: Negative for pallor and rash  Neurological: Negative for dizziness, light-headedness and headaches  Physical Exam  Physical Exam   Constitutional: He is oriented to person, place, and time  He appears well-developed and well-nourished  Non-toxic appearance  He does not appear ill  No distress  HENT:   Head: Normocephalic and atraumatic     Right Ear: External ear normal    Left Ear: External ear normal    Nose: Nose normal    Mouth/Throat: Oropharynx is clear and moist  No oropharyngeal exudate  Eyes: Pupils are equal, round, and reactive to light  Conjunctivae and EOM are normal    Neck: Normal range of motion  Neck supple  No tracheal deviation present  Cardiovascular: Normal rate, regular rhythm, normal heart sounds and intact distal pulses  Exam reveals no gallop and no friction rub  No murmur heard  Pulmonary/Chest: Effort normal and breath sounds normal  No stridor  No respiratory distress  He has no wheezes  He has no rhonchi  He has no rales  He exhibits no tenderness  Abdominal: Soft  Normal appearance and bowel sounds are normal  He exhibits no distension, no ascites, no pulsatile midline mass and no mass  There is tenderness in the right lower quadrant  There is no rigidity, no rebound, no guarding, no CVA tenderness, no tenderness at McBurney's point and negative Muniz's sign  No hernia  Musculoskeletal: Normal range of motion  He exhibits no edema or tenderness  Lymphadenopathy:     He has no cervical adenopathy  Neurological: He is alert and oriented to person, place, and time  Skin: Skin is warm and dry  He is not diaphoretic  No erythema  Psychiatric: He has a normal mood and affect  His behavior is normal    Nursing note and vitals reviewed        Vital Signs  ED Triage Vitals [11/03/19 1002]   Temperature Pulse Respirations Blood Pressure SpO2   98 4 °F (36 9 °C) 67 20 130/83 96 %      Temp Source Heart Rate Source Patient Position - Orthostatic VS BP Location FiO2 (%)   Oral Monitor Lying Left arm --      Pain Score       6           Vitals:    11/03/19 1002 11/03/19 1151   BP: 130/83 121/89   Pulse: 67 71   Patient Position - Orthostatic VS: Lying          Visual Acuity      ED Medications  Medications   sodium chloride 0 9 % bolus 1,000 mL (0 mL Intravenous Stopped 11/3/19 1207)   sucralfate (CARAFATE) oral suspension 1,000 mg (1,000 mg Oral Given 11/3/19 1050)   iodixanol (VISIPAQUE) 320 MG/ML injection 100 mL (100 mL Intravenous Given 11/3/19 1114)       Diagnostic Studies  Results Reviewed     Procedure Component Value Units Date/Time    Urine Microscopic [[id number]]  (Abnormal) Collected:  11/03/19 1042    Lab Status:  Final result Specimen:  Urine, Clean Catch Updated:  11/03/19 1216     RBC, UA 0-1 /hpf      WBC, UA None Seen /hpf      Epithelial Cells None Seen /hpf      Bacteria, UA None Seen /hpf     Comprehensive metabolic panel [[id number]]  (Abnormal) Collected:  11/03/19 1025    Lab Status:  Final result Specimen:  Blood from Arm, Right Updated:  11/03/19 1102     Sodium 137 mmol/L      Potassium 4 2 mmol/L      Chloride 104 mmol/L      CO2 29 mmol/L      ANION GAP 4 mmol/L      BUN 21 mg/dL      Creatinine 1 35 mg/dL      Glucose 82 mg/dL      Calcium 9 3 mg/dL      AST 36 U/L      ALT 42 U/L      Alkaline Phosphatase 111 U/L      Total Protein 7 4 g/dL      Albumin 3 5 g/dL      Total Bilirubin 0 63 mg/dL      eGFR 57 ml/min/1 73sq m     Narrative:       [place] guidelines for Chronic Kidney Disease (CKD):     Stage 1 with normal or high GFR (GFR > 90 mL/min/1 73 square meters)    Stage 2 Mild CKD (GFR = 60-89 mL/min/1 73 square meters)    Stage 3A Moderate CKD (GFR = 45-59 mL/min/1 73 square meters)    Stage 3B Moderate CKD (GFR = 30-44 mL/min/1 73 square meters)    Stage 4 Severe CKD (GFR = 15-29 mL/min/1 73 square meters)    Stage 5 End Stage CKD (GFR <15 mL/min/1 73 square meters)  Note: GFR calculation is accurate only with a steady state creatinine    Lipase [[id number]]  (Normal) Collected:  11/03/19 1025    Lab Status:  Final result Specimen:  Blood from Arm, Right Updated:  11/03/19 1102     Lipase 285 u/L     POCT urinalysis dipstick [[id number]]  (Abnormal) Resulted:  11/03/19 1047    Lab Status:  Final result Specimen:  Urine Updated:  11/03/19 1047    ED Urine Macroscopic [[id number]]  (Abnormal) Collected:  11/03/19 1042    Lab Status:  Final result Specimen:  Urine Updated:  11/03/19 1043 Color, UA Yellow     Clarity, UA Clear     pH, UA 7 0     Leukocytes, UA Negative     Nitrite, UA Negative     Protein, UA Negative mg/dl      Glucose, UA Negative mg/dl      Ketones, UA Negative mg/dl      Urobilinogen, UA 0 2 E U /dl      Bilirubin, UA Negative     Blood, UA Trace     Specific [place], UA 1 015    Narrative:       CLINITEK RESULT    CBC and differential [[id number]]  (Abnormal) Collected:  11/03/19 1025    Lab Status:  Final result Specimen:  Blood from Arm, Right Updated:  11/03/19 1038     WBC 4 26 Thousand/uL      RBC 5 07 Million/uL      Hemoglobin 15 5 g/dL      Hematocrit 47 1 %      MCV 93 fL      MCH 30 6 pg      MCHC 32 9 g/dL      RDW 14 2 %      MPV 10 5 fL      Platelets 694 Thousands/uL      nRBC 0 /100 WBCs      Neutrophils Relative 44 %      Immat GRANS % 0 %      Lymphocytes Relative 41 %      Monocytes Relative 9 %      Eosinophils Relative 5 %      Basophils Relative 1 %      Neutrophils Absolute 1 85 Thousands/µL      Immature Grans Absolute 0 01 Thousand/uL      Lymphocytes Absolute 1 76 Thousands/µL      Monocytes Absolute 0 39 Thousand/µL      Eosinophils Absolute 0 22 Thousand/µL      Basophils Absolute 0 03 Thousands/µL                  CT abdomen pelvis with contrast   Final Result by Ofelia Gastelum DO (11/03 1138)   1  No acute abdominal pelvic abnormality  2   Stable nonobstructing right upper pole renal calculi  3   Mildly enlarged prostate gland with findings suggestive of mild bladder outlet obstruction  Workstation performed: [id number]                    Procedures  Procedures       ED Course  ED Course as of Nov 03 1343   [facility] Nov 03, 2019   1146 Stable nonobstructing right upper pole renal calculi  MDM  Number of Diagnoses or Management Options  Constipation:   Renal calculus, right:   Diagnosis management comments: Assessment and Plan:   Acute on chronic RLQ abdominal pain   Patient reports change from baseline type of pain  Abdominal exam is benign  Will check labs, urinalysis and repeat imaging  Treat with carafate; Reassess  Patient reported feeling improvement post carafate  Discussed CT findings with patient and that he has signs of slow transit consistent with constipation as well as right kidney stone  Counseled patient to follow up with urologist, gastroenterologist, and start taking miralax daily PRN  Discussed strict return precautions with patient including, but not limited to gross hematuria, inability to urinate, worsening abdominal pain, change in quality of abdominal pain, fevers, blood in the stools  Patient verbalized understanding and had no questions at time of discharge      Disposition  Final diagnoses:   Constipation   Renal calculus, right     Time reflects when diagnosis was documented in both MDM as applicable and the Disposition within this note     Time User Action Codes Description Comment    11/3/2019 11:47 AM Laura Velazquez Add [K59 00] Constipation     11/3/2019 11:47 AM Laura Velazquez Add [N20 0] Renal calculus, right       ED Disposition     ED Disposition Condition Date/Time Comment    Discharge Stable Sun Nov 3, 2019 11:47 AM Shereen Cai discharge to home/self care              Follow-up Information     Follow up With Specialties Details Why Contact Info Additional Information    Torsten Scott PA-C Family Medicine, Physician Assistant Schedule an appointment as soon as possible for a visit in 3 days for re-evaluation [address]  [address] [facility]  [place]   [phone]  [phone]  [address] [facility] Department Emergency Medicine Go to  As needed, If symptoms worsen, for re-evaluation [facility] [id number] [phone] [place] ED, [address] [facility] [address]  , [place], [place], [address]          Discharge Medication List as of 11/3/2019 11:50 AM      START taking these medications    Details   polyethylene glycol (MIRALAX) 17 g packet Take 17 g by mouth daily, Starting Sun 11/3/2019, Normal         CONTINUE these medications which have NOT CHANGED    Details   lisinopril (ZESTRIL) 5 mg tablet TAKE 1 TABLET BY MOUTH EVERY DAY, Normal      omeprazole (PriLOSEC) 40 MG capsule Take 1 capsule (40 mg total) by mouth daily before breakfast 30 minutes prior to breakfast, Starting Tue 10/22/2019, Normal      ranitidine (ZANTAC) 300 MG tablet Take 1 tablet (300 mg total) by mouth daily at bedtime, Starting Tue 10/22/2019, Normal      tamsulosin (FLOMAX) 0 4 mg Take 1 capsule (0 4 mg total) by mouth daily with dinner, Starting Wed 10/23/2019, Normal      naproxen (NAPROSYN) 500 mg tablet Take 1 tablet (500 mg total) by mouth 2 (two) times a day with meals, Starting Mon 10/14/2019, Print           No discharge procedures on file      ED Provider  Electronically Signed by           Marcus Garner DO  11/03/19 6893

## 2019-11-03 NOTE — DISCHARGE INSTRUCTIONS
Keep your appointments with Gastroenterology as well as Urology  Follow up with your primary care provider in 3-5 days for re-evaluation  Return to the emergency department for the following, but not limited to worsening abdominal pain, blood in your stool, fever, blood in your urine, inability to urinate

## 2019-11-07 ENCOUNTER — OFFICE VISIT (OUTPATIENT)
Dept: FAMILY MEDICINE CLINIC | Facility: CLINIC | Age: 59
End: 2019-11-07

## 2019-11-07 VITALS
RESPIRATION RATE: 18 BRPM | TEMPERATURE: 98 F | SYSTOLIC BLOOD PRESSURE: 112 MMHG | WEIGHT: 156.9 LBS | OXYGEN SATURATION: 98 % | HEART RATE: 66 BPM | HEIGHT: 63 IN | BODY MASS INDEX: 27.8 KG/M2 | DIASTOLIC BLOOD PRESSURE: 78 MMHG

## 2019-11-07 DIAGNOSIS — R21 RASH: Primary | ICD-10-CM

## 2019-11-07 DIAGNOSIS — N28.9 ABNORMAL RENAL FUNCTION: ICD-10-CM

## 2019-11-07 PROCEDURE — 3008F BODY MASS INDEX DOCD: CPT | Performed by: FAMILY MEDICINE

## 2019-11-07 PROCEDURE — 99213 OFFICE O/P EST LOW 20 MIN: CPT | Performed by: FAMILY MEDICINE

## 2019-11-07 RX ORDER — FAMOTIDINE 20 MG/1
20 TABLET, FILM COATED ORAL 2 TIMES DAILY
Refills: 0 | COMMUNITY
Start: 2019-10-22 | End: 2020-04-08 | Stop reason: SDUPTHER

## 2019-11-07 RX ORDER — DIPHENHYDRAMINE HCL 25 MG
25 TABLET ORAL 2 TIMES DAILY PRN
Qty: 30 TABLET | Refills: 0 | Status: SHIPPED | OUTPATIENT
Start: 2019-11-07 | End: 2021-05-13

## 2019-11-07 RX ORDER — DIAPER,BRIEF,INFANT-TODD,DISP
EACH MISCELLANEOUS 2 TIMES DAILY
Qty: 30 G | Refills: 0 | Status: SHIPPED | OUTPATIENT
Start: 2019-11-07 | End: 2020-04-29 | Stop reason: ALTCHOICE

## 2019-11-07 NOTE — ASSESSMENT & PLAN NOTE
- Suspect contact v/s allergic dermatitis v/s drug eruption  - LFTs 11/3 wnl  Creat 1 35 with normal BUN  Repeat CMP at follow up  - No mucosa involvement  No fever/ arthritis/ arthralgia/ hematuria  - Benadryl 25 mg BID prn  Start HS  Caution with sedation  Increase to BID prn  - Topical hydrocortisone to affected area  Avoid application to face  - RTC in one week  - Trial holding prilosec and tamsulosin for a week  Continue only Zantac  If present at follow up will stop Zantac and place on Prilosec  Resume Tamsulosin a week thereafter if no rash when on prilosec

## 2019-11-07 NOTE — PROGRESS NOTES
Assessment/Plan:    Rash  - Suspect contact v/s allergic dermatitis v/s drug eruption  - LFTs 11/3 wnl  Creat 1 35 with normal BUN  Repeat CMP at follow up  - No mucosa involvement  No fever/ arthritis/ arthralgia/ hematuria  - Benadryl 25 mg BID prn  Start HS  Caution with sedation  Increase to BID prn  - Topical hydrocortisone to affected area  Avoid application to face  - RTC in one week  - Trial holding prilosec and tamsulosin for a week  Continue only Zantac  If present at follow up will stop Zantac and place on Prilosec  Resume Tamsulosin a week thereafter if no rash when on prilosec  Diagnoses and all orders for this visit:    Rash  -     diphenhydrAMINE (BENADRYL) 25 mg tablet; Take 1 tablet (25 mg total) by mouth 2 (two) times a day as needed for itching  -     hydrocortisone 0 5 % cream; Apply topically 2 (two) times a day for 14 days Avoid application to face    Abnormal renal function  -     Comprehensive metabolic panel; Future    Other orders  -     famotidine (PEPCID) 20 mg tablet; Take 20 mg by mouth 2 (two) times a day          Subjective:      Patient ID: Chela Queen is a 60 y o  male  60 y o male presents as acute visit for mesha and itching  C/O rash and itching x 4 days  Over trunk and proximal arms  No rash over face/ LE  No change in soap/ detergent/lotion  No pet animals  No rash in family members  No known allergies  No similar symptoms in past  No fever/ joint pain/ joint swelling/ throat pain/ eye redness/ hematuria  States he started taking prilosec, zantac and tamsulosin a week prior     11/3/2019 CMP with creat 1 35  Normal LFTs  Normal BUN  The following portions of the patient's history were reviewed and updated as appropriate: He  has a past medical history of History of chronic constipation, History of neck pain (11/21/2017), and Hypertension    Patient Active Problem List    Diagnosis Date Noted    Rash 11/07/2019    Abdominal pain, chronic, right lower quadrant 05/13/2019    Right-sided low back pain with right-sided sciatica 06/28/2018    Hoarseness of voice 05/17/2018    Acute pain of both knees 01/24/2018    Chronic tension headache 01/24/2018    Left shoulder pain 06/07/2017    Benign colon polyp 03/24/2017    Enlarged prostate without lower urinary tract symptoms (luts) 07/18/2016    Varicocele 01/12/2016    Mild vitamin D deficiency 06/10/2015    Impingement syndrome, shoulder, left 05/16/2014    Pain in joint of right hip 02/04/2014    Microscopic hematuria 11/19/2013    Renal calculus, right 11/19/2013    Atypical chest pain 06/19/2013    Benign essential hypertension 10/10/2012    Abnormal glucose 10/10/2012     He  has a past surgical history that includes Cystoscopy (05/29/2014) and Tooth extraction  His family history includes Arthritis in his family; Cancer in his family and sister; Diabetes in his family and paternal grandmother  He  reports that he has quit smoking  His smoking use included cigarettes  He has never used smokeless tobacco  He reports that he drinks alcohol  He reports that he does not use drugs    Current Outpatient Medications   Medication Sig Dispense Refill    famotidine (PEPCID) 20 mg tablet Take 20 mg by mouth 2 (two) times a day  0    lisinopril (ZESTRIL) 5 mg tablet TAKE 1 TABLET BY MOUTH EVERY DAY 90 tablet 1    naproxen (NAPROSYN) 500 mg tablet Take 1 tablet (500 mg total) by mouth 2 (two) times a day with meals 10 tablet 0    omeprazole (PriLOSEC) 40 MG capsule Take 1 capsule (40 mg total) by mouth daily before breakfast 30 minutes prior to breakfast 30 capsule 6    polyethylene glycol (MIRALAX) 17 g packet Take 17 g by mouth daily 14 each 0    ranitidine (ZANTAC) 300 MG tablet Take 1 tablet (300 mg total) by mouth daily at bedtime 30 tablet 6    tamsulosin (FLOMAX) 0 4 mg Take 1 capsule (0 4 mg total) by mouth daily with dinner 90 capsule 3    diphenhydrAMINE (BENADRYL) 25 mg tablet Take 1 tablet (25 mg total) by mouth 2 (two) times a day as needed for itching 30 tablet 0    hydrocortisone 0 5 % cream Apply topically 2 (two) times a day for 14 days Avoid application to face 30 g 0     No current facility-administered medications for this visit  Current Outpatient Medications on File Prior to Visit   Medication Sig    famotidine (PEPCID) 20 mg tablet Take 20 mg by mouth 2 (two) times a day    lisinopril (ZESTRIL) 5 mg tablet TAKE 1 TABLET BY MOUTH EVERY DAY    naproxen (NAPROSYN) 500 mg tablet Take 1 tablet (500 mg total) by mouth 2 (two) times a day with meals    omeprazole (PriLOSEC) 40 MG capsule Take 1 capsule (40 mg total) by mouth daily before breakfast 30 minutes prior to breakfast    polyethylene glycol (MIRALAX) 17 g packet Take 17 g by mouth daily    ranitidine (ZANTAC) 300 MG tablet Take 1 tablet (300 mg total) by mouth daily at bedtime    tamsulosin (FLOMAX) 0 4 mg Take 1 capsule (0 4 mg total) by mouth daily with dinner     No current facility-administered medications on file prior to visit  He has No Known Allergies       Review of Systems   Constitutional: Negative for chills and fever  HENT: Negative for congestion, sore throat and trouble swallowing  Eyes: Negative for redness and itching  Respiratory: Negative for cough, shortness of breath and wheezing  Cardiovascular: Negative for chest pain, palpitations and leg swelling  Gastrointestinal: Negative for abdominal pain, blood in stool, constipation, diarrhea, nausea and vomiting  Genitourinary: Negative for dysuria and hematuria  Musculoskeletal: Negative for arthralgias  Skin: Positive for rash  Allergic/Immunologic: Negative for immunocompromised state           Objective:      /78 (BP Location: Left arm, Patient Position: Sitting, Cuff Size: Standard)   Pulse 66   Temp 98 °F (36 7 °C) (Temporal)   Resp 18   Ht 5' 3" (1 6 m)   Wt 71 2 kg (156 lb 14 4 oz)   SpO2 98%   BMI 27 79 kg/m² Physical Exam   Constitutional: He is oriented to person, place, and time  He appears well-developed and well-nourished  No distress  HENT:   Head: Normocephalic and atraumatic  Right Ear: External ear normal    Left Ear: External ear normal    Mouth/Throat: No oropharyngeal exudate  Eyes: Conjunctivae and EOM are normal  Right eye exhibits no discharge  Left eye exhibits no discharge  No scleral icterus  Neck: Normal range of motion  Cardiovascular: Normal rate, regular rhythm and normal heart sounds  Exam reveals no gallop and no friction rub  No murmur heard  Pulmonary/Chest: Effort normal and breath sounds normal  No respiratory distress  He has no wheezes  He has no rales  Abdominal: Soft  He exhibits no distension  There is no tenderness  There is no rebound and no guarding  Musculoskeletal: He exhibits no edema or tenderness  Lymphadenopathy:     He has no cervical adenopathy  Neurological: He is alert and oriented to person, place, and time  He exhibits normal muscle tone  Skin: Skin is warm  Rash (Raised, slightly erythematous, non tender, maculopapular rash over trunk, back  (Picture in media)) noted  He is not diaphoretic  Psychiatric: He has a normal mood and affect  Vitals reviewed

## 2019-11-13 ENCOUNTER — OFFICE VISIT (OUTPATIENT)
Dept: GASTROENTEROLOGY | Facility: MEDICAL CENTER | Age: 59
End: 2019-11-13
Payer: COMMERCIAL

## 2019-11-13 VITALS
HEART RATE: 63 BPM | HEIGHT: 63 IN | SYSTOLIC BLOOD PRESSURE: 116 MMHG | TEMPERATURE: 98.3 F | BODY MASS INDEX: 28.1 KG/M2 | DIASTOLIC BLOOD PRESSURE: 70 MMHG | WEIGHT: 158.6 LBS

## 2019-11-13 DIAGNOSIS — R10.31 ABDOMINAL PAIN, CHRONIC, RIGHT LOWER QUADRANT: ICD-10-CM

## 2019-11-13 DIAGNOSIS — K59.00 CONSTIPATION, UNSPECIFIED CONSTIPATION TYPE: Primary | ICD-10-CM

## 2019-11-13 DIAGNOSIS — N50.819 TESTICULAR PAIN: ICD-10-CM

## 2019-11-13 DIAGNOSIS — G89.29 ABDOMINAL PAIN, CHRONIC, RIGHT LOWER QUADRANT: ICD-10-CM

## 2019-11-13 PROCEDURE — 99244 OFF/OP CNSLTJ NEW/EST MOD 40: CPT | Performed by: INTERNAL MEDICINE

## 2019-11-13 NOTE — PROGRESS NOTES
[facility] - Outpatient Consultation  Angle Hodge 60 y o  male MRN: [id number]  Encounter: [id number]          ASSESSMENT AND PLAN:    Angle Hodge is a 60 y o  male who presents with complaint of abdominal pain in the RLQ, but also some RUQ  He also notes testicular pain  Recent CT imaging, blood work, colonoscopy report from August all reviewed and overall normal  Suspect some of his symptoms could be from constipation (idiopathic vs IBS-C)  Will obtain further imaging and try to symptomatically relieve his constipation  Will then reassess his symptoms  1  Constipation, unspecified constipation type    2  Abdominal pain, chronic, right lower quadrant    3  Testicular pain      Orders Placed This Encounter   Procedures    US abdomen limited    US scrotum and testicles       Today we discussed:  -- We will get an ultrasound of the gallbladder as well as the groin and testicles  -- For constipation, please make sure to drink plenty of water (enough so that your urine is a light yellow color), try to exercise for 15-30 minutes on most days of the week, increase fiber in your diet (with fresh fruits, vegetables and whole grains)  -- We can start a bowel regimen with the goal of having at least 1 soft but formed bowel movement per day  I would like you to start taking 1 capful of Miralax every evening with 10 ounces of water      Follow-up in the office in 6 weeks, but be in touch via the [facility] portal or by phone with questions, concerns or changes  ______________________________________________________________________    HPI:    Angle Hodge is a 60 y o  male who presents with complaint of RLQ abdominal pain  He noticed that 6 months ago he began to have RLQ abdominal pain  It comes and goes, but it occurs most days of the week  No clear triggers and it can occur even if he is lying down  Nothing makes it better and he does not take anything for it   Nothing makes the pain worse  The pain feels like a stabbing pain, and it can last all day  He feels that it radiates to his back and a little bit to the leg  He also notes testicular pain and an occasional lump in his right groin  No diarrhea  He has a BM every day or every other day  It can be hard and he has to strain  He does not take anything for it  No BRBPR or melena  No heartburn, dysphagia, odynophagia, nausea, vomiting, weight loss  FH notable for an uncle with stomach cancer (in 76s)  He had a colonoscopy last in 8/2019 with no polyps (he had one previously with polyps)         REVIEW OF SYSTEMS:  10 point ROS reviewed and negative, except as above      Historical Information   Past Medical History:   Diagnosis Date    History of chronic constipation     History of neck pain 11/21/2017    Hypertension      Past Surgical History:   Procedure Laterality Date    CYSTOSCOPY  05/29/2014    diagnostic managed by Jalen Mora    TOOTH EXTRACTION       Social History   Social History     Substance and Sexual Activity   Alcohol Use Yes    Comment: weekly     Social History     Substance and Sexual Activity   Drug Use No     Social History     Tobacco Use   Smoking Status Former Smoker    Types: Cigarettes   Smokeless Tobacco Never Used     Family History   Problem Relation Age of Onset    Cancer Sister         malignant neoplasm of breast    Diabetes Paternal Grandmother     Diabetes Family     Arthritis Family     Cancer Family         malignant neoplasm    Substance Abuse Neg Hx         denied Mother and Father    Mental illness Neg Hx         no family history Mother and Father    Other Neg Hx         denied family history of Osteopertrosis       Meds/Allergies       Current Outpatient Medications:     diphenhydrAMINE (BENADRYL) 25 mg tablet    famotidine (PEPCID) 20 mg tablet    hydrocortisone 0 5 % cream    lisinopril (ZESTRIL) 5 mg tablet    naproxen (NAPROSYN) 500 mg tablet    omeprazole (PriLOSEC) 40 MG capsule    tamsulosin (FLOMAX) 0 4 mg    polyethylene glycol (MIRALAX) 17 g packet    ranitidine (ZANTAC) 300 MG tablet    No Known Allergies        Objective     Blood pressure 116/70, pulse 63, temperature 98 3 °F (36 8 °C), height 5' 3" (1 6 m), weight 71 9 kg (158 lb 9 6 oz)  Body mass index is 28 09 kg/m²  PHYSICAL EXAM:      General Appearance:   Alert, cooperative, no distress   HEENT:   Normocephalic, atraumatic, anicteric  Neck:  Supple, symmetrical, trachea midline   Lungs:   Clear to auscultation bilaterally; no rales, rhonchi or wheezing; respirations unlabored    Heart[de-identified]   Regular rate and rhythm; no murmur, rub, or gallop  Abdomen:   Soft, non-tender, non-distended; normal bowel sounds; no masses, no organomegaly    Genitalia:   No obvious hernia or scrotal mass  No obvious lesions  Rectal:   Deferred    Extremities:  No cyanosis, clubbing or edema    Pulses:  2+ and symmetric    Skin:  No jaundice, rashes, or lesions    Lymph nodes:  No palpable cervical lymphadenopathy        Lab Results:   No visits with results within 1 Day(s) from this visit     Latest known visit with results is:   Admission on 11/03/2019, Discharged on 11/03/2019   Component Date Value    WBC 11/03/2019 4 26*    RBC 11/03/2019 5 07     Hemoglobin 11/03/2019 15 5     Hematocrit 11/03/2019 47 1     MCV 11/03/2019 93     MCH 11/03/2019 30 6     MCHC 11/03/2019 32 9     RDW 11/03/2019 14 2     MPV 11/03/2019 10 5     Platelets 28/27/2068 248     nRBC 11/03/2019 0     Neutrophils Relative 11/03/2019 44     Immat GRANS % 11/03/2019 0     Lymphocytes Relative 11/03/2019 41     Monocytes Relative 11/03/2019 9     Eosinophils Relative 11/03/2019 5     Basophils Relative 11/03/2019 1     Neutrophils Absolute 11/03/2019 1 85     Immature Grans Absolute 11/03/2019 0 01     Lymphocytes Absolute 11/03/2019 1 76     Monocytes Absolute 11/03/2019 0 39     Eosinophils Absolute 11/03/2019 0 22     Basophils Absolute 11/03/2019 0 03     Sodium 11/03/2019 137     Potassium 11/03/2019 4 2     Chloride 11/03/2019 104     CO2 11/03/2019 29     ANION GAP 11/03/2019 4     BUN 11/03/2019 21     Creatinine 11/03/2019 1 35*    Glucose 11/03/2019 82     Calcium 11/03/2019 9 3     AST 11/03/2019 36     ALT 11/03/2019 42     Alkaline Phosphatase 11/03/2019 111     Total Protein 11/03/2019 7 4     Albumin 11/03/2019 3 5     Total Bilirubin 11/03/2019 0 63     eGFR 11/03/2019 57     Lipase 11/03/2019 285     Color, UA 11/03/2019 Yellow     Clarity, UA 11/03/2019 Clear     pH, UA 11/03/2019 7 0     Leukocytes, UA 11/03/2019 Negative     Nitrite, UA 11/03/2019 Negative     Protein, UA 11/03/2019 Negative     Glucose, UA 11/03/2019 Negative     Ketones, UA 11/03/2019 Negative     Urobilinogen, UA 11/03/2019 0 2     Bilirubin, UA 11/03/2019 Negative     Blood, UA 11/03/2019 Trace*    Specific Vestal, UA 11/03/2019 1 015     RBC, UA 11/03/2019 0-1*    WBC, UA 11/03/2019 None Seen     Epithelial Cells 11/03/2019 None Seen     Bacteria, UA 11/03/2019 None Seen          Radiology Results:   Ct Abdomen Pelvis With Contrast    Result Date: 11/3/2019  Narrative: CT ABDOMEN AND PELVIS WITH IV CONTRAST INDICATION:   RLQ abdominal pain  Chronic right lower abdominal pain for 6 months  Worsening right lower quadrant pain for one week  COMPARISON:  CT abdomen pelvis July 7, 2019  TECHNIQUE:  CT examination of the abdomen and pelvis was performed  Axial, sagittal, and coronal 2D reformatted images were created from the source data and submitted for interpretation  Radiation dose length product (DLP) for this visit:  332 mGy-cm   This examination, like all CT scans performed in the [facility], was performed utilizing techniques to minimize radiation dose exposure, including the use of iterative reconstruction and automated exposure control   IV Contrast:  100 mL of iodixanol (VISIPAQUE) Enteric Contrast:  Enteric contrast was not administered  FINDINGS: ABDOMEN LOWER CHEST:  Mild emphysematous changes in the lower lobes of the lungs bilaterally  Stable 2 mm noncalcified subpleural nodule anterolateral aspect of the lingula (series 2, image 8)  Based on current Fleischner Society 2017 Guidelines on incidental pulmonary nodule, no routine follow-up is needed if the patient is considered low risk for lung cancer  If the patient is considered high risk for lung cancer, 12 month follow-up non-contrast chest CT is recommended  LIVER/BILIARY TREE:  Unremarkable  GALLBLADDER:  No calcified gallstones  No pericholecystic inflammatory change  SPLEEN:  Unremarkable  PANCREAS:  Unremarkable  ADRENAL GLANDS:  Unremarkable  KIDNEYS/URETERS: There are two, 2 mm calculi in the upper pole right kidney (series 2, image 26 and series 601, image 99)  No ureteric calculi are seen  No hydronephrosis or hydroureter  One or more sharply circumscribed subcentimeter renal hypodensities are noted  These lesions are too small to accurately characterize, but are statistically most likely to represent benign cortical renal cyst(s)  According to the guidelines published in  the [facility] Paper of the ACR Incidental Findings Committee (Radiology 2010), no further workup of these lesions is recommended  STOMACH AND BOWEL:  Evaluation of the GI tract is somewhat limited due to lack of oral contrast material  Stomach decompressed  Hiatal hernia  Fecalization of small bowel contents, compatible with delayed small bowel transit  Normal fecal burden throughout the colon  Scattered diverticula in the sigmoid colon  Nothing to suggest acute diverticulitis  APPENDIX:  No findings to suggest appendicitis  ABDOMINOPELVIC CAVITY:  No ascites or free intraperitoneal air  No lymphadenopathy  VESSELS:  Atherosclerotic changes are present  No evidence of aneurysm   PELVIS REPRODUCTIVE ORGANS:  Prostate gland mildly enlarged and heterogeneous in CT density  Correlate with serum PSA  URINARY BLADDER:  Normally distended  Mild circumferential wall thickening  Correlate for mild bladder outlet obstruction  ABDOMINAL WALL/INGUINAL REGIONS:  Small umbilical hernia containing fat  OSSEOUS STRUCTURES:  There are age appropriate degenerative changes  No acute fracture or destructive osseous lesion  Impression: 1  No acute abdominal pelvic abnormality  2   Stable nonobstructing right upper pole renal calculi  3   Mildly enlarged prostate gland with findings suggestive of mild bladder outlet obstruction   Workstation performed: [id number]

## 2019-11-13 NOTE — PATIENT INSTRUCTIONS
Today we discussed:  -- We will get an ultrasound of the gallbladder as well as the groin and testicles  -- For constipation, please make sure to drink plenty of water (enough so that your urine is a light yellow color), try to exercise for 15-30 minutes on most days of the week, increase fiber in your diet (with fresh fruits, vegetables and whole grains)  -- We can start a bowel regimen with the goal of having at least 1 soft but formed bowel movement per day  I would like you to start taking 1 capful of Miralax every evening with 10 ounces of water      Follow-up in the office in 6 weeks, but be in touch via the [facility] portal or by phone with questions, concerns or changes

## 2019-11-14 ENCOUNTER — HOSPITAL ENCOUNTER (OUTPATIENT)
Dept: ULTRASOUND IMAGING | Facility: HOSPITAL | Age: 59
Discharge: HOME | End: 2019-11-14
Attending: INTERNAL MEDICINE
Payer: COMMERCIAL

## 2019-11-14 DIAGNOSIS — R10.31 ABDOMINAL PAIN, CHRONIC, RIGHT LOWER QUADRANT: ICD-10-CM

## 2019-11-14 DIAGNOSIS — G89.29 ABDOMINAL PAIN, CHRONIC, RIGHT LOWER QUADRANT: ICD-10-CM

## 2019-11-14 PROCEDURE — 76705 ECHO EXAM OF ABDOMEN: CPT

## 2019-11-14 PROCEDURE — 76870 US EXAM SCROTUM: CPT

## 2019-11-21 ENCOUNTER — TELEPHONE (OUTPATIENT)
Dept: GASTROENTEROLOGY | Facility: MEDICAL CENTER | Age: 59
End: 2019-11-21

## 2019-11-21 NOTE — TELEPHONE ENCOUNTER
Called patient and LMOM to call the office    ----- Message from Demetrio Hurt MD sent at 11/14/2019 11:51 AM EST -----  Hi,    Can you let him know that his ultrasounds overall were unremarkable and nothing concerning was found       Thank you,  Rakesh Wiggins

## 2019-11-23 PROBLEM — J38.3 SULCUS VOCALIS OF VOCAL FOLD: Status: ACTIVE | Noted: 2019-11-23

## 2019-11-23 PROBLEM — J04.0 REFLUX LARYNGITIS: Status: ACTIVE | Noted: 2019-11-23

## 2019-11-23 PROBLEM — K21.9 GASTROESOPHAGEAL REFLUX DISEASE: Status: ACTIVE | Noted: 2019-11-23

## 2019-11-23 PROBLEM — J38.3 GLOTTIC INSUFFICIENCY: Status: ACTIVE | Noted: 2019-11-23

## 2019-11-23 PROBLEM — H61.23 BILATERAL IMPACTED CERUMEN: Status: ACTIVE | Noted: 2019-11-23

## 2019-11-23 PROBLEM — R05.9 COUGH: Status: ACTIVE | Noted: 2019-11-23

## 2019-11-23 PROBLEM — R49.0 DYSPHONIA: Status: ACTIVE | Noted: 2019-11-23

## 2019-11-23 PROBLEM — J38.01 VOCAL FOLD PARESIS, LEFT: Status: ACTIVE | Noted: 2019-11-23

## 2019-11-23 PROBLEM — R49.0 MUSCLE TENSION DYSPHONIA: Status: ACTIVE | Noted: 2019-11-23

## 2019-11-23 PROBLEM — K21.9 REFLUX LARYNGITIS: Status: ACTIVE | Noted: 2019-11-23

## 2019-12-16 ENCOUNTER — TELEPHONE (OUTPATIENT)
Dept: FAMILY MEDICINE CLINIC | Facility: CLINIC | Age: 59
End: 2019-12-16

## 2019-12-16 NOTE — TELEPHONE ENCOUNTER
Patient is requesting an order to have a xray done of his waist  I did offer him an appt but he stated he has spoken to you before about his waist pain

## 2019-12-17 NOTE — TELEPHONE ENCOUNTER
There is no xray as a waist xray  He has been having abdominal pain in the recently had ultrasound  I recommend that he continue follow-up in with Gastroenterology as orders

## 2019-12-26 ENCOUNTER — OFFICE VISIT (OUTPATIENT)
Dept: FAMILY MEDICINE CLINIC | Facility: CLINIC | Age: 59
End: 2019-12-26

## 2019-12-26 VITALS
RESPIRATION RATE: 18 BRPM | HEART RATE: 65 BPM | BODY MASS INDEX: 27.11 KG/M2 | WEIGHT: 153 LBS | TEMPERATURE: 98 F | HEIGHT: 63 IN | SYSTOLIC BLOOD PRESSURE: 110 MMHG | DIASTOLIC BLOOD PRESSURE: 70 MMHG | OXYGEN SATURATION: 99 %

## 2019-12-26 DIAGNOSIS — K59.00 CONSTIPATION: ICD-10-CM

## 2019-12-26 DIAGNOSIS — M25.561 ACUTE PAIN OF RIGHT KNEE: Primary | ICD-10-CM

## 2019-12-26 PROCEDURE — 3008F BODY MASS INDEX DOCD: CPT | Performed by: FAMILY MEDICINE

## 2019-12-26 PROCEDURE — 99214 OFFICE O/P EST MOD 30 MIN: CPT | Performed by: FAMILY MEDICINE

## 2019-12-26 PROCEDURE — 1036F TOBACCO NON-USER: CPT | Performed by: FAMILY MEDICINE

## 2019-12-26 RX ORDER — POLYETHYLENE GLYCOL 3350 17 G/17G
17 POWDER, FOR SOLUTION ORAL DAILY
Qty: 28 EACH | Refills: 0 | Status: SHIPPED | OUTPATIENT
Start: 2019-12-26 | End: 2020-01-06 | Stop reason: SDUPTHER

## 2019-12-26 RX ORDER — MELOXICAM 7.5 MG/1
7.5 TABLET ORAL DAILY
Qty: 15 TABLET | Refills: 0 | Status: SHIPPED | OUTPATIENT
Start: 2019-12-26 | End: 2020-01-08

## 2019-12-26 NOTE — TELEPHONE ENCOUNTER
Pt has been advised to f/u with GI concerning his pain  Pt understood and agreed to plan  Pt also complained about recent pain in R knee and requested an Xray of his knee  I advised pt he must be evaluated before anything can be ordered  Pt understood and agreed to be scheduled  Pt advised pain is new and started about 3 days ago  Pt stated he feels as if the bone is going to pop out    Pt has been scheduled with Dr Catalino Abrams to be evaluated for his R knee pain

## 2019-12-26 NOTE — PROGRESS NOTES
Assessment/Plan:    Acute right knee pain:   Rest, elevate leg while seated, continue compression bandage, apply ice 15 minutes BID  Meloxicam 7 5 mg   If no improvement will have X ray done  Script for X ray written patient knows to give conservative treatment a try for at least 1 week prior to having X ray done   At patient's request refilled Miralax  Diagnoses and all orders for this visit:    Acute pain of right knee  -     meloxicam (MOBIC) 7 5 mg tablet; Take 1 tablet (7 5 mg total) by mouth daily    Constipation  -     polyethylene glycol (MIRALAX) 17 g packet; Take 17 g by mouth daily          Subjective:      Patient ID: Liyah Johnson is a 60 y o  male  59 yo  male complains of R knee pain for 5 days  Denies trauma  Works in [employer], constantly on his feet  Pain is constant  Tried icy hot without any improvement  Pains is described as a stabbing sensation, worse with going up steps  Compression bandage has improved pain  Has not tried any OTC oral medications  Denies swelling, redness, knee locking, states twice has felt like his knee gives out when running       The following portions of the patient's history were reviewed and updated as appropriate: He  has a past medical history of History of chronic constipation, History of neck pain (11/21/2017), and Hypertension    He   Patient Active Problem List    Diagnosis Date Noted    Dysphonia 11/23/2019    Cough 11/23/2019    Bilateral impacted cerumen 11/23/2019    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    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    Reflux laryngitis 11/23/2019    Vocal fold paresis, 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    Sulcus vocalis of vocal fold 11/23/2019    Gastroesophageal reflux disease 11/23/2019    Rash 11/07/2019    Abdominal pain, chronic, right lower quadrant 05/13/2019    Right-sided low back pain with right-sided sciatica 06/28/2018    Hoarseness of voice 05/17/2018    Acute pain of both knees 01/24/2018    Chronic tension headache 01/24/2018    Left shoulder pain 06/07/2017    Benign colon polyp 03/24/2017    Enlarged prostate without lower urinary tract symptoms (luts) 07/18/2016    Varicocele 01/12/2016    Mild vitamin D deficiency 06/10/2015    Impingement syndrome, shoulder, left 05/16/2014    Pain in joint of right hip 02/04/2014    Microscopic hematuria 11/19/2013    Renal calculus, right 11/19/2013    Atypical chest pain 06/19/2013    Benign essential hypertension 10/10/2012    Abnormal glucose 10/10/2012     He  has a past surgical history that includes Cystoscopy (05/29/2014) and Tooth extraction  His family history includes Arthritis in his family; Cancer in his family and sister; Diabetes in his family and paternal grandmother  He  reports that he has quit smoking  His smoking use included cigarettes  He has never used smokeless tobacco  He reports that he drinks alcohol  He reports that he does not use drugs    Current Outpatient Medications   Medication Sig Dispense Refill    lisinopril (ZESTRIL) 5 mg tablet TAKE 1 TABLET BY MOUTH EVERY DAY 90 tablet 1    diphenhydrAMINE (BENADRYL) 25 mg tablet Take 1 tablet (25 mg total) by mouth 2 (two) times a day as needed for itching (Patient not taking: Reported on 12/26/2019) 30 tablet 0    famotidine (PEPCID) 20 mg tablet Take 20 mg by mouth 2 (two) times a day  0    hydrocortisone 0 5 % cream Apply topically 2 (two) times a day for 14 days Avoid application to face 30 g 0    meloxicam (MOBIC) 7 5 mg tablet Take 1 tablet (7 5 mg total) by mouth daily 15 tablet 0    naproxen (NAPROSYN) 500 mg tablet Take 1 tablet (500 mg total) by mouth 2 (two) times a day with meals 10 tablet 0    omeprazole (PriLOSEC) 40 MG capsule Take 1 capsule (40 mg total) by mouth daily before breakfast 30 minutes prior to breakfast 30 capsule 6    polyethylene glycol (MIRALAX) 17 g packet Take 17 g by mouth daily 28 each 0    ranitidine (ZANTAC) 300 MG tablet Take 1 tablet (300 mg total) by mouth daily at bedtime (Patient not taking: Reported on 11/13/2019) 30 tablet 6    tamsulosin (FLOMAX) 0 4 mg Take 1 capsule (0 4 mg total) by mouth daily with dinner 90 capsule 3     No current facility-administered medications for this visit  Current Outpatient Medications on File Prior to Visit   Medication Sig    lisinopril (ZESTRIL) 5 mg tablet TAKE 1 TABLET BY MOUTH EVERY DAY    diphenhydrAMINE (BENADRYL) 25 mg tablet Take 1 tablet (25 mg total) by mouth 2 (two) times a day as needed for itching (Patient not taking: Reported on 12/26/2019)    famotidine (PEPCID) 20 mg tablet Take 20 mg by mouth 2 (two) times a day    hydrocortisone 0 5 % cream Apply topically 2 (two) times a day for 14 days Avoid application to face    naproxen (NAPROSYN) 500 mg tablet Take 1 tablet (500 mg total) by mouth 2 (two) times a day with meals    omeprazole (PriLOSEC) 40 MG capsule Take 1 capsule (40 mg total) by mouth daily before breakfast 30 minutes prior to breakfast    ranitidine (ZANTAC) 300 MG tablet Take 1 tablet (300 mg total) by mouth daily at bedtime (Patient not taking: Reported on 11/13/2019)    tamsulosin (FLOMAX) 0 4 mg Take 1 capsule (0 4 mg total) by mouth daily with dinner    [DISCONTINUED] polyethylene glycol (MIRALAX) 17 g packet Take 17 g by mouth daily (Patient not taking: Reported on 11/13/2019)     No current facility-administered medications on file prior to visit       Review of Systems   Musculoskeletal: Positive for arthralgias (as per HPI)  All other systems reviewed and are negative  Objective:      /70 (BP Location: Right arm, Patient Position: Sitting, Cuff Size: Standard)   Pulse 65   Temp 98 °F (36 7 °C) (Temporal)   Resp 18   Ht 5' 3" (1 6 m)   Wt 69 4 kg (153 lb)   SpO2 99%   BMI 27 10 kg/m²          Physical Exam   Constitutional: He is oriented to person, place, and time  He appears well-developed  HENT:   Head: Normocephalic     Right Ear: External ear normal    Left Ear: External ear normal    Nose: Nose normal    Mouth/Throat: Oropharynx is clear and moist    Eyes: Pupils are equal, round, and reactive to light  Conjunctivae and EOM are normal    Neck: Normal range of motion  Neck supple  No thyromegaly present  Cardiovascular: Normal rate, regular rhythm and normal heart sounds  Pulmonary/Chest: Effort normal and breath sounds normal    Abdominal: Soft  There is no tenderness  There is no rebound and no guarding  Musculoskeletal: Normal range of motion  He exhibits tenderness  Right knee: Tenderness found  Medial joint line tenderness noted  Neurological: He is alert and oriented to person, place, and time  He has normal reflexes  Skin: Skin is dry  Psychiatric: He has a normal mood and affect  Nursing note and vitals reviewed

## 2019-12-27 ENCOUNTER — TELEPHONE (OUTPATIENT)
Dept: FAMILY MEDICINE CLINIC | Facility: CLINIC | Age: 59
End: 2019-12-27

## 2019-12-27 NOTE — TELEPHONE ENCOUNTER
Nothing noted on his visit note   As long as he doesn't need a physical form filled out, he can be scheduled for the skin test next week as a nurse visit or if patient has a history of BCG vaccination, we can order a Angela Dollar (blood work)

## 2019-12-30 ENCOUNTER — TELEPHONE (OUTPATIENT)
Dept: FAMILY MEDICINE CLINIC | Facility: CLINIC | Age: 59
End: 2019-12-30

## 2019-12-30 NOTE — TELEPHONE ENCOUNTER
Pt scheduled an appt for TB skin test   I asked pt if he has ever had the BCG vaccine administered  Pt confirmed he has  I advised pt his TB skin test will come out positive because of the vaccine so another test has to be ordered to confirm he does not have TB  Pt understood  Consulted with Torsten and she requested I ask pt what the reason for the TB skin test is because he does not work in any environment that would request this test       Upon speaking to pt, he advised me he wanted it done because his wife had to get the test done and was sent to have an Xray done instead  Pt confirmed his wife's Xray was negative for TB  Pt stated because his wife was sent for Xray, he would like to make sure he doesn't have TB  Spoke with Torsten once again and oTrsten advised me there is no reason for pt to have testing done, especially since pt's wife's Xray came out negative for TB  Advised pt of Torsten's response to his request and pt understood  Pt did not have any other questions or concerns at the time

## 2020-01-06 ENCOUNTER — OFFICE VISIT (OUTPATIENT)
Dept: GASTROENTEROLOGY | Facility: MEDICAL CENTER | Age: 60
End: 2020-01-06
Payer: COMMERCIAL

## 2020-01-06 VITALS
HEIGHT: 63 IN | HEART RATE: 70 BPM | BODY MASS INDEX: 28 KG/M2 | WEIGHT: 158 LBS | SYSTOLIC BLOOD PRESSURE: 122 MMHG | DIASTOLIC BLOOD PRESSURE: 76 MMHG | TEMPERATURE: 97.7 F

## 2020-01-06 DIAGNOSIS — R49.0 HOARSENESS OF VOICE: ICD-10-CM

## 2020-01-06 DIAGNOSIS — K59.00 CONSTIPATION: ICD-10-CM

## 2020-01-06 DIAGNOSIS — G89.29 ABDOMINAL PAIN, CHRONIC, RIGHT LOWER QUADRANT: Primary | ICD-10-CM

## 2020-01-06 DIAGNOSIS — R10.31 ABDOMINAL PAIN, CHRONIC, RIGHT LOWER QUADRANT: Primary | ICD-10-CM

## 2020-01-06 PROCEDURE — 99214 OFFICE O/P EST MOD 30 MIN: CPT | Performed by: PHYSICIAN ASSISTANT

## 2020-01-06 RX ORDER — POLYETHYLENE GLYCOL 3350 17 G/17G
17 POWDER, FOR SOLUTION ORAL DAILY
Qty: 30 EACH | Refills: 5 | Status: SHIPPED | OUTPATIENT
Start: 2020-01-06 | End: 2020-01-08

## 2020-01-06 NOTE — PROGRESS NOTES
Assessment/Plan:     Diagnoses and all orders for this visit:    Abdominal pain, chronic, right lower quadrant  He continues to complain of constant right lower quadrant abdominal pain however it is really more inguinal in nature  It is possible this may be referred pain from his back as he does also have pain going into his legs and numbness  He had a previous MRI that did show some bulging discs  I did ask him to follow up with his PCP for further evaluation as I do not feel that his abdominal pain is GI in nature  Constipation  He does have a history of constipation  It was recommended that he take MiraLax daily which she did with good relief of his symptoms  Therefore would recommend continuing   -     polyethylene glycol (MIRALAX) 17 g packet; Take 17 g by mouth daily    Hoarseness of voice  He does have some dysphonia/hoarseness for which he is following with ear nose and throat and has been prescribed omeprazole  He denies any heartburn or reflux symptoms  Would recommend follow-up with ENT  Will see him back in 4 months or sooner if necessary  Subjective:      Patient ID: Karma Zhang is a 60 y o  male  HPI     This is a follow-up for RLQ abdominal pain, constipation  Patient is [place]-speaking only and all history is via an   He describes the pain as constant  It's not affected by eating or moving his bowels  He states he has tried Tylenol without relief  It was previously felt his pain may be secondary to constipation and he was prescribed MiraLax which has helped his bowel movements but has not done anything for the pain  He is also on omeprazole for dysphonia/hoarseness and again this does nothing for his pain  He has had a CT scan, ultrasound of the abdomen, ultrasound of the scrotum, colonoscopy in August of 2019 all of which were within normal limits     He states he does occasionally have some pain radiating down the back of his legs as well as some numbness in his legs  I reviewed his MRI from 2017 any did have some bulging discs  I do not believe he has ever had an endoscopy      Patient Active Problem List   Diagnosis    Benign colon polyp    Benign essential hypertension    Atypical chest pain    Enlarged prostate without lower urinary tract symptoms (luts)    Impingement syndrome, shoulder, left    Varicocele    Mild vitamin D deficiency    Microscopic hematuria    Left shoulder pain    Abnormal glucose    Pain in joint of right hip    Renal calculus, right    Acute pain of both knees    Chronic tension headache    Hoarseness of voice    Right-sided low back pain with right-sided sciatica    Abdominal pain, chronic, right lower quadrant    Rash    Dysphonia    Cough    Bilateral impacted cerumen    Muscle tension dysphonia    Glottic insufficiency    Reflux laryngitis    Vocal fold paresis, left    Sulcus vocalis of vocal fold    Gastroesophageal reflux disease    Constipation     No Known Allergies  Current Outpatient Medications on File Prior to Visit   Medication Sig    famotidine (PEPCID) 20 mg tablet Take 20 mg by mouth 2 (two) times a day    lisinopril (ZESTRIL) 5 mg tablet TAKE 1 TABLET BY MOUTH EVERY DAY    meloxicam (MOBIC) 7 5 mg tablet Take 1 tablet (7 5 mg total) by mouth daily    naproxen (NAPROSYN) 500 mg tablet Take 1 tablet (500 mg total) by mouth 2 (two) times a day with meals    omeprazole (PriLOSEC) 40 MG capsule Take 1 capsule (40 mg total) by mouth daily before breakfast 30 minutes prior to breakfast    tamsulosin (FLOMAX) 0 4 mg Take 1 capsule (0 4 mg total) by mouth daily with dinner    [DISCONTINUED] polyethylene glycol (MIRALAX) 17 g packet Take 17 g by mouth daily    diphenhydrAMINE (BENADRYL) 25 mg tablet Take 1 tablet (25 mg total) by mouth 2 (two) times a day as needed for itching (Patient not taking: Reported on 12/26/2019)    hydrocortisone 0 5 % cream Apply topically 2 (two) times a day for 14 days Avoid application to face    ranitidine (ZANTAC) 300 MG tablet Take 1 tablet (300 mg total) by mouth daily at bedtime (Patient not taking: Reported on 11/13/2019)     No current facility-administered medications on file prior to visit        Family History   Problem Relation Age of Onset   Sena Norris Cancer Sister         malignant neoplasm of breast    Diabetes Paternal Grandmother     Diabetes Family     Arthritis Family     Cancer Family         malignant neoplasm    Substance Abuse Neg Hx         denied Mother and Father    Mental illness Neg Hx         no family history Mother and Father    Other Neg Hx         denied family history of Osteopertrosis     Past Medical History:   Diagnosis Date    History of chronic constipation     History of neck pain 11/21/2017    Hypertension      Social History     Socioeconomic History    Marital status: Single     Spouse name: None    Number of children: None    Years of education: None    Highest education level: None   Occupational History    None   Social Needs    Financial resource strain: None    Food insecurity:     Worry: None     Inability: None    Transportation needs:     Medical: None     Non-medical: None   Tobacco Use    Smoking status: Former Smoker     Types: Cigarettes    Smokeless tobacco: Never Used   Substance and Sexual Activity    Alcohol use: Yes     Comment: weekly    Drug use: No    Sexual activity: None   Lifestyle    Physical activity:     Days per week: None     Minutes per session: None    Stress: None   Relationships    Social connections:     Talks on phone: None     Gets together: None     Attends Anabaptist service: None     Active member of club or organization: None     Attends meetings of clubs or organizations: None     Relationship status: None    Intimate partner violence:     Fear of current or ex partner: None     Emotionally abused: None     Physically abused: None     Forced sexual activity: None Other Topics Concern    None   Social History Narrative    No preference or Confucianist beliefs     Past Surgical History:   Procedure Laterality Date    CYSTOSCOPY  05/29/2014    diagnostic managed by Jalen Mora    TOOTH EXTRACTION           Review of Systems   All other systems reviewed and are negative  Objective:      /76   Pulse 70   Temp 97 7 °F (36 5 °C)   Ht 5' 3" (1 6 m)   Wt 71 7 kg (158 lb)   BMI 27 99 kg/m²          Physical Exam   Constitutional: He is oriented to person, place, and time  He appears well-developed and well-nourished  HENT:   Head: Normocephalic  Eyes: Conjunctivae and EOM are normal    Neck: Normal range of motion  Cardiovascular: Normal rate and regular rhythm  Pulmonary/Chest: Effort normal and breath sounds normal    Abdominal: Soft  Bowel sounds are normal    There is tenderness to palpation in the right inguinal crease   Musculoskeletal: Normal range of motion  Neurological: He is alert and oriented to person, place, and time  Skin: Skin is warm and dry  Psychiatric: He has a normal mood and affect   His behavior is normal

## 2020-01-08 ENCOUNTER — OFFICE VISIT (OUTPATIENT)
Dept: FAMILY MEDICINE CLINIC | Facility: CLINIC | Age: 60
End: 2020-01-08

## 2020-01-08 VITALS
OXYGEN SATURATION: 99 % | HEART RATE: 64 BPM | BODY MASS INDEX: 27.93 KG/M2 | TEMPERATURE: 97.8 F | RESPIRATION RATE: 16 BRPM | WEIGHT: 157.6 LBS | DIASTOLIC BLOOD PRESSURE: 78 MMHG | SYSTOLIC BLOOD PRESSURE: 112 MMHG | HEIGHT: 63 IN

## 2020-01-08 DIAGNOSIS — M51.26 LUMBAR DISC HERNIATION: ICD-10-CM

## 2020-01-08 DIAGNOSIS — I10 BENIGN ESSENTIAL HYPERTENSION: ICD-10-CM

## 2020-01-08 DIAGNOSIS — R20.2 NUMBNESS AND TINGLING OF BOTH LEGS: Primary | ICD-10-CM

## 2020-01-08 DIAGNOSIS — Z82.0 FAMILY HISTORY OF PARKINSONISM: ICD-10-CM

## 2020-01-08 DIAGNOSIS — Z23 NEED FOR VACCINATION: ICD-10-CM

## 2020-01-08 DIAGNOSIS — R20.0 NUMBNESS AND TINGLING OF BOTH LEGS: Primary | ICD-10-CM

## 2020-01-08 DIAGNOSIS — E66.3 OVERWEIGHT: ICD-10-CM

## 2020-01-08 DIAGNOSIS — K21.9 GASTROESOPHAGEAL REFLUX DISEASE, ESOPHAGITIS PRESENCE NOT SPECIFIED: ICD-10-CM

## 2020-01-08 DIAGNOSIS — R25.1 TREMOR: ICD-10-CM

## 2020-01-08 PROCEDURE — 90715 TDAP VACCINE 7 YRS/> IM: CPT

## 2020-01-08 PROCEDURE — 3074F SYST BP LT 130 MM HG: CPT | Performed by: PHYSICIAN ASSISTANT

## 2020-01-08 PROCEDURE — 99214 OFFICE O/P EST MOD 30 MIN: CPT | Performed by: PHYSICIAN ASSISTANT

## 2020-01-08 PROCEDURE — 3008F BODY MASS INDEX DOCD: CPT | Performed by: PHYSICIAN ASSISTANT

## 2020-01-08 PROCEDURE — 90471 IMMUNIZATION ADMIN: CPT

## 2020-01-08 PROCEDURE — 3078F DIAST BP <80 MM HG: CPT | Performed by: PHYSICIAN ASSISTANT

## 2020-01-08 RX ORDER — OMEPRAZOLE 40 MG/1
40 CAPSULE, DELAYED RELEASE ORAL
Qty: 30 CAPSULE | Refills: 6 | Status: SHIPPED | OUTPATIENT
Start: 2020-01-08 | End: 2020-07-31

## 2020-01-08 NOTE — PROGRESS NOTES
Assessment/Plan:    Benign essential hypertension    Continue lisinopril 5 milligrams    Lumbar disc herniation   Numbness likely originating from the lumbar spine  Recommend comprehensive spine program     Tremor    Recommend consultation with Neurology  It could be the start Parkinson's disease  Overweight  BMI Counseling: Body mass index is 27 92 kg/m²  The BMI is above normal  Exercise recommendations include moderate aerobic physical activity for 150 minutes/week  Problem List Items Addressed This Visit        Digestive    Gastroesophageal reflux disease    Relevant Medications    omeprazole (PriLOSEC) 40 MG capsule       Cardiovascular and Mediastinum    Benign essential hypertension       Continue lisinopril 5 milligrams            Musculoskeletal and Integument    Lumbar disc herniation      Numbness likely originating from the lumbar spine  Recommend comprehensive spine program          Relevant Orders    Ambulatory Referral to Comprehensive Spine Program       Other    Tremor       Recommend consultation with Neurology  It could be the start Parkinson's disease  Relevant Orders    Ambulatory referral to Neurology    Overweight     BMI Counseling: Body mass index is 27 92 kg/m²  The BMI is above normal  Exercise recommendations include moderate aerobic physical activity for 150 minutes/week  Other Visit Diagnoses     Numbness and tingling of both legs    -  Primary    Relevant Orders    Ambulatory Referral to Comprehensive Spine Program    Comprehensive metabolic panel    TSH, 3rd generation with Free T4 reflex    Lyme Antibody Profile with reflex to WB    Need for vaccination        Relevant Orders    TDAP VACCINE GREATER THAN OR EQUAL TO 8YO IM (Completed)    Family history of Parkinsonism        Relevant Orders    Ambulatory referral to Neurology            Subjective:      Patient ID: Sixto Vogel is a 60 y o  male      HPI    53-year-old male for bilateral leg numbness  His legs went numb twice  This happened on 1/6 and today  Is not typically associated with movement  He does have a history of back problems and does have pain starting in lowerback/sacral area and radiating into both hamstrings  He does have history lumbar spondylosis and mild stenosis  He is not currently taking anything for this  Denies any upper extremity weakness or difficulties with speech  He also notices symptoms in his legs when he tries to do ab exercises like a V up  He has also been having a tremor of his right hand for 4 months  Is typically only in the right hand  He states that his family has started to notice it and made comments about it  It is often when he is resting and reaching that he will get it and then when he goes to reach for something he notices hand shaking  Dad did have parkinsons  He has not noticed any changes in gait  No improvement with alcohol  No anxiety  The following portions of the patient's history were reviewed and updated as appropriate:   He  has a past medical history of History of chronic constipation and History of neck pain (11/21/2017)    He   Patient Active Problem List    Diagnosis Date Noted    Tremor 01/09/2020    Lumbar disc herniation 01/09/2020    Overweight 01/09/2020    Constipation 01/06/2020    Dysphonia 11/23/2019    Cough 11/23/2019    Bilateral impacted cerumen 11/23/2019    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    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    Reflux laryngitis 11/23/2019    Vocal fold paresis, 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    Sulcus vocalis of vocal fold 11/23/2019    Gastroesophageal reflux disease 11/23/2019    Rash 11/07/2019    Abdominal pain, chronic, right lower quadrant 05/13/2019    Right-sided low back pain with right-sided sciatica 06/28/2018    Hoarseness of voice 05/17/2018    Acute pain of both knees 01/24/2018    Chronic tension headache 01/24/2018    Left shoulder pain 06/07/2017    Benign colon polyp 03/24/2017    Enlarged prostate without lower urinary tract symptoms (luts) 07/18/2016    Varicocele 01/12/2016    Mild vitamin D deficiency 06/10/2015    Impingement syndrome, shoulder, left 05/16/2014    Pain in joint of right hip 02/04/2014    Microscopic hematuria 11/19/2013    Renal calculus, right 11/19/2013    Atypical chest pain 06/19/2013    Benign essential hypertension 10/10/2012    Abnormal glucose 10/10/2012     He  has a past surgical history that includes Cystoscopy (05/29/2014) and Tooth extraction  His family history includes Arthritis in his family; Cancer in his family and sister; Diabetes in his family and paternal grandmother  He  reports that he quit smoking about 6 years ago  His smoking use included cigarettes  He has never used smokeless tobacco  He reports that he drinks alcohol  He reports that he does not use drugs    Current Outpatient Medications   Medication Sig Dispense Refill    lisinopril (ZESTRIL) 5 mg tablet TAKE 1 TABLET BY MOUTH EVERY DAY 90 tablet 1    ranitidine (ZANTAC) 300 MG tablet Take 1 tablet (300 mg total) by mouth daily at bedtime 30 tablet 6    tamsulosin (FLOMAX) 0 4 mg Take 1 capsule (0 4 mg total) by mouth daily with dinner 90 capsule 3    diphenhydrAMINE (BENADRYL) 25 mg tablet Take 1 tablet (25 mg total) by mouth 2 (two) times a day as needed for itching (Patient not taking: Reported on 12/26/2019) 30 tablet 0    famotidine (PEPCID) 20 mg tablet Take 20 mg by mouth 2 (two) times a day  0    hydrocortisone 0 5 % cream Apply topically 2 (two) times a day for 14 days Avoid application to face 30 g 0    naproxen (NAPROSYN) 500 mg tablet Take 1 tablet (500 mg total) by mouth 2 (two) times a day with meals (Patient not taking: Reported on 1/8/2020) 10 tablet 0    omeprazole (PriLOSEC) 40 MG capsule Take 1 capsule (40 mg total) by mouth daily before breakfast 30 minutes prior to breakfast 30 capsule 6     No current facility-administered medications for this visit  Current Outpatient Medications on File Prior to Visit   Medication Sig    lisinopril (ZESTRIL) 5 mg tablet TAKE 1 TABLET BY MOUTH EVERY DAY    ranitidine (ZANTAC) 300 MG tablet Take 1 tablet (300 mg total) by mouth daily at bedtime    tamsulosin (FLOMAX) 0 4 mg Take 1 capsule (0 4 mg total) by mouth daily with dinner    diphenhydrAMINE (BENADRYL) 25 mg tablet Take 1 tablet (25 mg total) by mouth 2 (two) times a day as needed for itching (Patient not taking: Reported on 12/26/2019)    famotidine (PEPCID) 20 mg tablet Take 20 mg by mouth 2 (two) times a day    hydrocortisone 0 5 % cream Apply topically 2 (two) times a day for 14 days Avoid application to face    naproxen (NAPROSYN) 500 mg tablet Take 1 tablet (500 mg total) by mouth 2 (two) times a day with meals (Patient not taking: Reported on 1/8/2020)     No current facility-administered medications on file prior to visit  He has No Known Allergies       Review of Systems   Constitutional: Negative for activity change, appetite change, fatigue, fever and unexpected weight change  HENT: Negative for dental problem, ear pain, hearing loss and sore throat  Eyes: Negative for visual disturbance  Respiratory: Negative for cough and wheezing  Gastrointestinal: Negative for abdominal pain, constipation, diarrhea and vomiting  Genitourinary: Negative for difficulty urinating and dysuria  Musculoskeletal: Positive for back pain  Negative for arthralgias and myalgias  Skin: Negative for rash  Neurological: Positive for tremors and numbness  Negative for dizziness and headaches  Psychiatric/Behavioral: Negative for behavioral problems           Objective:      /78 (BP Location: Right arm, Patient Position: Sitting, Cuff Size: Adult)   Pulse 64   Temp 97 8 °F (36 6 °C) (Tympanic)   Resp 16   Ht 5' 3" (1 6 m)   Wt 71 5 kg (157 lb 9 6 oz)   SpO2 99%   BMI 27 92 kg/m²          Physical Exam Constitutional: He is oriented to person, place, and time  He appears well-developed and well-nourished  No distress  HENT:   Head: Normocephalic and atraumatic  Right Ear: External ear normal    Left Ear: External ear normal    Eyes: Conjunctivae are normal    Neck: Normal range of motion  Neck supple  No thyromegaly present  Cardiovascular: Normal rate, regular rhythm and normal heart sounds  No murmur heard  Pulmonary/Chest: Effort normal and breath sounds normal  No respiratory distress  He has no wheezes  Musculoskeletal: Normal range of motion  He exhibits no edema, tenderness or deformity    - SLR on left     Lymphadenopathy:     He has no cervical adenopathy  Neurological: He is alert and oriented to person, place, and time  He displays normal reflexes  No sensory deficit  He exhibits normal muscle tone  No tremor noted and gait normal     Psychiatric: He has a normal mood and affect  His behavior is normal    Nursing note and vitals reviewed

## 2020-01-09 ENCOUNTER — TELEPHONE (OUTPATIENT)
Dept: NEUROLOGY | Facility: CLINIC | Age: 60
End: 2020-01-09

## 2020-01-09 ENCOUNTER — TELEPHONE (OUTPATIENT)
Dept: PHYSICAL THERAPY | Facility: OTHER | Age: 60
End: 2020-01-09

## 2020-01-09 PROBLEM — E66.3 OVERWEIGHT: Status: ACTIVE | Noted: 2020-01-09

## 2020-01-09 PROBLEM — R25.1 TREMOR: Status: ACTIVE | Noted: 2020-01-09

## 2020-01-09 PROBLEM — M51.26 LUMBAR DISC HERNIATION: Status: ACTIVE | Noted: 2020-01-09

## 2020-01-09 NOTE — TELEPHONE ENCOUNTER
Best contact number for patient:    Emergency Contact name and number:    Referring provider:    Referring provider Telephone:________________    Primary Care Provider Name:     Reason for Appointment/Dx: tremors/history of parkinsons     Neurology Location patient would like to be seen:    Order received? Yes                                                 Records Received? Yes    Have you ever seen another Neurologist? No    Insurance Information    Insurance Name: [facility]     ID/Policy #:    Secondary Insurance:    ID/Policy#: Workman's Comp/ Accident/ School  Information      Workman's Comp/Accident/School related?  Yes, describe: no    If yes name of Insurance company:    Date of Injury:    Open Claim:no    [address] Name and Telephone Number:    Notes:                   Appointment date:04/29/2020 _____________________________

## 2020-01-09 NOTE — TELEPHONE ENCOUNTER
This nurse did review in detail the comp spine program and what we can provide for the pt for their back pain  Pt is agreeable to being triaged by this nurse and stated that he will call us back in 10 minutes     was used # [id number]

## 2020-01-09 NOTE — ASSESSMENT & PLAN NOTE
BMI Counseling: Body mass index is 27 92 kg/m²  The BMI is above normal  Exercise recommendations include moderate aerobic physical activity for 150 minutes/week

## 2020-01-14 ENCOUNTER — TRANSCRIBE ORDERS (OUTPATIENT)
Dept: ADMINISTRATIVE | Facility: HOSPITAL | Age: 60
End: 2020-01-14

## 2020-01-15 ENCOUNTER — DOCUMENTATION (OUTPATIENT)
Dept: PHYSICAL THERAPY | Facility: OTHER | Age: 60
End: 2020-01-15

## 2020-01-15 NOTE — PROGRESS NOTES
Patient was called and spoken to by RN on 1/9/2020  Note states the patient said he was agreeable to be triaged and would call us back in 10 mins  Patient never returned the call  Referral closed

## 2020-01-21 ENCOUNTER — LAB (OUTPATIENT)
Dept: LAB | Facility: CLINIC | Age: 60
End: 2020-01-21
Payer: COMMERCIAL

## 2020-01-21 DIAGNOSIS — N28.9 ABNORMAL RENAL FUNCTION: ICD-10-CM

## 2020-01-21 DIAGNOSIS — R20.0 NUMBNESS AND TINGLING OF BOTH LEGS: ICD-10-CM

## 2020-01-21 DIAGNOSIS — R20.2 NUMBNESS AND TINGLING OF BOTH LEGS: ICD-10-CM

## 2020-01-21 LAB
ALBUMIN SERPL BCP-MCNC: 3.4 G/DL (ref 3.5–5)
ALP SERPL-CCNC: 87 U/L (ref 46–116)
ALT SERPL W P-5'-P-CCNC: 39 U/L (ref 12–78)
ANION GAP SERPL CALCULATED.3IONS-SCNC: 2 MMOL/L (ref 4–13)
AST SERPL W P-5'-P-CCNC: 30 U/L (ref 5–45)
BACTERIA UR QL AUTO: ABNORMAL /HPF
BILIRUB SERPL-MCNC: 0.84 MG/DL (ref 0.2–1)
BILIRUB UR QL STRIP: NEGATIVE
BUN SERPL-MCNC: 21 MG/DL (ref 5–25)
CALCIUM SERPL-MCNC: 9.3 MG/DL (ref 8.3–10.1)
CHLORIDE SERPL-SCNC: 112 MMOL/L (ref 100–108)
CLARITY UR: CLEAR
CO2 SERPL-SCNC: 27 MMOL/L (ref 21–32)
COLOR UR: YELLOW
CREAT SERPL-MCNC: 1.41 MG/DL (ref 0.6–1.3)
GFR SERPL CREATININE-BSD FRML MDRD: 62 ML/MIN/1.73SQ M
GLUCOSE P FAST SERPL-MCNC: 81 MG/DL (ref 65–99)
GLUCOSE UR STRIP-MCNC: NEGATIVE MG/DL
HGB UR QL STRIP.AUTO: ABNORMAL
HYALINE CASTS #/AREA URNS LPF: ABNORMAL /LPF
KETONES UR STRIP-MCNC: NEGATIVE MG/DL
LEUKOCYTE ESTERASE UR QL STRIP: NEGATIVE
NITRITE UR QL STRIP: NEGATIVE
NON-SQ EPI CELLS URNS QL MICRO: ABNORMAL /HPF
PH UR STRIP.AUTO: 6 [PH]
POTASSIUM SERPL-SCNC: 4.6 MMOL/L (ref 3.5–5.3)
PROT SERPL-MCNC: 7.4 G/DL (ref 6.4–8.2)
PROT UR STRIP-MCNC: ABNORMAL MG/DL
RBC #/AREA URNS AUTO: ABNORMAL /HPF
SODIUM SERPL-SCNC: 141 MMOL/L (ref 136–145)
SP GR UR STRIP.AUTO: 1.03 (ref 1–1.03)
TSH SERPL DL<=0.05 MIU/L-ACNC: 2.89 UIU/ML (ref 0.36–3.74)
UROBILINOGEN UR QL STRIP.AUTO: 0.2 E.U./DL
WBC #/AREA URNS AUTO: ABNORMAL /HPF

## 2020-01-21 PROCEDURE — 84443 ASSAY THYROID STIM HORMONE: CPT

## 2020-01-21 PROCEDURE — 36415 COLL VENOUS BLD VENIPUNCTURE: CPT

## 2020-01-21 PROCEDURE — 86618 LYME DISEASE ANTIBODY: CPT

## 2020-01-21 PROCEDURE — 81001 URINALYSIS AUTO W/SCOPE: CPT | Performed by: NURSE PRACTITIONER

## 2020-01-21 PROCEDURE — 86617 LYME DISEASE ANTIBODY: CPT

## 2020-01-21 PROCEDURE — 80053 COMPREHEN METABOLIC PANEL: CPT

## 2020-01-21 PROCEDURE — 87086 URINE CULTURE/COLONY COUNT: CPT | Performed by: NURSE PRACTITIONER

## 2020-01-22 LAB — BACTERIA UR CULT: NORMAL

## 2020-01-23 LAB
B BURGDOR IGG PATRN SER IB-IMP: POSITIVE
B BURGDOR IGG+IGM SER-ACNC: 1.28 ISR (ref 0–0.9)
B BURGDOR IGM PATRN SER IB-IMP: NEGATIVE
B BURGDOR18KD IGG SER QL IB: PRESENT
B BURGDOR23KD IGG SER QL IB: ABNORMAL
B BURGDOR23KD IGM SER QL IB: ABNORMAL
B BURGDOR28KD IGG SER QL IB: ABNORMAL
B BURGDOR30KD IGG SER QL IB: ABNORMAL
B BURGDOR39KD IGG SER QL IB: PRESENT
B BURGDOR39KD IGM SER QL IB: ABNORMAL
B BURGDOR41KD IGG SER QL IB: PRESENT
B BURGDOR41KD IGM SER QL IB: ABNORMAL
B BURGDOR45KD IGG SER QL IB: ABNORMAL
B BURGDOR58KD IGG SER QL IB: PRESENT
B BURGDOR66KD IGG SER QL IB: ABNORMAL
B BURGDOR93KD IGG SER QL IB: PRESENT

## 2020-01-27 ENCOUNTER — OFFICE VISIT (OUTPATIENT)
Dept: UROLOGY | Facility: AMBULATORY SURGERY CENTER | Age: 60
End: 2020-01-27
Payer: COMMERCIAL

## 2020-01-27 VITALS
SYSTOLIC BLOOD PRESSURE: 110 MMHG | DIASTOLIC BLOOD PRESSURE: 60 MMHG | BODY MASS INDEX: 28 KG/M2 | WEIGHT: 158 LBS | HEIGHT: 63 IN | HEART RATE: 81 BPM

## 2020-01-27 DIAGNOSIS — N40.0 BENIGN PROSTATIC HYPERPLASIA, UNSPECIFIED WHETHER LOWER URINARY TRACT SYMPTOMS PRESENT: Primary | ICD-10-CM

## 2020-01-27 LAB — POST-VOID RESIDUAL VOLUME, ML POC: 32 ML

## 2020-01-27 PROCEDURE — 99213 OFFICE O/P EST LOW 20 MIN: CPT | Performed by: NURSE PRACTITIONER

## 2020-01-27 PROCEDURE — 51736 URINE FLOW MEASUREMENT: CPT | Performed by: NURSE PRACTITIONER

## 2020-01-27 PROCEDURE — 51798 US URINE CAPACITY MEASURE: CPT | Performed by: NURSE PRACTITIONER

## 2020-01-27 NOTE — PROGRESS NOTES
1/27/2020      Chief Complaint   Patient presents with    Benign prostatic hyperplasia, unspecified whether lower urin     follow up      Assessment and Plan    60 y o  male managed by Dr Jerry Fallon    1  Routine prostate cancer screening  · PSA and JENN due 10/2020    2  Microscopic hematuria  · Status post negative microscopic hematuria workup in 2014  · Urine dip at next office visit    3  Epididymal head cyst  · Asymptomatic  · Ultrasound of course scrotum and testicle performed 11/14/2019 reveals bilateral epididymal head cyst within significant change from prior evaluation    4  Benign prostatic hyperplasia  · Bladder scan PVR 32 ml  · Uroflow- results below  · Tamsulosin to be continued    History of Present Illness  Karma Zhang is a 60 y o  male here for follow up evaluation of  urinary symptoms secondary to benign prostatic hyperplasia  At previous office visit patient was started on tamsulosin 0 4 mg p o  Daily for urinary symptoms of sensation of incomplete bladder emptying and urinary frequency  He continues to take tamsulosin within difficulties  He denies side effects secondary to the medication  He also has history of an epididymal cysts which have not changed in size or given him any symptoms of pain and discomfort  Patient's most recent PSA performed 10/22/2019 is 0 4  PSA trend as below  Patient with a negative microscopic hematuria workup performed in 2014  He denies all lower urinary tract symptoms including dysuria, hematuria, urinary frequency and urgency  He denies any recent changes to his health  He is currently satisfied with his urinary health  He continues to take MiraLax avoid 4 mg p o  Daily with no side effects          Review of Systems   Constitutional: Negative for chills and fever  Respiratory: Negative for cough and shortness of breath  Cardiovascular: Negative for chest pain     Gastrointestinal: Negative for abdominal distention, abdominal pain, blood in stool, nausea and vomiting  Genitourinary: Negative for difficulty urinating, dysuria, enuresis, flank pain, frequency, hematuria and urgency  Skin: Negative for rash  Neurological: Negative for dizziness         Past Medical History  Past Medical History:   Diagnosis Date    History of chronic constipation     History of neck pain 2017       Past Social History  Past Surgical History:   Procedure Laterality Date    CYSTOSCOPY  2014    diagnostic managed by Jalen Mora    TOOTH EXTRACTION       Social History     Tobacco Use   Smoking Status Former Smoker    Types: Cigarettes    Last attempt to quit: 2014    Years since quittin 0   Smokeless Tobacco Never Used       Past Family History  Family History   Problem Relation Age of Onset    Cancer Sister         malignant neoplasm of breast    Diabetes Paternal Grandmother     Diabetes Family     Arthritis Family     Cancer Family         malignant neoplasm    Substance Abuse Neg Hx         denied Mother and Father    Mental illness Neg Hx         no family history Mother and Father    Other Neg Hx         denied family history of Osteopertrosis       Past Social history  Social History     Socioeconomic History    Marital status: Single     Spouse name: Not on file    Number of children: Not on file    Years of education: Not on file    Highest education level: Not on file   Occupational History    Not on file   Social Needs    Financial resource strain: Not on file    Food insecurity:     Worry: Not on file     Inability: Not on file    Transportation needs:     Medical: Not on file     Non-medical: Not on file   Tobacco Use    Smoking status: Former Smoker     Types: Cigarettes     Last attempt to quit: 2014     Years since quittin 0    Smokeless tobacco: Never Used   Substance and Sexual Activity    Alcohol use: Yes     Frequency: 2-4 times a month     Drinks per session: 5 or 6     Binge frequency: Monthly Comment: weekly    Drug use: Never    Sexual activity: Not on file   Lifestyle    Physical activity:     Days per week: Not on file     Minutes per session: Not on file    Stress: Not on file   Relationships    Social connections:     Talks on phone: Not on file     Gets together: Not on file     Attends Evangelical service: Not on file     Active member of club or organization: Not on file     Attends meetings of clubs or organizations: Not on file     Relationship status: Not on file    Intimate partner violence:     Fear of current or ex partner: Not on file     Emotionally abused: Not on file     Physically abused: Not on file     Forced sexual activity: Not on file   Other Topics Concern    Not on file   Social History Narrative    No preference or Evangelical beliefs       Current Medications  Current Outpatient Medications   Medication Sig Dispense Refill    diphenhydrAMINE (BENADRYL) 25 mg tablet Take 1 tablet (25 mg total) by mouth 2 (two) times a day as needed for itching (Patient not taking: Reported on 12/26/2019) 30 tablet 0    famotidine (PEPCID) 20 mg tablet Take 20 mg by mouth 2 (two) times a day  0    hydrocortisone 0 5 % cream Apply topically 2 (two) times a day for 14 days Avoid application to face 30 g 0    lisinopril (ZESTRIL) 5 mg tablet TAKE 1 TABLET BY MOUTH EVERY DAY 90 tablet 1    naproxen (NAPROSYN) 500 mg tablet Take 1 tablet (500 mg total) by mouth 2 (two) times a day with meals 10 tablet 0    omeprazole (PriLOSEC) 40 MG capsule Take 1 capsule (40 mg total) by mouth daily before breakfast 30 minutes prior to breakfast 30 capsule 6    ranitidine (ZANTAC) 300 MG tablet Take 1 tablet (300 mg total) by mouth daily at bedtime 30 tablet 6    tamsulosin (FLOMAX) 0 4 mg Take 1 capsule (0 4 mg total) by mouth daily with dinner 90 capsule 3     No current facility-administered medications for this visit          Allergies  No Known Allergies    The following portions of the patient's history were reviewed and updated as appropriate: allergies, current medications, past medical history, past social history, past surgical history and problem list       Vitals  Vitals:    01/27/20 0827   BP: 110/60   BP Location: Left arm   Patient Position: Sitting   Cuff Size: Adult   Pulse: 81   Weight: 71 7 kg (158 lb)   Height: 5' 3" (1 6 m)     Physical Exam  Physical Exam   Constitutional: He is oriented to person, place, and time  He appears well-developed and well-nourished  No distress  Cardiovascular: Normal rate  Pulmonary/Chest: Effort normal and breath sounds normal  No respiratory distress  Musculoskeletal: Normal range of motion  Neurological: He is alert and oriented to person, place, and time  Skin: Skin is warm  Vitals reviewed  Results  Recent Results (from the past 1 hour(s))   POCT Measure PVR    Collection Time: 01/27/20  8:31 AM   Result Value Ref Range    POST-VOID RESIDUAL VOLUME, ML POC 32 mL     Lab Results   Component Value Date    PSA 0 4 10/22/2019    PSA 0 4 09/18/2018    PSA 0 4 11/30/2017     Lab Results   Component Value Date    GLUCOSE 113 09/15/2015    CALCIUM 9 3 01/21/2020     09/15/2015    K 4 6 01/21/2020    CO2 27 01/21/2020     (H) 01/21/2020    BUN 21 01/21/2020    CREATININE 1 41 (H) 01/21/2020     Lab Results   Component Value Date    WBC 4 26 (L) 11/03/2019    HGB 15 5 11/03/2019    HCT 47 1 11/03/2019    MCV 93 11/03/2019     11/03/2019     Uroflow  Date/Time: 1/27/2020 8:42 AM  Performed by: ALE Pressley  Authorized by: ALE Pressley   Procedure - Urodynamics:  Procedure details: Simple Uroflow          Post-procedure:     Patient tolerance: Patient tolerated procedure well with no immediate complications      Comments:      Maximum flow 19 1 mL/second  Average flow 12 0 mL/second  Voiding time 35 1 mm:ss  S  Voided volume 147 6 mL  PVR with 32 mL  Voiding time 35 1mm        Ultrasound of scrotum and testicle performed 11/14/2019    IMPRESSION:     Bilateral epididymal cysts, otherwise no significant abnormality     Orders  Orders Placed This Encounter   Procedures    POCT Measure PVR    Uroflow     This order was created via procedure documentation       ALE Charlton

## 2020-01-27 NOTE — RESULT ENCOUNTER NOTE
The lyme testing was abnormal but only because he had it years ago  He has less bands positive now than in the past which means he is unlikely to have infection  His kidney function is mildly abnormal  Recommend he stay well hydrated and avoid anti-inflammatories such as ibuprofen, aleeve, motrin, naproxen and aspirin  Recommend we recheck it in 6 months

## 2020-01-30 ENCOUNTER — TELEPHONE (OUTPATIENT)
Dept: PHYSICAL THERAPY | Facility: OTHER | Age: 60
End: 2020-01-30

## 2020-01-30 ENCOUNTER — TELEPHONE (OUTPATIENT)
Dept: FAMILY MEDICINE CLINIC | Facility: CLINIC | Age: 60
End: 2020-01-30

## 2020-01-30 NOTE — TELEPHONE ENCOUNTER
----- Message from Dasha Pichardo sent at 1/29/2020  4:28 PM EST -----  Contact: Sharon Mabry  I spoke with pt this afternoon regarding results and when he complained about his pain, I advised him he was referred to [facility] but the referral was closed because he refused service  Pt was very adamant stating he never, ever refused service  He stated he was dissatisfied with the date of the appointment being so far (April) but that he would go  I advised pt I would send referral team msg  Please advise, thanks so much

## 2020-01-30 NOTE — TELEPHONE ENCOUNTER
This nurse received a VM from [facility] requesting return call  [phone] called this number to discuss pt status etc  Rings and sounds like it transfers and rings again  Nurse did not get greeting to LM  Please see EMR notes regarding our attempts to contact patient for  [facility]   Nurse called patient today to discuss program offerings  Nurse identified self and office to patient who stated, "I'm at work right now"  Nurse requested call back  Call disconnected  Will await patients call to move forward

## 2020-01-30 NOTE — TELEPHONE ENCOUNTER
This is call from [facility] ([phone]) to pt and it looks like pt never returned call to them so they closed order per protocol  I MADDIE on VM of [facility] to call me back about this pt and the referral   Patient was called and spoken to by RN on 1/9/2020  Note states the patient said he was agreeable to be triaged and would call us back in 10 mins  Patient never returned the call  Referral closed

## 2020-02-03 NOTE — TELEPHONE ENCOUNTER
This nurse at [facility] received a VM from [facility] center requesting return call  [phone] called this number to discuss pt status etc  Rings and sounds like it transfers and rings again  Nurse did not get greeting to [facility] called pt on 1/30/2020 and see below:   Please see EMR notes regarding our attempts to contact patient for  [facility]   Nurse called patient today to discuss program offerings  Nurse identified self and office to patient who stated, "I'm at work right now"  Nurse requested call back  Call disconnected  Will await patients call to move forward

## 2020-02-09 ENCOUNTER — HOSPITAL ENCOUNTER (EMERGENCY)
Facility: HOSPITAL | Age: 60
Discharge: HOME | End: 2020-02-09
Attending: EMERGENCY MEDICINE
Payer: COMMERCIAL

## 2020-02-09 VITALS
HEART RATE: 85 BPM | SYSTOLIC BLOOD PRESSURE: 134 MMHG | DIASTOLIC BLOOD PRESSURE: 83 MMHG | WEIGHT: 156.53 LBS | OXYGEN SATURATION: 99 % | BODY MASS INDEX: 27.73 KG/M2 | TEMPERATURE: 99 F | RESPIRATION RATE: 16 BRPM

## 2020-02-09 DIAGNOSIS — M54.16 LUMBAR RADICULOPATHY: Primary | ICD-10-CM

## 2020-02-09 DIAGNOSIS — I10 BENIGN HYPERTENSION: ICD-10-CM

## 2020-02-09 PROCEDURE — 99283 EMERGENCY DEPT VISIT LOW MDM: CPT

## 2020-02-09 PROCEDURE — 99284 EMERGENCY DEPT VISIT MOD MDM: CPT | Performed by: PHYSICIAN ASSISTANT

## 2020-02-09 RX ORDER — DIAZEPAM 5 MG/1
5 TABLET ORAL EVERY 8 HOURS PRN
Qty: 15 TABLET | Refills: 0 | Status: SHIPPED | OUTPATIENT
Start: 2020-02-09 | End: 2020-05-20

## 2020-02-09 RX ORDER — PREDNISONE 20 MG/1
20 TABLET ORAL 2 TIMES DAILY WITH MEALS
Qty: 10 TABLET | Refills: 0 | Status: SHIPPED | OUTPATIENT
Start: 2020-02-09 | End: 2020-02-14

## 2020-02-09 NOTE — ED NOTES
Took tylenol at  with slight relief        Mina Hayes RN  02/09/20 [address], RN  02/09/20 7171

## 2020-02-10 RX ORDER — LISINOPRIL 5 MG/1
TABLET ORAL
Qty: 90 TABLET | Refills: 1 | Status: SHIPPED | OUTPATIENT
Start: 2020-02-10 | End: 2020-06-01 | Stop reason: SDUPTHER

## 2020-02-10 NOTE — ED PROVIDER NOTES
History  Chief Complaint   Patient presents with    Buttocks Pain     Reports pain in left buttock down left leg x 1 month  No falls/injury noted   Leg Pain       Back Pain   Location:  Lumbar spine  Radiates to:  L thigh and L knee  Pain severity:  Moderate  Pain is: Worse during the day  Onset quality:  Gradual  Duration:  2 months  Timing:  Intermittent  Progression:  Waxing and waning  Context: physical stress    Context: not emotional stress    Associated symptoms: no abdominal pain, no abdominal swelling, no bladder incontinence, no bowel incontinence, no chest pain, no dysuria, no fever, no headaches, no leg pain, no numbness, no paresthesias, no pelvic pain, no perianal numbness, no tingling, no weakness and no weight loss        Prior to Admission Medications   Prescriptions Last Dose Informant Patient Reported? Taking?    diphenhydrAMINE (BENADRYL) 25 mg tablet   No No   Sig: Take 1 tablet (25 mg total) by mouth 2 (two) times a day as needed for itching   Patient not taking: Reported on 12/26/2019   famotidine (PEPCID) 20 mg tablet   Yes No   Sig: Take 20 mg by mouth 2 (two) times a day   hydrocortisone 0 5 % cream   No No   Sig: Apply topically 2 (two) times a day for 14 days Avoid application to face   lisinopril (ZESTRIL) 5 mg tablet  Self No No   Sig: TAKE 1 TABLET BY MOUTH EVERY DAY   naproxen (NAPROSYN) 500 mg tablet  Self No No   Sig: Take 1 tablet (500 mg total) by mouth 2 (two) times a day with meals   omeprazole (PriLOSEC) 40 MG capsule   No No   Sig: Take 1 capsule (40 mg total) by mouth daily before breakfast 30 minutes prior to breakfast   ranitidine (ZANTAC) 300 MG tablet  Self No No   Sig: Take 1 tablet (300 mg total) by mouth daily at bedtime   tamsulosin (FLOMAX) 0 4 mg   No No   Sig: Take 1 capsule (0 4 mg total) by mouth daily with dinner      Facility-Administered Medications: None       Past Medical History:   Diagnosis Date    History of chronic constipation     History of neck pain 2017    Hypertension        Past Surgical History:   Procedure Laterality Date    CYSTOSCOPY  2014    diagnostic managed by Jalen Mora    TOOTH EXTRACTION         Family History   Problem Relation Age of Onset    Cancer Sister         malignant neoplasm of breast    Diabetes Paternal Grandmother     Diabetes Family     Arthritis Family     Cancer Family         malignant neoplasm    Substance Abuse Neg Hx         denied Mother and Father    Mental illness Neg Hx         no family history Mother and Father    Other Neg Hx         denied family history of Osteopertrosis     I have reviewed and agree with the history as documented  Social History     Tobacco Use    Smoking status: Former Smoker     Types: Cigarettes     Last attempt to quit: 2014     Years since quittin 0    Smokeless tobacco: Never Used   Substance Use Topics    Alcohol use: Yes     Frequency: 2-4 times a month     Drinks per session: 5 or 6     Binge frequency: Monthly     Comment: weekly    Drug use: Never        Review of Systems   Constitutional: Negative for activity change, appetite change, fatigue, fever and weight loss  HENT: Negative for nosebleeds, sneezing, sore throat, trouble swallowing and voice change  Eyes: Negative for photophobia, pain and visual disturbance  Respiratory: Negative for apnea, choking and stridor  Cardiovascular: Negative for chest pain, palpitations and leg swelling  Gastrointestinal: Negative for abdominal pain, anal bleeding, bowel incontinence and constipation  Endocrine: Negative for cold intolerance, heat intolerance, polydipsia and polyphagia  Genitourinary: Negative for bladder incontinence, decreased urine volume, dysuria, enuresis, frequency, genital sores, pelvic pain and urgency  Musculoskeletal: Positive for back pain  Negative for joint swelling and myalgias  Allergic/Immunologic: Negative for environmental allergies and food allergies  Neurological: Negative for tingling, tremors, seizures, speech difficulty, weakness, numbness, headaches and paresthesias  Hematological: Negative for adenopathy  Psychiatric/Behavioral: Negative for behavioral problems, decreased concentration, dysphoric mood and hallucinations  All other systems reviewed and are negative  Physical Exam  Physical Exam   Constitutional: He is oriented to person, place, and time  He appears well-developed and well-nourished  No distress  HENT:   Head: Normocephalic and atraumatic  Eyes: Pupils are equal, round, and reactive to light  Conjunctivae and EOM are normal    Neck: Normal range of motion  Neck supple  No tracheal deviation present  No thyromegaly present  Cardiovascular: Normal rate, regular rhythm, normal heart sounds and intact distal pulses  No murmur heard  Pulmonary/Chest: Effort normal and breath sounds normal  No respiratory distress  He has no wheezes  He has no rales  Abdominal: Soft  Bowel sounds are normal  He exhibits no distension  There is no tenderness  Musculoskeletal:        Lumbar back: He exhibits decreased range of motion, tenderness, pain and spasm  He exhibits no swelling and no deformity  Neurological: He is alert and oriented to person, place, and time  He has normal reflexes  No cranial nerve deficit  Skin: He is not diaphoretic  Psychiatric: He has a normal mood and affect   His behavior is normal        Vital Signs  ED Triage Vitals [02/09/20 1835]   Temperature Pulse Respirations Blood Pressure SpO2   99 °F (37 2 °C) 85 16 134/83 99 %      Temp Source Heart Rate Source Patient Position - Orthostatic VS BP Location FiO2 (%)   Temporal -- -- -- --      Pain Score       8           Vitals:    02/09/20 1835   BP: 134/83   Pulse: 85         Visual Acuity      ED Medications  Medications - No data to display    Diagnostic Studies  Results Reviewed     None                 No orders to display Procedures  Procedures         ED Course                               MDM      Disposition  Final diagnoses:   Lumbar radiculopathy     Time reflects when diagnosis was documented in both MDM as applicable and the Disposition within this note     Time User Action Codes Description Comment    2/9/2020  7:20 PM Simon Castellanos Add [I30 26] Lumbar radiculopathy       ED Disposition     ED Disposition Condition Date/Time Comment    Discharge Stable Sun Feb 9, 2020  7:21 PM Camilo Jean discharge to home/self care  Follow-up Information     Follow up With Specialties Details Why Contact Info Additional Information    [address]    [address] [place] [address]  [phone] [facility], [address] [place] [address], [facility] [address]          Patient's Medications   Discharge Prescriptions    DIAZEPAM (VALIUM) 5 MG TABLET    Take 1 tablet (5 mg total) by mouth every 8 (eight) hours as needed for anxiety for up to 5 days       Start Date: 2/9/2020  End Date: 2/14/2020       Order Dose: 5 mg       Quantity: 15 tablet    Refills: 0    PREDNISONE 20 MG TABLET    Take 1 tablet (20 mg total) by mouth 2 (two) times a day with meals for 5 days       Start Date: 2/9/2020  End Date: 2/14/2020       Order Dose: 20 mg       Quantity: 10 tablet    Refills: 0     No discharge procedures on file      ED Provider  Electronically Signed by           Gamaliel Gay PA-C  02/09/20 9845

## 2020-02-26 ENCOUNTER — HOSPITAL ENCOUNTER (EMERGENCY)
Facility: HOSPITAL | Age: 60
Discharge: HOME | End: 2020-02-27
Attending: EMERGENCY MEDICINE | Admitting: EMERGENCY MEDICINE
Payer: COMMERCIAL

## 2020-02-26 VITALS
TEMPERATURE: 98.2 F | HEART RATE: 95 BPM | RESPIRATION RATE: 18 BRPM | DIASTOLIC BLOOD PRESSURE: 97 MMHG | WEIGHT: 157.85 LBS | OXYGEN SATURATION: 99 % | SYSTOLIC BLOOD PRESSURE: 153 MMHG | BODY MASS INDEX: 27.96 KG/M2

## 2020-02-26 DIAGNOSIS — L03.90 CELLULITIS: Primary | ICD-10-CM

## 2020-02-26 PROCEDURE — 99283 EMERGENCY DEPT VISIT LOW MDM: CPT

## 2020-02-27 ENCOUNTER — APPOINTMENT (EMERGENCY)
Dept: RADIOLOGY | Facility: HOSPITAL | Age: 60
End: 2020-02-27
Payer: COMMERCIAL

## 2020-02-27 ENCOUNTER — OFFICE VISIT (OUTPATIENT)
Dept: FAMILY MEDICINE CLINIC | Facility: CLINIC | Age: 60
End: 2020-02-27

## 2020-02-27 VITALS
RESPIRATION RATE: 18 BRPM | TEMPERATURE: 97.9 F | SYSTOLIC BLOOD PRESSURE: 124 MMHG | DIASTOLIC BLOOD PRESSURE: 82 MMHG | HEART RATE: 83 BPM | OXYGEN SATURATION: 98 % | BODY MASS INDEX: 27.68 KG/M2 | HEIGHT: 63 IN | WEIGHT: 156.2 LBS

## 2020-02-27 DIAGNOSIS — L03.119 CELLULITIS OF FOOT: ICD-10-CM

## 2020-02-27 DIAGNOSIS — I10 BENIGN ESSENTIAL HYPERTENSION: ICD-10-CM

## 2020-02-27 DIAGNOSIS — B35.3 TINEA PEDIS OF LEFT FOOT: Primary | ICD-10-CM

## 2020-02-27 PROCEDURE — 73630 X-RAY EXAM OF FOOT: CPT

## 2020-02-27 PROCEDURE — 99283 EMERGENCY DEPT VISIT LOW MDM: CPT | Performed by: PHYSICIAN ASSISTANT

## 2020-02-27 PROCEDURE — 3074F SYST BP LT 130 MM HG: CPT | Performed by: PHYSICIAN ASSISTANT

## 2020-02-27 PROCEDURE — 3008F BODY MASS INDEX DOCD: CPT | Performed by: PHYSICIAN ASSISTANT

## 2020-02-27 PROCEDURE — 3079F DIAST BP 80-89 MM HG: CPT | Performed by: PHYSICIAN ASSISTANT

## 2020-02-27 PROCEDURE — 1036F TOBACCO NON-USER: CPT | Performed by: PHYSICIAN ASSISTANT

## 2020-02-27 PROCEDURE — 99213 OFFICE O/P EST LOW 20 MIN: CPT | Performed by: PHYSICIAN ASSISTANT

## 2020-02-27 RX ORDER — NYSTATIN 100000 [USP'U]/G
POWDER TOPICAL 2 TIMES DAILY
Qty: 15 G | Refills: 0 | Status: SHIPPED | OUTPATIENT
Start: 2020-02-27 | End: 2020-05-20

## 2020-02-27 RX ORDER — CEPHALEXIN 500 MG/1
500 CAPSULE ORAL EVERY 6 HOURS SCHEDULED
Qty: 20 CAPSULE | Refills: 0 | Status: SHIPPED | OUTPATIENT
Start: 2020-02-27 | End: 2020-03-03

## 2020-02-27 RX ORDER — CEPHALEXIN 250 MG/1
500 CAPSULE ORAL ONCE
Status: COMPLETED | OUTPATIENT
Start: 2020-02-27 | End: 2020-02-27

## 2020-02-27 RX ADMIN — CEPHALEXIN 500 MG: 250 CAPSULE ORAL at 01:05

## 2020-02-27 NOTE — LETTER
February 27, 2020     Patient: Francisco Carmichael   YOB: 1960   Date of Visit: 2/27/2020       To Whom it May Concern:    Dunia Segura is under my professional care  He was seen in my office on 2/27/2020  He may return to work on 2/29/2020  If you have any questions or concerns, please don't hesitate to call           Sincerely,          Torsten Scott PA-C        CC: No Recipients

## 2020-02-27 NOTE — ASSESSMENT & PLAN NOTE
Much better control right now compared the ER  It could be related to a anxiety at that time  Continue on current medication

## 2020-02-27 NOTE — PATIENT INSTRUCTIONS
Wear moisture wicking socking or cotton socks  Can use in shoe gold bond powder    Celulitis   LO QUE NECESITA SABER:   La celulitis es aggie infección en la piel causada por bacteria  La celulitis podría desaparecer por sí trey o puede que necesite tratamiento  Harrison médico puede dibujar un círculo alrededor de los bordes externos de harrison celulitis  Si la celulitis se extiende, harrison médico verá que se salió del círculo  INSTRUCCIONES SOBRE [facility]:   Llame al 911 si presenta:   · Tiene dolor en el pecho o dificultad repentina para respirar  Regrese a la dana de emergencias si:   · Harrison herida se engrandece o tiene más dolor  · Usted siente sonidos crepitantes bajo la piel al tocarla  · Usted tiene puntos o protuberancias color emelia en harrison piel o nota demetra debajo harrison piel  · Usted tiene aggie nueva inflamación o dolor en dewey piernas  · [place] [employer] enrojecidas, cálidas e inflamadas se [address]  · Usted ve líneas rodriguez saliendo del área infectada  Pregúntele a harrison Garcia Jono vitaminas y minerales son adecuados para usted  · Usted tiene fiebre  · Harrison fiebre o dolor no desaparecen o empeoran  · El área no se reduce después de 2 días de uso de antibióticos  · Harrison piel se escama o se pela  · Usted tiene preguntas o inquietudes acerca de harrison condición o cuidado  Medicamentos:   · Antibióticos  sirven para tratar la infección bacterial      · AINEs (Analgésicos antiinflamatorios no esteroides) melvin el ibuprofeno, ayudan a disminuir la inflamación, el dolor y la fiebre  Los AINEs pueden causar sangrado estomacal o problemas renales en ciertas personas  Si usted esta tomando un anticoágulante,  siempre  pregunte si los AINEs son seguros para usted  Siempre eze la etiqueta de amanda medicamento y [place] instrucciones   No administre amanda medicamento a niños menores de 6 meses de mora sin antes obtener la autorización de harrison médico      · Acetaminofeno:  elias el dolor y baja la fiebre  Está disponible sin receta médica  Pregunte la cantidad y la frecuencia con que debe tomarlos  [address] las etiquetas de todos los demás medicamentos que esté usando para saber si también contienen acetaminofén, o pregunte a harrison médico o farmacéutico  El acetaminofén puede causar daño en el hígado cuando no se yogi de forma correcta  No use más de 4 gramos (4000 miligramos) en total de acetaminofeno en un día  · [place]at[place] dewey medicamentos melvin se le haya indicado  Consulte con harrison médico si usted jade que harrison medicamento no le está ayudando o si presenta efectos secundarios  Infórmele si es alérgico a cualquier medicamento  Mantenga aggie lista actualizada de los [place], las vitaminas y los productos herbales que yogi  Incluya los siguientes datos de los medicamentos: cantidad, frecuencia y motivo de administración  Traiga con usted la lista o los envases de la píldoras a dewey citas de seguimiento  Lleve la lista de los medicamentos con usted en gail de aggie emergencia  Cuidados personales:   · Eleve el área por encima del nivel de harrison corazón   con la frecuencia posible  [place] va a disminuir inflamación y el dolor  Coloque el área sobre almohadas o sábanas para tratar de mantenerla elevada cómodamente  · Limpie la sharonda diariamente hasta que se forme aggie costra sobre la herida  Tenny Ramming y agua  Séquela con palmaditas  Use apósitos melvin se le haya indicado  · Coloque paños húmedos fríos o calientes en la sharonda melvin se le haya indicado  Use paños limpios y agua limpia  Déjelos en el área hasta que el paño llegue a temperatura ambiente  Seque el área con palmaditas con un paño limpio y seco  [place] ayudar a disminuir el dolor  Evite la celulitis:   · No se rasque picaduras de insectos o áreas lesionadas  Rascarse estas áreas aumenta harrison riesgo de tener celulitis       · No comparta los artículos de [address] [place] [address] personal, melvin toallas, ropa, o navajas de afeitar  · Limpie los equipos de ejercicio  con un detergente desinfectante antes y después de [place]  · Lávese las yissel frecuentemente  Utilice agua y [place]  [employer] las yissel después de usar el baño, cambiarle el pañal a un sharon o estornudar  Lávese las yissel antes de comer o preparar alimentos  Use aggie loción para evitar que la piel se reseque o se agriete  · Use medias de compresión melvin se le indique  Es posible que le recomienden usar las medias si tiene edema periférico  Las medias mejoran el flujo sanguíneo y [address]  · Trate el pie de atleta  [employer] puede ayudar a prevenir la propagación de aggie infección bacteriana en la piel  Acuda a dewey consultas de control con harrison médico dentro de 3 días o según le indicaron:  Harrison médico comprobará si la celulitis está mejorando  Es posible que necesite un medicamento diferente  Anote dewey preguntas para que se acuerde de hacerlas demond dewey visitas  © 2017 [address] [employer] Information is for End User's use only and may not be sold, redistributed or otherwise used for commercial purposes  All illustrations and images included in CareNotes® are the copyrighted property of A D A M , Inc  or Leo Ewing  Esta información es sólo para uso en educación  Harrison intención no es darle un consejo médico sobre enfermedades o tratamientos  Colsulte con harrison Mabeline Loser farmacéutico antes de seguir cualquier régimen médico para saber si es seguro y efectivo para usted

## 2020-02-27 NOTE — ED PROVIDER NOTES
History  Chief Complaint   Patient presents with    Foot Pain     patient c/o left pinky toe pain after waking up  denies injury/trauma to area  68-year-old man complaining of swelling and pain of the left pinky toe  He noticed it after he woke up this evening  He denies any injury or trauma to the area that he can recall  Overall he is well-appearing in no acute distress and denies any other complaints  He denies fever chills nausea vomiting abdominal pain chest pain shortness of breath  Allergies reviewed          Prior to Admission Medications   Prescriptions Last Dose Informant Patient Reported?  Taking?   diazepam (VALIUM) 5 mg tablet   No No   Sig: Take 1 tablet (5 mg total) by mouth every 8 (eight) hours as needed for anxiety for up to 5 days   diphenhydrAMINE (BENADRYL) 25 mg tablet   No No   Sig: Take 1 tablet (25 mg total) by mouth 2 (two) times a day as needed for itching   Patient not taking: Reported on 12/26/2019   famotidine (PEPCID) 20 mg tablet   Yes No   Sig: Take 20 mg by mouth 2 (two) times a day   hydrocortisone 0 5 % cream   No No   Sig: Apply topically 2 (two) times a day for 14 days Avoid application to face   lisinopril (ZESTRIL) 5 mg tablet   No No   Sig: TAKE 1 TABLET BY MOUTH EVERY DAY   naproxen (NAPROSYN) 500 mg tablet  Self No No   Sig: Take 1 tablet (500 mg total) by mouth 2 (two) times a day with meals   omeprazole (PriLOSEC) 40 MG capsule   No No   Sig: Take 1 capsule (40 mg total) by mouth daily before breakfast 30 minutes prior to breakfast   ranitidine (ZANTAC) 300 MG tablet  Self No No   Sig: Take 1 tablet (300 mg total) by mouth daily at bedtime   Patient not taking: Reported on 2/27/2020   tamsulosin (FLOMAX) 0 4 mg   No No   Sig: Take 1 capsule (0 4 mg total) by mouth daily with dinner      Facility-Administered Medications: None       Past Medical History:   Diagnosis Date    History of chronic constipation     History of neck pain 11/21/2017       Past Surgical History:   Procedure Laterality Date    CYSTOSCOPY  2014    diagnostic managed by Jalen Mora    TOOTH EXTRACTION         Family History   Problem Relation Age of Onset    Cancer Sister         malignant neoplasm of breast    Diabetes Paternal Grandmother     Diabetes Family     Arthritis Family     Cancer Family         malignant neoplasm    Substance Abuse Neg Hx         denied Mother and Father    Mental illness Neg Hx         no family history Mother and Father    Other Neg Hx         denied family history of Osteopertrosis     I have reviewed and agree with the history as documented  E-Cigarette/Vaping     E-Cigarette/Vaping Substances     Social History     Tobacco Use    Smoking status: Former Smoker     Types: Cigarettes     Last attempt to quit: 2014     Years since quittin 1    Smokeless tobacco: Never Used   Substance Use Topics    Alcohol use: Yes     Frequency: 2-4 times a month     Drinks per session: 5 or 6     Binge frequency: Monthly     Comment: weekly    Drug use: Never       Review of Systems   Musculoskeletal: Positive for joint swelling  All other systems reviewed and are negative  Physical Exam  Physical Exam   Constitutional: He is oriented to person, place, and time  He appears well-developed and well-nourished  He is cooperative  He does not appear ill  No distress  HENT:   Head: Normocephalic and atraumatic  Right Ear: External ear normal    Left Ear: External ear normal    Nose: Nose normal    Mouth/Throat: Oropharynx is clear and moist    Eyes: Pupils are equal, round, and reactive to light  EOM are normal    Neck: Normal range of motion  Neck supple  Cardiovascular: Normal rate, regular rhythm, normal heart sounds and intact distal pulses  Exam reveals no gallop and no friction rub  No murmur heard  Pulmonary/Chest: Effort normal and breath sounds normal  No stridor  No respiratory distress  He has no wheezes  He has no rales  Abdominal: Soft  Bowel sounds are normal  He exhibits no distension  There is no tenderness  There is no guarding  Musculoskeletal: Normal range of motion  He exhibits no tenderness  Feet:    Small wound noted between the 4th and 5th toe of the left foot  It is of unknown etiology  It appears to be scabbed over  There is warmth and erythema extending along the 5th metatarsal and the 5th digit  Patient is tender over this area as well  The remainder of the foot is benign  Patient has normal range of motion and strength of the extremity  Neurological: He is alert and oriented to person, place, and time  Skin: Skin is warm  Capillary refill takes less than 2 seconds  Nursing note and vitals reviewed  Vital Signs  ED Triage Vitals   Temperature Pulse Respirations Blood Pressure SpO2   02/26/20 2346 02/26/20 2345 02/26/20 2345 02/26/20 2345 02/26/20 2345   98 2 °F (36 8 °C) 95 18 (!) 143/103 99 %      Temp Source Heart Rate Source Patient Position - Orthostatic VS BP Location FiO2 (%)   02/26/20 2346 02/26/20 2345 02/26/20 2345 02/26/20 2345 --   Oral Monitor Sitting Right arm       Pain Score       --                  Vitals:    02/26/20 2345 02/26/20 2347   BP: (!) 143/103 153/97   Pulse: 95    Patient Position - Orthostatic VS: Sitting Lying         Visual Acuity      ED Medications  Medications   cephalexin (KEFLEX) capsule 500 mg (500 mg Oral Given 2/27/20 0105)       Diagnostic Studies  Results Reviewed     None                 XR foot 3+ views LEFT   Final Result by Reyes Frankel MD (02/27 0991)      No acute osseous abnormality  Workstation performed: [id number]                    Procedures  Procedures         ED Course                               MDM  Number of Diagnoses or Management Options  Cellulitis: new and requires workup  Diagnosis management comments: X-ray of foot appears unremarkable  Likely cellulitis  Follow-up with podiatry  Keflex given    Patient educated regarding their diagnosis and given return and follow-up instructions  Patient is understanding and in agreement with the treatment plan  There are no questions at the time of discharge  Amount and/or Complexity of Data Reviewed  Tests in the radiology section of CPT®: ordered and reviewed    Risk of Complications, Morbidity, and/or Mortality  Presenting problems: low  Diagnostic procedures: low  Management options: low    Patient Progress  Patient progress: stable        Disposition  Final diagnoses:   Cellulitis     Time reflects when diagnosis was documented in both MDM as applicable and the Disposition within this note     Time User Action Codes Description Comment    2/27/2020  1:01 AM Markie Zuniga Add [I69 [id number]] Cellulitis       ED Disposition     ED Disposition Condition Date/Time Comment    Discharge Stable Thu Feb 27, 2020  1:01 AM Stella Diver discharge to home/self care              Follow-up Information     Follow up With Specialties Details Why Contact Info    Ayala Sanders DPM Podiatry, [address] [place] [address]  [phone]            Discharge Medication List as of 2/27/2020  1:03 AM      START taking these medications    Details   cephalexin (KEFLEX) 500 mg capsule Take 1 capsule (500 mg total) by mouth every 6 (six) hours for 5 days, Starting u 2/27/2020, Until Tue 3/3/2020, Normal         CONTINUE these medications which have NOT CHANGED    Details   diazepam (VALIUM) 5 mg tablet Take 1 tablet (5 mg total) by mouth every 8 (eight) hours as needed for anxiety for up to 5 days, Starting Sun 2/9/2020, Until Fri 2/14/2020, Print      diphenhydrAMINE (BENADRYL) 25 mg tablet Take 1 tablet (25 mg total) by mouth 2 (two) times a day as needed for itching, Starting Thu 11/7/2019, Normal      famotidine (PEPCID) 20 mg tablet Take 20 mg by mouth 2 (two) times a day, Starting Tue 10/22/2019, Historical Med      hydrocortisone 0 5 % cream Apply topically 2 (two) times a day for 14 days Avoid application to face, Starting Thu 11/7/2019, Until Thu 11/21/2019, Normal      lisinopril (ZESTRIL) 5 mg tablet TAKE 1 TABLET BY MOUTH EVERY DAY, Normal      naproxen (NAPROSYN) 500 mg tablet Take 1 tablet (500 mg total) by mouth 2 (two) times a day with meals, Starting Mon 10/14/2019, Print      omeprazole (PriLOSEC) 40 MG capsule Take 1 capsule (40 mg total) by mouth daily before breakfast 30 minutes prior to breakfast, Starting Wed 1/8/2020, Normal      ranitidine (ZANTAC) 300 MG tablet Take 1 tablet (300 mg total) by mouth daily at bedtime, Starting Tue 10/22/2019, Normal      tamsulosin (FLOMAX) 0 4 mg Take 1 capsule (0 4 mg total) by mouth daily with dinner, Starting Wed 10/23/2019, Normal           No discharge procedures on file      PDMP Review     None          ED Provider  Electronically Signed by           Austyn Piedra PA-C  02/27/20 8012

## 2020-02-27 NOTE — PROGRESS NOTES
Assessment/Plan:    Benign essential hypertension   Much better control right now compared the ER  It could be related to a anxiety at that time  Continue on current medication  No follow-ups on file  Patient Instructions     Wear moisture wicking socking or cotton socks  Can use in shoe gold bond powder    Celulitis   LO QUE NECESITA SABER:   La celulitis es aggie infección en la piel causada por bacteria  La celulitis podría desaparecer por sí trey o puede que necesite tratamiento  Harrison médico puede dibujar un círculo alrededor de los bordes externos de harrison celulitis  Si la celulitis se extiende, harrison médico verá que se salió del círculo  INSTRUCCIONES SOBRE EL [facility]OSPITALARIA:   [place] al 911 si presenta:   · Tiene dolor en el pecho o dificultad repentina para respirar  Regrese a la dana de emergencias si:   · Harrison herida se engrandece o tiene más dolor  · Usted siente sonidos crepitantes bajo la piel al tocarla  · Usted tiene puntos o protuberancias color emelia en harrison piel o nota demetra debajo harrison piel  · Usted tiene aggie nueva inflamación o dolor en dewey piernas  · [place] enrojecidas, cálidas e inflamadas se [address]  · Usted ve líneas rodriguez saliendo del área infectada  Pregúntele a harrison Eric Riding vitaminas y minerales son adecuados para usted  · Usted tiene fiebre  · Harrison fiebre o dolor no desaparecen o empeoran  · El área no se reduce después de 2 días de uso de antibióticos  · Harrison piel se escama o se pela  · Usted tiene preguntas o inquietudes acerca de harrison condición o cuidado  Medicamentos:   · Antibióticos  sirven para tratar la infección bacterial      · AINEs (Analgésicos antiinflamatorios no esteroides) melvin el ibuprofeno, ayudan a disminuir la inflamación, el dolor y la fiebre  Los AINEs pueden causar sangrado estomacal o problemas renales en ciertas personas   Si usted esta tomando un anticoágulante,  siempre  pregunte si los AINEs son seguros para usted  Siempre sofy la etiqueta de amanda medicamento y [place] instrucciones  No administre amanda medicamento a niños menores de 6 meses de mora sin antes obtener la autorización de harrison médico      · Acetaminofeno:  elias el dolor y baja la fiebre  Está disponible sin receta médica  Pregunte la cantidad y la frecuencia con que debe tomarlos  [id number]  Sofy las etiquetas de todos los demás medicamentos que esté usando para saber si también contienen acetaminofén, o pregunte a harrison médico o farmacéutico  El acetaminofén puede causar daño en el hígado cuando no se yogi de forma correcta  No use más de 4 gramos (4000 miligramos) en total de acetaminofeno en un día  · [place] dewey medicamentos melvin se le haya indicado  Consulte con harrison médico si usted jade que harrison medicamento no le está ayudando o si presenta efectos secundarios  Infórmele si es alérgico a cualquier medicamento  Mantenga aggie lista actualizada de los [employer], las vitaminas y los productos herbales que yogi  Incluya los siguientes datos de los medicamentos: cantidad, frecuencia y motivo de administración  Traiga con usted la lista o los envases de la píldoras a dewey citas de seguimiento  Lleve la lista de los medicamentos con usted en gail de aggie emergencia  Cuidados personales:   · Eleve el área por encima del nivel de harrison corazón   con la frecuencia posible  [place] va a disminuir inflamación y el dolor  Coloque el área sobre almohadas o sábanas para tratar de mantenerla elevada cómodamente  · Limpie la sharonda diariamente hasta que se forme aggie costra sobre la herida  [place] y agua  Séquela con palmaditas  Use apósitos melvin se le haya indicado  · Coloque paños húmedos fríos o calientes en la sharonda melvin se le haya indicado  Use paños limpios y agua limpia  Déjelos en el área hasta que el paño llegue a temperatura ambiente   Seque el área con palmaditas con un paño limpio y seco  Shellia Sick ayudar a disminuir el dolor  Evite la celulitis:   · No se rasque picaduras de insectos o áreas lesionadas  Rascarse estas áreas aumenta harrison riesgo de tener celulitis  · No comparta los artículos de [address] [place] [address] [address] [facility] personal, melvin toallas, ropa, o navajas de afeitar  · Limpie los equipos de ejercicio  con un detergente desinfectante antes y después de [address]  · Lávese las yissel frecuentemente  Utilice agua y [place]  [employer] las yissel después de usar el baño, cambiarle el pañal a un sharon o estornudar  Lávese las yissel antes de comer o preparar alimentos  Use aggie loción para evitar que la piel se reseque o se agriete  · Use medias de compresión melvin se le indique  Es posible que le recomienden usar las medias si tiene edema periférico  Las medias mejoran el flujo sanguíneo y [address]  · Trate el pie de atleta  Menlo puede ayudar a prevenir la propagación de aggie infección bacteriana en la piel  Acuda a dewey consultas de control con harrison médico dentro de 3 días o según le indicaron:  Harrison médico comprobará si la celulitis está mejorando  Es posible que necesite un medicamento diferente  Anote dewey preguntas para que se acuerde de hacerlas demond dewey visitas  © 2017 [address] Information is for End User's use only and may not be sold, redistributed or otherwise used for commercial purposes  All illustrations and images included in CareNotes® are the copyrighted property of A D A M , Inc  or Leo Ewing  Esta información es sólo para uso en educación  Harrison intención no es darle un consejo médico sobre enfermedades o tratamientos  Colsulte con harrison [place] farmacéutico antes de seguir cualquier régimen médico para saber si es seguro y efectivo para usted  Problem List Items Addressed This Visit        Cardiovascular and Mediastinum    Benign essential hypertension      Much better control right now compared the ER  It could be related to a anxiety at that time  Continue on current medication  Other Visit Diagnoses     Tinea pedis of left foot    -  Primary    Relevant Medications    nystatin (MYCOSTATIN) powder    Cellulitis of foot              Discussed the need to keep his feet dry  Recommend good cotton socks or moisture wicking socks  Discussed that he can use gold Bond foot powder  Continue taking Keflex and finish it    Subjective:     Francisco Carmichael is a 60 y o  male who  has a past medical history of History of chronic constipation and History of neck pain  who presented to the office today for   Follow-up from the emergency department  He was there yesterday  He had developed left toe pain after waking  He denies any injury to the foot  He was diagnosed with cellulitis and started on Keflex  The blood pressure was also elevated in the emergency department  He does continue to take lisinopril for this  He has begun taking Keflex  He is concerned because he does not know where this infection started from  He does wear steel-toed boots while in  Work any pain while putting his shoes on today so did not go  The following portions of the patient's history were reviewed and updated as appropriate:     He  has a past medical history of History of chronic constipation and History of neck pain (11/21/2017)    He   Patient Active Problem List    Diagnosis Date Noted    Tremor 01/09/2020    Lumbar disc herniation 01/09/2020    Overweight 01/09/2020    Constipation 01/06/2020    Dysphonia 11/23/2019    Cough 11/23/2019    Bilateral impacted cerumen 11/23/2019    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    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    Reflux laryngitis 11/23/2019    Vocal fold paresis, 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    Sulcus vocalis of vocal fold 11/23/2019    Gastroesophageal reflux disease 11/23/2019    Rash 11/07/2019    Abdominal pain, chronic, right lower quadrant 05/13/2019    Right-sided low back pain with right-sided sciatica 06/28/2018  Hoarseness of voice 05/17/2018    Acute pain of both knees 01/24/2018    Chronic tension headache 01/24/2018    Left shoulder pain 06/07/2017    Benign colon polyp 03/24/2017    Enlarged prostate without lower urinary tract symptoms (luts) 07/18/2016    Varicocele 01/12/2016    Mild vitamin D deficiency 06/10/2015    Impingement syndrome, shoulder, left 05/16/2014    Pain in joint of right hip 02/04/2014    Microscopic hematuria 11/19/2013    Renal calculus, right 11/19/2013    Atypical chest pain 06/19/2013    Benign essential hypertension 10/10/2012    Abnormal glucose 10/10/2012     He  has a past surgical history that includes Cystoscopy (05/29/2014) and Tooth extraction  His family history includes Arthritis in his family; Cancer in his family and sister; Diabetes in his family and paternal grandmother  He  reports that he quit smoking about 6 years ago  His smoking use included cigarettes  He has never used smokeless tobacco  He reports that he drinks alcohol  He reports that he does not use drugs    Current Outpatient Medications   Medication Sig Dispense Refill    cephalexin (KEFLEX) 500 mg capsule Take 1 capsule (500 mg total) by mouth every 6 (six) hours for 5 days 20 capsule 0    famotidine (PEPCID) 20 mg tablet Take 20 mg by mouth 2 (two) times a day  0    lisinopril (ZESTRIL) 5 mg tablet TAKE 1 TABLET BY MOUTH EVERY DAY 90 tablet 1    naproxen (NAPROSYN) 500 mg tablet Take 1 tablet (500 mg total) by mouth 2 (two) times a day with meals 10 tablet 0    omeprazole (PriLOSEC) 40 MG capsule Take 1 capsule (40 mg total) by mouth daily before breakfast 30 minutes prior to breakfast 30 capsule 6    tamsulosin (FLOMAX) 0 4 mg Take 1 capsule (0 4 mg total) by mouth daily with dinner 90 capsule 3    diazepam (VALIUM) 5 mg tablet Take 1 tablet (5 mg total) by mouth every 8 (eight) hours as needed for anxiety for up to 5 days 15 tablet 0    diphenhydrAMINE (BENADRYL) 25 mg tablet Take 1 tablet (25 mg total) by mouth 2 (two) times a day as needed for itching (Patient not taking: Reported on 12/26/2019) 30 tablet 0    hydrocortisone 0 5 % cream Apply topically 2 (two) times a day for 14 days Avoid application to face 30 g 0    nystatin (MYCOSTATIN) powder Apply topically 2 (two) times a day Between last 2 toes 15 g 0    ranitidine (ZANTAC) 300 MG tablet Take 1 tablet (300 mg total) by mouth daily at bedtime (Patient not taking: Reported on 2/27/2020) 30 tablet 6     No current facility-administered medications for this visit  Current Outpatient Medications on File Prior to Visit   Medication Sig    cephalexin (KEFLEX) 500 mg capsule Take 1 capsule (500 mg total) by mouth every 6 (six) hours for 5 days    famotidine (PEPCID) 20 mg tablet Take 20 mg by mouth 2 (two) times a day    lisinopril (ZESTRIL) 5 mg tablet TAKE 1 TABLET BY MOUTH EVERY DAY    naproxen (NAPROSYN) 500 mg tablet Take 1 tablet (500 mg total) by mouth 2 (two) times a day with meals    omeprazole (PriLOSEC) 40 MG capsule Take 1 capsule (40 mg total) by mouth daily before breakfast 30 minutes prior to breakfast    tamsulosin (FLOMAX) 0 4 mg Take 1 capsule (0 4 mg total) by mouth daily with dinner    diazepam (VALIUM) 5 mg tablet Take 1 tablet (5 mg total) by mouth every 8 (eight) hours as needed for anxiety for up to 5 days    diphenhydrAMINE (BENADRYL) 25 mg tablet Take 1 tablet (25 mg total) by mouth 2 (two) times a day as needed for itching (Patient not taking: Reported on 12/26/2019)    hydrocortisone 0 5 % cream Apply topically 2 (two) times a day for 14 days Avoid application to face    ranitidine (ZANTAC) 300 MG tablet Take 1 tablet (300 mg total) by mouth daily at bedtime (Patient not taking: Reported on 2/27/2020)     Current Facility-Administered Medications on File Prior to Visit   Medication    [COMPLETED] cephalexin (KEFLEX) capsule 500 mg     He has No Known Allergies       Current Outpatient Medications on File Prior to Visit   Medication Sig Dispense Refill    cephalexin (KEFLEX) 500 mg capsule Take 1 capsule (500 mg total) by mouth every 6 (six) hours for 5 days 20 capsule 0    famotidine (PEPCID) 20 mg tablet Take 20 mg by mouth 2 (two) times a day  0    lisinopril (ZESTRIL) 5 mg tablet TAKE 1 TABLET BY MOUTH EVERY DAY 90 tablet 1    naproxen (NAPROSYN) 500 mg tablet Take 1 tablet (500 mg total) by mouth 2 (two) times a day with meals 10 tablet 0    omeprazole (PriLOSEC) 40 MG capsule Take 1 capsule (40 mg total) by mouth daily before breakfast 30 minutes prior to breakfast 30 capsule 6    tamsulosin (FLOMAX) 0 4 mg Take 1 capsule (0 4 mg total) by mouth daily with dinner 90 capsule 3    diazepam (VALIUM) 5 mg tablet Take 1 tablet (5 mg total) by mouth every 8 (eight) hours as needed for anxiety for up to 5 days 15 tablet 0    diphenhydrAMINE (BENADRYL) 25 mg tablet Take 1 tablet (25 mg total) by mouth 2 (two) times a day as needed for itching (Patient not taking: Reported on 12/26/2019) 30 tablet 0    hydrocortisone 0 5 % cream Apply topically 2 (two) times a day for 14 days Avoid application to face 30 g 0    ranitidine (ZANTAC) 300 MG tablet Take 1 tablet (300 mg total) by mouth daily at bedtime (Patient not taking: Reported on 2/27/2020) 30 tablet 6     Current Facility-Administered Medications on File Prior to Visit   Medication Dose Route Frequency Provider Last Rate Last Dose    [COMPLETED] cephalexin (KEFLEX) capsule 500 mg  500 mg Oral Once Lui Estevez PA-C   500 mg at 02/27/20 0105       Review of Systems   Constitutional: Negative for activity change, appetite change and fever  Eyes: Negative for visual disturbance  Respiratory: Negative for cough  Cardiovascular: Negative for leg swelling  Genitourinary: Negative for difficulty urinating and dysuria  Musculoskeletal: Positive for arthralgias and back pain (sometimes)  Negative for myalgias  Skin: Positive for rash  Negative for wound  Objective:    /82 (BP Location: Left arm, Patient Position: Sitting, Cuff Size: Standard)   Pulse 83   Temp 97 9 °F (36 6 °C) (Temporal)   Resp 18   Ht 5' 3" (1 6 m)   Wt 70 9 kg (156 lb 3 2 oz)   SpO2 98%   BMI 27 67 kg/m²     Physical Exam   Constitutional: He is oriented to person, place, and time  He appears well-developed and well-nourished  No distress  HENT:   Head: Normocephalic and atraumatic  Right Ear: External ear normal    Left Ear: External ear normal    Eyes: Conjunctivae are normal    Neck: Normal range of motion  Neck supple  No thyromegaly present  Cardiovascular: Normal rate, regular rhythm and normal heart sounds  No murmur heard  Pulmonary/Chest: Effort normal and breath sounds normal  No respiratory distress  He has no wheezes  Musculoskeletal: He exhibits tenderness (left foot 5th digit and over mtp joint)  He exhibits no edema or deformity  Yellow crusting with oozing between webspaces     Lymphadenopathy:     He has no cervical adenopathy  Neurological: He is alert and oriented to person, place, and time  Skin: Rash (mild redness left foot laterally near 5th digit) noted  Psychiatric: He has a normal mood and affect  His behavior is normal    Nursing note and vitals reviewed        Torsten Scott PA-C  02/27/20  1:48 PM

## 2020-03-16 ENCOUNTER — TELEPHONE (OUTPATIENT)
Dept: FAMILY MEDICINE CLINIC | Facility: CLINIC | Age: 60
End: 2020-03-16

## 2020-03-16 NOTE — TELEPHONE ENCOUNTER
Patient called stating he wants an xray of her left leg  He is having pain from the left buttocks down to ankle  He is taking ibuprofen but its not helping  He didn't want to come in

## 2020-03-16 NOTE — TELEPHONE ENCOUNTER
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

## 2020-03-17 NOTE — TELEPHONE ENCOUNTER
Pt called me back and explained to me that he does not feel any pain in his waist or back and that is why he is requesting X ray of his leg  I consulted with Torsten and per Torsten, I explained to pt that the pain he is feeling in his leg is definitely due to the pressure from the damaged nerve in his back close to his waist   I advised him there is not much more she can do regarding this because she has tried several therapies including PT and nothing has helped him  I advised him unless there has been trauma or injury to his leg, an Xray is not indicated at this time  Pt denied any trauma or injury to his leg  Pt requested pain medication  Per Torsten, advised pt he can take ibuprofen or naproxen PRN for pain  Pt understood and agreed to plan  Pt had no other questions or concerns at the time

## 2020-03-23 ENCOUNTER — TELEPHONE (OUTPATIENT)
Dept: FAMILY MEDICINE CLINIC | Facility: CLINIC | Age: 60
End: 2020-03-23

## 2020-03-23 ENCOUNTER — TELEMEDICINE (OUTPATIENT)
Dept: FAMILY MEDICINE CLINIC | Facility: CLINIC | Age: 60
End: 2020-03-23

## 2020-03-23 DIAGNOSIS — J06.9 VIRAL UPPER RESPIRATORY TRACT INFECTION: Primary | ICD-10-CM

## 2020-03-23 PROCEDURE — G2012 BRIEF CHECK IN BY MD/QHP: HCPCS | Performed by: PHYSICIAN ASSISTANT

## 2020-03-23 NOTE — PROGRESS NOTES
Virtual Brief Visit    Reason for visit is cough  Encounter provider Luis Wilkes PA-C    [facility] [address] [address] [phone]      Recent Visits  Date Type Provider Dept   03/16/20 Telephone SANDRITA Milligan [facility]   Showing recent visits within past 7 days and meeting all other requirements     Today's Visits  Date Type Provider Dept   03/23/20 Telephone SANDRITA Milligan [facility]   Showing today's visits and meeting all other requirements     Future Appointments  No visits were found meeting these conditions  Showing future appointments within next 150 days and meeting all other requirements        Patient agrees to participate in a virtual check in via telephone or video visit instead of presenting to the office to address urgent/immediate medical needs  Patient is aware this is a billable service  After connecting through telephone, the patient was identified by name and date of birth  Tiffanie Camacho was informed that this was a telemedicine visit and that the visit is being conducted through telephone which may not be secure and therefore might not be HIPAA-compliant  My office door was closed  No one else was in the room  He acknowledged consent and understanding of privacy and security of the virtual check-in visit  I informed the patient that I have reviewed his record in Epic and presented the opportunity for him to ask any questions regarding the visit today  The patient initiated communication and agreed to participate  Subjective  Tiffanie Camacho is a 60 y o  male for cough  He does work at [employer]        Past Medical History:   Diagnosis Date    History of chronic constipation     History of neck pain 11/21/2017       Past Surgical History:   Procedure Laterality Date    CYSTOSCOPY  05/29/2014    diagnostic managed by Jalen Mora    TOOTH EXTRACTION         Current Outpatient Medications   Medication Sig Dispense Refill    diazepam (VALIUM) 5 mg tablet Take 1 tablet (5 mg total) by mouth every 8 (eight) hours as needed for anxiety for up to 5 days 15 tablet 0    diphenhydrAMINE (BENADRYL) 25 mg tablet Take 1 tablet (25 mg total) by mouth 2 (two) times a day as needed for itching (Patient not taking: Reported on 12/26/2019) 30 tablet 0    famotidine (PEPCID) 20 mg tablet Take 20 mg by mouth 2 (two) times a day  0    hydrocortisone 0 5 % cream Apply topically 2 (two) times a day for 14 days Avoid application to face 30 g 0    lisinopril (ZESTRIL) 5 mg tablet TAKE 1 TABLET BY MOUTH EVERY DAY 90 tablet 1    naproxen (NAPROSYN) 500 mg tablet Take 1 tablet (500 mg total) by mouth 2 (two) times a day with meals 10 tablet 0    nystatin (MYCOSTATIN) powder Apply topically 2 (two) times a day Between last 2 toes 15 g 0    omeprazole (PriLOSEC) 40 MG capsule Take 1 capsule (40 mg total) by mouth daily before breakfast 30 minutes prior to breakfast 30 capsule 6    ranitidine (ZANTAC) 300 MG tablet Take 1 tablet (300 mg total) by mouth daily at bedtime (Patient not taking: Reported on 2/27/2020) 30 tablet 6    tamsulosin (FLOMAX) 0 4 mg Take 1 capsule (0 4 mg total) by mouth daily with dinner 90 capsule 3     No current facility-administered medications for this visit  No Known Allergies    Assessment    Angie Albino appears {GENERAL APPEARANCE:15673}   Disposition:    ***    I spent *** minutes with the patient during this virtual check-in visit

## 2020-03-23 NOTE — TELEPHONE ENCOUNTER
Pt is scheduled for a virtual visit for 3/24 for cough, pt is calling office regarding he is coughing more today

## 2020-03-23 NOTE — PROGRESS NOTES
Virtual Brief Visit    Reason for visit is cough      Encounter provider Torsten Scott PA-C    Provider located at [address]   [place] [address] [phone]      Recent Visits  Date Type Provider Dept   03/16/20 Telephone Torsten Scott PA-C  [facility]   Showing recent visits within past 7 days and meeting all other requirements     Today's Visits  Date Type Provider Dept   03/23/20 Telephone Torsten Scott PA-C  [facility]   Showing today's visits and meeting all other requirements     Future Appointments  No visits were found meeting these conditions  Showing future appointments within next 150 days and meeting all other requirements        Patient agrees to participate in a virtual check in via telephone or video visit instead of presenting to the office to address urgent/immediate medical needs  Patient is aware this is a billable service  After connecting through telephone, the patient was identified by name and date of birth  Glendy Cintron was informed that this was a telemedicine visit and that the visit is being conducted through telephone which may not be secure and therefore might not be HIPAA-compliant  My office door was closed  No one else was in the room  He acknowledged consent and understanding of privacy and security of the virtual check-in visit  I informed the patient that I have reviewed his record in Epic and presented the opportunity for him to ask any questions regarding the visit today  The patient initiated communication and agreed to participate  Subjective  Glendy Cintron is a 60 y o  male c/o cough x 4 days  He has not had any SOB or dyspnea  No fevers or chills  He has not traveled outside the area and has had no know + Covid contacts but he does still work in a [employer]  He has had rhinorrhea and cough is wet  No GI symptoms  He has not taken anything for this    He has HTN and does not like to take meds unless necessary        Past Medical History:   Diagnosis Date    History of chronic constipation     History of neck pain 11/21/2017       Past Surgical History:   Procedure Laterality Date    CYSTOSCOPY  05/29/2014    diagnostic managed by Jalen Mora    TOOTH EXTRACTION         Current Outpatient Medications   Medication Sig Dispense Refill    diazepam (VALIUM) 5 mg tablet Take 1 tablet (5 mg total) by mouth every 8 (eight) hours as needed for anxiety for up to 5 days 15 tablet 0    diphenhydrAMINE (BENADRYL) 25 mg tablet Take 1 tablet (25 mg total) by mouth 2 (two) times a day as needed for itching (Patient not taking: Reported on 12/26/2019) 30 tablet 0    famotidine (PEPCID) 20 mg tablet Take 20 mg by mouth 2 (two) times a day  0    hydrocortisone 0 5 % cream Apply topically 2 (two) times a day for 14 days Avoid application to face 30 g 0    lisinopril (ZESTRIL) 5 mg tablet TAKE 1 TABLET BY MOUTH EVERY DAY 90 tablet 1    naproxen (NAPROSYN) 500 mg tablet Take 1 tablet (500 mg total) by mouth 2 (two) times a day with meals 10 tablet 0    nystatin (MYCOSTATIN) powder Apply topically 2 (two) times a day Between last 2 toes 15 g 0    omeprazole (PriLOSEC) 40 MG capsule Take 1 capsule (40 mg total) by mouth daily before breakfast 30 minutes prior to breakfast 30 capsule 6    ranitidine (ZANTAC) 300 MG tablet Take 1 tablet (300 mg total) by mouth daily at bedtime (Patient not taking: Reported on 2/27/2020) 30 tablet 6    tamsulosin (FLOMAX) 0 4 mg Take 1 capsule (0 4 mg total) by mouth daily with dinner 90 capsule 3     No current facility-administered medications for this visit  No Known Allergies    Assessment    Cata Negrete telephone assessment is URI   Disposition:    Take coricidin HBP  Patient will call in another 2 days to report symtpoms     I spent 7 minutes with the patient during this virtual check-in visit

## 2020-03-30 ENCOUNTER — TELEPHONE (OUTPATIENT)
Dept: FAMILY MEDICINE CLINIC | Facility: CLINIC | Age: 60
End: 2020-03-30

## 2020-03-30 DIAGNOSIS — R05.9 COUGH: Primary | ICD-10-CM

## 2020-03-30 NOTE — TELEPHONE ENCOUNTER
Pt called stating that he still coughing feels like asthma symptoms  Per pt wants to know if you can send him for a chest X ray  Please advise   Thx

## 2020-04-03 ENCOUNTER — HOSPITAL ENCOUNTER (OUTPATIENT)
Dept: RADIOLOGY | Facility: HOSPITAL | Age: 60
Discharge: HOME | End: 2020-04-03
Payer: COMMERCIAL

## 2020-04-03 DIAGNOSIS — R05.9 COUGH: ICD-10-CM

## 2020-04-03 PROCEDURE — 71046 X-RAY EXAM CHEST 2 VIEWS: CPT

## 2020-04-06 ENCOUNTER — TELEPHONE (OUTPATIENT)
Dept: FAMILY MEDICINE CLINIC | Facility: CLINIC | Age: 60
End: 2020-04-06

## 2020-04-06 ENCOUNTER — TELEPHONE (OUTPATIENT)
Dept: OTHER | Facility: OTHER | Age: 60
End: 2020-04-06

## 2020-04-06 ENCOUNTER — NURSE TRIAGE (OUTPATIENT)
Dept: OTHER | Facility: OTHER | Age: 60
End: 2020-04-06

## 2020-04-06 ENCOUNTER — TELEMEDICINE (OUTPATIENT)
Dept: FAMILY MEDICINE CLINIC | Facility: CLINIC | Age: 60
End: 2020-04-06

## 2020-04-06 DIAGNOSIS — Z20.828 EXPOSURE TO SARS-ASSOCIATED CORONAVIRUS: ICD-10-CM

## 2020-04-06 DIAGNOSIS — Z20.828 EXPOSURE TO SARS-ASSOCIATED CORONAVIRUS: Primary | ICD-10-CM

## 2020-04-06 PROCEDURE — 99441 PR PHYS/QHP TELEPHONE EVALUATION 5-10 MIN: CPT | Performed by: FAMILY MEDICINE

## 2020-04-06 PROCEDURE — 87635 SARS-COV-2 COVID-19 AMP PRB: CPT

## 2020-04-07 ENCOUNTER — TELEPHONE (OUTPATIENT)
Dept: FAMILY MEDICINE CLINIC | Facility: CLINIC | Age: 60
End: 2020-04-07

## 2020-04-07 ENCOUNTER — AMB VIDEO VISIT (OUTPATIENT)
Dept: FAMILY MEDICINE CLINIC | Facility: CLINIC | Age: 60
End: 2020-04-07

## 2020-04-07 DIAGNOSIS — U07.1 COVID-19 VIRUS INFECTION: Primary | ICD-10-CM

## 2020-04-07 DIAGNOSIS — R05.9 COUGH: ICD-10-CM

## 2020-04-07 LAB — SARS-COV-2 RNA SPEC QL NAA+PROBE: DETECTED

## 2020-04-07 PROCEDURE — 99441 PR PHYS/QHP TELEPHONE EVALUATION 5-10 MIN: CPT | Performed by: PHYSICIAN ASSISTANT

## 2020-04-08 DIAGNOSIS — K21.9 GASTROESOPHAGEAL REFLUX DISEASE, ESOPHAGITIS PRESENCE NOT SPECIFIED: Primary | ICD-10-CM

## 2020-04-08 RX ORDER — FAMOTIDINE 20 MG/1
20 TABLET, FILM COATED ORAL 2 TIMES DAILY
Qty: 180 TABLET | Refills: 0 | Status: SHIPPED | OUTPATIENT
Start: 2020-04-08 | End: 2020-05-20

## 2020-04-09 ENCOUNTER — TELEMEDICINE (OUTPATIENT)
Dept: FAMILY MEDICINE CLINIC | Facility: CLINIC | Age: 60
End: 2020-04-09

## 2020-04-09 DIAGNOSIS — U07.1 COVID-19 VIRUS INFECTION: Primary | ICD-10-CM

## 2020-04-09 PROCEDURE — 99212 OFFICE O/P EST SF 10 MIN: CPT | Performed by: PHYSICIAN ASSISTANT

## 2020-04-16 ENCOUNTER — TELEMEDICINE (OUTPATIENT)
Dept: FAMILY MEDICINE CLINIC | Facility: CLINIC | Age: 60
End: 2020-04-16

## 2020-04-16 DIAGNOSIS — U07.1 COVID-19 VIRUS INFECTION: Primary | ICD-10-CM

## 2020-04-16 PROCEDURE — 99212 OFFICE O/P EST SF 10 MIN: CPT | Performed by: PHYSICIAN ASSISTANT

## 2020-04-17 ENCOUNTER — TELEPHONE (OUTPATIENT)
Dept: FAMILY MEDICINE CLINIC | Facility: CLINIC | Age: 60
End: 2020-04-17

## 2020-04-23 ENCOUNTER — TELEPHONE (OUTPATIENT)
Dept: NEUROLOGY | Facility: CLINIC | Age: 60
End: 2020-04-23

## 2020-04-27 ENCOUNTER — TELEMEDICINE (OUTPATIENT)
Dept: GASTROENTEROLOGY | Facility: MEDICAL CENTER | Age: 60
End: 2020-04-27

## 2020-04-27 ENCOUNTER — TELEPHONE (OUTPATIENT)
Dept: FAMILY MEDICINE CLINIC | Facility: CLINIC | Age: 60
End: 2020-04-27

## 2020-04-27 DIAGNOSIS — K59.00 CONSTIPATION, UNSPECIFIED CONSTIPATION TYPE: ICD-10-CM

## 2020-04-27 DIAGNOSIS — R05.9 COUGH: ICD-10-CM

## 2020-04-27 DIAGNOSIS — K21.9 GASTROESOPHAGEAL REFLUX DISEASE, ESOPHAGITIS PRESENCE NOT SPECIFIED: ICD-10-CM

## 2020-04-27 DIAGNOSIS — U07.1 COVID-19: ICD-10-CM

## 2020-04-27 DIAGNOSIS — R10.31 RIGHT LOWER QUADRANT ABDOMINAL PAIN: Primary | ICD-10-CM

## 2020-04-27 PROCEDURE — 99213 OFFICE O/P EST LOW 20 MIN: CPT | Performed by: INTERNAL MEDICINE

## 2020-04-27 RX ORDER — POLYETHYLENE GLYCOL 3350 17 G/17G
17 POWDER, FOR SOLUTION ORAL DAILY
Qty: 250 G | Refills: 1 | Status: SHIPPED | OUTPATIENT
Start: 2020-04-27 | End: 2020-05-27 | Stop reason: SDUPTHER

## 2020-04-28 ENCOUNTER — TELEMEDICINE (OUTPATIENT)
Dept: FAMILY MEDICINE CLINIC | Facility: CLINIC | Age: 60
End: 2020-04-28

## 2020-04-28 VITALS — TEMPERATURE: 97.6 F

## 2020-04-28 DIAGNOSIS — U07.1 COVID-19: Primary | ICD-10-CM

## 2020-04-28 PROCEDURE — 99212 OFFICE O/P EST SF 10 MIN: CPT | Performed by: PHYSICIAN ASSISTANT

## 2020-04-29 ENCOUNTER — TELEMEDICINE (OUTPATIENT)
Dept: NEUROLOGY | Facility: CLINIC | Age: 60
End: 2020-04-29

## 2020-04-29 DIAGNOSIS — E53.8 LOW VITAMIN B12 LEVEL: ICD-10-CM

## 2020-04-29 DIAGNOSIS — R25.1 TREMOR: Primary | ICD-10-CM

## 2020-04-29 DIAGNOSIS — Z82.0 FAMILY HISTORY OF PARKINSONISM: ICD-10-CM

## 2020-04-29 DIAGNOSIS — E55.9 MILD VITAMIN D DEFICIENCY: ICD-10-CM

## 2020-04-29 PROCEDURE — 99245 OFF/OP CONSLTJ NEW/EST HI 55: CPT | Performed by: PSYCHIATRY & NEUROLOGY

## 2020-04-30 PROBLEM — R79.89 LOW VITAMIN B12 LEVEL: Status: ACTIVE | Noted: 2020-04-30

## 2020-04-30 PROBLEM — Z82.0 FAMILY HISTORY OF PARKINSONISM: Status: ACTIVE | Noted: 2020-04-30

## 2020-04-30 PROBLEM — E53.8 LOW VITAMIN B12 LEVEL: Status: ACTIVE | Noted: 2020-04-30

## 2020-05-01 ENCOUNTER — TELEMEDICINE (OUTPATIENT)
Dept: FAMILY MEDICINE CLINIC | Facility: CLINIC | Age: 60
End: 2020-05-01

## 2020-05-01 DIAGNOSIS — U07.1 COVID-19 VIRUS INFECTION: Primary | ICD-10-CM

## 2020-05-01 DIAGNOSIS — R05.9 COUGH: ICD-10-CM

## 2020-05-01 PROCEDURE — 99442 PR PHYS/QHP TELEPHONE EVALUATION 11-20 MIN: CPT | Performed by: FAMILY MEDICINE

## 2020-05-01 RX ORDER — BENZONATATE 100 MG/1
100 CAPSULE ORAL 3 TIMES DAILY PRN
Qty: 20 CAPSULE | Refills: 0 | Status: SHIPPED | OUTPATIENT
Start: 2020-05-01 | End: 2020-05-06

## 2020-05-04 ENCOUNTER — TELEPHONE (OUTPATIENT)
Dept: FAMILY MEDICINE CLINIC | Facility: CLINIC | Age: 60
End: 2020-05-04

## 2020-05-05 ENCOUNTER — TELEMEDICINE (OUTPATIENT)
Dept: FAMILY MEDICINE CLINIC | Facility: CLINIC | Age: 60
End: 2020-05-05

## 2020-05-05 DIAGNOSIS — M54.6 CHRONIC BILATERAL THORACIC BACK PAIN: ICD-10-CM

## 2020-05-05 DIAGNOSIS — Z86.16 HISTORY OF 2019 NOVEL CORONAVIRUS DISEASE (COVID-19): Primary | ICD-10-CM

## 2020-05-05 DIAGNOSIS — G89.29 CHRONIC BILATERAL THORACIC BACK PAIN: ICD-10-CM

## 2020-05-05 PROCEDURE — 99214 OFFICE O/P EST MOD 30 MIN: CPT | Performed by: PHYSICIAN ASSISTANT

## 2020-05-06 ENCOUNTER — HOSPITAL ENCOUNTER (OUTPATIENT)
Dept: RADIOLOGY | Facility: HOSPITAL | Age: 60
Discharge: HOME | End: 2020-05-06
Payer: COMMERCIAL

## 2020-05-06 ENCOUNTER — TELEMEDICINE (OUTPATIENT)
Dept: FAMILY MEDICINE CLINIC | Facility: CLINIC | Age: 60
End: 2020-05-06

## 2020-05-06 DIAGNOSIS — Z86.16 HISTORY OF 2019 NOVEL CORONAVIRUS DISEASE (COVID-19): ICD-10-CM

## 2020-05-06 DIAGNOSIS — M54.6 ACUTE BILATERAL THORACIC BACK PAIN: Primary | ICD-10-CM

## 2020-05-06 DIAGNOSIS — M54.6 CHRONIC BILATERAL THORACIC BACK PAIN: ICD-10-CM

## 2020-05-06 DIAGNOSIS — G89.29 CHRONIC BILATERAL THORACIC BACK PAIN: ICD-10-CM

## 2020-05-06 PROCEDURE — 99441 PR PHYS/QHP TELEPHONE EVALUATION 5-10 MIN: CPT | Performed by: PHYSICIAN ASSISTANT

## 2020-05-06 PROCEDURE — 71046 X-RAY EXAM CHEST 2 VIEWS: CPT

## 2020-05-11 ENCOUNTER — TELEPHONE (OUTPATIENT)
Dept: FAMILY MEDICINE CLINIC | Facility: CLINIC | Age: 60
End: 2020-05-11

## 2020-05-15 ENCOUNTER — TELEPHONE (OUTPATIENT)
Dept: FAMILY MEDICINE CLINIC | Facility: CLINIC | Age: 60
End: 2020-05-15

## 2020-05-15 ENCOUNTER — TELEMEDICINE (OUTPATIENT)
Dept: FAMILY MEDICINE CLINIC | Facility: CLINIC | Age: 60
End: 2020-05-15

## 2020-05-15 DIAGNOSIS — H10.31 ACUTE BACTERIAL CONJUNCTIVITIS OF RIGHT EYE: Primary | ICD-10-CM

## 2020-05-15 DIAGNOSIS — L24.9 IRRITANT CONTACT DERMATITIS, UNSPECIFIED TRIGGER: ICD-10-CM

## 2020-05-15 PROCEDURE — 99442 PR PHYS/QHP TELEPHONE EVALUATION 11-20 MIN: CPT | Performed by: FAMILY MEDICINE

## 2020-05-15 RX ORDER — ERYTHROMYCIN 5 MG/G
0.5 OINTMENT OPHTHALMIC
Qty: 7 G | Refills: 0 | Status: SHIPPED | OUTPATIENT
Start: 2020-05-15 | End: 2020-05-22

## 2020-05-20 ENCOUNTER — TELEMEDICINE (OUTPATIENT)
Dept: FAMILY MEDICINE CLINIC | Facility: CLINIC | Age: 60
End: 2020-05-20

## 2020-05-20 DIAGNOSIS — B35.3 TINEA PEDIS OF LEFT FOOT: Primary | ICD-10-CM

## 2020-05-20 DIAGNOSIS — L24.9 IRRITANT CONTACT DERMATITIS, UNSPECIFIED TRIGGER: ICD-10-CM

## 2020-05-20 PROCEDURE — 99213 OFFICE O/P EST LOW 20 MIN: CPT | Performed by: PHYSICIAN ASSISTANT

## 2020-05-20 RX ORDER — NYSTATIN 100000 U/G
CREAM TOPICAL 2 TIMES DAILY
Qty: 30 G | Refills: 0 | Status: SHIPPED | OUTPATIENT
Start: 2020-05-20 | End: 2021-08-19

## 2020-05-27 ENCOUNTER — TELEMEDICINE (OUTPATIENT)
Dept: FAMILY MEDICINE CLINIC | Facility: CLINIC | Age: 60
End: 2020-05-27

## 2020-05-27 VITALS — WEIGHT: 145 LBS | HEIGHT: 63 IN | BODY MASS INDEX: 25.69 KG/M2

## 2020-05-27 DIAGNOSIS — Z20.828 EXPOSURE TO SARS-ASSOCIATED CORONAVIRUS: Primary | ICD-10-CM

## 2020-05-27 DIAGNOSIS — R05.9 COUGH: ICD-10-CM

## 2020-05-27 DIAGNOSIS — Z20.828 EXPOSURE TO SARS-ASSOCIATED CORONAVIRUS: ICD-10-CM

## 2020-05-27 DIAGNOSIS — K59.00 CONSTIPATION, UNSPECIFIED CONSTIPATION TYPE: ICD-10-CM

## 2020-05-27 PROCEDURE — U0003 INFECTIOUS AGENT DETECTION BY NUCLEIC ACID (DNA OR RNA); SEVERE ACUTE RESPIRATORY SYNDROME CORONAVIRUS 2 (SARS-COV-2) (CORONAVIRUS DISEASE [COVID-19]), AMPLIFIED PROBE TECHNIQUE, MAKING USE OF HIGH THROUGHPUT TECHNOLOGIES AS DESCRIBED BY CMS-2020-01-R: HCPCS

## 2020-05-27 PROCEDURE — 99214 OFFICE O/P EST MOD 30 MIN: CPT | Performed by: PHYSICIAN ASSISTANT

## 2020-05-27 RX ORDER — FLUTICASONE PROPIONATE 50 MCG
2 SPRAY, SUSPENSION (ML) NASAL DAILY
Qty: 1 BOTTLE | Refills: 0 | Status: SHIPPED | OUTPATIENT
Start: 2020-05-27 | End: 2020-06-11

## 2020-05-27 RX ORDER — POLYETHYLENE GLYCOL 3350 17 G/17G
17 POWDER, FOR SOLUTION ORAL DAILY
Qty: 250 G | Refills: 1 | Status: SHIPPED | OUTPATIENT
Start: 2020-05-27 | End: 2020-07-24 | Stop reason: ALTCHOICE

## 2020-05-30 LAB — SARS-COV-2 RNA SPEC QL NAA+PROBE: NOT DETECTED

## 2020-05-31 ENCOUNTER — TELEPHONE (OUTPATIENT)
Dept: OTHER | Facility: OTHER | Age: 60
End: 2020-05-31

## 2020-06-01 DIAGNOSIS — I10 BENIGN HYPERTENSION: ICD-10-CM

## 2020-06-01 RX ORDER — LISINOPRIL 5 MG/1
5 TABLET ORAL DAILY
Qty: 90 TABLET | Refills: 1 | Status: SHIPPED | OUTPATIENT
Start: 2020-06-01 | End: 2021-01-11

## 2020-06-11 DIAGNOSIS — R05.9 COUGH: ICD-10-CM

## 2020-06-11 RX ORDER — FLUTICASONE PROPIONATE 50 MCG
SPRAY, SUSPENSION (ML) NASAL
Qty: 48 ML | Refills: 1 | Status: SHIPPED | OUTPATIENT
Start: 2020-06-11 | End: 2020-07-31

## 2020-06-12 ENCOUNTER — TELEPHONE (OUTPATIENT)
Dept: FAMILY MEDICINE CLINIC | Facility: CLINIC | Age: 60
End: 2020-06-12

## 2020-06-23 ENCOUNTER — TELEMEDICINE (OUTPATIENT)
Dept: FAMILY MEDICINE CLINIC | Facility: CLINIC | Age: 60
End: 2020-06-23

## 2020-06-23 DIAGNOSIS — I10 BENIGN HYPERTENSION: Primary | ICD-10-CM

## 2020-06-23 DIAGNOSIS — K21.9 REFLUX LARYNGITIS: ICD-10-CM

## 2020-06-23 DIAGNOSIS — J04.0 REFLUX LARYNGITIS: ICD-10-CM

## 2020-06-23 PROCEDURE — 99442 PR PHYS/QHP TELEPHONE EVALUATION 11-20 MIN: CPT | Performed by: PHYSICIAN ASSISTANT

## 2020-07-10 ENCOUNTER — TELEMEDICINE (OUTPATIENT)
Dept: FAMILY MEDICINE CLINIC | Facility: CLINIC | Age: 60
End: 2020-07-10

## 2020-07-10 DIAGNOSIS — R05.9 COUGH: Primary | ICD-10-CM

## 2020-07-10 PROCEDURE — 3074F SYST BP LT 130 MM HG: CPT | Performed by: PHYSICIAN ASSISTANT

## 2020-07-10 PROCEDURE — 99213 OFFICE O/P EST LOW 20 MIN: CPT | Performed by: PHYSICIAN ASSISTANT

## 2020-07-10 PROCEDURE — 3079F DIAST BP 80-89 MM HG: CPT | Performed by: PHYSICIAN ASSISTANT

## 2020-07-10 PROCEDURE — 1036F TOBACCO NON-USER: CPT | Performed by: PHYSICIAN ASSISTANT

## 2020-07-10 RX ORDER — AMOXICILLIN 500 MG/1
500 CAPSULE ORAL EVERY 8 HOURS SCHEDULED
Qty: 30 CAPSULE | Refills: 0 | Status: SHIPPED | OUTPATIENT
Start: 2020-07-10 | End: 2020-07-20

## 2020-07-10 NOTE — PROGRESS NOTES
Virtual Regular Visit      Assessment/Plan:    Problem List Items Addressed This Visit        Other    Cough - Primary    Relevant Medications    amoxicillin (AMOXIL) 500 mg capsule  Recommend coming into the office for better evaluation  Discussed that mucous with malodor may be sign of infection but it is difficult to assess where from at this point  Labs were ordered prior to recheck blood sugar and lipids  He was made aware he needs to fast              Reason for visit is   Chief Complaint   Patient presents with    Virtual Regular Visit        Encounter provider Torsten Scott PA-C    Provider located at [address] [place] [address]  [address] [place] [id number] [phone]      Recent Visits  No visits were found meeting these conditions  Showing recent visits within past 7 days and meeting all other requirements     Today's Visits  Date Type Provider Dept   07/10/20 Telemedicine Torsten Scott PA-C  [facility]   Showing today's visits and meeting all other requirements     Future Appointments  No visits were found meeting these conditions  Showing future appointments within next 150 days and meeting all other requirements        The patient was identified by name and date of birth  Britton Osullivan was informed that this is a telemedicine visit and that the visit is being conducted through [address] [place] [address] and patient was informed that this is not a secure, HIPAA-complaint platform  He agrees to proceed     My office door was closed  The following individuals were in the room with me and the patient informed Mary courtney helped to interprete  He acknowledged consent and understanding of privacy and security of the video platform  The patient has agreed to participate and understands they can discontinue the visit at any time  Patient is aware this is a billable service  Subjective  Britton Osullivan is a 60 y o  male calling to request labs   He has also had a bad taste in his mouth and bad smell and thickness to his mucous for the last week  No sore throat, congestion,dental issues,  allergies or acid reflux  He has had a cough with some mucous  He has had this for a few months  He had covid in  April and cough was worse then  Karen Hernandez HPI     Past Medical History:   Diagnosis Date    History of chronic constipation     History of neck pain 11/21/2017       Past Surgical History:   Procedure Laterality Date    CYSTOSCOPY  05/29/2014    diagnostic managed by Jalen Mora    TOOTH EXTRACTION         Current Outpatient Medications   Medication Sig Dispense Refill    amoxicillin (AMOXIL) 500 mg capsule Take 1 capsule (500 mg total) by mouth every 8 (eight) hours for 10 days 30 capsule 0    diphenhydrAMINE (BENADRYL) 25 mg tablet Take 1 tablet (25 mg total) by mouth 2 (two) times a day as needed for itching (Patient not taking: Reported on 12/26/2019) 30 tablet 0    fluticasone (FLONASE) 50 mcg/act nasal spray SPRAY 2 SPRAYS INTO EACH NOSTRIL EVERY DAY 48 mL 1    lisinopril (ZESTRIL) 5 mg tablet Take 1 tablet (5 mg total) by mouth daily 90 tablet 1    nystatin (MYCOSTATIN) cream Apply topically 2 (two) times a day On feet (Patient not taking: Reported on 5/27/2020) 30 g 0    omeprazole (PriLOSEC) 40 MG capsule Take 1 capsule (40 mg total) by mouth daily before breakfast 30 minutes prior to breakfast (Patient not taking: Reported on 4/29/2020) 30 capsule 6    polyethylene glycol (GLYCOLAX) 17 GM/SCOOP powder Take 17 g by mouth daily 250 g 1    tamsulosin (FLOMAX) 0 4 mg Take 1 capsule (0 4 mg total) by mouth daily with dinner (Patient not taking: Reported on 4/29/2020) 90 capsule 3     No current facility-administered medications for this visit  No Known Allergies    Review of Systems   Constitutional: Negative for activity change, appetite change, fatigue, fever and unexpected weight change     HENT: Negative for congestion, dental problem, ear pain, hearing loss and sore throat  Eyes: Negative for visual disturbance  Respiratory: Positive for cough  Negative for wheezing  Cardiovascular: Negative for chest pain  Gastrointestinal: Negative for abdominal pain and vomiting  Genitourinary: Negative for difficulty urinating and dysuria  Musculoskeletal: Negative for myalgias  Skin: Negative for rash  Neurological: Negative for dizziness and headaches  Psychiatric/Behavioral: Negative for behavioral problems  Video Exam    There were no vitals filed for this visit  Physical Exam   Constitutional: He is oriented to person, place, and time  He appears well-developed and well-nourished  HENT:   Head: Normocephalic and atraumatic  Eyes: Conjunctivae are normal    Neck:   No visible masses   Pulmonary/Chest: Effort normal    Neurological: He is alert and oriented to person, place, and time  Psychiatric: He has a normal mood and affect  His behavior is normal         I spent 15 minutes directly with the patient during this visit      VIRTUAL VISIT [address] acknowledges that he has consented to an online visit or consultation  He understands that the online visit is based solely on information provided by him, and that, in the absence of a face-to-face physical evaluation by the physician, the diagnosis he receives is both limited and provisional in terms of accuracy and completeness  This is not intended to replace a full medical face-to-face evaluation by the physician  Kandis Mccartney understands and accepts these terms

## 2020-07-11 ENCOUNTER — APPOINTMENT (OUTPATIENT)
Dept: LAB | Facility: HOSPITAL | Age: 60
End: 2020-07-11

## 2020-07-11 DIAGNOSIS — I10 BENIGN HYPERTENSION: ICD-10-CM

## 2020-07-11 LAB
ALBUMIN SERPL BCP-MCNC: 3.3 G/DL (ref 3.5–5)
ALP SERPL-CCNC: 92 U/L (ref 46–116)
ALT SERPL W P-5'-P-CCNC: 31 U/L (ref 12–78)
ANION GAP SERPL CALCULATED.3IONS-SCNC: 9 MMOL/L (ref 4–13)
AST SERPL W P-5'-P-CCNC: 31 U/L (ref 5–45)
BASOPHILS # BLD AUTO: 0.04 THOUSANDS/ΜL (ref 0–0.1)
BASOPHILS NFR BLD AUTO: 1 % (ref 0–1)
BILIRUB SERPL-MCNC: 0.36 MG/DL (ref 0.2–1)
BUN SERPL-MCNC: 19 MG/DL (ref 5–25)
CALCIUM SERPL-MCNC: 8.7 MG/DL (ref 8.3–10.1)
CHLORIDE SERPL-SCNC: 107 MMOL/L (ref 100–108)
CHOLEST SERPL-MCNC: 135 MG/DL (ref 50–200)
CO2 SERPL-SCNC: 24 MMOL/L (ref 21–32)
CREAT SERPL-MCNC: 1.27 MG/DL (ref 0.6–1.3)
EOSINOPHIL # BLD AUTO: 0.22 THOUSAND/ΜL (ref 0–0.61)
EOSINOPHIL NFR BLD AUTO: 6 % (ref 0–6)
ERYTHROCYTE [DISTWIDTH] IN BLOOD BY AUTOMATED COUNT: 14.4 % (ref 11.6–15.1)
GFR SERPL CREATININE-BSD FRML MDRD: 71 ML/MIN/1.73SQ M
GLUCOSE P FAST SERPL-MCNC: 94 MG/DL (ref 65–99)
HCT VFR BLD AUTO: 43.6 % (ref 36.5–49.3)
HDLC SERPL-MCNC: 41 MG/DL
HGB BLD-MCNC: 14.4 G/DL (ref 12–17)
IMM GRANULOCYTES # BLD AUTO: 0.01 THOUSAND/UL (ref 0–0.2)
IMM GRANULOCYTES NFR BLD AUTO: 0 % (ref 0–2)
LDLC SERPL CALC-MCNC: 74 MG/DL (ref 0–100)
LYMPHOCYTES # BLD AUTO: 2.11 THOUSANDS/ΜL (ref 0.6–4.47)
LYMPHOCYTES NFR BLD AUTO: 54 % (ref 14–44)
MCH RBC QN AUTO: 30.6 PG (ref 26.8–34.3)
MCHC RBC AUTO-ENTMCNC: 33 G/DL (ref 31.4–37.4)
MCV RBC AUTO: 93 FL (ref 82–98)
MONOCYTES # BLD AUTO: 0.49 THOUSAND/ΜL (ref 0.17–1.22)
MONOCYTES NFR BLD AUTO: 13 % (ref 4–12)
NEUTROPHILS # BLD AUTO: 1.01 THOUSANDS/ΜL (ref 1.85–7.62)
NEUTS SEG NFR BLD AUTO: 26 % (ref 43–75)
NONHDLC SERPL-MCNC: 94 MG/DL
NRBC BLD AUTO-RTO: 0 /100 WBCS
PLATELET # BLD AUTO: 234 THOUSANDS/UL (ref 149–390)
PMV BLD AUTO: 11.1 FL (ref 8.9–12.7)
POTASSIUM SERPL-SCNC: 4.3 MMOL/L (ref 3.5–5.3)
PROT SERPL-MCNC: 6.7 G/DL (ref 6.4–8.2)
RBC # BLD AUTO: 4.71 MILLION/UL (ref 3.88–5.62)
SODIUM SERPL-SCNC: 140 MMOL/L (ref 136–145)
TRIGL SERPL-MCNC: 102 MG/DL
WBC # BLD AUTO: 3.88 THOUSAND/UL (ref 4.31–10.16)

## 2020-07-11 PROCEDURE — 36415 COLL VENOUS BLD VENIPUNCTURE: CPT

## 2020-07-11 PROCEDURE — 80053 COMPREHEN METABOLIC PANEL: CPT

## 2020-07-11 PROCEDURE — 85025 COMPLETE CBC W/AUTO DIFF WBC: CPT

## 2020-07-11 PROCEDURE — 80061 LIPID PANEL: CPT

## 2020-07-13 ENCOUNTER — TELEPHONE (OUTPATIENT)
Dept: FAMILY MEDICINE CLINIC | Facility: CLINIC | Age: 60
End: 2020-07-13

## 2020-07-23 ENCOUNTER — TELEPHONE (OUTPATIENT)
Dept: OTHER | Facility: OTHER | Age: 60
End: 2020-07-23

## 2020-07-23 ENCOUNTER — TELEPHONE (OUTPATIENT)
Dept: FAMILY MEDICINE CLINIC | Facility: CLINIC | Age: 60
End: 2020-07-23

## 2020-07-23 NOTE — TELEPHONE ENCOUNTER
Patient is requesting a call back from office  Patient stated that he thinks he may have something wrong with his liver and is requesting an MRI

## 2020-07-23 NOTE — TELEPHONE ENCOUNTER
Pt leave a msg stating that still have odor in his saliva and he still taking antibiotic  He wants to know what to do  Please advise

## 2020-07-24 ENCOUNTER — TELEMEDICINE (OUTPATIENT)
Dept: FAMILY MEDICINE CLINIC | Facility: CLINIC | Age: 60
End: 2020-07-24

## 2020-07-24 DIAGNOSIS — K59.00 CONSTIPATION, UNSPECIFIED CONSTIPATION TYPE: Primary | ICD-10-CM

## 2020-07-24 PROCEDURE — 99213 OFFICE O/P EST LOW 20 MIN: CPT | Performed by: FAMILY MEDICINE

## 2020-07-24 RX ORDER — POLYETHYLENE GLYCOL 3350 17 G/17G
17 POWDER, FOR SOLUTION ORAL DAILY
Qty: 116 G | Refills: 0 | Status: SHIPPED | OUTPATIENT
Start: 2020-07-24 | End: 2020-07-31 | Stop reason: SDUPTHER

## 2020-07-24 NOTE — PROGRESS NOTES
Virtual Brief Visit    Assessment/Plan:    Problem List Items Addressed This Visit        Other    Constipation - Primary     -Hx of chronic constipation that has been worsening for the past 3 months associated with lower abdominal pain which relieves with bowel movement   -last bowel movement today AM, hard stool with no blood present    -will order Miralax and encouraged to use daily as well as to increase fiber in his diet  Provided with dietary information in AVS          Relevant Medications    polyethylene glycol (GLYCOLAX) 17 GM/SCOOP powder                Reason for visit is   Chief Complaint   Patient presents with    Virtual Brief Visit        Encounter provider Gabino Palmer MD    Provider located at [address] [place] [id number] [phone]    Recent Visits  Date Type Provider Dept   07/23/20 Telephone [facility] 1843 recent visits within past 7 days and meeting all other requirements     Today's Visits  Date Type Provider Dept   07/24/20 Telemedicine Gabino Palmer MD  [facility]   Showing today's visits and meeting all other requirements     Future Appointments  No visits were found meeting these conditions  Showing future appointments within next 150 days and meeting all other requirements        After connecting through telephone and patient was informed that this is not a secure, HIPAA-complaint platform  He agrees to proceed  , the patient was identified by name and date of birth  Oliverio Galicia was informed that this is a telemedicine visit and that the visit is being conducted through telephone  My office door was closed  No one else was in the room  He acknowledged consent and understanding of privacy and security of the platform  The patient has agreed to participate and understands he can discontinue the visit at any time  Patient is aware this is a billable service  Subjective    Chapin Humphries is a 60 y o  male   Case og 61 y/o male who was called today via televisit due to symptoms of constipation  Patient states to have chronic constipation but symptoms have been worsening for the past 3 months  These symptoms are assocaited with lower abdominal pain that relieves with defecation  Past Medical History:   Diagnosis Date    History of chronic constipation     History of neck pain 11/21/2017       Past Surgical History:   Procedure Laterality Date    CYSTOSCOPY  05/29/2014    diagnostic managed by Jalen Mora    TOOTH EXTRACTION         Current Outpatient Medications   Medication Sig Dispense Refill    diphenhydrAMINE (BENADRYL) 25 mg tablet Take 1 tablet (25 mg total) by mouth 2 (two) times a day as needed for itching (Patient not taking: Reported on 12/26/2019) 30 tablet 0    fluticasone (FLONASE) 50 mcg/act nasal spray SPRAY 2 SPRAYS INTO EACH NOSTRIL EVERY DAY 48 mL 1    lisinopril (ZESTRIL) 5 mg tablet Take 1 tablet (5 mg total) by mouth daily 90 tablet 1    nystatin (MYCOSTATIN) cream Apply topically 2 (two) times a day On feet (Patient not taking: Reported on 5/27/2020) 30 g 0    omeprazole (PriLOSEC) 40 MG capsule Take 1 capsule (40 mg total) by mouth daily before breakfast 30 minutes prior to breakfast (Patient not taking: Reported on 4/29/2020) 30 capsule 6    polyethylene glycol (GLYCOLAX) 17 GM/SCOOP powder Take 17 g by mouth daily 116 g 0    tamsulosin (FLOMAX) 0 4 mg Take 1 capsule (0 4 mg total) by mouth daily with dinner (Patient not taking: Reported on 4/29/2020) 90 capsule 3     No current facility-administered medications for this visit  No Known Allergies    Review of Systems   Constitutional: Negative for fever  Respiratory: Negative for cough, shortness of breath and wheezing  Cardiovascular: Negative for chest pain, palpitations and leg swelling  Gastrointestinal: Positive for constipation     Psychiatric/Behavioral: The patient is not nervous/anxious  There were no vitals filed for this visit  I spent 20 minutes directly with the patient during this visit    VIRTUAL VISIT [address] acknowledges that he has consented to an online visit or consultation  He understands that the online visit is based solely on information provided by him, and that, in the absence of a face-to-face physical evaluation by the physician, the diagnosis he receives is both limited and provisional in terms of accuracy and completeness  This is not intended to replace a full medical face-to-face evaluation by the physician  Dewane Brunner understands and accepts these terms

## 2020-07-24 NOTE — PATIENT INSTRUCTIONS
Estreñimiento   LO QUE NECESITA SABER:   El estreñimiento sucede cuando usted tiene evacuaciones intestinales duras y secas o pasa más tiempo del normal entre ellas  INSTRUCCIONES SOBRE EL [facility]AR[facility] HOSPITALARIA:   Regrese a la dana de emergencias si:   · Usted tiene demetra en harrison evacuaciones intestinales  · Usted tiene fiebre y dolor abdominal con el estreñimiento  Pregúntele a harrison [place] vitaminas y minerales son adecuados para usted  · El estreñimiento empeora  · Usted comienza a vomitar  · Usted tiene preguntas o inquietudes acerca de harrison condición o cuidado  Medicamentos:   · Un medicamento o un suplemento de fibra  podría ayudarle a ablandar las evacuaciones intestinales  Un laxante podría aflojar o ayudar a relajar dewey intestinos para que pueda tener aggie evacuación intestinal  También es probable que le den medicamentos para aumentar el líquido en dewey intestinos  El líquido puede llegar a movilizar las evacuaciones intestinales a través de dewey intestinos  · Semmes dewey medicamentos mevlin se le haya indicado  Consulte con harrison médico si usted jade que harrison medicamento no le está ayudando o si presenta efectos secundarios  Infórmele si es alérgico a algún medicamento  Mantenga aggie lista actualizada de los [place], las vitaminas y los productos herbales que yogi  Incluya los siguientes datos de los medicamentos: cantidad, frecuencia y motivo de administración  Traiga con usted la lista o los envases de la píldoras a dewey citas de seguimiento  Lleve la lista de los medicamentos con usted en gail de aggie emergencia  Controle harrison estreñimiento:   · Semmes líquidos melvin se le haya indicado  Puede que necesite beber más líquidos para ayudar a ablandar las evacuaciones y evacuar el intestino  Pregunte cuánto líquido debe beatriz cada día y cuáles líquidos son los más adecuados para usted  · Coma alimentos altos en fibras    [place] podría ayudar a disminuir el estreñimiento al aumentar el volumen de las evacuaciones intestinales  Alimentos altos en [address] frutas, vegetales, panes y cereales integrales, y frijoles  Harrison médico o dietista puede ayudarle a crear un plan alimenticio con alto contenido de [place]  · Realice actividad física con regularidad  La actividad física regular puede ayudarle a estimular dewey intestinos  Pregunte cuáles son los ejercicios más adecuados para usted  · Programe aggie hora cada día para tener aggie evacuación intestinal   [place] podría ayudar a harrison cuerpo a acostumbrarse a tener evacuaciones intestinales regulares  Inclínese hacia adelante mientras está sentado en el inodoro para facilitar la expulsión de la evacuación intestinal  Siéntese en el inodoro al menos por 10 minutos, incluso si usted no tiene aggie evacuación intestinal   Acuda a dewey consultas de control con harrison médico según le indicaron  Anote dewey preguntas para que se acuerde de hacerlas demond dewey visitas  © 2017 [address]  Information is for End User's use only and may not be sold, redistributed or otherwise used for commercial purposes  All illustrations and images included in CareNotes® are the copyrighted property of A D A M , Inc  or Leo Ewing  Esta información es sólo para uso en educación  Harrison intención no es darle un consejo médico sobre enfermedades o tratamientos  Colsulte con harrison Rebecca Parker farmacéutico antes de seguir cualquier régimen médico para saber si es seguro y efectivo para usted

## 2020-07-24 NOTE — TELEPHONE ENCOUNTER
I would recommend he be seen seen for it next week   He did have covid months ago but it is past 6 weeks so he can come in

## 2020-07-24 NOTE — ASSESSMENT & PLAN NOTE
-Hx of chronic constipation that has been worsening for the past 3 months associated with lower abdominal pain which relieves with bowel movement   -last bowel movement today AM, hard stool with no blood present    -will order Miralax and encouraged to use daily as well as to increase fiber in his diet   Provided with dietary information in AVS

## 2020-07-24 NOTE — TELEPHONE ENCOUNTER
Pt has been scheduled for 7/31/2020 @ 3:40 pm   Pt could not come earlier than this day  Pt also has an appt over the telephone today with Dr Cantu Brought  I advised him he can mention it to her today and still come to the visit next week if needed  Pt understood and agreed

## 2020-07-31 ENCOUNTER — OFFICE VISIT (OUTPATIENT)
Dept: FAMILY MEDICINE CLINIC | Facility: CLINIC | Age: 60
End: 2020-07-31

## 2020-07-31 VITALS
HEIGHT: 63 IN | OXYGEN SATURATION: 97 % | BODY MASS INDEX: 26.75 KG/M2 | HEART RATE: 75 BPM | RESPIRATION RATE: 18 BRPM | DIASTOLIC BLOOD PRESSURE: 78 MMHG | SYSTOLIC BLOOD PRESSURE: 130 MMHG | WEIGHT: 151 LBS | TEMPERATURE: 96.8 F

## 2020-07-31 DIAGNOSIS — K59.00 CONSTIPATION, UNSPECIFIED CONSTIPATION TYPE: ICD-10-CM

## 2020-07-31 DIAGNOSIS — K21.9 GASTROESOPHAGEAL REFLUX DISEASE, ESOPHAGITIS PRESENCE NOT SPECIFIED: ICD-10-CM

## 2020-07-31 DIAGNOSIS — Z11.59 ENCOUNTER FOR HEPATITIS C SCREENING TEST FOR LOW RISK PATIENT: ICD-10-CM

## 2020-07-31 DIAGNOSIS — N28.9 ABNORMAL RENAL FUNCTION: Primary | ICD-10-CM

## 2020-07-31 PROCEDURE — 3078F DIAST BP <80 MM HG: CPT | Performed by: PHYSICIAN ASSISTANT

## 2020-07-31 PROCEDURE — 3008F BODY MASS INDEX DOCD: CPT | Performed by: PHYSICIAN ASSISTANT

## 2020-07-31 PROCEDURE — 99213 OFFICE O/P EST LOW 20 MIN: CPT | Performed by: PHYSICIAN ASSISTANT

## 2020-07-31 PROCEDURE — 1036F TOBACCO NON-USER: CPT | Performed by: PHYSICIAN ASSISTANT

## 2020-07-31 PROCEDURE — 3008F BODY MASS INDEX DOCD: CPT | Performed by: PSYCHIATRY & NEUROLOGY

## 2020-07-31 PROCEDURE — 3075F SYST BP GE 130 - 139MM HG: CPT | Performed by: PHYSICIAN ASSISTANT

## 2020-07-31 RX ORDER — FAMOTIDINE 40 MG/1
TABLET, FILM COATED ORAL
Qty: 30 TABLET | Refills: 2 | OUTPATIENT
Start: 2020-07-31

## 2020-07-31 RX ORDER — FAMOTIDINE 20 MG/1
20 TABLET, FILM COATED ORAL 2 TIMES DAILY
Qty: 60 TABLET | Refills: 1 | Status: SHIPPED | OUTPATIENT
Start: 2020-07-31 | End: 2020-08-17 | Stop reason: SDUPTHER

## 2020-07-31 RX ORDER — POLYETHYLENE GLYCOL 3350 17 G/17G
17 POWDER, FOR SOLUTION ORAL DAILY
Qty: 116 G | Refills: 0 | Status: SHIPPED | OUTPATIENT
Start: 2020-07-31 | End: 2021-05-13

## 2020-07-31 RX ORDER — FAMOTIDINE 40 MG/1
40 TABLET, FILM COATED ORAL DAILY
Qty: 30 TABLET | Refills: 2 | Status: SHIPPED | OUTPATIENT
Start: 2020-07-31 | End: 2020-07-31

## 2020-07-31 NOTE — PROGRESS NOTES
ffamoAssessment/Plan:    Gastroesophageal reflux disease  Will start with famotidine  If no change in symtpoms then to restart omeprazole         Problem List Items Addressed This Visit        Digestive    Gastroesophageal reflux disease     Will start with famotidine  If no change in symtpoms then to restart omeprazole         Relevant Medications    famotidine (PEPCID) 20 mg tablet       Other    Constipation    Relevant Medications    polyethylene glycol (GLYCOLAX) 17 GM/SCOOP powder      Other Visit Diagnoses     Abnormal renal function    -  Primary    Relevant Orders    Urinalysis with microscopic    Comprehensive metabolic panel    Encounter for hepatitis C screening test for low risk patient        Relevant Orders    Hepatitis C antibody            Subjective:      Patient ID: Bernice Melendez is a 60 y o  male  HPI 60year old M here for follow up of bad tasting saliva  He had virtual visit and was given PCN  He had upset stomach prior and then started to get abnormal smell to his saliva  It was thought it may be a dental infection but it did not improve  He now notices abnormal taste in his mouth and acid reflux  He is having LLQ pain at times  He has had colonoscopy for this and CT and they were negative  He does have a hx of reflux laryngitis but stopped the omeprazole he was on  The following portions of the patient's history were reviewed and updated as appropriate:   He  has a past medical history of History of chronic constipation and History of neck pain (11/21/2017)    He   Patient Active Problem List    Diagnosis Date Noted    Acute bacterial conjunctivitis of right eye 05/15/2020    Irritant contact dermatitis 05/15/2020    COVID-19 virus infection 05/01/2020    Family history of Parkinsonism 04/30/2020    Low vitamin B12 level 04/30/2020    Tremor 01/09/2020    Lumbar disc herniation 01/09/2020    Overweight 01/09/2020    Constipation 01/06/2020    Dysphonia 11/23/2019    Cough 11/23/2019    Bilateral impacted cerumen 11/23/2019    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    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    Reflux laryngitis 11/23/2019    Vocal fold paresis, 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    Sulcus vocalis of vocal fold 11/23/2019    Gastroesophageal reflux disease 11/23/2019    Rash 11/07/2019    Abdominal pain, chronic, right lower quadrant 05/13/2019    Right-sided low back pain with right-sided sciatica 06/28/2018    Hoarseness of voice 05/17/2018    Acute pain of both knees 01/24/2018    Chronic tension headache 01/24/2018    Left shoulder pain 06/07/2017    Benign colon polyp 03/24/2017    Enlarged prostate without lower urinary tract symptoms (luts) 07/18/2016    Varicocele 01/12/2016    Mild vitamin D deficiency 06/10/2015    Impingement syndrome, shoulder, left 05/16/2014    Pain in joint of right hip 02/04/2014    Microscopic hematuria 11/19/2013    Renal calculus, right 11/19/2013    Atypical chest pain 06/19/2013    Benign essential hypertension 10/10/2012    Abnormal glucose 10/10/2012     He  has a past surgical history that includes Cystoscopy (05/29/2014) and Tooth extraction  His family history includes Arthritis in his family; Cancer in his family and sister; Diabetes in his family and paternal grandmother  He  reports that he quit smoking about 6 years ago  His smoking use included cigarettes  He has never used smokeless tobacco  He reports current alcohol use  He reports that he does not use drugs    Current Outpatient Medications   Medication Sig Dispense Refill    lisinopril (ZESTRIL) 5 mg tablet Take 1 tablet (5 mg total) by mouth daily 90 tablet 1    tamsulosin (FLOMAX) 0 4 mg Take 1 capsule (0 4 mg total) by mouth daily with dinner 90 capsule 3    diphenhydrAMINE (BENADRYL) 25 mg tablet Take 1 tablet (25 mg total) by mouth 2 (two) times a day as needed for itching (Patient not taking: Reported on 12/26/2019) 30 tablet 0    famotidine (PEPCID) 20 mg tablet Take 1 tablet (20 mg total) by mouth 2 (two) times a day 60 tablet 1    nystatin (MYCOSTATIN) cream Apply topically 2 (two) times a day On feet (Patient not taking: Reported on 5/27/2020) 30 g 0    polyethylene glycol (GLYCOLAX) 17 GM/SCOOP powder Take 17 g by mouth daily 116 g 0     No current facility-administered medications for this visit  Current Outpatient Medications on File Prior to Visit   Medication Sig    lisinopril (ZESTRIL) 5 mg tablet Take 1 tablet (5 mg total) by mouth daily    tamsulosin (FLOMAX) 0 4 mg Take 1 capsule (0 4 mg total) by mouth daily with dinner    diphenhydrAMINE (BENADRYL) 25 mg tablet Take 1 tablet (25 mg total) by mouth 2 (two) times a day as needed for itching (Patient not taking: Reported on 12/26/2019)    nystatin (MYCOSTATIN) cream Apply topically 2 (two) times a day On feet (Patient not taking: Reported on 5/27/2020)     No current facility-administered medications on file prior to visit  He has No Known Allergies       Review of Systems   Constitutional: Negative for activity change, appetite change, fatigue, fever and unexpected weight change  HENT: Negative for dental problem, ear pain, hearing loss and sore throat  Eyes: Negative for visual disturbance  Respiratory: Positive for cough  Negative for wheezing  Cardiovascular: Negative for chest pain  Gastrointestinal: Positive for abdominal pain  Negative for constipation, diarrhea and vomiting  Genitourinary: Negative for difficulty urinating and dysuria  Musculoskeletal: Negative for arthralgias, back pain and myalgias  Skin: Negative for rash  Neurological: Negative for dizziness and headaches  Psychiatric/Behavioral: Negative for behavioral problems           Objective:      /78 (BP Location: Left arm, Patient Position: Sitting, Cuff Size: Standard)   Pulse 75   Temp (!) 96 8 °F (36 °C) (Temporal)   Resp 18   Ht 5' 3"   Wt 68 5 kg (151 lb)   SpO2 97%   BMI 26 75 kg/m²          Physical Exam   Constitutional: He is oriented to person, place, and time  He appears well-developed  HENT:   Head: Normocephalic and atraumatic  Right Ear: External ear normal    Left Ear: External ear normal    Nose: Nose normal    Eyes: Conjunctivae are normal    Neck: Normal range of motion  Neck supple  No thyromegaly present  Cardiovascular: Normal rate, regular rhythm and normal heart sounds  No murmur heard  Pulmonary/Chest: Effort normal and breath sounds normal  No respiratory distress  Abdominal: Soft  Bowel sounds are normal  He exhibits no mass  There is no abdominal tenderness  There is no guarding  Musculoskeletal: Normal range of motion  Lymphadenopathy:     He has no cervical adenopathy  Neurological: He is alert and oriented to person, place, and time  Skin: Skin is warm  Nursing note and vitals reviewed

## 2020-08-15 DIAGNOSIS — K21.9 GASTROESOPHAGEAL REFLUX DISEASE, ESOPHAGITIS PRESENCE NOT SPECIFIED: ICD-10-CM

## 2020-08-17 RX ORDER — FAMOTIDINE 20 MG/1
TABLET, FILM COATED ORAL
Qty: 180 TABLET | Refills: 1 | Status: SHIPPED | OUTPATIENT
Start: 2020-08-17 | End: 2021-07-15 | Stop reason: SDUPTHER

## 2020-10-13 ENCOUNTER — TELEPHONE (OUTPATIENT)
Dept: NEUROLOGY | Facility: CLINIC | Age: 60
End: 2020-10-13

## 2020-10-29 ENCOUNTER — OFFICE VISIT (OUTPATIENT)
Dept: FAMILY MEDICINE CLINIC | Facility: CLINIC | Age: 60
End: 2020-10-29

## 2020-10-29 VITALS
DIASTOLIC BLOOD PRESSURE: 74 MMHG | BODY MASS INDEX: 27.07 KG/M2 | WEIGHT: 152.8 LBS | OXYGEN SATURATION: 99 % | HEIGHT: 63 IN | RESPIRATION RATE: 18 BRPM | TEMPERATURE: 97.5 F | SYSTOLIC BLOOD PRESSURE: 132 MMHG | HEART RATE: 72 BPM

## 2020-10-29 DIAGNOSIS — G89.29 CHRONIC LEFT-SIDED LOW BACK PAIN, UNSPECIFIED WHETHER SCIATICA PRESENT: Primary | ICD-10-CM

## 2020-10-29 DIAGNOSIS — M54.50 CHRONIC LEFT-SIDED LOW BACK PAIN, UNSPECIFIED WHETHER SCIATICA PRESENT: Primary | ICD-10-CM

## 2020-10-29 PROCEDURE — 99213 OFFICE O/P EST LOW 20 MIN: CPT | Performed by: FAMILY MEDICINE

## 2020-11-23 ENCOUNTER — TELEMEDICINE (OUTPATIENT)
Dept: FAMILY MEDICINE CLINIC | Facility: CLINIC | Age: 60
End: 2020-11-23

## 2020-11-23 DIAGNOSIS — Z20.822 EXPOSURE TO COVID-19 VIRUS: Primary | ICD-10-CM

## 2020-11-23 PROCEDURE — 99213 OFFICE O/P EST LOW 20 MIN: CPT | Performed by: FAMILY MEDICINE

## 2020-11-23 RX ORDER — FLUTICASONE PROPIONATE 50 MCG
1 SPRAY, SUSPENSION (ML) NASAL DAILY
Qty: 1 BOTTLE | Refills: 1 | Status: SHIPPED | OUTPATIENT
Start: 2020-11-23 | End: 2021-05-13

## 2020-11-23 RX ORDER — ACETAMINOPHEN 500 MG
1000 TABLET ORAL EVERY 8 HOURS
Qty: 60 TABLET | Refills: 0 | Status: SHIPPED | OUTPATIENT
Start: 2020-11-23 | End: 2020-12-03

## 2020-11-24 DIAGNOSIS — Z20.822 EXPOSURE TO COVID-19 VIRUS: ICD-10-CM

## 2020-11-24 PROCEDURE — U0003 INFECTIOUS AGENT DETECTION BY NUCLEIC ACID (DNA OR RNA); SEVERE ACUTE RESPIRATORY SYNDROME CORONAVIRUS 2 (SARS-COV-2) (CORONAVIRUS DISEASE [COVID-19]), AMPLIFIED PROBE TECHNIQUE, MAKING USE OF HIGH THROUGHPUT TECHNOLOGIES AS DESCRIBED BY CMS-2020-01-R: HCPCS | Performed by: FAMILY MEDICINE

## 2020-11-26 LAB — SARS-COV-2 RNA SPEC QL NAA+PROBE: NOT DETECTED

## 2020-12-02 ENCOUNTER — TELEPHONE (OUTPATIENT)
Dept: FAMILY MEDICINE CLINIC | Facility: CLINIC | Age: 60
End: 2020-12-02

## 2020-12-22 ENCOUNTER — TELEPHONE (OUTPATIENT)
Dept: NEUROLOGY | Facility: CLINIC | Age: 60
End: 2020-12-22

## 2020-12-23 ENCOUNTER — TELEPHONE (OUTPATIENT)
Dept: FAMILY MEDICINE CLINIC | Facility: CLINIC | Age: 60
End: 2020-12-23

## 2021-01-08 ENCOUNTER — TELEMEDICINE (OUTPATIENT)
Dept: FAMILY MEDICINE CLINIC | Facility: CLINIC | Age: 61
End: 2021-01-08

## 2021-01-08 ENCOUNTER — LAB (OUTPATIENT)
Dept: LAB | Facility: HOSPITAL | Age: 61
End: 2021-01-08

## 2021-01-08 DIAGNOSIS — R30.0 DYSURIA: ICD-10-CM

## 2021-01-08 DIAGNOSIS — R42 DIZZINESS: Primary | ICD-10-CM

## 2021-01-08 DIAGNOSIS — R42 DIZZINESS: ICD-10-CM

## 2021-01-08 LAB
ALBUMIN SERPL BCP-MCNC: 3.3 G/DL (ref 3.5–5)
ALP SERPL-CCNC: 93 U/L (ref 46–116)
ALT SERPL W P-5'-P-CCNC: 51 U/L (ref 12–78)
ANION GAP SERPL CALCULATED.3IONS-SCNC: 7 MMOL/L (ref 4–13)
AST SERPL W P-5'-P-CCNC: 34 U/L (ref 5–45)
BACTERIA UR QL AUTO: ABNORMAL /HPF
BASOPHILS # BLD AUTO: 0.03 THOUSANDS/ΜL (ref 0–0.1)
BASOPHILS NFR BLD AUTO: 1 % (ref 0–1)
BILIRUB SERPL-MCNC: 0.4 MG/DL (ref 0.2–1)
BILIRUB UR QL STRIP: NEGATIVE
BUN SERPL-MCNC: 17 MG/DL (ref 5–25)
CALCIUM ALBUM COR SERPL-MCNC: 9.5 MG/DL (ref 8.3–10.1)
CALCIUM SERPL-MCNC: 8.9 MG/DL (ref 8.3–10.1)
CHLORIDE SERPL-SCNC: 108 MMOL/L (ref 100–108)
CLARITY UR: CLEAR
CO2 SERPL-SCNC: 27 MMOL/L (ref 21–32)
COLOR UR: YELLOW
CREAT SERPL-MCNC: 1.42 MG/DL (ref 0.6–1.3)
EOSINOPHIL # BLD AUTO: 0.1 THOUSAND/ΜL (ref 0–0.61)
EOSINOPHIL NFR BLD AUTO: 3 % (ref 0–6)
ERYTHROCYTE [DISTWIDTH] IN BLOOD BY AUTOMATED COUNT: 14.7 % (ref 11.6–15.1)
GFR SERPL CREATININE-BSD FRML MDRD: 61 ML/MIN/1.73SQ M
GLUCOSE P FAST SERPL-MCNC: 137 MG/DL (ref 65–99)
GLUCOSE UR STRIP-MCNC: NEGATIVE MG/DL
HCT VFR BLD AUTO: 44 % (ref 36.5–49.3)
HGB BLD-MCNC: 14.2 G/DL (ref 12–17)
HGB UR QL STRIP.AUTO: ABNORMAL
IMM GRANULOCYTES # BLD AUTO: 0.01 THOUSAND/UL (ref 0–0.2)
IMM GRANULOCYTES NFR BLD AUTO: 0 % (ref 0–2)
KETONES UR STRIP-MCNC: NEGATIVE MG/DL
LEUKOCYTE ESTERASE UR QL STRIP: NEGATIVE
LYMPHOCYTES # BLD AUTO: 1.75 THOUSANDS/ΜL (ref 0.6–4.47)
LYMPHOCYTES NFR BLD AUTO: 43 % (ref 14–44)
MCH RBC QN AUTO: 29.8 PG (ref 26.8–34.3)
MCHC RBC AUTO-ENTMCNC: 32.3 G/DL (ref 31.4–37.4)
MCV RBC AUTO: 92 FL (ref 82–98)
MONOCYTES # BLD AUTO: 0.3 THOUSAND/ΜL (ref 0.17–1.22)
MONOCYTES NFR BLD AUTO: 7 % (ref 4–12)
NEUTROPHILS # BLD AUTO: 1.85 THOUSANDS/ΜL (ref 1.85–7.62)
NEUTS SEG NFR BLD AUTO: 46 % (ref 43–75)
NITRITE UR QL STRIP: NEGATIVE
NON-SQ EPI CELLS URNS QL MICRO: ABNORMAL /HPF
NRBC BLD AUTO-RTO: 0 /100 WBCS
PH UR STRIP.AUTO: 6 [PH]
PLATELET # BLD AUTO: 232 THOUSANDS/UL (ref 149–390)
PMV BLD AUTO: 11.1 FL (ref 8.9–12.7)
POTASSIUM SERPL-SCNC: 4.4 MMOL/L (ref 3.5–5.3)
PROT SERPL-MCNC: 7.1 G/DL (ref 6.4–8.2)
PROT UR STRIP-MCNC: ABNORMAL MG/DL
RBC # BLD AUTO: 4.76 MILLION/UL (ref 3.88–5.62)
RBC #/AREA URNS AUTO: ABNORMAL /HPF
SODIUM SERPL-SCNC: 142 MMOL/L (ref 136–145)
SP GR UR STRIP.AUTO: 1.02 (ref 1–1.03)
UROBILINOGEN UR QL STRIP.AUTO: 0.2 E.U./DL
WBC # BLD AUTO: 4.04 THOUSAND/UL (ref 4.31–10.16)
WBC #/AREA URNS AUTO: ABNORMAL /HPF

## 2021-01-08 PROCEDURE — 36415 COLL VENOUS BLD VENIPUNCTURE: CPT

## 2021-01-08 PROCEDURE — 80053 COMPREHEN METABOLIC PANEL: CPT

## 2021-01-08 PROCEDURE — 81001 URINALYSIS AUTO W/SCOPE: CPT | Performed by: PHYSICIAN ASSISTANT

## 2021-01-08 PROCEDURE — 85025 COMPLETE CBC W/AUTO DIFF WBC: CPT

## 2021-01-08 PROCEDURE — 99213 OFFICE O/P EST LOW 20 MIN: CPT | Performed by: PHYSICIAN ASSISTANT

## 2021-01-08 NOTE — PROGRESS NOTES
Virtual Regular Visit      Assessment/Plan:    Problem List Items Addressed This Visit     None      Visit Diagnoses     Dizziness    -  Primary    Relevant Orders    Comprehensive metabolic panel (Completed)    CBC and differential (Completed)    Dysuria        Relevant Orders    Urinalysis with microscopic (Completed)               Reason for visit is   Chief Complaint   Patient presents with    Virtual Regular Visit        Encounter provider Torsten Scott PA-C    [facility] [address] [address] [phone]      Recent Visits  Date Type Provider Dept   01/08/21 Telemedicine Torsten Scott PA-C  [facility]   Showing recent visits within past 7 days and meeting all other requirements     Future Appointments  No visits were found meeting these conditions  Showing future appointments within next 150 days and meeting all other requirements        The patient was identified by name and date of birth  Shell Thurman was informed that this is a telemedicine visit and that the visit is being conducted through [facility] and patient was informed that this is a secure, HIPAA-compliant platform  He agrees to proceed     My office door was closed  No one else was in the room  He acknowledged consent and understanding of privacy and security of the video platform  The patient has agreed to participate and understands they can discontinue the visit at any time  Patient is aware this is a billable service  Subjective  Shell Thurman is a 63 y o  male for left leg pain  Pain was mild but getting worse  He has taking tylenol 500 but it is not helping  He feels like his urine is dark and he has dysuria  No swelling in the testicles  Angela Bowers He is getting a lot of pain in left testicle  It has been more than 1 month with problems with urination  He does have a history of lower back with radiation  No middle back pain    No numbness in the legs  No urinary incontinence  HPI     Past Medical History:   Diagnosis Date    History of chronic constipation     History of neck pain 11/21/2017       Past Surgical History:   Procedure Laterality Date    CYSTOSCOPY  05/29/2014    diagnostic managed by Jalen Mora    TOOTH EXTRACTION         Current Outpatient Medications   Medication Sig Dispense Refill    diphenhydrAMINE (BENADRYL) 25 mg tablet Take 1 tablet (25 mg total) by mouth 2 (two) times a day as needed for itching (Patient not taking: Reported on 12/26/2019) 30 tablet 0    famotidine (PEPCID) 20 mg tablet TAKE 1 TABLET BY MOUTH TWICE A  tablet 1    fluticasone (FLONASE) 50 mcg/act nasal spray 1 spray into each nostril daily 1 Bottle 1    lisinopril (ZESTRIL) 5 mg tablet Take 1 tablet (5 mg total) by mouth daily 90 tablet 1    nystatin (MYCOSTATIN) cream Apply topically 2 (two) times a day On feet (Patient not taking: Reported on 5/27/2020) 30 g 0    polyethylene glycol (GLYCOLAX) 17 GM/SCOOP powder Take 17 g by mouth daily 116 g 0    tamsulosin (FLOMAX) 0 4 mg Take 1 capsule (0 4 mg total) by mouth daily with dinner 90 capsule 3     No current facility-administered medications for this visit  No Known Allergies    Review of Systems   Constitutional: Negative for chills and fever  HENT: Negative for ear pain and sore throat  Eyes: Negative for pain and visual disturbance  Respiratory: Negative for cough and shortness of breath  Cardiovascular: Negative for chest pain and palpitations  Gastrointestinal: Negative for abdominal pain and vomiting  Genitourinary: Positive for dysuria and testicular pain  Negative for difficulty urinating and hematuria  Musculoskeletal: Positive for back pain  Negative for arthralgias  Skin: Negative for color change and rash  Neurological: Positive for dizziness (rare)  Negative for seizures and syncope  All other systems reviewed and are negative        Video Exam    There were no vitals filed for this visit  Physical Exam  Constitutional:       Appearance: He is well-developed  HENT:      Head: Normocephalic and atraumatic  Right Ear: External ear normal       Left Ear: External ear normal    Eyes:      Conjunctiva/sclera: Conjunctivae normal    Neck:      Comments: No visible masses  Pulmonary:      Effort: Pulmonary effort is normal    Neurological:      Mental Status: He is alert and oriented to person, place, and time  Psychiatric:         Mood and Affect: Mood normal          Behavior: Behavior normal           I spent 10 minutes directly with the patient during this visit      VIRTUAL VISIT [address] [place] [address] acknowledges that he has consented to an online visit or consultation  He understands that the online visit is based solely on information provided by him, and that, in the absence of a face-to-face physical evaluation by the physician, the diagnosis he receives is both limited and provisional in terms of accuracy and completeness  This is not intended to replace a full medical face-to-face evaluation by the physician  Betina Carnes understands and accepts these terms

## 2021-01-11 ENCOUNTER — TELEPHONE (OUTPATIENT)
Dept: FAMILY MEDICINE CLINIC | Facility: CLINIC | Age: 61
End: 2021-01-11

## 2021-01-11 DIAGNOSIS — I10 BENIGN HYPERTENSION: Primary | ICD-10-CM

## 2021-01-11 RX ORDER — AMLODIPINE BESYLATE 5 MG/1
5 TABLET ORAL DAILY
Qty: 90 TABLET | Refills: 0 | Status: SHIPPED | OUTPATIENT
Start: 2021-01-11 | End: 2021-04-06

## 2021-01-11 NOTE — RESULT ENCOUNTER NOTE
He still has a small amount blood and protein in his urine  He is scheduled for Urology on February 1st and I would recommend he go to that visit  He also did have a slightly abnormal kidney functions I recommend he increase water intake  I know I had recommend that he take Advil but I would actually recommend that he stop the Advil and use over-the-counter pain patches such as lidocaine  I would also recommend we switch his lisinopril to amlodipine  I want to see if this does help with his kidney function

## 2021-01-11 NOTE — TELEPHONE ENCOUNTER
----- Message from [id number] [address] SANDRITA Melara sent at 1/8/2021  1:52 PM EST -----  Regarding: bp check  Need bp check 2 weeks with nurse

## 2021-02-01 ENCOUNTER — TELEPHONE (OUTPATIENT)
Dept: UROLOGY | Facility: AMBULATORY SURGERY CENTER | Age: 61
End: 2021-02-01

## 2021-02-01 ENCOUNTER — OFFICE VISIT (OUTPATIENT)
Dept: UROLOGY | Facility: AMBULATORY SURGERY CENTER | Age: 61
End: 2021-02-01

## 2021-02-01 DIAGNOSIS — R39.11 BENIGN PROSTATIC HYPERPLASIA WITH URINARY HESITANCY: ICD-10-CM

## 2021-02-01 DIAGNOSIS — Z12.5 PROSTATE CANCER SCREENING: Primary | ICD-10-CM

## 2021-02-01 DIAGNOSIS — N40.1 BENIGN PROSTATIC HYPERPLASIA WITH URINARY HESITANCY: ICD-10-CM

## 2021-02-01 PROCEDURE — 99213 OFFICE O/P EST LOW 20 MIN: CPT | Performed by: NURSE PRACTITIONER

## 2021-02-01 RX ORDER — TAMSULOSIN HYDROCHLORIDE 0.4 MG/1
0.4 CAPSULE ORAL
Qty: 90 CAPSULE | Refills: 3 | Status: SHIPPED | OUTPATIENT
Start: 2021-02-01 | End: 2021-09-10 | Stop reason: SDUPTHER

## 2021-02-01 NOTE — PATIENT INSTRUCTIONS
Mariajose la medicina, Tamsulosin diario  Necesitamos a checkar harrison PSA in 2 semanas  Me gustaria para usted a regresa a la oficina in 3 semanas a hablar que tus sinotmas

## 2021-02-01 NOTE — TELEPHONE ENCOUNTER
Patient needs to follow up in 4 weeks with a PSA  Caryn Polk can double book some patients where needed he said

## 2021-02-01 NOTE — PROGRESS NOTES
Virtual Brief Visit    Assessment/Plan:    Problem List Items Addressed This Visit     None                Reason for visit is Benign Prostatic Hyperplasia  Chief Complaint   Patient presents with    Virtual Brief Visit        Encounter provider ALE Leonardo    Provider located at [address] [address]    Recent Visits  No visits were found meeting these conditions  Showing recent visits within past 7 days and meeting all other requirements     Today's Visits  Date Type Provider Dept   02/01/21 Office Visit ALE Leonardo  [facility]   Showing today's visits and meeting all other requirements     Future Appointments  No visits were found meeting these conditions  Showing future appointments within next 150 days and meeting all other requirements        After connecting through telephone, the patient was identified by name and date of birth  Dax Aceves was informed that this is a telemedicine visit and that the visit is being conducted through telephone  My office door was closed  No one else was in the room  He acknowledged consent and understanding of privacy and security of the platform  The patient has agreed to participate and understands he can discontinue the visit at any time  Patient is aware this is a billable service       Assessment and Plan    Routine prostate cancer screening  ·  PSA to be performed prior to next office visit  ·  JENN will be performed at next office visit    Benign Prostatic Hyperplasia  ·  resume tamsulosin 0 4 mg p o  daily-prescription refill provided  ·  bladder scan PVR upon return to the office in 1 month  ·   If urinary symptoms do not resolve after re-initiation of tamsulosin will need to consider cystoscopy    Subjective    Dax Aceves is a 60 y o  male here for follow up evaluation of  urinary symptoms secondary to benign prostatic hyperplasia, routine prostate cancer screening and microscopic hematuria  Patient also has a history of epididymal cysts which have not changed in size or given him any symptoms of pain and discomfort  Patient with a negative microscopic hematuria workup performed in 2014  He reports worsening urinary symptoms since his last office visit  He reports significant urinary urgency, hesitancy and frequency  He reports postvoid dribbling  He is very dissatisfied with his current urinary status is is worsened to the point that it has     He denies any recent changes to his health  He is currently satisfied with his urinary health  He continues to take MiraLax avoid 4 mg p o  Daily with no side effects  Unfortunately, patient did not have his PSA prior to this office visit    Past Medical History:   Diagnosis Date    History of chronic constipation     History of neck pain 11/21/2017       Past Surgical History:   Procedure Laterality Date    CYSTOSCOPY  05/29/2014    diagnostic managed by Jalen Mora    TOOTH EXTRACTION         Current Outpatient Medications   Medication Sig Dispense Refill    amLODIPine (NORVASC) 5 mg tablet Take 1 tablet (5 mg total) by mouth daily 90 tablet 0    diphenhydrAMINE (BENADRYL) 25 mg tablet Take 1 tablet (25 mg total) by mouth 2 (two) times a day as needed for itching (Patient not taking: Reported on 12/26/2019) 30 tablet 0    famotidine (PEPCID) 20 mg tablet TAKE 1 TABLET BY MOUTH TWICE A  tablet 1    fluticasone (FLONASE) 50 mcg/act nasal spray 1 spray into each nostril daily 1 Bottle 1    nystatin (MYCOSTATIN) cream Apply topically 2 (two) times a day On feet (Patient not taking: Reported on 5/27/2020) 30 g 0    polyethylene glycol (GLYCOLAX) 17 GM/SCOOP powder Take 17 g by mouth daily 116 g 0    tamsulosin (FLOMAX) 0 4 mg Take 1 capsule (0 4 mg total) by mouth daily with dinner 90 capsule 3     No current facility-administered medications for this visit           No Known Allergies    Review of Systems   Constitutional: Negative for chills and fever  Respiratory: Negative for cough and shortness of breath  Cardiovascular: Negative for chest pain  Gastrointestinal: Negative for abdominal distention, abdominal pain, blood in stool, nausea and vomiting  Genitourinary: Positive for difficulty urinating, frequency and urgency  Negative for dysuria, enuresis, flank pain and hematuria  Skin: Negative for rash  Neurological: Negative for dizziness  There were no vitals filed for this visit  I spent 12 minutes directly with the patient during this visit    VIRTUAL VISIT [address] acknowledges that he has consented to an online visit or consultation  He understands that the online visit is based solely on information provided by him, and that, in the absence of a face-to-face physical evaluation by the physician, the diagnosis he receives is both limited and provisional in terms of accuracy and completeness  This is not intended to replace a full medical face-to-face evaluation by the physician  Angle Hodge understands and accepts these terms       ALE Camejo

## 2021-02-24 ENCOUNTER — TELEPHONE (OUTPATIENT)
Dept: FAMILY MEDICINE CLINIC | Facility: CLINIC | Age: 61
End: 2021-02-24

## 2021-03-01 ENCOUNTER — TRANSCRIBE ORDERS (OUTPATIENT)
Dept: ADMINISTRATIVE | Facility: HOSPITAL | Age: 61
End: 2021-03-01

## 2021-03-01 ENCOUNTER — LAB (OUTPATIENT)
Dept: LAB | Facility: HOSPITAL | Age: 61
End: 2021-03-01

## 2021-03-01 ENCOUNTER — APPOINTMENT (OUTPATIENT)
Dept: LAB | Facility: HOSPITAL | Age: 61
End: 2021-03-01

## 2021-03-01 DIAGNOSIS — Z12.5 PROSTATE CANCER SCREENING: Primary | ICD-10-CM

## 2021-03-01 DIAGNOSIS — Z12.5 PROSTATE CANCER SCREENING: ICD-10-CM

## 2021-03-01 LAB — PSA SERPL-MCNC: 0.4 NG/ML (ref 0–4)

## 2021-03-01 PROCEDURE — G0103 PSA SCREENING: HCPCS

## 2021-03-01 PROCEDURE — 36415 COLL VENOUS BLD VENIPUNCTURE: CPT

## 2021-03-02 ENCOUNTER — OFFICE VISIT (OUTPATIENT)
Dept: UROLOGY | Facility: AMBULATORY SURGERY CENTER | Age: 61
End: 2021-03-02

## 2021-03-02 VITALS
DIASTOLIC BLOOD PRESSURE: 70 MMHG | WEIGHT: 150 LBS | BODY MASS INDEX: 26.58 KG/M2 | HEART RATE: 82 BPM | HEIGHT: 63 IN | SYSTOLIC BLOOD PRESSURE: 122 MMHG

## 2021-03-02 DIAGNOSIS — N40.1 BENIGN PROSTATIC HYPERPLASIA WITH URINARY HESITANCY: Primary | ICD-10-CM

## 2021-03-02 DIAGNOSIS — R39.11 BENIGN PROSTATIC HYPERPLASIA WITH URINARY HESITANCY: Primary | ICD-10-CM

## 2021-03-02 DIAGNOSIS — Z12.5 PROSTATE CANCER SCREENING: ICD-10-CM

## 2021-03-02 LAB — POST-VOID RESIDUAL VOLUME, ML POC: 12 ML

## 2021-03-02 PROCEDURE — 51798 US URINE CAPACITY MEASURE: CPT | Performed by: NURSE PRACTITIONER

## 2021-03-02 PROCEDURE — 99213 OFFICE O/P EST LOW 20 MIN: CPT | Performed by: NURSE PRACTITIONER

## 2021-03-02 NOTE — PROGRESS NOTES
· 3/2/2021      Chief Complaint   Patient presents with    Prostate cancer screen     Benign Prostatic Hypertrophy     Assessment and Plan    63 y o  male managed by Dr Aminah Simon  1  Prostate Cancer Screen  ·   PSA performed 03/01/2021 resulted 0 4  ·  JENN reveals a prostate size 40-45 g with no nodules noted  ·  repeat PSA and JENN in 1 year    2  Benign Prostatic Hyperplasia  ·  bladder scan PVR 12 mL  ·  will continue tamsulosin 0 4 mg p o  daily-prescription refill not yet this time  ·  Will continue to monitor for progression of urinary symptoms  ·  follow up the office in 1 year    History of Present Illness  Karma Zhang is a 63 y o  male here for follow up evaluation of    urinary symptoms secondary to benign prostatic hyperplasia, routine prostate cancer screening and microscopic hematuria   Patient also has a history of epididymal cysts which have not changed in size or given him any symptoms of pain and discomfort   Patient with a negative microscopic hematuria workup performed in 2014  He reports worsening urinary symptoms since his last office visit  He reports significant urinary urgency, hesitancy and frequency  He reports postvoid dribbling  He is very dissatisfied with his current urinary status is is worsened to the point that it has  Karen Cantor denies any recent changes to his health  Dayton Situ is currently satisfied with his urinary health  Santos Situ continues to take MiraLax avoid 4 mg p o  Daily with no side effects  Component       PSA, Total   Latest Ref Rng & Units       0 0 - 4 0 ng/mL   8/25/2016      11:37 AM 0 4   11/30/2017      9:45 AM 0 4   9/18/2018      10:39 AM 0 4   10/22/2019      9:23 AM 0 4   3/1/2021      11:16 AM 0 4     Review of Systems   Constitutional: Negative for chills and fever  Respiratory: Negative for cough and shortness of breath  Cardiovascular: Negative for chest pain     Gastrointestinal: Negative for abdominal distention, abdominal pain, blood in stool, nausea and vomiting  Genitourinary: Negative for difficulty urinating, dysuria, enuresis, flank pain, frequency, hematuria and urgency  Skin: Negative for rash  AUA SYMPTOM SCORE      Most Recent Value   AUA SYMPTOM SCORE   How often have you had a sensation of not emptying your bladder completely after you finished urinating? 4   How often have you had to urinate again less than two hours after you finished urinating? 4   How often have you found you stopped and started again several times when you urinate? 3   How often have you found it difficult to postpone urination? 3   How often have you had a weak urinary stream?  5   How often have you had to push or strain to begin urination?  --   How many times did you most typically get up to urinate from the time you went to bed at night until the time you got up in the morning?   5   Quality of Life: If you were to spend the rest of your life with your urinary condition just the way it is now, how would you feel about that?  --   AUA SYMPTOM SCORE  --             Past Medical History  Past Medical History:   Diagnosis Date    History of chronic constipation     History of neck pain 2017       Past Social History  Past Surgical History:   Procedure Laterality Date    CYSTOSCOPY  2014    diagnostic managed by Jalen Mora    TOOTH EXTRACTION       Social History     Tobacco Use   Smoking Status Former Smoker    Types: Cigarettes    Quit date: 2014    Years since quittin 1   Smokeless Tobacco Never Used       Past Family History  Family History   Problem Relation Age of Onset    Cancer Sister         malignant neoplasm of breast    Diabetes Paternal Grandmother     Diabetes Family     Arthritis Family     Cancer Family         malignant neoplasm    Substance Abuse Neg Hx         denied Mother and Father    Mental illness Neg Hx         no family history Mother and Father    Other Neg Hx         denied family history of Osteopertrosis       Past Social history  Social History     Socioeconomic History    Marital status: Single     Spouse name: Not on file    Number of children: Not on file    Years of education: Not on file    Highest education level: Not on file   Occupational History    Not on file   Social Needs    Financial resource strain: Not on file    Food insecurity     Worry: Not on file     Inability: Not on file    Transportation needs     Medical: Not on file     Non-medical: Not on file   Tobacco Use    Smoking status: Former Smoker     Types: Cigarettes     Quit date: 2014     Years since quittin 1    Smokeless tobacco: Never Used   Substance and Sexual Activity    Alcohol use: Yes     Frequency: 2-4 times a month     Drinks per session: 5 or 6     Binge frequency: Monthly     Comment: weekly    Drug use: Never    Sexual activity: Not on file   Lifestyle    Physical activity     Days per week: Not on file     Minutes per session: Not on file    Stress: Not on file   Relationships    Social connections     Talks on phone: Not on file     Gets together: Not on file     Attends [employer] service: Not on file     Active member of club or organization: Not on file     Attends meetings of clubs or organizations: Not on file     Relationship status: Not on file    Intimate partner violence     Fear of current or ex partner: Not on file     Emotionally abused: Not on file     Physically abused: Not on file     Forced sexual activity: Not on file   Other Topics Concern    Not on file   Social History Narrative    No preference or Uatsdin beliefs       Current Medications  Current Outpatient Medications   Medication Sig Dispense Refill    amLODIPine (NORVASC) 5 mg tablet Take 1 tablet (5 mg total) by mouth daily 90 tablet 0    famotidine (PEPCID) 20 mg tablet TAKE 1 TABLET BY MOUTH TWICE A  tablet 1    fluticasone (FLONASE) 50 mcg/act nasal spray 1 spray into each nostril daily 1 Bottle 1  polyethylene glycol (GLYCOLAX) 17 GM/SCOOP powder Take 17 g by mouth daily 116 g 0    tamsulosin (FLOMAX) 0 4 mg Take 1 capsule (0 4 mg total) by mouth daily with dinner 90 capsule 3    diphenhydrAMINE (BENADRYL) 25 mg tablet Take 1 tablet (25 mg total) by mouth 2 (two) times a day as needed for itching (Patient not taking: Reported on 12/26/2019) 30 tablet 0    nystatin (MYCOSTATIN) cream Apply topically 2 (two) times a day On feet (Patient not taking: Reported on 5/27/2020) 30 g 0     No current facility-administered medications for this visit  Allergies  No Known Allergies      The following portions of the patient's history were reviewed and updated as appropriate: allergies, current medications, past medical history, past social history, past surgical history and problem list       Vitals  Vitals:    03/02/21 0817   BP: 122/70   Pulse: 82   Weight: 68 kg (150 lb)   Height: 5' 3" (1 6 m)           Physical Exam  Physical Exam  Vitals signs reviewed  Constitutional:       General: He is not in acute distress  Appearance: Normal appearance  He is normal weight  HENT:      Head: Normocephalic  Eyes:      Pupils: Pupils are equal, round, and reactive to light  Cardiovascular:      Rate and Rhythm: Normal rate  Pulmonary:      Effort: No respiratory distress  Breath sounds: Normal breath sounds  Genitourinary:     Comments:  JENN-prostate 40-45 g with no nodules  Skin:     General: Skin is warm and dry  Neurological:      General: No focal deficit present  Mental Status: He is alert and oriented to person, place, and time     Psychiatric:         Mood and Affect: Mood normal          Behavior: Behavior normal        Results  Recent Results (from the past 1 hour(s))   POCT Measure PVR    Collection Time: 03/02/21  8:21 AM   Result Value Ref Range    POST-VOID RESIDUAL VOLUME, ML POC 12 mL   ]  Lab Results   Component Value Date    PSA 0 4 03/01/2021    PSA 0 4 10/22/2019    PSA 0 4 09/18/2018     Lab Results   Component Value Date    GLUCOSE 113 09/15/2015    CALCIUM 8 9 01/08/2021     09/15/2015    K 4 4 01/08/2021    CO2 27 01/08/2021     01/08/2021    BUN 17 01/08/2021    CREATININE 1 42 (H) 01/08/2021     Lab Results   Component Value Date    WBC 4 04 (L) 01/08/2021    HGB 14 2 01/08/2021    HCT 44 0 01/08/2021    MCV 92 01/08/2021     01/08/2021     Orders  Orders Placed This Encounter   Procedures    POCT Measure PVR       Romana Bushman, CRNP

## 2021-03-05 ENCOUNTER — TELEPHONE (OUTPATIENT)
Dept: FAMILY MEDICINE CLINIC | Facility: CLINIC | Age: 61
End: 2021-03-05

## 2021-03-05 ENCOUNTER — OFFICE VISIT (OUTPATIENT)
Dept: URGENT CARE | Facility: MEDICAL CENTER | Age: 61
End: 2021-03-05

## 2021-03-05 VITALS — OXYGEN SATURATION: 98 % | RESPIRATION RATE: 16 BRPM | HEART RATE: 82 BPM | TEMPERATURE: 98.4 F

## 2021-03-05 DIAGNOSIS — R05.9 COUGH: Primary | ICD-10-CM

## 2021-03-05 LAB — SARS-COV-2 RNA RESP QL NAA+PROBE: NEGATIVE

## 2021-03-05 PROCEDURE — U0005 INFEC AGEN DETEC AMPLI PROBE: HCPCS | Performed by: PHYSICIAN ASSISTANT

## 2021-03-05 PROCEDURE — G0382 LEV 3 HOSP TYPE B ED VISIT: HCPCS | Performed by: PHYSICIAN ASSISTANT

## 2021-03-05 PROCEDURE — U0003 INFECTIOUS AGENT DETECTION BY NUCLEIC ACID (DNA OR RNA); SEVERE ACUTE RESPIRATORY SYNDROME CORONAVIRUS 2 (SARS-COV-2) (CORONAVIRUS DISEASE [COVID-19]), AMPLIFIED PROBE TECHNIQUE, MAKING USE OF HIGH THROUGHPUT TECHNOLOGIES AS DESCRIBED BY CMS-2020-01-R: HCPCS | Performed by: PHYSICIAN ASSISTANT

## 2021-03-05 NOTE — TELEPHONE ENCOUNTER
Pr is informed, he does state he is having a cough wanted to know if something can be called in or should her wait for his results

## 2021-03-05 NOTE — TELEPHONE ENCOUNTER
Via nurse line:    Pt when to get test for covid, he states he is having a slight cold and had fever results still in process just went today  He would like to know if he can get a xray done of his lungs as well?

## 2021-03-05 NOTE — PROGRESS NOTES
[facility]        NAME: Marlene Cardona is a 63 y o  male  : 1960    MRN: [id number]  DATE: 2021  TIME: 8:28 AM    Assessment and Plan   Cough [R05]  1  Cough  [facility]ov[facility]         Patient Instructions       Increase fluids quarantine until results return  Follow up with PCP in 3-5 days  Proceed to  ER if symptoms worsen  Chief Complaint     Chief Complaint   Patient presents with   Sheilda Arnold Like Symptoms     Patient relates has been sick since Monday with cough, runny nose, watery eyes, congestion and fever  Temperature was not measured at home  Denies chest pain and SOB  Denies weakness  No exposure to anyone COVID positive  Taking left over Amoxicillin for 2 days  History of Present Illness       Patient with exposure to COVID-19  Has a 2 day history of cough nonproductive, feels  As if he was feverish but does not check his temperature  Complains of loss of taste and smell as well as nasal congestion and runny nose  He had leftover antibiotics amoxicillin that he took without change in his symptoms  No other associated symptoms  No shortness of breath  Review of Systems   Review of Systems   All other systems reviewed and are negative          Current Medications       Current Outpatient Medications:     amLODIPine (NORVASC) 5 mg tablet, Take 1 tablet (5 mg total) by mouth daily, Disp: 90 tablet, Rfl: 0    famotidine (PEPCID) 20 mg tablet, TAKE 1 TABLET BY MOUTH TWICE A DAY, Disp: 180 tablet, Rfl: 1    tamsulosin (FLOMAX) 0 4 mg, Take 1 capsule (0 4 mg total) by mouth daily with dinner, Disp: 90 capsule, Rfl: 3    diphenhydrAMINE (BENADRYL) 25 mg tablet, Take 1 tablet (25 mg total) by mouth 2 (two) times a day as needed for itching (Patient not taking: Reported on 2019), Disp: 30 tablet, Rfl: 0    fluticasone (FLONASE) 50 mcg/act nasal spray, 1 spray into each nostril daily (Patient not taking: Reported on 3/5/2021), Disp: 1 Bottle, Rfl: 1    nystatin (MYCOSTATIN) cream, Apply topically 2 (two) times a day On feet (Patient not taking: Reported on 5/27/2020), Disp: 30 g, Rfl: 0    polyethylene glycol (GLYCOLAX) 17 GM/SCOOP powder, Take 17 g by mouth daily (Patient not taking: Reported on 3/5/2021), Disp: 116 g, Rfl: 0    Current Allergies     Allergies as of 03/05/2021    (No Known Allergies)            The following portions of the patient's history were reviewed and updated as appropriate: allergies, current medications, past family history, past medical history, past social history, past surgical history and problem list      Past Medical History:   Diagnosis Date    History of chronic constipation     History of neck pain 11/21/2017       Past Surgical History:   Procedure Laterality Date    CYSTOSCOPY  05/29/2014    diagnostic managed by Jalen Mora    TOOTH EXTRACTION         Family History   Problem Relation Age of Onset    Cancer Sister         malignant neoplasm of breast    Diabetes Paternal Grandmother     Diabetes Family     Arthritis Family     Cancer Family         malignant neoplasm    Substance Abuse Neg Hx         denied Mother and Father    Mental illness Neg Hx         no family history Mother and Father    Other Neg Hx         denied family history of Osteopertrosis         Medications have been verified  Objective   Pulse 82   Temp 98 4 °F (36 9 °C) (Tympanic)   Resp 16   SpO2 98%   No LMP for male patient  Physical Exam     Physical Exam  Vitals signs and nursing note reviewed  Constitutional:       Appearance: Normal appearance  He is normal weight  HENT:      Head: Normocephalic  Right Ear: Tympanic membrane normal       Left Ear: Tympanic membrane normal    Cardiovascular:      Rate and Rhythm: Normal rate and regular rhythm  Pulses: Normal pulses  Heart sounds: Normal heart sounds     Pulmonary:      Effort: Pulmonary effort is normal  Breath sounds: Normal breath sounds  Neurological:      Mental Status: He is alert

## 2021-03-05 NOTE — PATIENT INSTRUCTIONS
COVID-19 (Coronavirus Disease 2019)   WHAT YOU NEED TO KNOW:   COVID-19 is the disease caused by the novel (new) coronavirus first discovered in December 2019  Coronaviruses generally cause upper respiratory (nose, throat, and lung) infections, such as a cold  The new virus can also cause serious lower respiratory conditions, such as pneumonia or acute respiratory distress syndrome (ARDS)  Anyone can develop serious problems from the new virus, but your risk is higher if you are 65 or older  A weak immune system, diabetes, or a heart or lung condition can also increase your risk  DISCHARGE INSTRUCTIONS:   If you think you or someone you know may be infected:  Do the following to protect others:  · If emergency care is needed,  tell the  about the possible infection, or call ahead and tell the emergency department  · Call a healthcare provider  for instructions if symptoms are mild  Anyone who may be infected should not  arrive without calling first  The provider will need to protect staff members and other patients  · The person who may be infected needs to wear a face covering  while getting medical care  This will help lower the risk of infecting others  Coverings are not used for anyone who is younger than 2 years, has breathing problems, or cannot remove it  The provider can give you instructions for anyone who cannot wear a covering  Call your local emergency number (911 in the [address] [facility]) or go to the emergency department if:   · You have trouble breathing or shortness of breath at rest     · You have chest pain or pressure that lasts longer than 5 minutes  · You become confused or hard to wake  · Your lips or face are blue  · You have a fever of 104°F (40°C) or higher  Call your doctor if:   · You do not  have symptoms of COVID-19 but had close physical contact within 14 days with someone who tested positive  · You have questions or concerns about your condition or care      Medicines: You may need any of the following for mild symptoms:  · Decongestants  help reduce nasal congestion and help you breathe more easily  If you take decongestant pills, they may make you feel restless or cause problems with your sleep  Do not use decongestant sprays for more than a few days  · Cough suppressants  help reduce coughing  Ask your healthcare provider which type of cough medicine is best for you  · Acetaminophen  decreases pain and fever  It is available without a doctor's order  Ask how much to take and how often to take it  Follow directions  Read the labels of all other medicines you are using to see if they also contain acetaminophen, or ask your doctor or pharmacist  Acetaminophen can cause liver damage if not taken correctly  Do not use more than 4 grams (4,000 milligrams) total of acetaminophen in one day  · NSAIDs , such as ibuprofen, help decrease swelling, pain, and fever  NSAIDs can cause stomach bleeding or kidney problems in certain people  If you take blood thinner medicine, always ask your healthcare provider if NSAIDs are safe for you  Always read the medicine label and follow directions  · Take your medicine as directed  Contact your healthcare provider if you think your medicine is not helping or if you have side effects  Tell him or her if you are allergic to any medicine  Keep a list of the medicines, vitamins, and herbs you take  Include the amounts, and when and why you take them  Bring the list or the pill bottles to follow-up visits  Carry your medicine list with you in case of an emergency  How the 2019 coronavirus spreads: The virus spreads quickly and easily  You can become infected if you are in contact with a large amount of the virus, even for a short time  You can also become infected by being around a small amount of virus for a long time   The following are ways the virus is thought to spread, but more information may be coming:  · Droplets are the most common way all coronaviruses spread  The virus can travel in droplets that form when a person talks, coughs, or sneezes  Anyone who breathes in the droplets or gets them in his or her eyes can become infected with the virus  Close personal contact with an infected person is thought to be the main way the virus spreads  Close personal contact means you are within 6 feet (2 meters) of the person  · Person-to-person contact can spread the virus  For example, a person with the virus on his or her hands can spread it by shaking hands with someone  At this time, it does not appear that the virus can be passed to a baby during pregnancy or delivery  The baby can be infected after he or she is born through person-to-person contact  The virus also does not appear to spread in breast milk  If you are pregnant or breastfeeding, talk to your healthcare provider or obstetrician about any concerns you have  · The virus can stay on objects and surfaces  A person can get the virus on his or her hands by touching the object or surface  Infection happens if the person then touches his or her eyes or mouth with unwashed hands  It is not yet known how long the virus can stay on an object or surface  That is why it is important to clean all surfaces that are used regularly  · An infected animal may be able to infect a person who touches it  This may happen at live markets or on a farm  How everyone can lower the risk for COVID-19:  The best way to prevent infection is to avoid anyone who is infected, but this can be hard to do  An infected person can spread the virus before signs or symptoms begin, or even if signs or symptoms never develop  The following can help lower the risk for infection:      · Wash your hands often throughout the day  Use soap and water  Rub your soapy hands together, lacing your fingers  Wash the front and back of each hand, and in between your fingers   Use the fingers of one hand to scrub under the fingernails of the other hand  Wash for at least 20 seconds  Rinse with warm, running water for several seconds  Then dry your hands with a clean towel or paper towel  Use hand  that contains alcohol if soap and water are not available  Do not touch your eyes, nose, or mouth without washing your hands first  Teach children how to wash their hands and use hand   · Cover a sneeze or cough  This prevents droplets from traveling from you to others  Turn your face away and cover your mouth and nose with a tissue  Throw the tissue away  Use the bend of your arm if a tissue is not available  Then wash your hands well with soap and water or use hand   Turn and cover your face if you are around someone who is sneezing or coughing  Teach children how to cover a cough or sneeze  · Follow worldwide, national, and local social distancing guidelines  Social distancing means people avoid close physical contact so the virus cannot spread from one person to another  Keep at least 6 feet (2 meters) between you and others  Also keep this distance from anyone who comes to your home, such as someone making a delivery  · Make a habit of not touching your face  It is not known how long the virus can stay on objects and surfaces  If you get the virus on your hands, you can transfer it to your eyes, nose, or mouth and become infected  You can also transfer it to objects, surfaces, or people  Be aware of what you touch when you go out  Examples include handrails and elevator buttons  Try not to touch anything with bare hands unless it is necessary  Wash your hands before you leave your home and when you return  · Clean and disinfect high-touch surfaces and objects often  Use a disinfecting solution or wipes  You can make a solution by diluting 4 teaspoons of bleach in 1 quart (4 cups) of water   Clean and disinfect even if you think no one living in or coming to your home is infected with the virus  You can wipe items with a disinfecting cloth before you bring them into your home  Wash your hands after you handle anything you bring into your home  · Make your immune system as healthy as possible  A weakened immune system makes you more vulnerable to the new coronavirus  No COVID-19 vaccine is available yet  Vaccines such as the flu and pneumonia vaccines can help your immune system  Your healthcare provider can tell you which vaccines to get, and when to get them  Keep your immune system as strong as possible  Do not smoke  Eat healthy foods, exercise regularly, and try to manage stress  Go to bed and wake up at the same times each day  Social distancing guidelines:  National and local social distancing rules vary  Rules may change over time as restrictions are lifted  Restrictions may return if an outbreak happens where you live  It is important to know and follow all current social distancing rules in your area  The following are general guidelines:  · Limit trips out of your home  You may be able to have food, medicines, and other supplies delivered  If possible, have delivered items left at your door or other area  Try not to have someone hand you an item  You will be so close to the person that the virus can spread between you  · Do not have close physical contact with anyone who does not live in your home  Do not shake hands with, hug, or kiss a person as a greeting  Stand or walk as far from others as possible  If you must use public transportation (such as a bus or subway), try to sit or stand away from others  You can stay safely connected with others through phone calls, e-mail messages, social media websites, and video chats  Check in on anyone who may be having a hard time socially distancing, or who lives alone  Ask administrators at nursing homes or long-term care facilities how you can safely communicate with someone living there      · Wear a cloth face covering around others who do not live in your home  Face coverings help prevent the virus from spreading to others in droplets  You can use a clear face covering if someone needs to read your lips  This is a cloth covering that has plastic over the mouth area so your lips can be seen  Do not use coverings that have breathing valves or vents  The virus can travel out of the valve or vent and be spread to others  Do not take your covering off to talk, cough, or sneeze  Do not use coverings on children younger than 2 years or on anyone who has breathing problems or cannot remove it  · Only allow medical or other necessary professionals into your home  Wear your face covering, and remind professionals to wear a face covering  Remind them to wash their hands when they arrive and before they leave  Do not  let anyone who does not live in your home in, even if the person is not sick  A person can pass the virus to others before symptoms of COVID-19 begin  Some people never even develop symptoms  Children commonly have mild symptoms or no symptoms  It may be hard to tell a child not to hug or kiss you  Explain that this is how he or she can help you stay healthy  · Do not go to someone else's home unless it is necessary  Do not go over to visit, even if the person is lonely  Only go if you need to help him or her  Make sure you both wear face coverings while you are there  · Avoid large gatherings and crowds  Gatherings or crowds of 10 or more individuals can cause the virus to spread  Examples of gatherings include parties, sporting events, [employer]ian[employer] services, and conferences  Crowds may form at beaches, [place], and tourist attractions  Protect yourself by staying away from large gatherings and crowds  · Ask your healthcare provider for other ways to have appointments  You may be able to have appointments without having to go into the provider's office  Some providers offer phone, video, or other types of appointments  You may also be able to get prescriptions for a few months of your medicines at a time  · Stay safe if you must go out to work  You may have a job that can only be done outside your home  Keep physical distance between you and other workers as much as possible  Follow your employer's rules so everyone stays safe  If you have COVID-19 and are recovering at home:  Healthcare providers will give you specific instructions to follow  The following are general guidelines to remind you how to keep others safe until you are well:  · Wash your hands often  Use soap and water as much as possible  You can use hand  that contains alcohol if soap and water are not available  Do not share towels with anyone  If you use paper towels, throw them away in a lined trash can kept in your room or area  Use a covered trash can, if possible  · Do not go out of your home unless it is necessary  You may have to go to your healthcare provider's office for check-ups or to get prescription refills  Do not arrive at the provider's office without an appointment  Providers have to make their offices safe for staff and other patients  · Do not have close physical contact with anyone unless it is necessary  Only have close physical contact with a person giving direct care, or a baby or child you must care for  Family members and friends should not visit you  If possible, stay in a separate area or room of your home if you live with others  No one should go into the area or room except to give you care  You can visit with others by phone, video chat, e-mail, or similar systems  It is important to stay connected with others in your life while you recover  · Wear a face covering while others are near you  This can help prevent droplets from spreading the virus when you talk, sneeze, or cough  Put the covering on before anyone comes into your room or area   Remind the person to cover his or her nose and mouth before going in to provide care for you  · Do not share items  Do not share dishes, towels, or other items with anyone  Items need to be washed after you use them  · Protect your baby  Wash your hands with soap and water often throughout the day  Wear a clean face covering while you breastfeed, or while you express or pump breast milk  If possible, ask someone who is well to care for your baby  You can put breast milk in bottles for the person to use, if needed  Talk to your healthcare provider if you have any questions or concerns about caring for or bonding with your baby  He or she will tell you when to bring your baby in for check-ups and vaccines  He or she will also tell you what to do if you think your baby was infected with the new virus  · Do not handle live animals  Until more is known, it is best not to touch, play with, or handle live animals  Some animals, including pets, have been infected with the new coronavirus  Do not handle or care for animals until you are well  Care includes feeding, petting, and cuddling your pet  Do not let your pet lick you or share your food  Ask someone who is not infected to take care of your pet, if possible  If you must care for a pet, wear a face covering  Wash your hands before and after you give care  · Follow directions from your healthcare provider for being around others after you recover  You will need to wait at least 10 days after symptoms first appeared  Then you will need to have no fever for 24 hours without fever medicine, and no other symptoms  A loss of taste or smell may continue for several months  It is considered okay to be around others if this is your only symptom  It is not known for sure if or for how long a recovered person can pass the virus to others  Your provider may give you instructions, such as continuing social distancing or wearing a face covering around others      How to take care of someone who has COVID-19:  If the person lives in another home, arrange for a time to give care  Remember to bring a few pairs of disposable gloves and a cloth face covering  The following are general guidelines to help you safely care for anyone who has COVID-19:  · Wash your hands often  Wash before and after you go into the person's home, area, or room  Throw paper towels away in a lined trash can that has a lid, if possible  · Do not allow others to go near the person  No one should come into the person's home unless it is necessary  If possible, the person should be in a separate area or room if he or she lives with others  Keep the room's door shut unless you need to go in or out  Have others call, video chat, or e-mail the person if he or she is feeling well enough  The person may feel lonely if he or she is kept separate for a long period of time  Safe communication can help him or her stay connected to family and friends  · Make sure the person's room has good air flow  You may be able to open the window if the weather allows  An air conditioner can also be turned on to help air move  · Contact the person before you go in to give care  Make sure the person is wearing a face covering  Remind him or her to wash his or her hands with soap and water  He or she can use hand  that contains alcohol if soap and water are not available  Put on a face covering before you go in to give care  · Wear gloves while you give care and clean  Clean items the person uses often  Clean countertops, cooking surfaces, and the fronts and insides of the microwave and refrigerator  Clean the shower, toilet, the area around the toilet, the sink, the area around the sink, and faucets  Gather used laundry or bedding  Wash and dry items on the warmest settings the fabric allows  Wash dishes and silverware in hot, soapy water or in a   · Anything you throw away needs to go into a lined trash can    When you need to empty the trash, close the open end of the lining and tie it closed  This helps prevent items the virus is on from spilling out of the trash  Remove your gloves and throw them away  Wash your hands  Follow up with your doctor as directed:  Write down your questions so you remember to ask them during your visits  For more information:   · Centers for Disease Control and Prevention  [address] , [address]  Phone: 6- 433 - [phone]  Web Address: [employer] com br    © Copyright [address] [place] [address] Drive Information is for End User's use only and may not be sold, redistributed or otherwise used for commercial purposes  All illustrations and images included in CareNotes® are the copyrighted property of A D A M , [employer]  or [facility] Leal   The above information is an  only  It is not intended as medical advice for individual conditions or treatments   Talk to your doctor, nurse or pharmacist before following any medical regimen to see if it is safe and effective for you   [id number]

## 2021-03-23 ENCOUNTER — HOSPITAL ENCOUNTER (EMERGENCY)
Facility: HOSPITAL | Age: 61
Discharge: HOME | End: 2021-03-23
Attending: EMERGENCY MEDICINE | Admitting: EMERGENCY MEDICINE

## 2021-03-23 ENCOUNTER — APPOINTMENT (EMERGENCY)
Dept: RADIOLOGY | Facility: HOSPITAL | Age: 61
End: 2021-03-23

## 2021-03-23 VITALS
SYSTOLIC BLOOD PRESSURE: 140 MMHG | RESPIRATION RATE: 16 BRPM | WEIGHT: 155.2 LBS | TEMPERATURE: 98.9 F | OXYGEN SATURATION: 98 % | HEART RATE: 61 BPM | DIASTOLIC BLOOD PRESSURE: 85 MMHG | BODY MASS INDEX: 27.49 KG/M2

## 2021-03-23 DIAGNOSIS — R07.9 CHEST PAIN, UNSPECIFIED TYPE: Primary | ICD-10-CM

## 2021-03-23 LAB
ANION GAP SERPL CALCULATED.3IONS-SCNC: 10 MMOL/L (ref 4–13)
ATRIAL RATE: 63 BPM
BASOPHILS # BLD AUTO: 0.03 THOUSANDS/ΜL (ref 0–0.1)
BASOPHILS NFR BLD AUTO: 1 % (ref 0–1)
BUN SERPL-MCNC: 15 MG/DL (ref 5–25)
CALCIUM SERPL-MCNC: 9.1 MG/DL (ref 8.3–10.1)
CHLORIDE SERPL-SCNC: 107 MMOL/L (ref 100–108)
CO2 SERPL-SCNC: 23 MMOL/L (ref 21–32)
CREAT SERPL-MCNC: 1.33 MG/DL (ref 0.6–1.3)
EOSINOPHIL # BLD AUTO: 0.18 THOUSAND/ΜL (ref 0–0.61)
EOSINOPHIL NFR BLD AUTO: 4 % (ref 0–6)
ERYTHROCYTE [DISTWIDTH] IN BLOOD BY AUTOMATED COUNT: 14.1 % (ref 11.6–15.1)
GFR SERPL CREATININE-BSD FRML MDRD: 57 ML/MIN/1.73SQ M
GLUCOSE SERPL-MCNC: 132 MG/DL (ref 65–140)
HCT VFR BLD AUTO: 42.1 % (ref 36.5–49.3)
HGB BLD-MCNC: 14.4 G/DL (ref 12–17)
IMM GRANULOCYTES # BLD AUTO: 0.01 THOUSAND/UL (ref 0–0.2)
IMM GRANULOCYTES NFR BLD AUTO: 0 % (ref 0–2)
LYMPHOCYTES # BLD AUTO: 1.78 THOUSANDS/ΜL (ref 0.6–4.47)
LYMPHOCYTES NFR BLD AUTO: 39 % (ref 14–44)
MCH RBC QN AUTO: 30.8 PG (ref 26.8–34.3)
MCHC RBC AUTO-ENTMCNC: 34.2 G/DL (ref 31.4–37.4)
MCV RBC AUTO: 90 FL (ref 82–98)
MONOCYTES # BLD AUTO: 0.4 THOUSAND/ΜL (ref 0.17–1.22)
MONOCYTES NFR BLD AUTO: 9 % (ref 4–12)
NEUTROPHILS # BLD AUTO: 2.22 THOUSANDS/ΜL (ref 1.85–7.62)
NEUTS SEG NFR BLD AUTO: 47 % (ref 43–75)
NRBC BLD AUTO-RTO: 0 /100 WBCS
P AXIS: 45 DEGREES
PLATELET # BLD AUTO: 273 THOUSANDS/UL (ref 149–390)
PMV BLD AUTO: 10.6 FL (ref 8.9–12.7)
POTASSIUM SERPL-SCNC: 3.7 MMOL/L (ref 3.5–5.3)
PR INTERVAL: 186 MS
QRS AXIS: 30 DEGREES
QRSD INTERVAL: 84 MS
QT INTERVAL: 402 MS
QTC INTERVAL: 411 MS
RBC # BLD AUTO: 4.67 MILLION/UL (ref 3.88–5.62)
SODIUM SERPL-SCNC: 140 MMOL/L (ref 136–145)
T WAVE AXIS: 13 DEGREES
TROPONIN I SERPL-MCNC: <0.02 NG/ML
TROPONIN I SERPL-MCNC: <0.02 NG/ML
VENTRICULAR RATE: 63 BPM
WBC # BLD AUTO: 4.62 THOUSAND/UL (ref 4.31–10.16)

## 2021-03-23 PROCEDURE — 85025 COMPLETE CBC W/AUTO DIFF WBC: CPT | Performed by: PHYSICIAN ASSISTANT

## 2021-03-23 PROCEDURE — 36415 COLL VENOUS BLD VENIPUNCTURE: CPT | Performed by: PHYSICIAN ASSISTANT

## 2021-03-23 PROCEDURE — 99285 EMERGENCY DEPT VISIT HI MDM: CPT

## 2021-03-23 PROCEDURE — 99285 EMERGENCY DEPT VISIT HI MDM: CPT | Performed by: PHYSICIAN ASSISTANT

## 2021-03-23 PROCEDURE — 93010 ELECTROCARDIOGRAM REPORT: CPT | Performed by: INTERNAL MEDICINE

## 2021-03-23 PROCEDURE — 80048 BASIC METABOLIC PNL TOTAL CA: CPT | Performed by: PHYSICIAN ASSISTANT

## 2021-03-23 PROCEDURE — 71045 X-RAY EXAM CHEST 1 VIEW: CPT

## 2021-03-23 PROCEDURE — 93005 ELECTROCARDIOGRAM TRACING: CPT

## 2021-03-23 PROCEDURE — 84484 ASSAY OF TROPONIN QUANT: CPT | Performed by: PHYSICIAN ASSISTANT

## 2021-03-23 RX ORDER — NAPROXEN 500 MG/1
500 TABLET ORAL 2 TIMES DAILY WITH MEALS
Qty: 10 TABLET | Refills: 0 | Status: SHIPPED | OUTPATIENT
Start: 2021-03-23 | End: 2021-06-09

## 2021-03-23 NOTE — ED PROVIDER NOTES
History  Chief Complaint   Patient presents with    Chest Pain     Pt reports chest pain started at noon, denies n/v/d, isiah SOB, pt reports hurts more when moving his arms and taking deep breaths      63year old male with hx of HTN, BPH presents to the emergency department for evaluation of left sided chest pain that started 4 hours ago around noon  He denies any pain radiation, though he reports the pain is worse with moving his left arm and deep breaths  He denies any fever, shortness of breath, n/v/d, cough  He denies any chest trauma  History provided by:  Medical records and patient   used: No    Chest Pain  Pain location:  L chest  Pain quality: pressure    Pain radiates to:  Does not radiate  Pain radiates to the back: no    Pain severity:  Mild  Onset quality:  Sudden  Duration:  4 hours  Timing:  Intermittent  Progression:  Unchanged  Chronicity:  New  Context: breathing and raising an arm    Context: not at rest, no stress and no trauma    Relieved by:  None tried  Worsened by:  Nothing tried  Ineffective treatments:  None tried  Associated symptoms: no abdominal pain, no altered mental status, no anxiety, no back pain, no cough, no diaphoresis, no dizziness, no fever, no headache, no nausea, no near-syncope, no numbness, no palpitations, no shortness of breath, not vomiting and no weakness    Risk factors: hypertension and male sex    Risk factors: no coronary artery disease, no diabetes mellitus, no high cholesterol, no immobilization, not obese, no smoking and no surgery        Prior to Admission Medications   Prescriptions Last Dose Informant Patient Reported? Taking?    amLODIPine (NORVASC) 5 mg tablet  Self No Yes   Sig: Take 1 tablet (5 mg total) by mouth daily   diphenhydrAMINE (BENADRYL) 25 mg tablet  Self No No   Sig: Take 1 tablet (25 mg total) by mouth 2 (two) times a day as needed for itching   Patient not taking: Reported on 12/26/2019   famotidine (PEPCID) 20 mg tablet  Self No No   Sig: TAKE 1 TABLET BY MOUTH TWICE A DAY   fluticasone (FLONASE) 50 mcg/act nasal spray  Self No No   Si spray into each nostril daily   Patient not taking: Reported on 3/5/2021   nystatin (MYCOSTATIN) cream  Self No No   Sig: Apply topically 2 (two) times a day On feet   Patient not taking: Reported on 2020   polyethylene glycol (GLYCOLAX) 17 GM/SCOOP powder  Self No No   Sig: Take 17 g by mouth daily   Patient not taking: Reported on 3/5/2021   tamsulosin (FLOMAX) 0 4 mg  Self No No   Sig: Take 1 capsule (0 4 mg total) by mouth daily with dinner      Facility-Administered Medications: None       Past Medical History:   Diagnosis Date    History of chronic constipation     History of neck pain 2017       Past Surgical History:   Procedure Laterality Date    CYSTOSCOPY  2014    diagnostic managed by Jalen Mora    TOOTH EXTRACTION         Family History   Problem Relation Age of Onset    Cancer Sister         malignant neoplasm of breast    Diabetes Paternal Grandmother     Diabetes Family     Arthritis Family     Cancer Family         malignant neoplasm    Substance Abuse Neg Hx         denied Mother and Father    Mental illness Neg Hx         no family history Mother and Father    Other Neg Hx         denied family history of Osteopertrosis     I have reviewed and agree with the history as documented  E-Cigarette/Vaping     E-Cigarette/Vaping Substances     Social History     Tobacco Use    Smoking status: Former Smoker     Types: Cigarettes     Quit date: 2014     Years since quittin 2    Smokeless tobacco: Never Used   Substance Use Topics    Alcohol use: Yes     Frequency: 2-4 times a month     Drinks per session: 5 or 6     Binge frequency: Monthly     Comment: weekly    Drug use: Never       Review of Systems   Constitutional: Negative for chills, diaphoresis and fever  HENT: Negative for congestion and sore throat      Respiratory: Negative for cough and shortness of breath  Cardiovascular: Positive for chest pain  Negative for palpitations, leg swelling and near-syncope  Gastrointestinal: Negative for abdominal pain, diarrhea, nausea and vomiting  Musculoskeletal: Negative for back pain  Skin: Negative for rash  Neurological: Negative for dizziness, weakness, numbness and headaches  All other systems reviewed and are negative  Physical Exam  Physical Exam  Vitals signs and nursing note reviewed  Constitutional:       General: He is not in acute distress  Appearance: He is well-developed  He is not ill-appearing or toxic-appearing  HENT:      Head: Normocephalic and atraumatic  Right Ear: Hearing and external ear normal       Left Ear: Hearing and external ear normal    Eyes:      Conjunctiva/sclera: Conjunctivae normal    Neck:      Vascular: No JVD  Trachea: No tracheal deviation  Cardiovascular:      Rate and Rhythm: Normal rate and regular rhythm  Heart sounds: Normal heart sounds, S1 normal and S2 normal  No murmur  Pulmonary:      Effort: Pulmonary effort is normal  No tachypnea, accessory muscle usage or prolonged expiration  Breath sounds: Normal breath sounds  No decreased breath sounds, wheezing, rhonchi or rales  Chest:      Chest wall: No mass, swelling, tenderness or edema  Abdominal:      General: Bowel sounds are normal       Palpations: Abdomen is soft  Tenderness: There is no abdominal tenderness  There is no guarding or rebound  Musculoskeletal:      Right lower leg: No edema  Left lower leg: No edema  Comments: Moves all four limbs without difficulty, crepitus, swelling, or deformity  Skin:     General: Skin is warm and dry  Findings: No rash  Neurological:      Mental Status: He is alert and oriented to person, place, and time  GCS: GCS eye subscore is 4  GCS verbal subscore is 5  GCS motor subscore is 6     Psychiatric:         Mood and Affect: Mood normal          Speech: Speech normal          Vital Signs  ED Triage Vitals   Temperature Pulse Respirations Blood Pressure SpO2   03/23/21 1645 03/23/21 1645 03/23/21 1645 03/23/21 1645 03/23/21 1645   98 9 °F (37 2 °C) 74 16 148/86 99 %      Temp Source Heart Rate Source Patient Position - Orthostatic VS BP Location FiO2 (%)   03/23/21 1645 03/23/21 1645 03/23/21 1845 03/23/21 1845 --   Oral Monitor Lying Right arm       Pain Score       --                  Vitals:    03/23/21 1645 03/23/21 1845 03/23/21 2011   BP: 148/86 133/87 140/85   Pulse: 74 77 61   Patient Position - Orthostatic VS:  Lying Lying         Visual Acuity      ED Medications  Medications - No data to display    Diagnostic Studies  Results Reviewed     Procedure Component Value Units Date/Time    Troponin I [[id number]]  (Normal) Collected: 03/23/21 2011    Lab Status: Final result Specimen: Blood from Arm, Left Updated: 03/23/21 2038     Troponin I <0 02 ng/mL     Basic metabolic panel [[id number]]  (Abnormal) Collected: 03/23/21 1714    Lab Status: Final result Specimen: Blood from Arm, Left Updated: 03/23/21 1752     Sodium 140 mmol/L      Potassium 3 7 mmol/L      Chloride 107 mmol/L      CO2 23 mmol/L      ANION GAP 10 mmol/L      BUN 15 mg/dL      Creatinine 1 33 mg/dL      Glucose 132 mg/dL      Calcium 9 1 mg/dL      eGFR 57 ml/min/1 73sq m     Narrative:      [place] guidelines for Chronic Kidney Disease (CKD):     Stage 1 with normal or high GFR (GFR > 90 mL/min/1 73 square meters)    Stage 2 Mild CKD (GFR = 60-89 mL/min/1 73 square meters)    Stage 3A Moderate CKD (GFR = 45-59 mL/min/1 73 square meters)    Stage 3B Moderate CKD (GFR = 30-44 mL/min/1 73 square meters)    Stage 4 Severe CKD (GFR = 15-29 mL/min/1 73 square meters)    Stage 5 End Stage CKD (GFR <15 mL/min/1 73 square meters)  Note: GFR calculation is accurate only with a steady state creatinine    Troponin I [[id number]] (Normal) Collected: 03/23/21 1714    Lab Status: Final result Specimen: Blood from Arm, Left Updated: 03/23/21 1741     Troponin I <0 02 ng/mL     CBC and differential [[id number]] Collected: 03/23/21 1714    Lab Status: Final result Specimen: Blood from Arm, Left Updated: 03/23/21 1721     WBC 4 62 Thousand/uL      RBC 4 67 Million/uL      Hemoglobin 14 4 g/dL      Hematocrit 42 1 %      MCV 90 fL      MCH 30 8 pg      MCHC 34 2 g/dL      RDW 14 1 %      MPV 10 6 fL      Platelets 787 Thousands/uL      nRBC 0 /100 WBCs      Neutrophils Relative 47 %      Immat GRANS % 0 %      Lymphocytes Relative 39 %      Monocytes Relative 9 %      Eosinophils Relative 4 %      Basophils Relative 1 %      Neutrophils Absolute 2 22 Thousands/µL      Immature Grans Absolute 0 01 Thousand/uL      Lymphocytes Absolute 1 78 Thousands/µL      Monocytes Absolute 0 40 Thousand/µL      Eosinophils Absolute 0 18 Thousand/µL      Basophils Absolute 0 03 Thousands/µL                  XR chest 1 view portable   ED Interpretation by Krista Kothari PA-C (03/23 1727)   Preliminary read by myself: no acute cardiopulmonary disease      Final Result by Paige Paul MD (03/23 1953)      No acute cardiopulmonary disease  Workstation performed: [id number]                    Procedures  ECG 12 Lead Documentation Only    Date/Time: 3/23/2021 5:00 PM  Performed by: Krista Kothari PA-C  Authorized by: Krista Kothari PA-C     Indications / Diagnosis:  Chest pain  ECG reviewed: also Dr Tami Chacko      Patient location:  ED  Previous ECG:     Previous ECG:  Compared to current    Comparison ECG info:  11/20/2018    Similarity:  No change  Interpretation:     Interpretation: normal    Rate:     ECG rate:  63    ECG rate assessment: normal    Rhythm:     Rhythm: sinus rhythm    Ectopy:     Ectopy: none    QRS:     QRS axis:  Normal  Conduction:     Conduction: normal    ST segments:     ST segments:  Normal  T waves: T waves: normal      ECG 12 Lead Documentation Only    Date/Time: 3/23/2021 8:20 PM  Performed by: Cem Weber PA-C  Authorized by: Cem Weber PA-C     Indications / Diagnosis:  Delta  ECG reviewed by me, the ED Provider: yes    Patient location:  ED  Previous ECG:     Previous ECG:  Compared to current    Comparison ECG info:  Prior this evening  Similarity:  No change  Interpretation:     Interpretation: normal    Rate:     ECG rate:  72    ECG rate assessment: normal    Rhythm:     Rhythm: sinus rhythm    Ectopy:     Ectopy: none    QRS:     QRS axis:  Normal  Conduction:     Conduction: normal    ST segments:     ST segments:  Normal  T waves:     T waves: normal               ED Course  ED Course as of Mar 23 2228   Tue Mar 23, 2021   1726 WBC: 4 62   1726 Hemoglobin: 14 4   1726 HCT: 42 1   1726 Platelet Count: 506   1727 NAD   XR chest 1 view portable   1744 Troponin I: <0 02   1755 1 42 2 months ago   Creatinine(!): 1 33   1807 Discussed labs, EKG with patient; agreeable for delta EKG, troponin      2007 IMPRESSION:     No acute cardiopulmonary disease  XR chest 1 view portable   2039 Troponin I: <0 02             HEART Risk Score      Most Recent Value   Heart Score Risk Calculator   History  0 Filed at: 03/23/2021 1749   ECG  0 Filed at: 03/23/2021 1749   Age  1 Filed at: 03/23/2021 1749   Risk Factors  1 Filed at: 03/23/2021 1749   Troponin  0 Filed at: 03/23/2021 1749   HEART Score  2 Filed at: 03/23/2021 1749                      SBIRT 20yo+      Most Recent Value   SBIRT (23 yo +)   In order to provide better care to our patients, we are screening all of our patients for alcohol and drug use  Would it be okay to ask you these screening questions? No Filed at: 03/23/2021 2011                    MDM  Number of Diagnoses or Management Options  Chest pain, unspecified type:   Diagnosis management comments: 63year old male presents with 4 hours of chest pain      Initial troponin, EKG normal   Due to recent start of symptoms, delta troponin and EKG done and unchanged  Suspect musculoskeletal etiology  Based on the HEART score of 2, the patient is at low risk (1 7% or less) for major adverse cardiac events (MACE) in the next 6 weeks  The risks of MACE, the potential benefits of hospitalization (increased diagnostic accurary) as well as potential harms of hospitalization (iatrogenic injury, false positive test results, nococomial infection risk) were discussed  Based on current guidelines, I believe that the best course of action would be to discharge the patient and follow up as an outpatient  The patient said they understood that even with the low HEART score there was the small potential that their current symptoms were due to a cardiac event, but they were comfortable with the low risk and they decided that they wanted to be discharged from the ED and follow up as an outpatient  I do not believe this patient's complaints are from pulmonary embolism or aortic dissection and I believe they would most likely be harmed through false positive test results and other associated test complications by further pursuing the diagnosis of pulmonary embolism or dissection  The management plan was discussed in detail with the patient at bedside and all questions were answered  The prior to discharge, we provided both verbal and written instructions  We discussed with the patient the signs and symptoms for which to return to the emergency department  All questions were answered and patient was comfortable with the plan of care and discharged to home  Instructed the patient to follow up with the primary care provider and/or special as provided and their written instructions  The patient verbalized understanding of our discussion and plan of care, and agrees to return to the Emergency Department for concerns and progression of illness          Disposition  Final diagnoses:   Chest pain, unspecified type     Time reflects when diagnosis was documented in both MDM as applicable and the Disposition within this note     Time User Action Codes Description Comment    3/23/2021  8:39 PM Iris Shane Add [R07 9] Chest pain, unspecified type       ED Disposition     ED Disposition Condition Date/Time Comment    Discharge Stable Tue Mar 23, 2021  8:39 PM Yolanda Woodward discharge to home/self care  Follow-up Information     Follow up With Specialties Details Why Contact Info    Torsten Scott PA-C Family Medicine, Physician Assistant Schedule an appointment as soon as possible for a visit in 3 days  [address]  [address] [place] [address]  [phone]            Discharge Medication List as of 3/23/2021  8:40 PM      CONTINUE these medications which have NOT CHANGED    Details   amLODIPine (NORVASC) 5 mg tablet Take 1 tablet (5 mg total) by mouth daily, Starting Mon 1/11/2021, Normal      diphenhydrAMINE (BENADRYL) 25 mg tablet Take 1 tablet (25 mg total) by mouth 2 (two) times a day as needed for itching, Starting Thu 11/7/2019, Normal      famotidine (PEPCID) 20 mg tablet TAKE 1 TABLET BY MOUTH TWICE A DAY, Normal      fluticasone (FLONASE) 50 mcg/act nasal spray 1 spray into each nostril daily, Starting Mon 11/23/2020, Normal      nystatin (MYCOSTATIN) cream Apply topically 2 (two) times a day On feet, Starting Wed 5/20/2020, Normal      polyethylene glycol (GLYCOLAX) 17 GM/SCOOP powder Take 17 g by mouth daily, Starting Fri 7/31/2020, Normal      tamsulosin (FLOMAX) 0 4 mg Take 1 capsule (0 4 mg total) by mouth daily with dinner, Starting Mon 2/1/2021, Normal           No discharge procedures on file      PDMP Review     None          ED Provider  Electronically Signed by           Александр Rodriguez PA-C  03/23/21 7155

## 2021-03-23 NOTE — Clinical Note
Wily Dubois was seen and treated in our emergency department on 3/23/2021  Diagnosis:     Philly Tanner    He may return on this date: 03/25/2021         If you have any questions or concerns, please don't hesitate to call        Jose Sidhu PA-C    ______________________________           _______________          _______________  Hospital Representative                              Date                                Time

## 2021-03-24 LAB
ATRIAL RATE: 72 BPM
P AXIS: 62 DEGREES
PR INTERVAL: 198 MS
QRS AXIS: 50 DEGREES
QRSD INTERVAL: 72 MS
QT INTERVAL: 378 MS
QTC INTERVAL: 413 MS
T WAVE AXIS: 25 DEGREES
VENTRICULAR RATE: 72 BPM

## 2021-03-24 PROCEDURE — 93010 ELECTROCARDIOGRAM REPORT: CPT | Performed by: INTERNAL MEDICINE

## 2021-04-06 DIAGNOSIS — I10 BENIGN HYPERTENSION: ICD-10-CM

## 2021-04-06 RX ORDER — AMLODIPINE BESYLATE 5 MG/1
TABLET ORAL
Qty: 90 TABLET | Refills: 0 | Status: SHIPPED | OUTPATIENT
Start: 2021-04-06 | End: 2021-05-24 | Stop reason: SDUPTHER

## 2021-05-06 ENCOUNTER — OFFICE VISIT (OUTPATIENT)
Dept: FAMILY MEDICINE CLINIC | Facility: CLINIC | Age: 61
End: 2021-05-06

## 2021-05-06 VITALS
DIASTOLIC BLOOD PRESSURE: 88 MMHG | HEART RATE: 80 BPM | OXYGEN SATURATION: 98 % | SYSTOLIC BLOOD PRESSURE: 114 MMHG | WEIGHT: 150 LBS | TEMPERATURE: 98 F | BODY MASS INDEX: 26.58 KG/M2 | RESPIRATION RATE: 16 BRPM | HEIGHT: 63 IN

## 2021-05-06 DIAGNOSIS — K59.00 CONSTIPATION, UNSPECIFIED CONSTIPATION TYPE: ICD-10-CM

## 2021-05-06 PROCEDURE — 99214 OFFICE O/P EST MOD 30 MIN: CPT | Performed by: NURSE PRACTITIONER

## 2021-05-06 PROCEDURE — 3725F SCREEN DEPRESSION PERFORMED: CPT | Performed by: NURSE PRACTITIONER

## 2021-05-06 RX ORDER — BISACODYL 10 MG
10 SUPPOSITORY, RECTAL RECTAL DAILY
Qty: 12 SUPPOSITORY | Refills: 0 | Status: SHIPPED | OUTPATIENT
Start: 2021-05-06 | End: 2021-11-22

## 2021-05-06 NOTE — PATIENT INSTRUCTIONS
Dolor abdominal, cuidados ambulatorios   INFORMACIÓN GENERAL:   El dolor abdominal  puede ser sordo, molesto o [place]  Puede sentir dolor en aggie sharonda del abdomen o en todo el abdomen  El dolor puede deberse a ciertos estados melvin estreñimiento, sensibilidad o intoxicación alimentaria, infección o aggie obstrucción  Asimismo, el dolor abdominal puede deberse a aggie hernia, apendicitis o úlcera  La causa del dolor abdominal puede ser desconocida  Busque cuidados inmediatos para los siguientes síntomas:   · [place] dolor de pecho o falta de aliento    · Dolor pulsátil en el abdomen superior o en la parte inferior de la espalda que de repente es robert    · Dolor que se localiza en el abdomen inferior derecho y empeora con el movimiento    · [place] por encima de 100 4°F (38°C) o escalofríos florian    · Vómito de todo lo que usted come y yogi    · Dolor que no mejora o más ekaterina empeora demond las siguientes 8 a 12 horas    · [place] en el vómito o heces que se patty negras y alquitranadas    · La piel o el reese de los ojos se vuelven amarillentos    · Grandes cantidades de sangrado vaginal que no es sahu periodo menstrual  El tratamiento para el dolor abdominal  puede llegar a incluir medicamentos para calmar sahu estómago, prevenir el vómito o disminuir el dolor  Programe aggie najma con sahu proveedor de [employer] se le haya indicado: Anote dewey preguntas para que se acuerde de hacerlas dmeond dewey visitas  ACUERDOS SOBRE SAHU CUIDADO:   Usted tiene el derecho de participar en la planificación de sahu cuidado  Aprenda todo lo que pueda sobre sahu condición y melvin darle tratamiento  Discuta con dewey médicos dewey opciones de tratamiento para juntos decidir el cuidado que usted quiere recibir  Usted siempre tiene el derecho a rechazar sahu tratamiento  Esta información es sólo para uso en educación  Sahu intención no es darle un consejo médico sobre enfermedades o tratamientos   Colsulte con sahu Ladena Creed farmacéutico antes de seguir cualquier régimen médico para saber si es seguro y efectivo para usted  © 2014 [address] [employer] is for End User's use only and may not be sold, redistributed or otherwise used for commercial purposes  All illustrations and images included in CareNotes® are the copyrighted property of A [employer] M [employer]  or Leo Ewing

## 2021-05-06 NOTE — PROGRESS NOTES
Assessment/Plan:      Jl Vaughan was seen today for abdominal pain  Diagnoses and all orders for this visit:    Constipation, unspecified constipation type  -     XR abdomen 1 view kub; Future  -     bisacodyl (DULCOLAX) 10 mg suppository; Insert 1 suppository (10 mg total) into the rectum daily  -     CBC and differential; Future  -     Comprehensive metabolic panel; Future  -     Lipase; Future        Return in about 1 week (around 5/13/2021) for Recheck  ED parameters reviewed with patient     Patient Instructions   Dolor abdominal, cuidados ambulatorios   INFORMACIÓN GENERAL:   El dolor abdominal  puede ser sordo, molesto o sukhdeep  Puede sentir dolor en aggie sharonda del abdomen o en todo el abdomen  El dolor puede deberse a ciertos estados melvin estreñimiento, sensibilidad o intoxicación alimentaria, infección o aggie obstrucción  Asimismo, el dolor abdominal puede deberse a aggie hernia, apendicitis o úlcera  La causa del dolor abdominal puede ser desconocida  Busque cuidados inmediatos para los siguientes síntomas:   · Oakley dolor de pecho o falta de aliento    · Dolor pulsátil en el abdomen superior o en la parte inferior de la espalda que de repente es robert    · Dolor que se localiza en el abdomen inferior derecho y empeora con el movimiento    · Fiebre por encima de 100 4°F (38°C) o escalofríos florian    · Vómito de todo lo que usted come y yogi    · Dolor que no mejora o más ekaterina empeora demond las siguientes 8 a 12 horas    · [place] en el vómito o heces que se patty negras y alquitranadas    · La piel o el reese de los ojos se vuelven amarillentos    · Grandes cantidades de sangrado vaginal que no es harrison periodo menstrual  El tratamiento para el dolor abdominal  puede llegar a incluir medicamentos para calmar harrison estómago, prevenir el vómito o disminuir el dolor  Programe aggie najma con harrison proveedor de [employer] se le haya indicado:  Anote dewey preguntas para que se acuerde de hacerlas demond dewey visitas  ACUERDOS SOBRE SAHU CUIDADO:   Usted tiene el derecho de participar en la planificación de sahu cuidado  Aprenda todo lo que pueda sobre sahu condición y melvin darle tratamiento  Discuta con dewey médicos dewey opciones de tratamiento para juntos decidir el cuidado que usted quiere recibir  Usted siempre tiene el derecho a rechazar sahu tratamiento  Esta información es sólo para uso en educación  Sahu intención no es darle un consejo médico sobre enfermedades o tratamientos  Colsulte con sahu Shari Oakes farmacéutico antes de seguir cualquier régimen médico para saber si es seguro y efectivo para usted  © 2014 [address] is for End User's use only and may not be sold, redistributed or otherwise used for commercial purposes  All illustrations and images included in CareNotes® are the copyrighted property of [employer] , [employer]  or [employer]  Subjective:     Bryanna Pang is a 63 y o  male who  has a past medical history of History of chronic constipation and History of neck pain  who presented to the office today for same day acute visit  Abdominal Pain  Patient complains of abdominal pain  The pain is described as sharp, and is 6/10 in intensity  The patient is experiencing RUQ pain without radiation  Onset was several months ago  Symptoms have been gradually worsening  Aggravating factors: none  Alleviating factors: bowel movements  Associated symptoms: none  The patient denies anorexia, arthralagias, belching, chills, diarrhea, dysuria, fever, flatus, frequency, headache, hematochezia, hematuria, melena, myalgias, nausea, sweats and vomiting  Does have hx of chronic right abdominal pain and constipation  Last BM yesterday  Small amount, very hard to pass       The following portions of the patient's history were reviewed and updated as appropriate: allergies, current medications, past family history, past medical history, past social history, past surgical history and problem list     Current Outpatient Medications on File Prior to Visit   Medication Sig Dispense Refill    amLODIPine (NORVASC) 5 mg tablet TAKE 1 TABLET BY MOUTH EVERY DAY 90 tablet 0    diphenhydrAMINE (BENADRYL) 25 mg tablet Take 1 tablet (25 mg total) by mouth 2 (two) times a day as needed for itching (Patient not taking: Reported on 12/26/2019) 30 tablet 0    famotidine (PEPCID) 20 mg tablet TAKE 1 TABLET BY MOUTH TWICE A  tablet 1    fluticasone (FLONASE) 50 mcg/act nasal spray 1 spray into each nostril daily (Patient not taking: Reported on 3/5/2021) 1 Bottle 1    naproxen (NAPROSYN) 500 mg tablet Take 1 tablet (500 mg total) by mouth 2 (two) times a day with meals for 5 days 10 tablet 0    nystatin (MYCOSTATIN) cream Apply topically 2 (two) times a day On feet (Patient not taking: Reported on 5/27/2020) 30 g 0    polyethylene glycol (GLYCOLAX) 17 GM/SCOOP powder Take 17 g by mouth daily (Patient not taking: Reported on 3/5/2021) 116 g 0    tamsulosin (FLOMAX) 0 4 mg Take 1 capsule (0 4 mg total) by mouth daily with dinner 90 capsule 3     No current facility-administered medications on file prior to visit  Review of Systems   Constitutional: Negative for chills and fever  HENT: Negative for ear pain and sore throat  Eyes: Negative for pain and visual disturbance  Respiratory: Negative for cough and shortness of breath  Cardiovascular: Negative for chest pain and palpitations  Gastrointestinal: Positive for abdominal pain and constipation  Negative for diarrhea, nausea and vomiting  Genitourinary: Negative for dysuria and hematuria  Musculoskeletal: Negative for arthralgias and back pain  Skin: Negative for color change and rash  Neurological: Negative for seizures and syncope  All other systems reviewed and are negative  BMI Counseling: Body mass index is 26 57 kg/m²   The BMI is above normal  Nutrition recommendations include decreasing portion sizes and limiting drinks that contain sugar  Exercise recommendations include exercising 3-5 times per week  Objective:    /88 (BP Location: Left arm, Patient Position: Sitting, Cuff Size: Standard)   Pulse 80   Temp 98 °F (36 7 °C) (Temporal)   Resp 16   Ht 5' 3" (1 6 m)   Wt 68 kg (150 lb)   SpO2 98%   BMI 26 57 kg/m²     Physical Exam  Vitals signs and nursing note reviewed  Constitutional:       General: He is not in acute distress  Appearance: He is well-developed  He is not diaphoretic  HENT:      Head: Normocephalic and atraumatic  Eyes:      Pupils: Pupils are equal, round, and reactive to light  Neck:      Musculoskeletal: Normal range of motion and neck supple  Cardiovascular:      Rate and Rhythm: Normal rate and regular rhythm  Heart sounds: Normal heart sounds  Pulmonary:      Effort: Pulmonary effort is normal  No respiratory distress  Breath sounds: Normal breath sounds  No wheezing  Abdominal:      General: Bowel sounds are normal  There is no distension  Palpations: Abdomen is soft  Tenderness: There is generalized abdominal tenderness  There is no guarding or rebound  Musculoskeletal: Normal range of motion  General: No deformity  Lymphadenopathy:      Cervical: No cervical adenopathy  Skin:     General: Skin is warm and dry  Capillary Refill: Capillary refill takes less than 2 seconds  Findings: No rash  Neurological:      Mental Status: He is alert and oriented to person, place, and time  Sensory: No sensory deficit        Coordination: Coordination normal       Deep Tendon Reflexes: Reflexes normal    Psychiatric:         Behavior: Behavior normal          ALE Jacobs  05/06/21  5:09 PM

## 2021-05-11 ENCOUNTER — IMMUNIZATIONS (OUTPATIENT)
Dept: FAMILY MEDICINE CLINIC | Facility: HOSPITAL | Age: 61
End: 2021-05-11

## 2021-05-11 DIAGNOSIS — Z23 ENCOUNTER FOR IMMUNIZATION: Primary | ICD-10-CM

## 2021-05-11 PROCEDURE — 0012A SARS-COV-2 / COVID-19 MRNA VACCINE (MODERNA) 100 MCG: CPT

## 2021-05-11 PROCEDURE — 91301 SARS-COV-2 / COVID-19 MRNA VACCINE (MODERNA) 100 MCG: CPT

## 2021-05-13 ENCOUNTER — TELEPHONE (OUTPATIENT)
Dept: FAMILY MEDICINE CLINIC | Facility: CLINIC | Age: 61
End: 2021-05-13

## 2021-05-13 NOTE — TELEPHONE ENCOUNTER
Left message for patient to call back to reschedule his appt he missed today   If he calls back please schedule    Thank you

## 2021-05-14 ENCOUNTER — TELEMEDICINE (OUTPATIENT)
Dept: FAMILY MEDICINE CLINIC | Facility: CLINIC | Age: 61
End: 2021-05-14

## 2021-05-14 DIAGNOSIS — T50.B95A ADVERSE EFFECT OF COVID-19 VACCINE: Primary | ICD-10-CM

## 2021-05-14 PROBLEM — U07.1 COVID-19 VIRUS INFECTION: Status: RESOLVED | Noted: 2020-05-01 | Resolved: 2021-05-14

## 2021-05-14 PROBLEM — Z20.822 EXPOSURE TO COVID-19 VIRUS: Status: RESOLVED | Noted: 2020-11-23 | Resolved: 2021-05-14

## 2021-05-14 PROCEDURE — 99213 OFFICE O/P EST LOW 20 MIN: CPT | Performed by: FAMILY MEDICINE

## 2021-05-14 NOTE — LETTER
May 14, 2021     Patient: Davide Luther   YOB: 1960   Date of Visit: 5/14/2021       To Whom it May Concern:    Jane Corona is under my professional care  He was seen in my office on 5/14/2021  Please excuse his absence from work from 5/12/21 to 5/14/21 due to recent illness  He may return to work on 5/16/2021  If you have any questions or concerns, please don't hesitate to call           Sincerely,          Ava Snyder MD

## 2021-05-14 NOTE — PROGRESS NOTES
Virtual Brief Visit    Assessment/Plan:    Problem List Items Addressed This Visit     None      Visit Diagnoses     Adverse effect of COVID-19 vaccine    -  Primary    Experienced suspected flu-like side effects for less than 48 hours  Educated patient  advise it is safe to get 2nd COVID vaccine  Provided patient with work note  As requested  Reason for visit is   Chief Complaint   Patient presents with    Virtual Brief Visit      Encounter provider Adam Phillip MD    Provider located at [address] [address] [phone]    Recent Visits  Date Type Provider Dept   05/13/21 Telephone [id number] recent visits within past 7 days and meeting all other requirements     Today's Visits  Date Type Provider Dept   05/14/21 Telemedicine Adam Phillip MD  [facility]   Showing today's visits and meeting all other requirements     Future Appointments  No visits were found meeting these conditions  Showing future appointments within next 150 days and meeting all other requirements      After connecting through [employer] and patient was informed that this is a secure, HIPAA-compliant platform  He agrees to proceed  , the patient was identified by name and date of birth  Idalia Zhao was informed that this is a telemedicine visit and that the visit is being conducted through [facility] and patient was informed that this is a secure, HIPAA-compliant platform  He agrees to proceed     My office door was closed  No one else was in the room  He acknowledged consent and understanding of privacy and security of the platform  The patient has agreed to participate and understands he can discontinue the visit at any time  Patient is aware this is a billable service  Subjective    Idalia Zhao is a 63 y o  male      This is a very pleasant 63year old male who presents virtually today with concerns of an adverse reaction to the covid vaccine  He received the Moderna vaccine on 5/11/2021 at [facility]  This was his first vaccine  The symptoms he is having are chills, fever, sore throat, weakness, mild cough  He reports symptoms started Wednesday evening and persisted until this morning  They have all resolved except for a mild cough and sore throat  His second dose is June 8th  He is nervous to get the second dose in fear of having worse symptoms  He reports that he did have covid infection twice this past year  Patient is exclusively Kiswahili-speaking,  services were required for this exam  [facility] services were used  Patient verbalizes understanding of assessment and agrees with the plan   number: [id number]     Past Medical History:   Diagnosis Date    COVID-19 virus infection 5/1/2020    Exposure to COVID-19 virus 11/23/2020    History of chronic constipation     History of neck pain 11/21/2017       Past Surgical History:   Procedure Laterality Date    CYSTOSCOPY  05/29/2014    diagnostic managed by Jalen Mora    TOOTH EXTRACTION         Current Outpatient Medications   Medication Sig Dispense Refill    amLODIPine (NORVASC) 5 mg tablet TAKE 1 TABLET BY MOUTH EVERY DAY 90 tablet 0    bisacodyl (DULCOLAX) 10 mg suppository Insert 1 suppository (10 mg total) into the rectum daily 12 suppository 0    famotidine (PEPCID) 20 mg tablet TAKE 1 TABLET BY MOUTH TWICE A  tablet 1    naproxen (NAPROSYN) 500 mg tablet Take 1 tablet (500 mg total) by mouth 2 (two) times a day with meals for 5 days 10 tablet 0    nystatin (MYCOSTATIN) cream Apply topically 2 (two) times a day On feet (Patient not taking: Reported on 5/27/2020) 30 g 0    tamsulosin (FLOMAX) 0 4 mg Take 1 capsule (0 4 mg total) by mouth daily with dinner 90 capsule 3     No current facility-administered medications for this visit         No Known Allergies    Review of Systems   Constitutional: Negative for activity change, appetite change, chills, fatigue and fever  HENT: Positive for sore throat  Negative for congestion, rhinorrhea and sinus pain  Eyes: Negative for photophobia, pain and visual disturbance  Respiratory: Positive for cough  Negative for chest tightness, shortness of breath and wheezing  Cardiovascular: Negative for chest pain, palpitations and leg swelling  Gastrointestinal: Negative for abdominal pain, constipation, diarrhea, nausea and vomiting  Genitourinary: Negative for difficulty urinating  Musculoskeletal: Negative for arthralgias and myalgias  Skin: Negative for rash  Neurological: Negative for dizziness, weakness, light-headedness, numbness and headaches  Hematological: Negative for adenopathy  Psychiatric/Behavioral: Negative for agitation, behavioral problems and sleep disturbance  There were no vitals filed for this visit  I spent 17 minutes directly with the patient during this visit    Patient appears to be in no distress over the phone  They are able to speak in full sentences and displays no conversational dyspnea  There is no audible cough  It was my intent to perform this visit via video technology but the patient was not able to do a video connection so the visit was completed via audio telephone only  VIRTUAL VISIT DISCLAIMER    Saumya Frederick acknowledges that he has consented to an online visit or consultation  He understands that the online visit is based solely on information provided by him, and that, in the absence of a face-to-face physical evaluation by the physician, the diagnosis he receives is both limited and provisional in terms of accuracy and completeness  This is not intended to replace a full medical face-to-face evaluation by the physician  Saumya Frederick understands and accepts these terms      Giovani Perales MD  05/14/21  9:14 PM

## 2021-05-24 DIAGNOSIS — I10 BENIGN HYPERTENSION: ICD-10-CM

## 2021-05-24 RX ORDER — AMLODIPINE BESYLATE 5 MG/1
5 TABLET ORAL DAILY
Qty: 90 TABLET | Refills: 0 | Status: SHIPPED | OUTPATIENT
Start: 2021-05-24 | End: 2021-09-10 | Stop reason: SDUPTHER

## 2021-06-08 ENCOUNTER — OFFICE VISIT (OUTPATIENT)
Dept: FAMILY MEDICINE CLINIC | Facility: CLINIC | Age: 61
End: 2021-06-08

## 2021-06-08 VITALS
RESPIRATION RATE: 16 BRPM | HEART RATE: 82 BPM | WEIGHT: 147 LBS | HEIGHT: 63 IN | TEMPERATURE: 98 F | OXYGEN SATURATION: 98 % | DIASTOLIC BLOOD PRESSURE: 80 MMHG | BODY MASS INDEX: 26.05 KG/M2 | SYSTOLIC BLOOD PRESSURE: 116 MMHG

## 2021-06-08 DIAGNOSIS — N28.9 ABNORMAL RENAL FUNCTION: ICD-10-CM

## 2021-06-08 DIAGNOSIS — R10.13 EPIGASTRIC PAIN: ICD-10-CM

## 2021-06-08 DIAGNOSIS — N18.4 STAGE 4 CHRONIC KIDNEY DISEASE (HCC): ICD-10-CM

## 2021-06-08 DIAGNOSIS — R10.9 RIGHT FLANK PAIN: ICD-10-CM

## 2021-06-08 DIAGNOSIS — I10 BENIGN ESSENTIAL HYPERTENSION: ICD-10-CM

## 2021-06-08 DIAGNOSIS — K59.00 CONSTIPATION, UNSPECIFIED CONSTIPATION TYPE: Primary | ICD-10-CM

## 2021-06-08 PROCEDURE — 1036F TOBACCO NON-USER: CPT | Performed by: PHYSICIAN ASSISTANT

## 2021-06-08 PROCEDURE — 3079F DIAST BP 80-89 MM HG: CPT | Performed by: PHYSICIAN ASSISTANT

## 2021-06-08 PROCEDURE — 3008F BODY MASS INDEX DOCD: CPT | Performed by: PHYSICIAN ASSISTANT

## 2021-06-08 PROCEDURE — 99214 OFFICE O/P EST MOD 30 MIN: CPT | Performed by: PHYSICIAN ASSISTANT

## 2021-06-08 PROCEDURE — 3074F SYST BP LT 130 MM HG: CPT | Performed by: PHYSICIAN ASSISTANT

## 2021-06-08 RX ORDER — POLYETHYLENE GLYCOL 3350 17 G/17G
17 POWDER, FOR SOLUTION ORAL DAILY
Qty: 289 G | Refills: 3 | Status: SHIPPED | OUTPATIENT
Start: 2021-06-08 | End: 2021-12-20

## 2021-06-08 NOTE — PATIENT INSTRUCTIONS
Constipation   AMBULATORY CARE:   Constipation  is when you have hard, dry bowel movements, or you go longer than usual between bowel movements  Constipation may be caused by a lack of water or high-fiber foods  Certain medicines, or a lack of fiber or physical activity may also cause constipation  Common symptoms include the following:   · Trouble pushing out your bowel movement    · Pain or bleeding during your bowel movement    · A feeling that you did not finish having your bowel movement    · Nausea    · Bloating    · Headache    Call your doctor if:   · You have blood in your bowel movements  · You have a fever and abdominal pain with the constipation  · Your constipation gets worse  · You start to vomit  · You have questions or concerns about your condition or care  Relieve constipation:  Medicine can keep you have a bowel movement more easily  Medicines may increase moisture in your bowel movement or increase the motion of your intestines  · A suppository  may be used to help soften your bowel movements  This may make them easier to pass  A suppository is guided into your rectum through your anus  · Laxatives  can help stimulate your bowels to have a bowel movement  Your provider may recommend you only use laxatives for a short time  Long-term use may make your bowels dependent on the medicine  · An enema  is liquid medicine used to clear bowel movement from your rectum  The medicine is put into your rectum through your anus  Prevent constipation:   · Drink liquids as directed  You may need to drink extra liquids to help soften and move your bowels  Ask how much liquid to drink each day and which liquids are best for you  · Eat high-fiber foods  This may help decrease constipation by adding bulk to your bowel movements  High-fiber foods include fruit, vegetables, whole-grain breads and cereals, and beans   Your healthcare provider or dietitian can help you create a high-fiber meal plan  Your provider may also recommend a fiber supplement if you cannot get enough fiber from food  · Exercise regularly  Regular physical activity can help stimulate your intestines  Walking is a good exercise to prevent or relieve constipation  Ask which exercises are best for you  · Schedule a time each day to have a bowel movement  This may help train your body to have regular bowel movements  Bend forward while you are on the toilet to help move the bowel movement out  Sit on the toilet for at least 10 minutes, even if you do not have a bowel movement  · Talk to your healthcare provider about your medicines  Certain medicines, such as opioids, can cause constipation  Your provider may be able to make medicine changes  For example, he or she may change the kind of medicine, or change when you take it  Follow up with your healthcare provider as directed:  Write down your questions so you remember to ask them during your visits  © Copyright [address] Information is for End User's use only and may not be sold, redistributed or otherwise used for commercial purposes  All illustrations and images included in CareNotes® are the copyrighted property of A D A M , Inc  or [facility] Eliot Leal   The above information is an  only  It is not intended as medical advice for individual conditions or treatments  Talk to your doctor, nurse or pharmacist before following any medical regimen to see if it is safe and effective for you

## 2021-06-08 NOTE — PROGRESS NOTES
Assessment/Plan:    Stage 4 chronic kidney disease [facility])  Lab Results   Component Value Date    EGFR 57 03/23/2021    EGFR 61 01/08/2021    EGFR 71 07/11/2020    CREATININE 1 33 (H) 03/23/2021    CREATININE 1 42 (H) 01/08/2021    CREATININE 1 27 07/11/2020     Recommend he avoid NSAIDs  Continue with amlodipine  Make sure to stay well hydrated  We will get renal ultrasound  Benign essential hypertension    Continue Norvasc         Problem List Items Addressed This Visit        Cardiovascular and Mediastinum    Benign essential hypertension       Continue Norvasc            Genitourinary    Stage 4 chronic kidney disease ([facility] [address] )     Lab Results   Component Value Date    EGFR 57 03/23/2021    EGFR 61 01/08/2021    EGFR 71 07/11/2020    CREATININE 1 33 (H) 03/23/2021    CREATININE 1 42 (H) 01/08/2021    CREATININE 1 27 07/11/2020     Recommend he avoid NSAIDs  Continue with amlodipine  Make sure to stay well hydrated  We will get renal ultrasound  Other    Constipation - Primary    Relevant Medications    polyethylene glycol (GLYCOLAX) 17 GM/SCOOP powder    Other Relevant Orders    Ambulatory referral to Gastroenterology    Right flank pain    Relevant Orders    US retroperitoneal complete      Other Visit Diagnoses     Abnormal renal function        Relevant Orders    US retroperitoneal complete    Epigastric pain        Relevant Orders    Ambulatory referral to Gastroenterology            Subjective:      Patient ID: Nora Vanegas is a 63 y o  male  HPI  63year old M here for follow up of abdomen pain  He has been having problems with chronic abdomen pain on right side and constipation  He was recommend dulcolax suppository  And xr  He is having pain in RUQ  He his eating a lot of salads  He is going to the bathroom more regularly now  He was on miralax in the past but is unsure if it was helping  No lower quadrant abdomen   Pain on LEs he is getting constipated        The right-sided pain is more on the right flank  He denies any problems with urination  Like he is taking the Flomax and is helping  He also did have a problem with abnormal renal function in the past   He did improve slightly when he was switched from lisinopril to amlodipine  He is not taking any NSAIDs  He is taking flomax         The following portions of the patient's history were reviewed and updated as appropriate:   He  has a past medical history of COVID-19 virus infection (5/1/2020), Exposure to COVID-19 virus (11/23/2020), History of chronic constipation, and History of neck pain (11/21/2017)    He   Patient Active Problem List    Diagnosis Date Noted    Right flank pain 06/09/2021    Stage 4 chronic kidney disease ([facility] [address] ) 06/09/2021    Acute bacterial conjunctivitis of right eye 05/15/2020    Irritant contact dermatitis 05/15/2020    Family history of parkinsonism 04/30/2020    Low vitamin B12 level 04/30/2020    Tremor 01/09/2020    Lumbar disc herniation 01/09/2020    Overweight 01/09/2020    Constipation 01/06/2020    Dysphonia 11/23/2019    Cough 11/23/2019    Bilateral impacted cerumen 11/23/2019    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    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    Reflux laryngitis 11/23/2019    Vocal fold paresis, 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    Sulcus vocalis of vocal fold 11/23/2019    Gastroesophageal reflux disease 11/23/2019    Rash 11/07/2019    Abdominal pain, chronic, right lower quadrant 05/13/2019    Right-sided low back pain with right-sided sciatica 06/28/2018    Hoarseness of voice 05/17/2018    Acute pain of both knees 01/24/2018    Chronic tension headache 01/24/2018    Left shoulder pain 06/07/2017    Benign colon polyp 03/24/2017    Enlarged prostate without lower urinary tract symptoms (luts) 07/18/2016    Varicocele 01/12/2016    Mild vitamin D deficiency 06/10/2015    Impingement syndrome, shoulder, left 05/16/2014    Pain in joint of right hip 02/04/2014    Microscopic hematuria 11/19/2013    Renal calculus, right 11/19/2013    Atypical chest pain 06/19/2013    Benign essential hypertension 10/10/2012    Abnormal glucose 10/10/2012     He  has a past surgical history that includes Cystoscopy (05/29/2014) and Tooth extraction  His family history includes Arthritis in his family; Cancer in his family and sister; Diabetes in his family and paternal grandmother  He  reports that he quit smoking about 7 years ago  His smoking use included cigarettes  He has never used smokeless tobacco  He reports current alcohol use  He reports that he does not use drugs  Current Outpatient Medications   Medication Sig Dispense Refill    amLODIPine (NORVASC) 5 mg tablet Take 1 tablet (5 mg total) by mouth daily 90 tablet 0    bisacodyl (DULCOLAX) 10 mg suppository Insert 1 suppository (10 mg total) into the rectum daily 12 suppository 0    famotidine (PEPCID) 20 mg tablet TAKE 1 TABLET BY MOUTH TWICE A  tablet 1    tamsulosin (FLOMAX) 0 4 mg Take 1 capsule (0 4 mg total) by mouth daily with dinner 90 capsule 3    nystatin (MYCOSTATIN) cream Apply topically 2 (two) times a day On feet (Patient not taking: Reported on 5/27/2020) 30 g 0    polyethylene glycol (GLYCOLAX) 17 GM/SCOOP powder Take 17 g by mouth daily 289 g 3     No current facility-administered medications for this visit        Current Outpatient Medications on File Prior to Visit   Medication Sig    amLODIPine (NORVASC) 5 mg tablet Take 1 tablet (5 mg total) by mouth daily    bisacodyl (DULCOLAX) 10 mg suppository Insert 1 suppository (10 mg total) into the rectum daily    famotidine (PEPCID) 20 mg tablet TAKE 1 TABLET BY MOUTH TWICE A DAY    tamsulosin (FLOMAX) 0 4 mg Take 1 capsule (0 4 mg total) by mouth daily with dinner    nystatin (MYCOSTATIN) cream Apply topically 2 (two) times a day On feet (Patient not taking: Reported on 5/27/2020)    [DISCONTINUED] naproxen (NAPROSYN) 500 mg tablet Take 1 tablet (500 mg total) by mouth 2 (two) times a day with meals for 5 days     No current facility-administered medications on file prior to visit  He has No Known Allergies       Review of Systems   Constitutional: Negative for activity change, appetite change, fatigue, fever and unexpected weight change  HENT: Negative for ear pain, hearing loss and sore throat  Eyes: Negative for visual disturbance  Respiratory: Negative for cough and wheezing  Cardiovascular: Negative for chest pain and leg swelling  Gastrointestinal: Positive for abdominal pain and constipation  Negative for diarrhea and vomiting  Genitourinary: Positive for flank pain  Negative for difficulty urinating and dysuria  Musculoskeletal: Negative for arthralgias, back pain and myalgias  Skin: Negative for rash  Neurological: Negative for dizziness and headaches  Psychiatric/Behavioral: Negative for behavioral problems  Objective:      /80 (BP Location: Left arm, Patient Position: Sitting, Cuff Size: Standard)   Pulse 82   Temp 98 °F (36 7 °C) (Temporal)   Resp 16   Ht 5' 3" (1 6 m)   Wt 66 7 kg (147 lb)   SpO2 98%   BMI 26 04 kg/m²          Physical Exam  Vitals signs and nursing note reviewed  Constitutional:       General: He is not in acute distress  Appearance: He is well-developed  HENT:      Head: Normocephalic and atraumatic  Right Ear: External ear normal       Left Ear: External ear normal       Nose: Nose normal    Eyes:      Conjunctiva/sclera: Conjunctivae normal    Neck:      Musculoskeletal: Normal range of motion and neck supple  Thyroid: No thyromegaly  Cardiovascular:      Rate and Rhythm: Normal rate and regular rhythm  Heart sounds: Normal heart sounds  No murmur  Pulmonary:      Effort: Pulmonary effort is normal  No respiratory distress  Breath sounds: Normal breath sounds  No wheezing     Abdominal:      General: Bowel sounds are normal  Palpations: Abdomen is soft  There is no mass  Tenderness: There is no abdominal tenderness  There is no guarding  Comments: Right flank mild     Musculoskeletal: Normal range of motion  Lymphadenopathy:      Cervical: No cervical adenopathy  Skin:     General: Skin is warm  Neurological:      Mental Status: He is alert and oriented to person, place, and time     Psychiatric:         Mood and Affect: Mood normal          Behavior: Behavior normal

## 2021-06-09 PROBLEM — R10.9 RIGHT FLANK PAIN: Status: ACTIVE | Noted: 2021-06-09

## 2021-06-09 PROBLEM — N18.4 STAGE 4 CHRONIC KIDNEY DISEASE (HCC): Status: ACTIVE | Noted: 2021-06-09

## 2021-06-12 ENCOUNTER — APPOINTMENT (OUTPATIENT)
Dept: LAB | Facility: HOSPITAL | Age: 61
End: 2021-06-12
Payer: COMMERCIAL

## 2021-06-12 DIAGNOSIS — K59.00 CONSTIPATION, UNSPECIFIED CONSTIPATION TYPE: ICD-10-CM

## 2021-06-12 LAB
ALBUMIN SERPL BCP-MCNC: 3.1 G/DL (ref 3.5–5)
ALP SERPL-CCNC: 97 U/L (ref 46–116)
ALT SERPL W P-5'-P-CCNC: 27 U/L (ref 12–78)
ANION GAP SERPL CALCULATED.3IONS-SCNC: 9 MMOL/L (ref 4–13)
AST SERPL W P-5'-P-CCNC: 27 U/L (ref 5–45)
BACTERIA UR QL AUTO: ABNORMAL /HPF
BASOPHILS # BLD AUTO: 0.04 THOUSANDS/ΜL (ref 0–0.1)
BASOPHILS NFR BLD AUTO: 1 % (ref 0–1)
BILIRUB SERPL-MCNC: 0.88 MG/DL (ref 0.2–1)
BILIRUB UR QL STRIP: NEGATIVE
BUN SERPL-MCNC: 17 MG/DL (ref 5–25)
CALCIUM ALBUM COR SERPL-MCNC: 9.1 MG/DL (ref 8.3–10.1)
CALCIUM SERPL-MCNC: 8.4 MG/DL (ref 8.3–10.1)
CHLORIDE SERPL-SCNC: 111 MMOL/L (ref 100–108)
CLARITY UR: CLEAR
CO2 SERPL-SCNC: 24 MMOL/L (ref 21–32)
COLOR UR: YELLOW
CREAT SERPL-MCNC: 1.37 MG/DL (ref 0.6–1.3)
EOSINOPHIL # BLD AUTO: 0.16 THOUSAND/ΜL (ref 0–0.61)
EOSINOPHIL NFR BLD AUTO: 4 % (ref 0–6)
ERYTHROCYTE [DISTWIDTH] IN BLOOD BY AUTOMATED COUNT: 14.3 % (ref 11.6–15.1)
GFR SERPL CREATININE-BSD FRML MDRD: 55 ML/MIN/1.73SQ M
GLUCOSE P FAST SERPL-MCNC: 87 MG/DL (ref 65–99)
GLUCOSE UR STRIP-MCNC: NEGATIVE MG/DL
HCT VFR BLD AUTO: 42.3 % (ref 36.5–49.3)
HGB BLD-MCNC: 13.9 G/DL (ref 12–17)
HGB UR QL STRIP.AUTO: ABNORMAL
IMM GRANULOCYTES # BLD AUTO: 0 THOUSAND/UL (ref 0–0.2)
IMM GRANULOCYTES NFR BLD AUTO: 0 % (ref 0–2)
KETONES UR STRIP-MCNC: NEGATIVE MG/DL
LEUKOCYTE ESTERASE UR QL STRIP: NEGATIVE
LIPASE SERPL-CCNC: 197 U/L (ref 73–393)
LYMPHOCYTES # BLD AUTO: 1.76 THOUSANDS/ΜL (ref 0.6–4.47)
LYMPHOCYTES NFR BLD AUTO: 47 % (ref 14–44)
MCH RBC QN AUTO: 30.2 PG (ref 26.8–34.3)
MCHC RBC AUTO-ENTMCNC: 32.9 G/DL (ref 31.4–37.4)
MCV RBC AUTO: 92 FL (ref 82–98)
MONOCYTES # BLD AUTO: 0.47 THOUSAND/ΜL (ref 0.17–1.22)
MONOCYTES NFR BLD AUTO: 13 % (ref 4–12)
NEUTROPHILS # BLD AUTO: 1.33 THOUSANDS/ΜL (ref 1.85–7.62)
NEUTS SEG NFR BLD AUTO: 35 % (ref 43–75)
NITRITE UR QL STRIP: NEGATIVE
NON-SQ EPI CELLS URNS QL MICRO: ABNORMAL /HPF
NRBC BLD AUTO-RTO: 0 /100 WBCS
PH UR STRIP.AUTO: 7 [PH]
PLATELET # BLD AUTO: 202 THOUSANDS/UL (ref 149–390)
PMV BLD AUTO: 10.9 FL (ref 8.9–12.7)
POTASSIUM SERPL-SCNC: 3.9 MMOL/L (ref 3.5–5.3)
PROT SERPL-MCNC: 6.3 G/DL (ref 6.4–8.2)
PROT UR STRIP-MCNC: ABNORMAL MG/DL
RBC # BLD AUTO: 4.6 MILLION/UL (ref 3.88–5.62)
RBC #/AREA URNS AUTO: ABNORMAL /HPF
SODIUM SERPL-SCNC: 144 MMOL/L (ref 136–145)
SP GR UR STRIP.AUTO: 1.02 (ref 1–1.03)
UROBILINOGEN UR QL STRIP.AUTO: 0.2 E.U./DL
WBC # BLD AUTO: 3.76 THOUSAND/UL (ref 4.31–10.16)
WBC #/AREA URNS AUTO: ABNORMAL /HPF

## 2021-06-12 PROCEDURE — 81001 URINALYSIS AUTO W/SCOPE: CPT | Performed by: PHYSICIAN ASSISTANT

## 2021-06-12 PROCEDURE — 36415 COLL VENOUS BLD VENIPUNCTURE: CPT

## 2021-06-12 PROCEDURE — 85025 COMPLETE CBC W/AUTO DIFF WBC: CPT

## 2021-06-12 PROCEDURE — 83690 ASSAY OF LIPASE: CPT

## 2021-06-12 PROCEDURE — 80053 COMPREHEN METABOLIC PANEL: CPT

## 2021-06-13 NOTE — RESULT ENCOUNTER NOTE
No infection with urine but still with some blood and protein   Since he sees urology for this just recommend to continue following with them

## 2021-06-22 ENCOUNTER — HOSPITAL ENCOUNTER (OUTPATIENT)
Dept: ULTRASOUND IMAGING | Facility: HOSPITAL | Age: 61
Discharge: HOME | End: 2021-06-22
Payer: COMMERCIAL

## 2021-06-22 DIAGNOSIS — R10.9 RIGHT FLANK PAIN: ICD-10-CM

## 2021-06-22 DIAGNOSIS — N28.9 ABNORMAL RENAL FUNCTION: ICD-10-CM

## 2021-06-22 PROCEDURE — 76770 US EXAM ABDO BACK WALL COMP: CPT

## 2021-06-29 ENCOUNTER — TELEPHONE (OUTPATIENT)
Dept: FAMILY MEDICINE CLINIC | Facility: CLINIC | Age: 61
End: 2021-06-29

## 2021-06-29 NOTE — TELEPHONE ENCOUNTER
Looks like Urology is his next appointment,  I wasn't given the exact specialist when I received the note for a referral, Urology is what I saw when I looked at upcoming appts    Thank you

## 2021-07-06 ENCOUNTER — HOSPITAL ENCOUNTER (EMERGENCY)
Facility: HOSPITAL | Age: 61
Discharge: HOME | End: 2021-07-06
Attending: EMERGENCY MEDICINE | Admitting: EMERGENCY MEDICINE
Payer: COMMERCIAL

## 2021-07-06 ENCOUNTER — APPOINTMENT (EMERGENCY)
Dept: CT IMAGING | Facility: HOSPITAL | Age: 61
End: 2021-07-06
Payer: COMMERCIAL

## 2021-07-06 VITALS
TEMPERATURE: 98.1 F | BODY MASS INDEX: 25.27 KG/M2 | RESPIRATION RATE: 17 BRPM | WEIGHT: 142.64 LBS | OXYGEN SATURATION: 100 % | SYSTOLIC BLOOD PRESSURE: 159 MMHG | DIASTOLIC BLOOD PRESSURE: 97 MMHG | HEART RATE: 57 BPM

## 2021-07-06 DIAGNOSIS — K59.00 CONSTIPATION, UNSPECIFIED CONSTIPATION TYPE: ICD-10-CM

## 2021-07-06 DIAGNOSIS — K59.09 CHRONIC CONSTIPATION: ICD-10-CM

## 2021-07-06 DIAGNOSIS — N18.4 STAGE 4 CHRONIC KIDNEY DISEASE (HCC): ICD-10-CM

## 2021-07-06 DIAGNOSIS — R10.31 RIGHT LOWER QUADRANT ABDOMINAL PAIN: Primary | ICD-10-CM

## 2021-07-06 LAB
ALBUMIN SERPL BCP-MCNC: 3.2 G/DL (ref 3.5–5)
ALP SERPL-CCNC: 106 U/L (ref 46–116)
ALT SERPL W P-5'-P-CCNC: 33 U/L (ref 12–78)
ANION GAP SERPL CALCULATED.3IONS-SCNC: 5 MMOL/L (ref 4–13)
AST SERPL W P-5'-P-CCNC: 29 U/L (ref 5–45)
BILIRUB SERPL-MCNC: 0.39 MG/DL (ref 0.2–1)
BUN SERPL-MCNC: 21 MG/DL (ref 5–25)
CALCIUM ALBUM COR SERPL-MCNC: 9.2 MG/DL (ref 8.3–10.1)
CALCIUM SERPL-MCNC: 8.6 MG/DL (ref 8.3–10.1)
CHLORIDE SERPL-SCNC: 110 MMOL/L (ref 100–108)
CO2 SERPL-SCNC: 28 MMOL/L (ref 21–32)
CREAT SERPL-MCNC: 1.43 MG/DL (ref 0.6–1.3)
GFR SERPL CREATININE-BSD FRML MDRD: 52 ML/MIN/1.73SQ M
GLUCOSE SERPL-MCNC: 76 MG/DL (ref 65–140)
LIPASE SERPL-CCNC: 229 U/L (ref 73–393)
POTASSIUM SERPL-SCNC: 4.3 MMOL/L (ref 3.5–5.3)
PROT SERPL-MCNC: 6.6 G/DL (ref 6.4–8.2)
SODIUM SERPL-SCNC: 143 MMOL/L (ref 136–145)

## 2021-07-06 PROCEDURE — 99284 EMERGENCY DEPT VISIT MOD MDM: CPT | Performed by: EMERGENCY MEDICINE

## 2021-07-06 PROCEDURE — 36415 COLL VENOUS BLD VENIPUNCTURE: CPT | Performed by: EMERGENCY MEDICINE

## 2021-07-06 PROCEDURE — 83690 ASSAY OF LIPASE: CPT | Performed by: EMERGENCY MEDICINE

## 2021-07-06 PROCEDURE — 99284 EMERGENCY DEPT VISIT MOD MDM: CPT

## 2021-07-06 PROCEDURE — 74177 CT ABD & PELVIS W/CONTRAST: CPT

## 2021-07-06 PROCEDURE — 80053 COMPREHEN METABOLIC PANEL: CPT | Performed by: EMERGENCY MEDICINE

## 2021-07-06 RX ORDER — SENNOSIDES 8.6 MG
1 TABLET ORAL
Status: DISCONTINUED | OUTPATIENT
Start: 2021-07-06 | End: 2021-07-06

## 2021-07-06 RX ORDER — SENNOSIDES 8.6 MG
8.6 TABLET ORAL
Qty: 14 TABLET | Refills: 0 | Status: SHIPPED | OUTPATIENT
Start: 2021-07-06 | End: 2021-12-20

## 2021-07-06 RX ADMIN — IOHEXOL 100 ML: 350 INJECTION, SOLUTION INTRAVENOUS at 18:33

## 2021-07-06 RX ADMIN — BISACODYL 5 MG: 5 TABLET, COATED ORAL at 17:57

## 2021-07-06 NOTE — Clinical Note
Pastor Love was seen and treated in our emergency department on 7/6/2021  Diagnosis:     Ratna Fernandez  may return to work on return date  He may return on this date: 07/07/2021         If you have any questions or concerns, please don't hesitate to call        Minna Taylor MD    ______________________________           _______________          _______________  Hospital Representative                              Date                                Time

## 2021-07-07 NOTE — ED NOTES
Patient provided urine cup for specimen  Provider indicated that patient will be notified of scan results and discharged       Charlotte Jaimes RN  07/06/21 2032

## 2021-07-10 NOTE — ED PROVIDER NOTES
History  Chief Complaint   Patient presents with    Abdominal Pain     Pt reports right sided abdominal pain with occassional radition into the back x 1 year     77-year-old male history of hypertension presents with abdominal pain  Patient points the right side of the abdomen, states that he has had this same pain for the past year  Feels like a dull ache mild to moderate in severity not colicky in nature, not associated with intake of food  He complains of constipation, has been seen for this previously and prescribed MiraLax which he does take  Denies any blood in the stool  dark tarry stool, acholic stool  Denies any urinary symptoms including hematuria, dysuria, foul-smelling urine  Denies chest pain, shortness of breath, fever, chills, nausea, vomiting, diarrhea, myalgias  Prior to Admission Medications   Prescriptions Last Dose Informant Patient Reported? Taking?    amLODIPine (NORVASC) 5 mg tablet   No No   Sig: Take 1 tablet (5 mg total) by mouth daily   bisacodyl (DULCOLAX) 10 mg suppository   No No   Sig: Insert 1 suppository (10 mg total) into the rectum daily   famotidine (PEPCID) 20 mg tablet  Self No No   Sig: TAKE 1 TABLET BY MOUTH TWICE A DAY   nystatin (MYCOSTATIN) cream  Self No No   Sig: Apply topically 2 (two) times a day On feet   Patient not taking: Reported on 5/27/2020   polyethylene glycol (GLYCOLAX) 17 GM/SCOOP powder   No No   Sig: Take 17 g by mouth daily   tamsulosin (FLOMAX) 0 4 mg  Self No No   Sig: Take 1 capsule (0 4 mg total) by mouth daily with dinner      Facility-Administered Medications: None       Past Medical History:   Diagnosis Date    COVID-19 virus infection 5/1/2020    Exposure to COVID-19 virus 11/23/2020    History of chronic constipation     History of neck pain 11/21/2017    Hypertension        Past Surgical History:   Procedure Laterality Date    CYSTOSCOPY  05/29/2014    diagnostic managed by Jalen Mora    TOOTH ADRIAN         Family History   Problem Relation Age of Onset   Jordon Ferrell Cancer Sister         malignant neoplasm of breast    Diabetes Paternal Grandmother     Diabetes Family     Arthritis Family     Cancer Family         malignant neoplasm    Substance Abuse Neg Hx         denied Mother and Father    Mental illness Neg Hx         no family history Mother and Father    Other Neg Hx         denied family history of Osteopertrosis     I have reviewed and agree with the history as documented  E-Cigarette/Vaping     E-Cigarette/Vaping Substances     Social History     Tobacco Use    Smoking status: Former Smoker     Types: Cigarettes     Quit date: 2014     Years since quittin 5    Smokeless tobacco: Never Used   Substance Use Topics    Alcohol use: Yes     Comment: weekly    Drug use: Never        Review of Systems   Constitutional: Negative for chills and fever  HENT: Negative for ear pain, sinus pain and sore throat  Eyes: Negative for pain  Respiratory: Negative for shortness of breath  Cardiovascular: Negative for chest pain  Gastrointestinal: Positive for abdominal pain and constipation  Negative for diarrhea, nausea and vomiting  Genitourinary: Negative for difficulty urinating, dysuria and flank pain  Musculoskeletal: Negative for back pain and neck pain  All other systems reviewed and are negative        Physical Exam  ED Triage Vitals   Temperature Pulse Respirations Blood Pressure SpO2   21 1542 21 1542 21 1542 21 1542 21 1542   98 1 °F (36 7 °C) 66 16 130/81 98 %      Temp Source Heart Rate Source Patient Position - Orthostatic VS BP Location FiO2 (%)   21 1542 21 1542 21 1737 21 1542 --   Oral Monitor Lying Right arm       Pain Score       21 1542       6             Orthostatic Vital Signs  Vitals:    21 1542 21 1737 21   BP: 130/81 (!) 182/103 159/97   Pulse: 66 70 57   Patient Position - Orthostatic VS:  Lying Lying       Physical Exam  Vitals and nursing note reviewed  Constitutional:       General: He is not in acute distress  Appearance: He is well-developed  He is not ill-appearing, toxic-appearing or diaphoretic  HENT:      Head: Normocephalic  Nose: Nose normal       Mouth/Throat:      Mouth: Mucous membranes are moist    Eyes:      General: No scleral icterus  Right eye: No discharge  Left eye: No discharge  Conjunctiva/sclera: Conjunctivae normal    Cardiovascular:      Rate and Rhythm: Normal rate  Heart sounds: Normal heart sounds  Pulmonary:      Effort: Pulmonary effort is normal  No respiratory distress  Breath sounds: No stridor  Abdominal:      General: There is no distension  Palpations: Abdomen is soft  Tenderness: There is abdominal tenderness (mild right sided non localized)  There is no right CVA tenderness, left CVA tenderness, guarding or rebound  Negative signs include Muniz's sign, Rovsing's sign and McBurney's sign  Musculoskeletal:      Cervical back: Normal range of motion  Skin:     General: Skin is warm and dry  Capillary Refill: Capillary refill takes less than 2 seconds  Neurological:      Mental Status: He is alert and oriented to person, place, and time  Cranial Nerves: No cranial nerve deficit     Psychiatric:         Mood and Affect: Mood normal          Behavior: Behavior normal          ED Medications  Medications   bisacodyl (DULCOLAX) EC tablet 5 mg (5 mg Oral Given 7/6/21 1757)   iohexol (OMNIPAQUE) 350 MG/ML injection (SINGLE-DOSE) 100 mL (100 mL Intravenous Given 7/6/21 1833)       Diagnostic Studies  Results Reviewed     Procedure Component Value Units Date/Time    Comprehensive metabolic panel [[id number]]  (Abnormal) Collected: 07/06/21 1756    Lab Status: Final result Specimen: Blood from Arm, Right Updated: 07/06/21 1822     Sodium 143 mmol/L      Potassium 4 3 mmol/L      Chloride 110 mmol/L      CO2 28 mmol/L      ANION GAP 5 mmol/L      BUN 21 mg/dL      Creatinine 1 43 mg/dL      Glucose 76 mg/dL      Calcium 8 6 mg/dL      Corrected Calcium 9 2 mg/dL      AST 29 U/L      ALT 33 U/L      Alkaline Phosphatase 106 U/L      Total Protein 6 6 g/dL      Albumin 3 2 g/dL      Total Bilirubin 0 39 mg/dL      eGFR 52 ml/min/1 73sq m     Narrative:      [place] guidelines for Chronic Kidney Disease (CKD):     Stage 1 with normal or high GFR (GFR > 90 mL/min/1 73 square meters)    Stage 2 Mild CKD (GFR = 60-89 mL/min/1 73 square meters)    Stage 3A Moderate CKD (GFR = 45-59 mL/min/1 73 square meters)    Stage 3B Moderate CKD (GFR = 30-44 mL/min/1 73 square meters)    Stage 4 Severe CKD (GFR = 15-29 mL/min/1 73 square meters)    Stage 5 End Stage CKD (GFR <15 mL/min/1 73 square meters)  Note: GFR calculation is accurate only with a steady state creatinine    Lipase [[id number]]  (Normal) Collected: 07/06/21 1756    Lab Status: Final result Specimen: Blood from Arm, Right Updated: 07/06/21 1822     Lipase 229 u/L                  CT abdomen pelvis with contrast   Final Result by Wes Reynoso DO (07/06 1944)      No acute intra-abdominal abnormality  Tiny nonobstructing right renal calculus  Descending colon diverticulosis  Workstation performed: [id number]               Procedures  Procedures      ED Course                             SBIRT 20yo+      Most Recent Value   SBIRT (24 yo +)   In order to provide better care to our patients, we are screening all of our patients for alcohol and drug use  Would it be okay to ask you these screening questions? No Filed at: 07/06/2021 1703   Initial Alcohol Screen: US AUDIT-C    1  How often do you have a drink containing alcohol?  0 Filed at: 07/06/2021 1703   2  How many drinks containing alcohol do you have on a typical day you are drinking? 0 Filed at: 07/06/2021 1703   3a  Male UNDER 65:  How often do you have five or more drinks on one occasion? 0 Filed at: 07/06/2021 1703   3b  FEMALE Any Age, or MALE 65+: How often do you have 4 or more drinks on one occassion? 0 Filed at: 07/06/2021 1703   Audit-C Score  0 Filed at: 07/06/2021 1703                [facility]  Number of Diagnoses or Management Options  Chronic constipation  Constipation, unspecified constipation type  Right lower quadrant abdominal pain  [facility]  Diagnosis management comments: Labs and scan  If unremarkable will prescribe senna, dulcolax, f/u GI, PCP  Scan unremarkable  Requests work note  Return precautions discussed  Patient appreciative and in agreement with plan  Disposition  Final diagnoses:   Right lower quadrant abdominal pain   Chronic constipation   Stage 4 chronic kidney disease (HCC)   Constipation, unspecified constipation type     Time reflects when diagnosis was documented in both MDM as applicable and the Disposition within this note     Time User Action Codes Description Comment    7/6/2021  7:48 PM Charlotta Human L Add [R10 31] Right lower quadrant abdominal pain     7/6/2021  7:48 PM Charlotta Human L Add [K59 09] Chronic constipation     7/6/2021  8:22 PM Merly Lise T Add [N18 4] Stage [facility] kidney [facility]     7/6/2021  8:24 PM Merly Lise T Add [K59 00] Constipation, unspecified constipation type       ED Disposition     ED Disposition Condition Date/Time Comment    Discharge Stable Tue Jul 6, 2021  7:48 PM Angelica Mustafa discharge to home/self care              Follow-up Information     Follow up With Specialties Details Why Contact Info    [address], PA-C Family Medicine, Physician Assistant Schedule an appointment as soon as possible for a visit   [address]  [address] [facility] [address] [phone]            Discharge Medication List as of 7/6/2021  8:29 PM      START taking these medications    Details   bisacodyl (DULCOLAX) 5 mg EC tablet Take 1 tablet (5 mg total) by mouth daily as needed for constipation for up to 14 days, Starting Tue 7/6/2021, Until Tue 7/20/2021 at 2359, Print      senna (SENOKOT) 8 6 mg Take 1 tablet (8 6 mg total) by mouth daily at bedtime for 14 days, Starting Tue 7/6/2021, Until Tue 7/20/2021, Print         CONTINUE these medications which have NOT CHANGED    Details   amLODIPine (NORVASC) 5 mg tablet Take 1 tablet (5 mg total) by mouth daily, Starting Mon 5/24/2021, Normal      bisacodyl (DULCOLAX) 10 mg suppository Insert 1 suppository (10 mg total) into the rectum daily, Starting Thu 5/6/2021, Normal      famotidine (PEPCID) 20 mg tablet TAKE 1 TABLET BY MOUTH TWICE A DAY, Normal      nystatin (MYCOSTATIN) cream Apply topically 2 (two) times a day On feet, Starting Wed 5/20/2020, Normal      polyethylene glycol (GLYCOLAX) 17 GM/SCOOP powder Take 17 g by mouth daily, Starting Tue 6/8/2021, Normal      tamsulosin (FLOMAX) 0 4 mg Take 1 capsule (0 4 mg total) by mouth daily with dinner, Starting Mon 2/1/2021, Normal           No discharge procedures on file  PDMP Review     None           ED Provider  Attending physically available and evaluated Jodi Allen I managed the patient along with the ED Attending      Electronically Signed by         Ale Rivers MD  07/09/21 2042

## 2021-07-13 ENCOUNTER — APPOINTMENT (EMERGENCY)
Dept: CT IMAGING | Facility: HOSPITAL | Age: 61
End: 2021-07-13
Payer: COMMERCIAL

## 2021-07-13 ENCOUNTER — HOSPITAL ENCOUNTER (EMERGENCY)
Facility: HOSPITAL | Age: 61
Discharge: HOME | End: 2021-07-14
Attending: EMERGENCY MEDICINE | Admitting: EMERGENCY MEDICINE
Payer: COMMERCIAL

## 2021-07-13 ENCOUNTER — NURSE TRIAGE (OUTPATIENT)
Dept: OTHER | Facility: OTHER | Age: 61
End: 2021-07-13

## 2021-07-13 VITALS
WEIGHT: 142.42 LBS | DIASTOLIC BLOOD PRESSURE: 93 MMHG | TEMPERATURE: 98 F | OXYGEN SATURATION: 100 % | RESPIRATION RATE: 16 BRPM | SYSTOLIC BLOOD PRESSURE: 157 MMHG | HEART RATE: 62 BPM | BODY MASS INDEX: 25.23 KG/M2

## 2021-07-13 DIAGNOSIS — R19.7 DIARRHEA: ICD-10-CM

## 2021-07-13 DIAGNOSIS — R10.9 NONSPECIFIC ABDOMINAL PAIN: Primary | ICD-10-CM

## 2021-07-13 LAB
ALBUMIN SERPL BCP-MCNC: 3 G/DL (ref 3.5–5)
ALP SERPL-CCNC: 90 U/L (ref 46–116)
ALT SERPL W P-5'-P-CCNC: 28 U/L (ref 12–78)
ANION GAP SERPL CALCULATED.3IONS-SCNC: 2 MMOL/L (ref 4–13)
AST SERPL W P-5'-P-CCNC: 32 U/L (ref 5–45)
BASOPHILS # BLD AUTO: 0.02 THOUSANDS/ΜL (ref 0–0.1)
BASOPHILS NFR BLD AUTO: 1 % (ref 0–1)
BILIRUB SERPL-MCNC: 0.63 MG/DL (ref 0.2–1)
BILIRUB UR QL STRIP: NEGATIVE
BUN SERPL-MCNC: 13 MG/DL (ref 5–25)
CALCIUM ALBUM COR SERPL-MCNC: 9.3 MG/DL (ref 8.3–10.1)
CALCIUM SERPL-MCNC: 8.5 MG/DL (ref 8.3–10.1)
CHLORIDE SERPL-SCNC: 108 MMOL/L (ref 100–108)
CLARITY UR: CLEAR
CO2 SERPL-SCNC: 30 MMOL/L (ref 21–32)
COLOR UR: YELLOW
CREAT SERPL-MCNC: 1.16 MG/DL (ref 0.6–1.3)
EOSINOPHIL # BLD AUTO: 0.18 THOUSAND/ΜL (ref 0–0.61)
EOSINOPHIL NFR BLD AUTO: 5 % (ref 0–6)
ERYTHROCYTE [DISTWIDTH] IN BLOOD BY AUTOMATED COUNT: 14.3 % (ref 11.6–15.1)
GFR SERPL CREATININE-BSD FRML MDRD: 68 ML/MIN/1.73SQ M
GLUCOSE SERPL-MCNC: 80 MG/DL (ref 65–140)
GLUCOSE UR STRIP-MCNC: NEGATIVE MG/DL
HCT VFR BLD AUTO: 41.7 % (ref 36.5–49.3)
HGB BLD-MCNC: 13.9 G/DL (ref 12–17)
HGB UR QL STRIP.AUTO: NEGATIVE
IMM GRANULOCYTES # BLD AUTO: 0.01 THOUSAND/UL (ref 0–0.2)
IMM GRANULOCYTES NFR BLD AUTO: 0 % (ref 0–2)
KETONES UR STRIP-MCNC: NEGATIVE MG/DL
LEUKOCYTE ESTERASE UR QL STRIP: NEGATIVE
LIPASE SERPL-CCNC: 178 U/L (ref 73–393)
LYMPHOCYTES # BLD AUTO: 1.28 THOUSANDS/ΜL (ref 0.6–4.47)
LYMPHOCYTES NFR BLD AUTO: 33 % (ref 14–44)
MCH RBC QN AUTO: 30.2 PG (ref 26.8–34.3)
MCHC RBC AUTO-ENTMCNC: 33.3 G/DL (ref 31.4–37.4)
MCV RBC AUTO: 91 FL (ref 82–98)
MONOCYTES # BLD AUTO: 0.44 THOUSAND/ΜL (ref 0.17–1.22)
MONOCYTES NFR BLD AUTO: 11 % (ref 4–12)
NEUTROPHILS # BLD AUTO: 1.98 THOUSANDS/ΜL (ref 1.85–7.62)
NEUTS SEG NFR BLD AUTO: 50 % (ref 43–75)
NITRITE UR QL STRIP: NEGATIVE
NRBC BLD AUTO-RTO: 0 /100 WBCS
PH UR STRIP.AUTO: 7.5 [PH] (ref 4.5–8)
PLATELET # BLD AUTO: 218 THOUSANDS/UL (ref 149–390)
PMV BLD AUTO: 11 FL (ref 8.9–12.7)
POTASSIUM SERPL-SCNC: 4.7 MMOL/L (ref 3.5–5.3)
PROT SERPL-MCNC: 6.6 G/DL (ref 6.4–8.2)
PROT UR STRIP-MCNC: ABNORMAL MG/DL
RBC # BLD AUTO: 4.61 MILLION/UL (ref 3.88–5.62)
SODIUM SERPL-SCNC: 140 MMOL/L (ref 136–145)
SP GR UR STRIP.AUTO: 1.02 (ref 1–1.03)
UROBILINOGEN UR QL STRIP.AUTO: 0.2 E.U./DL
WBC # BLD AUTO: 3.91 THOUSAND/UL (ref 4.31–10.16)

## 2021-07-13 PROCEDURE — 36415 COLL VENOUS BLD VENIPUNCTURE: CPT | Performed by: EMERGENCY MEDICINE

## 2021-07-13 PROCEDURE — 80053 COMPREHEN METABOLIC PANEL: CPT | Performed by: EMERGENCY MEDICINE

## 2021-07-13 PROCEDURE — 96374 THER/PROPH/DIAG INJ IV PUSH: CPT

## 2021-07-13 PROCEDURE — 99283 EMERGENCY DEPT VISIT LOW MDM: CPT | Performed by: EMERGENCY MEDICINE

## 2021-07-13 PROCEDURE — 83690 ASSAY OF LIPASE: CPT | Performed by: EMERGENCY MEDICINE

## 2021-07-13 PROCEDURE — 74177 CT ABD & PELVIS W/CONTRAST: CPT

## 2021-07-13 PROCEDURE — 85025 COMPLETE CBC W/AUTO DIFF WBC: CPT | Performed by: EMERGENCY MEDICINE

## 2021-07-13 PROCEDURE — G1004 CDSM NDSC: HCPCS

## 2021-07-13 PROCEDURE — 96361 HYDRATE IV INFUSION ADD-ON: CPT

## 2021-07-13 PROCEDURE — 99284 EMERGENCY DEPT VISIT MOD MDM: CPT

## 2021-07-13 PROCEDURE — 81001 URINALYSIS AUTO W/SCOPE: CPT

## 2021-07-13 RX ORDER — LOPERAMIDE HYDROCHLORIDE 2 MG/1
4 CAPSULE ORAL ONCE
Status: COMPLETED | OUTPATIENT
Start: 2021-07-13 | End: 2021-07-13

## 2021-07-13 RX ORDER — KETOROLAC TROMETHAMINE 30 MG/ML
15 INJECTION, SOLUTION INTRAMUSCULAR; INTRAVENOUS ONCE
Status: COMPLETED | OUTPATIENT
Start: 2021-07-13 | End: 2021-07-13

## 2021-07-13 RX ADMIN — KETOROLAC TROMETHAMINE 15 MG: 30 INJECTION, SOLUTION INTRAMUSCULAR; INTRAVENOUS at 21:01

## 2021-07-13 RX ADMIN — IOHEXOL 100 ML: 350 INJECTION, SOLUTION INTRAVENOUS at 22:51

## 2021-07-13 RX ADMIN — IOHEXOL 50 ML: 240 INJECTION, SOLUTION INTRATHECAL; INTRAVASCULAR; INTRAVENOUS; ORAL at 22:51

## 2021-07-13 RX ADMIN — SODIUM CHLORIDE 1000 ML: 0.9 INJECTION, SOLUTION INTRAVENOUS at 20:55

## 2021-07-13 RX ADMIN — LOPERAMIDE HYDROCHLORIDE 4 MG: 2 CAPSULE ORAL at 20:55

## 2021-07-13 NOTE — TELEPHONE ENCOUNTER
Regarding: [place] Speaker - Abdominal Pain  ----- Message from Nereyda Goodson sent at 7/13/2021  6:18 PM EDT -----  "I have stomach pain that hasn't gone away for a week  I went to the ER, for it before and its still persisting   I'd like to know what to do "

## 2021-07-13 NOTE — TELEPHONE ENCOUNTER
Reason for Disposition   [1] MILD-MODERATE pain AND [2] constant AND [3] present > 2 hours    Answer Assessment - Initial Assessment Questions  1  LOCATION: "Where does it hurt?"       Under the ribs near the liver  2  RADIATION: "Does the pain shoot anywhere else?" (e g , chest, back)      no  3  ONSET: "When did the pain begin?" (Minutes, hours or days ago)       One week ago and it went away  4  SUDDEN: "Gradual or sudden onset?"     sudden  5  PATTERN "Does the pain come and go, or is it constant?"     - If constant: "Is it getting better, staying the same, or worsening?"         The pain is constant  6  SEVERITY: "How bad is the pain?"  (e g , Scale 1-10; mild, moderate, or severe)     - MILD (1-3): doesn't interfere with normal activities, abdomen soft and not tender to touch      - MODERATE (4-7): interferes with normal activities or awakens from sleep, tender to touch      - SEVERE (8-10): excruciating pain, doubled over, unable to do any normal activities        5/10 constant  Pain it will not stop  7  RECURRENT SYMPTOM: "Have you ever had this type of stomach pain before?" If Yes, ask: "When was the last time?" and "What happened that time?"       Constant abdominal pain  8  CAUSE: "What do you think is causing the stomach pain?"      Unsure   9  RELIEVING/AGGRAVATING FACTORS: "What makes it better or worse?" (e g , movement, antacids, bowel movement)      Nothing makes it better  10   OTHER SYMPTOMS: "Has there been any vomiting, diarrhea, constipation, or urine problems?"        None just pain    Protocols used: [facility]

## 2021-07-14 LAB
BACTERIA UR QL AUTO: ABNORMAL /HPF
NON-SQ EPI CELLS URNS QL MICRO: ABNORMAL /HPF
RBC #/AREA URNS AUTO: ABNORMAL /HPF
WBC #/AREA URNS AUTO: ABNORMAL /HPF

## 2021-07-14 RX ORDER — DIPHENOXYLATE HYDROCHLORIDE AND ATROPINE SULFATE 2.5; .025 MG/1; MG/1
1 TABLET ORAL 4 TIMES DAILY PRN
Qty: 15 TABLET | Refills: 0 | Status: SHIPPED | OUTPATIENT
Start: 2021-07-14 | End: 2021-07-24

## 2021-07-14 NOTE — ED PROVIDER NOTES
History  Chief Complaint   Patient presents with    Abdominal Pain     Pt states he was seen here 1 week ago for RLQ abdominal pain that has not gotten better  Pt states " They told me nothing was wrong but something is"  Denies N/V/D     68-year-old male with right-sided abdominal pain for the past week  No nausea/vomiting, no urinary symptoms, no fevers  He was here on 07/06/2021 and had a CT of his abdomen and pelvis which showed:    No acute intra-abdominal abnormality      Tiny nonobstructing right renal calculus      Descending colon diverticulosis  Patient persists with abdominal pain and states that something has to be wrong because his abdominal pain will go away  At times he does have loose stools  No travel history, no recent antibiotics          Prior to Admission Medications   Prescriptions Last Dose Informant Patient Reported? Taking?    amLODIPine (NORVASC) 5 mg tablet 7/13/2021 at Unknown time  No Yes   Sig: Take 1 tablet (5 mg total) by mouth daily   bisacodyl (DULCOLAX) 10 mg suppository Not Taking at Unknown time  No No   Sig: Insert 1 suppository (10 mg total) into the rectum daily   Patient not taking: Reported on 7/13/2021   bisacodyl (DULCOLAX) 5 mg EC tablet Not Taking at Unknown time  No No   Sig: Take 1 tablet (5 mg total) by mouth daily as needed for constipation for up to 14 days   Patient not taking: Reported on 7/13/2021   famotidine (PEPCID) 20 mg tablet 7/13/2021 at Unknown time Self No Yes   Sig: TAKE 1 TABLET BY MOUTH TWICE A DAY   nystatin (MYCOSTATIN) cream Not Taking at Unknown time Self No No   Sig: Apply topically 2 (two) times a day On feet   Patient not taking: Reported on 5/27/2020   polyethylene glycol (GLYCOLAX) 17 GM/SCOOP powder 7/13/2021 at Unknown time  No Yes   Sig: Take 17 g by mouth daily   senna (SENOKOT) 8 6 mg Not Taking at Unknown time  No No   Sig: Take 1 tablet (8 6 mg total) by mouth daily at bedtime for 14 days   Patient not taking: Reported on 2021   tamsulosin (FLOMAX) 0 4 mg Not Taking at Unknown time Self No No   Sig: Take 1 capsule (0 4 mg total) by mouth daily with dinner   Patient not taking: Reported on 2021      Facility-Administered Medications: None       Past Medical History:   Diagnosis Date    COVID-19 virus infection 2020    Exposure to COVID-19 virus 2020    History of chronic constipation     History of neck pain 2017    Hypertension        Past Surgical History:   Procedure Laterality Date    CYSTOSCOPY  2014    diagnostic managed by Jalen Mora    TOOTH EXTRACTION         Family History   Problem Relation Age of Onset    Cancer Sister         malignant neoplasm of breast    Diabetes Paternal Grandmother     Diabetes Family     Arthritis Family     Cancer Family         malignant neoplasm    Substance Abuse Neg Hx         denied Mother and Father    Mental illness Neg Hx         no family history Mother and Father    Other Neg Hx         denied family history of Osteopertrosis     I have reviewed and agree with the history as documented  E-Cigarette/Vaping     E-Cigarette/Vaping Substances     Social History     Tobacco Use    Smoking status: Former Smoker     Types: Cigarettes     Quit date: 2014     Years since quittin 5    Smokeless tobacco: Never Used   Substance Use Topics    Alcohol use: Yes     Comment: weekly    Drug use: Never       Review of Systems   Constitutional: Negative for appetite change, fatigue and fever  HENT: Negative for rhinorrhea and sore throat  Respiratory: Negative for cough, shortness of breath and wheezing  Cardiovascular: Negative for chest pain and leg swelling  Gastrointestinal: Positive for abdominal pain and diarrhea  Negative for nausea and vomiting  Genitourinary: Negative for dysuria and flank pain  Musculoskeletal: Negative for back pain and neck pain  Skin: Negative for rash     Neurological: Negative for syncope and headaches  Psychiatric/Behavioral:        Mood normal       Physical Exam  Physical Exam  Vitals and nursing note reviewed  Constitutional:       Appearance: He is well-developed  HENT:      Head: Normocephalic and atraumatic  Cardiovascular:      Rate and Rhythm: Normal rate and regular rhythm  Pulmonary:      Effort: Pulmonary effort is normal  No respiratory distress  Breath sounds: Normal breath sounds  Abdominal:      Palpations: Abdomen is soft  Comments: Right mid abdominal tenderness, no rebound/guarding   Musculoskeletal:         General: Normal range of motion  Cervical back: Normal range of motion and neck supple  Skin:     General: Skin is warm and dry  Neurological:      Mental Status: He is alert and oriented to person, place, and time           Vital Signs  ED Triage Vitals [07/13/21 1949]   Temperature Pulse Respirations Blood Pressure SpO2   98 °F (36 7 °C) 64 16 127/77 98 %      Temp Source Heart Rate Source Patient Position - Orthostatic VS BP Location FiO2 (%)   Oral Monitor Sitting Right arm --      Pain Score       5           Vitals:    07/13/21 1949 07/13/21 2202   BP: 127/77 157/93   Pulse: 64 62   Patient Position - Orthostatic VS: Sitting Sitting         Visual Acuity      ED Medications  Medications   sodium chloride 0 9 % bolus 1,000 mL (0 mL Intravenous Stopped 7/13/21 2331)   ketorolac (TORADOL) injection 15 mg (15 mg Intravenous Given 7/13/21 2101)   loperamide (IMODIUM) capsule 4 mg (4 mg Oral Given 7/13/21 2055)   iohexol (OMNIPAQUE) 350 MG/ML injection (SINGLE-DOSE) 100 mL (100 mL Intravenous Given 7/13/21 2251)   iohexol (OMNIPAQUE) 240 MG/ML solution 50 mL (50 mL Oral Given 7/13/21 2251)       Diagnostic Studies  Results Reviewed     Procedure Component Value Units Date/Time    Urine Microscopic [[id number]]  (Abnormal) Collected: 07/13/21 2329    Lab Status: Final result Specimen: Urine, Clean Catch Updated: 07/14/21 0033     RBC, UA None Seen /hpf WBC, UA 0-1 /hpf      Epithelial Cells Occasional /hpf      Bacteria, UA Occasional /hpf     Urine Macroscopic, POC [[id number]]  (Abnormal) Collected: 07/13/21 2329    Lab Status: Final result Specimen: Urine Updated: 07/13/21 2330     Color, UA Yellow     Clarity, UA Clear     pH, UA 7 5     Leukocytes, UA Negative     Nitrite, UA Negative     Protein,  (2+) mg/dl      Glucose, UA Negative mg/dl      Ketones, UA Negative mg/dl      Urobilinogen, UA 0 2 E U /dl      Bilirubin, UA Negative     Blood, UA Negative     Specific Gravity, UA 1 020    Narrative:      CLINITEK RESULT    Comprehensive metabolic panel [[id number]]  (Abnormal) Collected: 07/13/21 2055    Lab Status: Final result Specimen: Blood from Arm, Left Updated: 07/13/21 2125     Sodium 140 mmol/L      Potassium 4 7 mmol/L      Chloride 108 mmol/L      CO2 30 mmol/L      ANION GAP 2 mmol/L      BUN 13 mg/dL      Creatinine 1 16 mg/dL      Glucose 80 mg/dL      Calcium 8 5 mg/dL      Corrected Calcium 9 3 mg/dL      AST 32 U/L      ALT 28 U/L      Alkaline Phosphatase 90 U/L      Total Protein 6 6 g/dL      Albumin 3 0 g/dL      Total Bilirubin 0 63 mg/dL      eGFR 68 ml/min/1 73sq m     Narrative:      [place] guidelines for Chronic Kidney Disease (CKD):     Stage 1 with normal or high GFR (GFR > 90 mL/min/1 73 square meters)    Stage 2 Mild CKD (GFR = 60-89 mL/min/1 73 square meters)    Stage 3A Moderate CKD (GFR = 45-59 mL/min/1 73 square meters)    Stage 3B Moderate CKD (GFR = 30-44 mL/min/1 73 square meters)    Stage 4 Severe CKD (GFR = 15-29 mL/min/1 73 square meters)    Stage 5 End Stage CKD (GFR <15 mL/min/1 73 square meters)  Note: GFR calculation is accurate only with a steady state creatinine    Lipase [[id number]]  (Normal) Collected: 07/13/21 2055    Lab Status: Final result Specimen: Blood from Arm, Left Updated: 07/13/21 2125     Lipase 178 u/L     CBC and differential [[id number]]  (Abnormal) Collected: 07/13/21 2055    Lab Status: Final result Specimen: Blood from Arm, Left Updated: 07/13/21 2107     WBC 3 91 Thousand/uL      RBC 4 61 Million/uL      Hemoglobin 13 9 g/dL      Hematocrit 41 7 %      MCV 91 fL      MCH 30 2 pg      MCHC 33 3 g/dL      RDW 14 3 %      MPV 11 0 fL      Platelets 297 Thousands/uL      nRBC 0 /100 WBCs      Neutrophils Relative 50 %      Immat GRANS % 0 %      Lymphocytes Relative 33 %      Monocytes Relative 11 %      Eosinophils Relative 5 %      Basophils Relative 1 %      Neutrophils Absolute 1 98 Thousands/µL      Immature Grans Absolute 0 01 Thousand/uL      Lymphocytes Absolute 1 28 Thousands/µL      Monocytes Absolute 0 44 Thousand/µL      Eosinophils Absolute 0 18 Thousand/µL      Basophils Absolute 0 02 Thousands/µL                  CT abdomen pelvis with contrast   Final Result by Rosamaria Benson MD (07/14 0024)      Normal appendix  No bowel obstruction or acute inflammatory process in the abdomen or pelvis  No significant change compared to prior recent examination dated 7/6/2021  Stable punctate nonobstructing right upper pole calculus  Workstation performed: [id number]                    Procedures  Procedures         ED Course                             SBIRT 22yo+      Most Recent Value   SBIRT (24 yo +)   In order to provide better care to our patients, we are screening all of our patients for alcohol and drug use  Would it be okay to ask you these screening questions? No Filed at: 07/13/2021 1957                    MDM  Number of Diagnoses or Management Options     Amount and/or Complexity of Data Reviewed  Clinical lab tests: ordered and reviewed  Tests in the radiology section of CPT®: ordered and reviewed    Risk of Complications, Morbidity, and/or Mortality  Presenting problems: moderate  General comments: I went over the lab work and CT with the patient    He understands to follow up with GI for further workup/management        Disposition  Final diagnoses:   Nonspecific abdominal pain   Diarrhea     Time reflects when diagnosis was documented in both MDM as applicable and the Disposition within this note     Time User Action Codes Description Comment    7/14/2021 12:36 AM Lopez-Rodriguez, Olegario Homans R Add [R10 9] Nonspecific abdominal pain     7/14/2021 12:37 AM Km Felipe Add [R19 7] Diarrhea       ED Disposition     ED Disposition Condition Date/Time Comment    Discharge Stable Wed Jul 14, 2021 12:36 AM Elva Wesley discharge to home/self care              Follow-up Information     Follow up With Specialties Details Why Contact Info Additional Information    Torsten Scott PA-C Family Medicine, Physician Assistant   [address]  [address] [address]  [phone]       [address] [facility]   [address]  [address] [facility] [address]  Jim Bowser [address] [facility], [address], [address] [place] [address], [place], [place], [address] [phone]          Discharge Medication List as of 7/14/2021 12:38 AM      START taking these medications    Details   diphenoxylate-atropine (LOMOTIL) 2 5-0 025 mg per tablet Take 1 tablet by mouth 4 (four) times a day as needed for diarrhea for up to 10 days, Starting Wed 7/14/2021, Until Sat 7/24/2021 at 2359, Normal         CONTINUE these medications which have NOT CHANGED    Details   amLODIPine (NORVASC) 5 mg tablet Take 1 tablet (5 mg total) by mouth daily, Starting Mon 5/24/2021, Normal      famotidine (PEPCID) 20 mg tablet TAKE 1 TABLET BY MOUTH TWICE A DAY, Normal      polyethylene glycol (GLYCOLAX) 17 GM/SCOOP powder Take 17 g by mouth daily, Starting Tue 6/8/2021, Normal      bisacodyl (DULCOLAX) 10 mg suppository Insert 1 suppository (10 mg total) into the rectum daily, Starting Thu 5/6/2021, Normal      bisacodyl (DULCOLAX) 5 mg EC tablet Take 1 tablet (5 mg total) by mouth daily as needed for constipation for up to 14 days, Starting Tue 7/6/2021, Until Tue 7/20/2021 at 2359, Print      nystatin (MYCOSTATIN) cream Apply topically 2 (two) times a day On feet, Starting Wed 5/20/2020, Normal      senna (SENOKOT) 8 6 mg Take 1 tablet (8 6 mg total) by mouth daily at bedtime for 14 days, Starting Tue 7/6/2021, Until Tue 7/20/2021, Print      tamsulosin (FLOMAX) 0 4 mg Take 1 capsule (0 4 mg total) by mouth daily with dinner, Starting Mon 2/1/2021, Normal           No discharge procedures on file      PDMP Review     None          ED Provider  Electronically Signed by           Cathy Pemberton MD  07/15/21 0900

## 2021-07-15 DIAGNOSIS — K21.9 GASTROESOPHAGEAL REFLUX DISEASE: ICD-10-CM

## 2021-07-15 RX ORDER — FAMOTIDINE 20 MG/1
20 TABLET, FILM COATED ORAL 2 TIMES DAILY
Qty: 180 TABLET | Refills: 1 | Status: SHIPPED | OUTPATIENT
Start: 2021-07-15 | End: 2021-12-20

## 2021-08-12 ENCOUNTER — TELEPHONE (OUTPATIENT)
Dept: FAMILY MEDICINE CLINIC | Facility: CLINIC | Age: 61
End: 2021-08-12

## 2021-08-12 NOTE — TELEPHONE ENCOUNTER
Pt called the nurse line stating that hes having really bad pain by were his liver is at he still waiting for somebody to call to make an appt to see GI  I called pt a same day appt was offered

## 2021-08-13 ENCOUNTER — OFFICE VISIT (OUTPATIENT)
Dept: FAMILY MEDICINE CLINIC | Facility: CLINIC | Age: 61
End: 2021-08-13

## 2021-08-13 VITALS
TEMPERATURE: 97.6 F | OXYGEN SATURATION: 98 % | DIASTOLIC BLOOD PRESSURE: 80 MMHG | WEIGHT: 142 LBS | HEART RATE: 79 BPM | HEIGHT: 63 IN | RESPIRATION RATE: 18 BRPM | SYSTOLIC BLOOD PRESSURE: 118 MMHG | BODY MASS INDEX: 25.16 KG/M2

## 2021-08-13 DIAGNOSIS — G89.29 CHRONIC RIGHT-SIDED LOW BACK PAIN, UNSPECIFIED WHETHER SCIATICA PRESENT: Primary | ICD-10-CM

## 2021-08-13 DIAGNOSIS — N20.0 RENAL CALCULUS, RIGHT: ICD-10-CM

## 2021-08-13 DIAGNOSIS — M54.50 CHRONIC RIGHT-SIDED LOW BACK PAIN, UNSPECIFIED WHETHER SCIATICA PRESENT: Primary | ICD-10-CM

## 2021-08-13 PROCEDURE — 99213 OFFICE O/P EST LOW 20 MIN: CPT | Performed by: FAMILY MEDICINE

## 2021-08-13 NOTE — PROGRESS NOTES
Assessment/Plan:    Renal calculus, right  -hx of renal calculus  -prior workup unremarkable  -nephrology referral placed    Chronic right-sided low back pain  -low back pain, paraspinal tenderness on physical examination  lifting legs elicits pain as well  -discussed differential diagnosis, including MSK related  -physical therapy referral  =-lumbar x ray ordered to rule out secondary causes       Diagnoses and all orders for this visit:    Chronic right-sided low back pain, unspecified whether sciatica present  -     XR hips bilateral 3-4 vw w pelvis if performed; Future  -     Ambulatory referral to Physical Therapy; Future    Renal calculus, right  -     Ambulatory referral to Nephrology; Future          Subjective:      Patient ID: Benjy Clements is a 63 y o  male  Case of 62 y/o male who came today for follow up  He indicates to have persistent low back pain, multiple imaging studies done  Has prior hx of kidney stones, follows with urology as well  The following portions of the patient's history were reviewed and updated as appropriate: allergies, current medications, past family history, past medical history, past social history, past surgical history and problem list     Review of Systems   Constitutional: Negative for fever  Eyes: Negative for visual disturbance  Respiratory: Negative for cough, shortness of breath and wheezing  Cardiovascular: Negative for chest pain, palpitations and leg swelling  Musculoskeletal:        Low back pain   Neurological: Negative for headaches  Psychiatric/Behavioral: The patient is not nervous/anxious  Objective:      /80 (BP Location: Left arm, Patient Position: Sitting, Cuff Size: Standard)   Pulse 79   Temp 97 6 °F (36 4 °C) (Temporal)   Resp 18   Ht 5' 3" (1 6 m)   Wt 64 4 kg (142 lb)   SpO2 98%   BMI 25 15 kg/m²          Physical Exam  Vitals reviewed  Constitutional:       Appearance: Normal appearance     Eyes: Extraocular Movements: Extraocular movements intact  Pulmonary:      Effort: Pulmonary effort is normal    Musculoskeletal:      Comments: Paraspinal tenderness upon palpation of lower back  Extension of lower extremities elicits pain   Neurological:      Mental Status: He is alert

## 2021-08-15 PROBLEM — G89.29 CHRONIC RIGHT-SIDED LOW BACK PAIN: Status: ACTIVE | Noted: 2021-08-15

## 2021-08-15 PROBLEM — M54.50 CHRONIC RIGHT-SIDED LOW BACK PAIN: Status: ACTIVE | Noted: 2021-08-15

## 2021-08-15 NOTE — ASSESSMENT & PLAN NOTE
-low back pain, paraspinal tenderness on physical examination  lifting legs elicits pain as well  -discussed differential diagnosis, including MSK related  -physical therapy referral  =-lumbar x ray ordered to rule out secondary causes

## 2021-08-19 ENCOUNTER — OFFICE VISIT (OUTPATIENT)
Dept: FAMILY MEDICINE CLINIC | Facility: CLINIC | Age: 61
End: 2021-08-19

## 2021-08-19 VITALS
HEART RATE: 76 BPM | TEMPERATURE: 98 F | BODY MASS INDEX: 25.34 KG/M2 | WEIGHT: 143 LBS | DIASTOLIC BLOOD PRESSURE: 80 MMHG | HEIGHT: 63 IN | RESPIRATION RATE: 16 BRPM | SYSTOLIC BLOOD PRESSURE: 110 MMHG | OXYGEN SATURATION: 96 %

## 2021-08-19 DIAGNOSIS — K59.00 CONSTIPATION, UNSPECIFIED CONSTIPATION TYPE: ICD-10-CM

## 2021-08-19 DIAGNOSIS — L30.4 TOE WEB INTERTRIGO: Primary | ICD-10-CM

## 2021-08-19 DIAGNOSIS — B35.1 NAIL FUNGUS: ICD-10-CM

## 2021-08-19 PROCEDURE — 87186 SC STD MICRODIL/AGAR DIL: CPT | Performed by: PHYSICIAN ASSISTANT

## 2021-08-19 PROCEDURE — 87070 CULTURE OTHR SPECIMN AEROBIC: CPT | Performed by: PHYSICIAN ASSISTANT

## 2021-08-19 PROCEDURE — 99213 OFFICE O/P EST LOW 20 MIN: CPT | Performed by: PHYSICIAN ASSISTANT

## 2021-08-19 PROCEDURE — 87077 CULTURE AEROBIC IDENTIFY: CPT | Performed by: PHYSICIAN ASSISTANT

## 2021-08-19 PROCEDURE — 1036F TOBACCO NON-USER: CPT | Performed by: PHYSICIAN ASSISTANT

## 2021-08-19 PROCEDURE — 3725F SCREEN DEPRESSION PERFORMED: CPT | Performed by: PHYSICIAN ASSISTANT

## 2021-08-19 PROCEDURE — 87205 SMEAR GRAM STAIN: CPT | Performed by: PHYSICIAN ASSISTANT

## 2021-08-19 PROCEDURE — 3008F BODY MASS INDEX DOCD: CPT | Performed by: PHYSICIAN ASSISTANT

## 2021-08-19 RX ORDER — NYSTATIN 100000 [USP'U]/G
POWDER TOPICAL 3 TIMES DAILY
Qty: 60 G | Refills: 0 | Status: SHIPPED | OUTPATIENT
Start: 2021-08-19

## 2021-08-19 RX ORDER — CEPHALEXIN 500 MG/1
500 CAPSULE ORAL EVERY 8 HOURS SCHEDULED
Qty: 15 CAPSULE | Refills: 0 | Status: SHIPPED | OUTPATIENT
Start: 2021-08-19 | End: 2021-08-24

## 2021-08-19 NOTE — LETTER
August 19, 2021     Patient: Arnold Sosa   YOB: 1960   Date of Visit: 8/19/2021       To Whom it May Concern:    Laura Rosalba is under my professional care  He was seen in my office on 8/19/2021  He may return to work on 8/20/2021  If you have any questions or concerns, please don't hesitate to call           Sincerely,          [employer] [facility]        CC: No Recipients

## 2021-08-19 NOTE — PATIENT INSTRUCTIONS
Candidiasis cutánea   CUIDADO AMBULATORIO:   ¿Qué necesito saber sobre la candidiasis cutánea? La cándida está normalmente presente en la piel  La infección se presenta cuando tiene demasiada cándida o cuando entra en contacto con aggie herida en harrison piel  Ciertos tipos de moho y [employer] pueden causar la candidiasis, o aggie infección por cándida en la piel  Aggie infección por levaduras en la piel puede aparecer en cualquier parte de la piel o del lecho ungueal  Las infecciones de la levadura de la piel se encuentran generalmente en las partes cálidas y [place] del cuerpo  Por ejemplo entre los pliegues de piel o debajo de los senos  Los síntomas más comunes [place] siguientes: Los signos y síntomas dependerán del tipo de cándida que está causando la infección y en dónde está localizada  · Veverly Flash y escamosa    · Cambios en el color de la piel, especialmente a un forte melvin en carne viva    · Sequedad y comezón en la piel    · Las comisuras de la boca están adoloridas y agrietadas    · Rita Fillers gruesas, sin color y que se rompen con facilidad    · [employer] en la piel que pueden ser rodriguez, o púrpuras y redondas    · Bultos con pus    Busque atención médica de inmediato si:  · Usted tiene signos de aggie infección, melvin pus, calor o fabricio rodriguez que provienen de la herida, o fiebre  Llame a harrison médico si:  · Berenice síntomas empeoran o no mejoran al cabo de 7 a 10 días  · Usted tiene signos de [place] infección por cándida en la piel que es nueva o que regresa después del [place]  · Usted tiene preguntas o inquietudes acerca de harrison condición o cuidado  El tratamiento para la infección de candidiasis cutánea puede incluir un medicamento contra los hongos o antifungicida para tratar la infección por hongos  El medicamento puede venir en forma de crema, ungüento o pastilla    El cuidado de la piel cerca de la infección: Usted puede que solo tenga la piel en parches de un color diferente o áreas que están secas o escamosas  Cuide de [employer] cutáneos según las indicaciones de harrison médico  Si usted tiene la piel adolorida o llagas abiertas, usted necesitará proteger la piel y prevenir daños  Usted también necesitará mantener la piel seca lo más que pueda  Pregunte a harrison médico cómo debe cuidar harrison piel mientras se elias de la infección  Los siguientes son pautas generales para cuidar de harrison piel adolorida o en carne viva  · Mantenga la piel limpia  Pregunte a harrison médico si usted se debe carlos con agua y [place]  No use un jabón que contenga alcohol  El alcohol puede secar e irritar la piel y [employer] síntomas  El médico de harrison bebé le puede indicar que use aggie crema o ungüento para la pañalitis cuando le cambie el pañal  [place] le protegerá la piel e impedirá que se acumule la humedad  · Mantenga la piel seca  Seque el área con la toalla con [place]  No se frote, ya que esto puede irritar la piel  Si usted tiene aggie candidiasis cutánea entre los pliegues de la piel, levante suavemente la parte superior y sosténgala mientras seca entre los pliegues de harrison piel  Siempre seque dewey pies completamente después de nadar o bañarse, incluso entre los dedos  Seque harrison piel si transpira a causa de la actividad física o la exposición al calor  Use aggie toalla limpia cada vez para evitar que la infección se propague o continúe  · Mantenga la piel protegida  Pregunte a harrison médico si se debería cubrir el área con un vendaje o [place] al aire  Revise harrison piel todos los días para asegurarse que no tiene problemas nuevos o que estén empeorando  Usted puede necesitar que otra persona le revise la piel si no es posible que usted mismo lo beto con facilidad      Evite otra infección por cándida en la piel:  · No comparta prendas de ropa ni toallas    · Use calzado para la ducha si usted necesita usar duchas públicas    · Seque ekaterina dewey pies después del baño y aplique el polvo o crema antifungicida según se indique    · [place] medias antes de vestirse para evitar propagar los hongos de dewey pies    · Use ropa ligera que permita que el aire llegue a harrison piel    · Cuide harrison peso para prevenir los pliegues de piel donde se acumula la cándida    · Controle la diabetes    · Utilice correctamente los antibióticos para evitar la resistencia a los mismos    · Cambie el pañal a harrison bebé con frecuencia y mantenga el área seca y limpia lo más que pueda    · Use aggie crema o ungüento para la pañalitis que contenga óxido de zinc o dimeticona en el área donde harrison bebé esté quemado por el pañal según le indicaron    Acuda a la consulta de control con harrison médico según las indicaciones: Anote dewey preguntas para que se acuerde de hacerlas demond dewey visitas  © Copyright [employer] 2021 Information is for End User's use only and may not be sold, redistributed or otherwise used for commercial purposes  All illustrations and images included in CareNotes® are the copyrighted property of [employer]  or [address] [place] [address] es sólo para uso en educación  Harrison intención no es darle un consejo médico sobre enfermedades o tratamientos  Colsulte con harrison [place] farmacéutico antes de seguir cualquier régimen médico para saber si es seguro y efectivo para usted

## 2021-08-19 NOTE — PROGRESS NOTES
yAssessment/Plan:    No problem-specific Assessment & Plan notes found for this encounter  Problem List Items Addressed This Visit        Other    Constipation      Other Visit Diagnoses     Toe web intertrigo    -  Primary    Relevant Medications    nystatin (MYCOSTATIN) powder    cephalexin (KEFLEX) 500 mg capsule    ciclopirox (PENLAC) 8 % solution    Other Relevant Orders    Wound culture and Gram stain    Nail fungus        Relevant Medications    ciclopirox (PENLAC) 8 % solution            Subjective:      Patient ID: Benjy Clements is a 63 y o  male  HPI   65-year-old male history of hypertension here for a visit for foot and toe pain  Pain is 5/10 And is located between his left 4th and 5th toes  it has been going on for the last week but got worse yesterday and he noticed pus coming uut  He also noticed fungus and dark thick brown left great nail for some time  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  The following portions of the patient's history were reviewed and updated as appropriate:   He  has a past medical history of COVID-19 virus infection (5/1/2020), Exposure to COVID-19 virus (11/23/2020), History of chronic constipation, History of neck pain (11/21/2017), and Hypertension    He   Patient Active Problem List    Diagnosis Date Noted    Chronic right-sided low back pain 08/15/2021    Right flank pain 06/09/2021    Stage 4 chronic kidney disease ([facility] [address] ) 06/09/2021    Acute bacterial conjunctivitis of right eye 05/15/2020    Irritant contact dermatitis 05/15/2020    Family history of parkinsonism 04/30/2020    Low vitamin B12 level 04/30/2020    Tremor 01/09/2020    Lumbar disc herniation 01/09/2020    Overweight 01/09/2020    Constipation 01/06/2020    Dysphonia 11/23/2019    Cough 11/23/2019    Bilateral impacted cerumen 11/23/2019    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    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    Reflux laryngitis 11/23/2019    Vocal fold paresis, 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    Sulcus vocalis of vocal fold 11/23/2019    Gastroesophageal reflux disease 11/23/2019    Rash 11/07/2019    Abdominal pain, chronic, right lower quadrant 05/13/2019    Right-sided low back pain with right-sided sciatica 06/28/2018    Hoarseness of voice 05/17/2018    Acute pain of both knees 01/24/2018    Chronic tension headache 01/24/2018    Left shoulder pain 06/07/2017    Benign colon polyp 03/24/2017    Enlarged prostate without lower urinary tract symptoms (luts) 07/18/2016    Varicocele 01/12/2016    Mild vitamin D deficiency 06/10/2015    Impingement syndrome, shoulder, left 05/16/2014    Pain in joint of right hip 02/04/2014    Microscopic hematuria 11/19/2013    Renal calculus, right 11/19/2013    Atypical chest pain 06/19/2013    Benign essential hypertension 10/10/2012    Abnormal glucose 10/10/2012     He  has a past surgical history that includes Cystoscopy (05/29/2014) and Tooth extraction  His family history includes Arthritis in his family; Cancer in his family and sister; Diabetes in his family and paternal grandmother  He  reports that he quit smoking about 7 years ago  His smoking use included cigarettes  He has never used smokeless tobacco  He reports current alcohol use  He reports that he does not use drugs    Current Outpatient Medications   Medication Sig Dispense Refill    amLODIPine (NORVASC) 5 mg tablet Take 1 tablet (5 mg total) by mouth daily 90 tablet 0    famotidine (PEPCID) 20 mg tablet Take 1 tablet (20 mg total) by mouth 2 (two) times a day 180 tablet 1    bisacodyl (DULCOLAX) 10 mg suppository Insert 1 suppository (10 mg total) into the rectum daily (Patient not taking: Reported on 7/13/2021) 12 suppository 0    bisacodyl (DULCOLAX) 5 mg EC tablet Take 1 tablet (5 mg total) by mouth daily as needed for constipation for up to 14 days (Patient not taking: Reported on 7/13/2021) 14 tablet 0    cephalexin (KEFLEX) 500 mg capsule Take 1 capsule (500 mg total) by mouth every 8 (eight) hours for 5 days 15 capsule 0    ciclopirox (PENLAC) 8 % solution Apply topically daily at bedtime Daily and clean with alcohol once a week 6 6 mL 5    nystatin (MYCOSTATIN) powder Apply topically 3 (three) times a day 60 g 0    polyethylene glycol (GLYCOLAX) 17 GM/SCOOP powder Take 17 g by mouth daily (Patient not taking: Reported on 8/19/2021) 289 g 3    senna (SENOKOT) 8 6 mg Take 1 tablet (8 6 mg total) by mouth daily at bedtime for 14 days (Patient not taking: Reported on 7/13/2021) 14 tablet 0    tamsulosin (FLOMAX) 0 4 mg Take 1 capsule (0 4 mg total) by mouth daily with dinner (Patient not taking: Reported on 7/13/2021) 90 capsule 3     No current facility-administered medications for this visit  Current Outpatient Medications on File Prior to Visit   Medication Sig    amLODIPine (NORVASC) 5 mg tablet Take 1 tablet (5 mg total) by mouth daily    famotidine (PEPCID) 20 mg tablet Take 1 tablet (20 mg total) by mouth 2 (two) times a day    bisacodyl (DULCOLAX) 10 mg suppository Insert 1 suppository (10 mg total) into the rectum daily (Patient not taking: Reported on 7/13/2021)    bisacodyl (DULCOLAX) 5 mg EC tablet Take 1 tablet (5 mg total) by mouth daily as needed for constipation for up to 14 days (Patient not taking: Reported on 7/13/2021)    polyethylene glycol (GLYCOLAX) 17 GM/SCOOP powder Take 17 g by mouth daily (Patient not taking: Reported on 8/19/2021)    senna (SENOKOT) 8 6 mg Take 1 tablet (8 6 mg total) by mouth daily at bedtime for 14 days (Patient not taking: Reported on 7/13/2021)    tamsulosin (FLOMAX) 0 4 mg Take 1 capsule (0 4 mg total) by mouth daily with dinner (Patient not taking: Reported on 7/13/2021)    [DISCONTINUED] nystatin (MYCOSTATIN) cream Apply topically 2 (two) times a day On feet (Patient not taking: Reported on 5/27/2020)     No current facility-administered medications on file prior to visit  He has No Known Allergies       Review of Systems   Constitutional: Negative for chills and fever  Respiratory: Negative for cough and shortness of breath  Skin: Positive for rash  Objective:      /80 (BP Location: Left arm, Patient Position: Sitting, Cuff Size: Standard)   Pulse 76   Temp 98 °F (36 7 °C) (Temporal)   Resp 16   Ht 5' 3" (1 6 m)   Wt 64 9 kg (143 lb)   SpO2 96%   BMI 25 33 kg/m²          Physical Exam  Vitals reviewed  Pulmonary:      Effort: Pulmonary effort is normal    Skin:     Findings: Rash (purulent d/c left 5th webspace  great nail brown,thcik) present  Neurological:      Mental Status: He is alert     Psychiatric:         Mood and Affect: Mood normal          Behavior: Behavior normal

## 2021-08-24 LAB
BACTERIA WND AEROBE CULT: ABNORMAL
GRAM STN SPEC: ABNORMAL
GRAM STN SPEC: ABNORMAL

## 2021-08-26 ENCOUNTER — TELEPHONE (OUTPATIENT)
Dept: FAMILY MEDICINE CLINIC | Facility: CLINIC | Age: 61
End: 2021-08-26

## 2021-08-26 NOTE — TELEPHONE ENCOUNTER
SIGNATURES NEEDED FOR  Cover My Meds Prior Authorization F/U ([facility])  FORM RECEIVED VIA FAX  WILL BE PLACED IN FORM BIN FOR MA PICKUP

## 2021-09-10 DIAGNOSIS — N40.1 BENIGN PROSTATIC HYPERPLASIA WITH URINARY HESITANCY: ICD-10-CM

## 2021-09-10 DIAGNOSIS — I10 BENIGN HYPERTENSION: ICD-10-CM

## 2021-09-10 DIAGNOSIS — R39.11 BENIGN PROSTATIC HYPERPLASIA WITH URINARY HESITANCY: ICD-10-CM

## 2021-09-13 RX ORDER — TAMSULOSIN HYDROCHLORIDE 0.4 MG/1
0.4 CAPSULE ORAL
Qty: 90 CAPSULE | Refills: 1 | Status: SHIPPED | OUTPATIENT
Start: 2021-09-13 | End: 2022-03-08 | Stop reason: SDUPTHER

## 2021-09-13 RX ORDER — AMLODIPINE BESYLATE 5 MG/1
5 TABLET ORAL DAILY
Qty: 90 TABLET | Refills: 1 | Status: SHIPPED | OUTPATIENT
Start: 2021-09-13 | End: 2022-06-21 | Stop reason: SDUPTHER

## 2021-10-25 ENCOUNTER — OFFICE VISIT (OUTPATIENT)
Dept: GASTROENTEROLOGY | Facility: MEDICAL CENTER | Age: 61
End: 2021-10-25
Payer: COMMERCIAL

## 2021-10-25 VITALS
DIASTOLIC BLOOD PRESSURE: 68 MMHG | SYSTOLIC BLOOD PRESSURE: 112 MMHG | WEIGHT: 146.2 LBS | BODY MASS INDEX: 25.9 KG/M2 | TEMPERATURE: 98.6 F | HEART RATE: 72 BPM

## 2021-10-25 DIAGNOSIS — R10.9 RIGHT SIDED ABDOMINAL PAIN: ICD-10-CM

## 2021-10-25 DIAGNOSIS — K59.00 CONSTIPATION, UNSPECIFIED CONSTIPATION TYPE: Primary | ICD-10-CM

## 2021-10-25 PROCEDURE — 1036F TOBACCO NON-USER: CPT | Performed by: PHYSICIAN ASSISTANT

## 2021-10-25 PROCEDURE — 99214 OFFICE O/P EST MOD 30 MIN: CPT | Performed by: PHYSICIAN ASSISTANT

## 2021-11-06 ENCOUNTER — HOSPITAL ENCOUNTER (OUTPATIENT)
Dept: ULTRASOUND IMAGING | Facility: HOSPITAL | Age: 61
Discharge: HOME | End: 2021-11-06
Payer: COMMERCIAL

## 2021-11-06 DIAGNOSIS — R10.9 RIGHT SIDED ABDOMINAL PAIN: ICD-10-CM

## 2021-11-06 PROCEDURE — 76705 ECHO EXAM OF ABDOMEN: CPT

## 2021-11-17 ENCOUNTER — TELEPHONE (OUTPATIENT)
Dept: NEPHROLOGY | Facility: CLINIC | Age: 61
End: 2021-11-17

## 2021-11-18 ENCOUNTER — CONSULT (OUTPATIENT)
Dept: NEPHROLOGY | Facility: CLINIC | Age: 61
End: 2021-11-18
Payer: COMMERCIAL

## 2021-11-18 VITALS
SYSTOLIC BLOOD PRESSURE: 122 MMHG | BODY MASS INDEX: 26.39 KG/M2 | HEART RATE: 70 BPM | DIASTOLIC BLOOD PRESSURE: 76 MMHG | WEIGHT: 149 LBS

## 2021-11-18 DIAGNOSIS — J32.4 CHRONIC PANSINUSITIS: ICD-10-CM

## 2021-11-18 DIAGNOSIS — N20.0 RENAL CALCULUS, RIGHT: ICD-10-CM

## 2021-11-18 PROBLEM — N18.4 STAGE 4 CHRONIC KIDNEY DISEASE (HCC): Status: RESOLVED | Noted: 2021-06-09 | Resolved: 2021-11-18

## 2021-11-18 PROCEDURE — 1036F TOBACCO NON-USER: CPT | Performed by: INTERNAL MEDICINE

## 2021-11-18 PROCEDURE — 99244 OFF/OP CNSLTJ NEW/EST MOD 40: CPT | Performed by: INTERNAL MEDICINE

## 2021-11-18 RX ORDER — FLUTICASONE PROPIONATE 50 MCG
2 SPRAY, SUSPENSION (ML) NASAL DAILY
Qty: 16 G | Refills: 5 | Status: SHIPPED | OUTPATIENT
Start: 2021-11-18 | End: 2021-11-19

## 2021-11-19 ENCOUNTER — OFFICE VISIT (OUTPATIENT)
Dept: FAMILY MEDICINE CLINIC | Facility: CLINIC | Age: 61
End: 2021-11-19

## 2021-11-19 VITALS
HEART RATE: 76 BPM | SYSTOLIC BLOOD PRESSURE: 116 MMHG | RESPIRATION RATE: 16 BRPM | TEMPERATURE: 98 F | OXYGEN SATURATION: 98 % | HEIGHT: 63 IN | BODY MASS INDEX: 26.33 KG/M2 | DIASTOLIC BLOOD PRESSURE: 82 MMHG | WEIGHT: 148.6 LBS

## 2021-11-19 DIAGNOSIS — Z23 NEEDS FLU SHOT: ICD-10-CM

## 2021-11-19 DIAGNOSIS — Z11.59 NEED FOR HEPATITIS C SCREENING TEST: Primary | ICD-10-CM

## 2021-11-19 DIAGNOSIS — N28.9 ABNORMAL RENAL FUNCTION: ICD-10-CM

## 2021-11-19 DIAGNOSIS — J32.4 CHRONIC PANSINUSITIS: ICD-10-CM

## 2021-11-19 DIAGNOSIS — I10 BENIGN ESSENTIAL HYPERTENSION: ICD-10-CM

## 2021-11-19 DIAGNOSIS — R10.11 RIGHT UPPER QUADRANT ABDOMINAL PAIN: ICD-10-CM

## 2021-11-19 PROCEDURE — 3008F BODY MASS INDEX DOCD: CPT | Performed by: PHYSICIAN ASSISTANT

## 2021-11-19 PROCEDURE — 99396 PREV VISIT EST AGE 40-64: CPT | Performed by: PHYSICIAN ASSISTANT

## 2021-11-19 PROCEDURE — 3008F BODY MASS INDEX DOCD: CPT | Performed by: INTERNAL MEDICINE

## 2021-11-19 PROCEDURE — 1036F TOBACCO NON-USER: CPT | Performed by: PHYSICIAN ASSISTANT

## 2021-11-19 RX ORDER — MOMETASONE FUROATE 50 UG/1
2 SPRAY, METERED NASAL DAILY
Qty: 17 G | Refills: 2 | Status: SHIPPED | OUTPATIENT
Start: 2021-11-19 | End: 2022-02-21

## 2021-11-20 ENCOUNTER — APPOINTMENT (OUTPATIENT)
Dept: LAB | Facility: HOSPITAL | Age: 61
End: 2021-11-20
Payer: COMMERCIAL

## 2021-11-20 DIAGNOSIS — I10 BENIGN ESSENTIAL HYPERTENSION: ICD-10-CM

## 2021-11-20 DIAGNOSIS — Z11.59 NEED FOR HEPATITIS C SCREENING TEST: ICD-10-CM

## 2021-11-20 LAB
ALBUMIN SERPL BCP-MCNC: 3.3 G/DL (ref 3.5–5)
ALP SERPL-CCNC: 106 U/L (ref 46–116)
ALT SERPL W P-5'-P-CCNC: 30 U/L (ref 12–78)
ANION GAP SERPL CALCULATED.3IONS-SCNC: 8 MMOL/L (ref 4–13)
AST SERPL W P-5'-P-CCNC: 22 U/L (ref 5–45)
BACTERIA UR QL AUTO: ABNORMAL /HPF
BASOPHILS # BLD AUTO: 0.03 THOUSANDS/ΜL (ref 0–0.1)
BASOPHILS NFR BLD AUTO: 1 % (ref 0–1)
BILIRUB SERPL-MCNC: 0.87 MG/DL (ref 0.2–1)
BILIRUB UR QL STRIP: NEGATIVE
BUN SERPL-MCNC: 19 MG/DL (ref 5–25)
CALCIUM ALBUM COR SERPL-MCNC: 9.2 MG/DL (ref 8.3–10.1)
CALCIUM SERPL-MCNC: 8.6 MG/DL (ref 8.3–10.1)
CHLORIDE SERPL-SCNC: 108 MMOL/L (ref 100–108)
CHOLEST SERPL-MCNC: 139 MG/DL
CLARITY UR: CLEAR
CO2 SERPL-SCNC: 25 MMOL/L (ref 21–32)
COLOR UR: YELLOW
CREAT SERPL-MCNC: 1.26 MG/DL (ref 0.6–1.3)
EOSINOPHIL # BLD AUTO: 0.17 THOUSAND/ΜL (ref 0–0.61)
EOSINOPHIL NFR BLD AUTO: 4 % (ref 0–6)
ERYTHROCYTE [DISTWIDTH] IN BLOOD BY AUTOMATED COUNT: 14.2 % (ref 11.6–15.1)
GFR SERPL CREATININE-BSD FRML MDRD: 61 ML/MIN/1.73SQ M
GLUCOSE P FAST SERPL-MCNC: 86 MG/DL (ref 65–99)
GLUCOSE UR STRIP-MCNC: NEGATIVE MG/DL
HCT VFR BLD AUTO: 44.4 % (ref 36.5–49.3)
HCV AB SER QL: NORMAL
HDLC SERPL-MCNC: 61 MG/DL
HGB BLD-MCNC: 14.7 G/DL (ref 12–17)
HGB UR QL STRIP.AUTO: ABNORMAL
HYALINE CASTS #/AREA URNS LPF: ABNORMAL /LPF
IMM GRANULOCYTES # BLD AUTO: 0.01 THOUSAND/UL (ref 0–0.2)
IMM GRANULOCYTES NFR BLD AUTO: 0 % (ref 0–2)
KETONES UR STRIP-MCNC: NEGATIVE MG/DL
LDLC SERPL CALC-MCNC: 70 MG/DL (ref 0–100)
LEUKOCYTE ESTERASE UR QL STRIP: NEGATIVE
LYMPHOCYTES # BLD AUTO: 1.33 THOUSANDS/ΜL (ref 0.6–4.47)
LYMPHOCYTES NFR BLD AUTO: 33 % (ref 14–44)
MCH RBC QN AUTO: 29.9 PG (ref 26.8–34.3)
MCHC RBC AUTO-ENTMCNC: 33.1 G/DL (ref 31.4–37.4)
MCV RBC AUTO: 90 FL (ref 82–98)
MONOCYTES # BLD AUTO: 0.47 THOUSAND/ΜL (ref 0.17–1.22)
MONOCYTES NFR BLD AUTO: 12 % (ref 4–12)
NEUTROPHILS # BLD AUTO: 2.03 THOUSANDS/ΜL (ref 1.85–7.62)
NEUTS SEG NFR BLD AUTO: 50 % (ref 43–75)
NITRITE UR QL STRIP: NEGATIVE
NON-SQ EPI CELLS URNS QL MICRO: ABNORMAL /HPF
NONHDLC SERPL-MCNC: 78 MG/DL
NRBC BLD AUTO-RTO: 0 /100 WBCS
PH UR STRIP.AUTO: 5.5 [PH]
PLATELET # BLD AUTO: 230 THOUSANDS/UL (ref 149–390)
PMV BLD AUTO: 10.6 FL (ref 8.9–12.7)
POTASSIUM SERPL-SCNC: 4.5 MMOL/L (ref 3.5–5.3)
PROT SERPL-MCNC: 6.8 G/DL (ref 6.4–8.2)
PROT UR STRIP-MCNC: ABNORMAL MG/DL
RBC # BLD AUTO: 4.92 MILLION/UL (ref 3.88–5.62)
RBC #/AREA URNS AUTO: ABNORMAL /HPF
SODIUM SERPL-SCNC: 141 MMOL/L (ref 136–145)
SP GR UR STRIP.AUTO: >=1.03 (ref 1–1.03)
TRIGL SERPL-MCNC: 39 MG/DL
UROBILINOGEN UR QL STRIP.AUTO: 0.2 E.U./DL
WBC # BLD AUTO: 4.04 THOUSAND/UL (ref 4.31–10.16)
WBC #/AREA URNS AUTO: ABNORMAL /HPF

## 2021-11-20 PROCEDURE — 86803 HEPATITIS C AB TEST: CPT

## 2021-11-20 PROCEDURE — 81001 URINALYSIS AUTO W/SCOPE: CPT | Performed by: PHYSICIAN ASSISTANT

## 2021-11-20 PROCEDURE — 80061 LIPID PANEL: CPT

## 2021-11-20 PROCEDURE — 80053 COMPREHEN METABOLIC PANEL: CPT

## 2021-11-20 PROCEDURE — 36415 COLL VENOUS BLD VENIPUNCTURE: CPT

## 2021-11-20 PROCEDURE — 85025 COMPLETE CBC W/AUTO DIFF WBC: CPT

## 2021-11-21 ENCOUNTER — HOSPITAL ENCOUNTER (EMERGENCY)
Facility: HOSPITAL | Age: 61
Discharge: HOME | End: 2021-11-21
Payer: COMMERCIAL

## 2021-11-21 VITALS
TEMPERATURE: 97.9 F | OXYGEN SATURATION: 99 % | DIASTOLIC BLOOD PRESSURE: 77 MMHG | SYSTOLIC BLOOD PRESSURE: 149 MMHG | HEART RATE: 63 BPM | RESPIRATION RATE: 17 BRPM

## 2021-11-21 DIAGNOSIS — Z20.822 PERSON UNDER INVESTIGATION FOR COVID-19: ICD-10-CM

## 2021-11-21 DIAGNOSIS — R05.9 COUGH: Primary | ICD-10-CM

## 2021-11-21 LAB — SARS-COV-2 RNA RESP QL NAA+PROBE: NEGATIVE

## 2021-11-21 PROCEDURE — U0005 INFEC AGEN DETEC AMPLI PROBE: HCPCS | Performed by: PHYSICIAN ASSISTANT

## 2021-11-21 PROCEDURE — 99282 EMERGENCY DEPT VISIT SF MDM: CPT | Performed by: PHYSICIAN ASSISTANT

## 2021-11-21 PROCEDURE — U0003 INFECTIOUS AGENT DETECTION BY NUCLEIC ACID (DNA OR RNA); SEVERE ACUTE RESPIRATORY SYNDROME CORONAVIRUS 2 (SARS-COV-2) (CORONAVIRUS DISEASE [COVID-19]), AMPLIFIED PROBE TECHNIQUE, MAKING USE OF HIGH THROUGHPUT TECHNOLOGIES AS DESCRIBED BY CMS-2020-01-R: HCPCS | Performed by: PHYSICIAN ASSISTANT

## 2021-11-21 PROCEDURE — 99283 EMERGENCY DEPT VISIT LOW MDM: CPT

## 2021-11-22 PROCEDURE — 90682 RIV4 VACC RECOMBINANT DNA IM: CPT | Performed by: PHYSICIAN ASSISTANT

## 2021-11-22 PROCEDURE — 90471 IMMUNIZATION ADMIN: CPT | Performed by: PHYSICIAN ASSISTANT

## 2021-12-20 ENCOUNTER — OFFICE VISIT (OUTPATIENT)
Dept: FAMILY MEDICINE CLINIC | Facility: CLINIC | Age: 61
End: 2021-12-20

## 2021-12-20 VITALS
OXYGEN SATURATION: 98 % | TEMPERATURE: 97.4 F | DIASTOLIC BLOOD PRESSURE: 76 MMHG | BODY MASS INDEX: 26.4 KG/M2 | WEIGHT: 149 LBS | RESPIRATION RATE: 18 BRPM | SYSTOLIC BLOOD PRESSURE: 114 MMHG | HEIGHT: 63 IN | HEART RATE: 83 BPM

## 2021-12-20 DIAGNOSIS — R10.9 FLANK PAIN: Primary | ICD-10-CM

## 2021-12-20 DIAGNOSIS — K59.09 CHRONIC CONSTIPATION: ICD-10-CM

## 2021-12-20 DIAGNOSIS — N50.812 PAIN IN LEFT TESTICLE: ICD-10-CM

## 2021-12-20 LAB
SL AMB  POCT GLUCOSE, UA: NEGATIVE
SL AMB LEUKOCYTE ESTERASE,UA: NEGATIVE
SL AMB POCT BILIRUBIN,UA: NEGATIVE
SL AMB POCT BLOOD,UA: ABNORMAL
SL AMB POCT CLARITY,UA: CLEAR
SL AMB POCT COLOR,UA: YELLOW
SL AMB POCT KETONES,UA: NEGATIVE
SL AMB POCT NITRITE,UA: NEGATIVE
SL AMB POCT PH,UA: 6
SL AMB POCT SPECIFIC GRAVITY,UA: 1.01
SL AMB POCT URINE PROTEIN: ABNORMAL
SL AMB POCT UROBILINOGEN: 0.5

## 2021-12-20 PROCEDURE — 99214 OFFICE O/P EST MOD 30 MIN: CPT | Performed by: PHYSICIAN ASSISTANT

## 2021-12-20 PROCEDURE — 3008F BODY MASS INDEX DOCD: CPT | Performed by: INTERNAL MEDICINE

## 2021-12-20 PROCEDURE — 81002 URINALYSIS NONAUTO W/O SCOPE: CPT | Performed by: PHYSICIAN ASSISTANT

## 2021-12-20 PROCEDURE — 3008F BODY MASS INDEX DOCD: CPT | Performed by: PHYSICIAN ASSISTANT

## 2021-12-20 PROCEDURE — 1036F TOBACCO NON-USER: CPT | Performed by: PHYSICIAN ASSISTANT

## 2021-12-20 RX ORDER — LINACLOTIDE 145 UG/1
1 CAPSULE, GELATIN COATED ORAL DAILY
Qty: 30 CAPSULE | Refills: 2 | Status: SHIPPED | OUTPATIENT
Start: 2021-12-20 | End: 2022-01-11

## 2021-12-21 PROBLEM — K59.09 CHRONIC CONSTIPATION: Status: ACTIVE | Noted: 2020-01-06

## 2022-01-08 ENCOUNTER — HOSPITAL ENCOUNTER (OUTPATIENT)
Dept: ULTRASOUND IMAGING | Facility: HOSPITAL | Age: 62
Discharge: HOME | End: 2022-01-08
Payer: COMMERCIAL

## 2022-01-08 DIAGNOSIS — N50.812 PAIN IN LEFT TESTICLE: ICD-10-CM

## 2022-01-08 PROCEDURE — 76870 US EXAM SCROTUM: CPT

## 2022-01-11 ENCOUNTER — OFFICE VISIT (OUTPATIENT)
Dept: GASTROENTEROLOGY | Facility: MEDICAL CENTER | Age: 62
End: 2022-01-11
Payer: COMMERCIAL

## 2022-01-11 VITALS
DIASTOLIC BLOOD PRESSURE: 88 MMHG | BODY MASS INDEX: 25.3 KG/M2 | WEIGHT: 142.8 LBS | TEMPERATURE: 98.4 F | SYSTOLIC BLOOD PRESSURE: 144 MMHG | HEART RATE: 87 BPM

## 2022-01-11 DIAGNOSIS — R10.11 RIGHT UPPER QUADRANT ABDOMINAL PAIN: ICD-10-CM

## 2022-01-11 DIAGNOSIS — R12 HEARTBURN: ICD-10-CM

## 2022-01-11 DIAGNOSIS — K59.00 CONSTIPATION, UNSPECIFIED CONSTIPATION TYPE: Primary | ICD-10-CM

## 2022-01-11 PROCEDURE — 99214 OFFICE O/P EST MOD 30 MIN: CPT | Performed by: INTERNAL MEDICINE

## 2022-01-11 RX ORDER — OMEPRAZOLE 40 MG/1
40 CAPSULE, DELAYED RELEASE ORAL
Qty: 30 CAPSULE | Refills: 3 | Status: SHIPPED | OUTPATIENT
Start: 2022-01-11 | End: 2022-03-29 | Stop reason: SDUPTHER

## 2022-01-11 RX ORDER — SUCRALFATE ORAL 1 G/10ML
1 SUSPENSION ORAL 4 TIMES DAILY
Qty: 1200 ML | Refills: 1 | Status: SHIPPED | OUTPATIENT
Start: 2022-01-11 | End: 2022-01-11

## 2022-01-11 RX ORDER — LINACLOTIDE 290 UG/1
290 CAPSULE, GELATIN COATED ORAL
Qty: 30 CAPSULE | Refills: 3 | Status: SHIPPED | OUTPATIENT
Start: 2022-01-11 | End: 2022-01-26

## 2022-01-11 RX ORDER — SUCRALFATE ORAL 1 G/10ML
1 SUSPENSION ORAL 4 TIMES DAILY
Qty: 1200 ML | Refills: 1 | Status: SHIPPED | OUTPATIENT
Start: 2022-01-11 | End: 2022-01-26

## 2022-01-11 NOTE — PATIENT INSTRUCTIONS
Scheduled date of EGD (as of today): 03/10/2022  Physician performing: Dr Michelle Roth  Location of procedure:  [place]  Bowel prep reviewed with patient: n/a  Instructions reviewed with patient by: MA  Clearances: n/a

## 2022-01-11 NOTE — PROGRESS NOTES
[facility] - Outpatient Follow-up Note  Dmitry Leiva 57 y o  male MRN: [id number]  Encounter: [id number]          ASSESSMENT AND PLAN:    Dmitry Leiva is a 57 y o  male who presents with complaint of chronic constipation, abdominal pain, heartburn  His constipation may be idiopathic constipation versus IBS C  He had a colonoscopy in 2019 that was reportedly normal   It is unclear if he has tried the [address] previously but he has tried MiraLax without improvement  His pain is mostly on the right side in the upper abdomen  This may be related to bloating or constipation but it could also be related to peptic ulcer disease, peptic duodenitis, gastritis  He also notes increased burping as well as heartburn  There could also potentially be a component of gastroparesis   His most recent blood work from November showed an albumin of 3 3 but otherwise normal CMP including AST, ALT, creatinine  His white blood cell count was 4 04 but normal hemoglobin, MCV, platelets  Hep C antibody nonreactive  1  Constipation, unspecified constipation type    2  Right upper quadrant abdominal pain    3  Heartburn        Orders Placed This Encounter   Procedures    EGD     Drink 10 cups of water per day  Take Linzess 290mcg daily to help you have a BM  Take 1 capful of Miralax daily  Start omeprazole and sucralfate in the meantime  Avoid NSAIDs  Will schedule EGD  Consider gastric emptying study in the future  ______________________________________________________________________    Nathan [facility] [id number]    SUBJECTIVE:    Dmitry Leiva is a 57 y o  male who presents with complaint of constipation and right upper quadrant    Very hard to have a BM  He takes something over the counter (he does not take anything prescribed)  He has a BM every 3 days  No obvious red blood or black stools  He took [address] but is not sure it helps  He has pain under his rib  He also has gassiness   + burping  + bloating  He has right sided pain  Today it was all day, on and off  Nothing makes it better or worse  He has it all the time  + heartburn  No nausea, vomiting  REVIEW OF SYSTEMS IS OTHERWISE NEGATIVE  10 point ROS reviewed and negative, except as above      Historical Information   Past Medical History:   Diagnosis Date    COVID-19 virus infection 2020    Exposure to COVID-19 virus 2020    History of chronic constipation     History of neck pain 2017    Hypertension      Past Surgical History:   Procedure Laterality Date    CYSTOSCOPY  2014    diagnostic managed by Jalen Mora    TOOTH EXTRACTION       Social History   Social History     Substance and Sexual Activity   Alcohol Use Yes    Comment: weekly     Social History     Substance and Sexual Activity   Drug Use Never     Social History     Tobacco Use   Smoking Status Former Smoker    Types: Cigarettes    Quit date: 2014    Years since quittin 0   Smokeless Tobacco Never Used     Family History   Problem Relation Age of Onset    Cancer Sister         malignant neoplasm of breast    Diabetes Paternal Grandmother     Diabetes Family     Arthritis Family     Cancer Family         malignant neoplasm    Substance Abuse Neg Hx         denied Mother and Father    Mental illness Neg Hx         no family history Mother and Father    Other Neg Hx         denied family history of Osteopertrosis       Meds/Allergies       Current Outpatient Medications:     amLODIPine (NORVASC) 5 mg tablet    tamsulosin (FLOMAX) 0 4 mg    linaCLOtide (Linzess) 290 MCG CAPS    mometasone (NASONEX) 50 mcg/act nasal spray    nystatin (MYCOSTATIN) powder    omeprazole (PriLOSEC) 40 MG capsule    sucralfate (CARAFATE) 1 g/10 mL suspension    No Known Allergies        Objective     Blood pressure 144/88, pulse 87, temperature 98 4 °F (36 9 °C), temperature source Probe, weight 64 8 kg (142 lb 12 8 oz)   Body mass index is 25 3 kg/m²     PHYSICAL EXAMINATION:    General Appearance:   Alert, cooperative, no distress   HEENT:  Normocephalic, atraumatic, anicteric  Neck supple, symmetrical, trachea midline  Lungs:   Equal chest rise and unlabored breathing, normal effort, no coughing  Cardiovascular:   No visualized JVD  Abdomen:   No abdominal distension  Skin:   No jaundice, rashes, or lesions  Musculoskeletal:   Normal range of motion visualized  Psych:  Normal affect and normal insight  Neuro:  Alert and appropriate  Lab Results:   No visits with results within 1 Day(s) from this visit     Latest known visit with results is:   Office Visit on 12/20/2021   Component Date Value    LEUKOCYTE ESTERASE,UA 12/20/2021 NEGATIVE     NITRITE,UA 12/20/2021 NEGATIVE     SL AMB POCT UROBILINOGEN 12/20/2021 0 5     POCT URINE PROTEIN 12/20/2021 ++ 100      PH,UA 12/20/2021 6     BLOOD,UA 12/20/2021 TRACE     SPECIFIC GRAVITY,UA 12/20/2021 1 015     KETONES,UA 12/20/2021 NEGATIVE     BILIRUBIN,UA 12/20/2021 NEGATIVE     GLUCOSE, UA 12/20/2021 NEGATIVE      COLOR,UA 12/20/2021 YELLOW     CLARITY,UA 12/20/2021 CLEAR        Lab Results   Component Value Date    WBC 4 04 (L) 11/20/2021    HGB 14 7 11/20/2021    HCT 44 4 11/20/2021    MCV 90 11/20/2021     11/20/2021       Lab Results   Component Value Date     09/15/2015    SODIUM 141 11/20/2021    K 4 5 11/20/2021     11/20/2021    CO2 25 11/20/2021    ANIONGAP 8 09/15/2015    AGAP 8 11/20/2021    BUN 19 11/20/2021    CREATININE 1 26 11/20/2021    GLUC 80 07/13/2021    GLUF 86 11/20/2021    CALCIUM 8 6 11/20/2021    AST 22 11/20/2021    ALT 30 11/20/2021    ALKPHOS 106 11/20/2021    PROT 6 9 09/15/2015    TP 6 8 11/20/2021    BILITOT 0 48 09/15/2015    TBILI 0 87 11/20/2021    EGFR 61 11/20/2021       No results found for: CRP    Lab Results   Component Value Date    MHE7OSJHMWNH 2 890 01/21/2020       No results found for: IRON, TIBC, FERRITIN    Radiology Results:   US scrotum and testicles    Result Date: 1/10/2022  Narrative: SCROTAL ULTRASOUND INDICATION:    N50 812: Left testicular pain  COMPARISON: 11/14/2019 TECHNIQUE:   Ultrasound the scrotal contents was performed with a high frequency linear transducer utilizing volumetric sweep imaging as well as standard still image techniques  Imaging performed in longitudinal and transverse orientation  Color and spectral Doppler evaluation also performed bilaterally  FINDINGS: TESTES: Testes are symmetric and normal in size  RIGHT testis = 3 7 x 2 x 2 3 cm Normal contour with homogeneous smooth echotexture  No intratesticular mass lesion or calcifications  LEFT testis = 3 7 x 1 6 x 2 6 cm Normal contour with homogeneous smooth echotexture  No intratesticular mass lesion or calcifications  Doppler flow within both testes is present and symmetric  EPIDIDYMIDES: Normal Size  Doppler ultrasound demonstrates normal blood flow  Right epididymal cyst measures 1 x 0 6 x 1 1 cm  Left epididymal cyst measures 0 8 x 0 5 x 0 7 cm  Additional left epididymal cyst measures 4 x 3 x 3 mm  HYDROCELE:  No significant fluid present  VARICOCELE:  None present  SCROTUM:  Scrotal thickness and appearance within normal limits  No evidence for extratesticular mass or hernia demonstrated  Impression: 1  Unremarkable testes  2   Bilateral epididymal cysts   Workstation performed: [id number]

## 2022-01-13 ENCOUNTER — HOSPITAL ENCOUNTER (OUTPATIENT)
Dept: CT IMAGING | Facility: HOSPITAL | Age: 62
Discharge: HOME | End: 2022-01-13
Attending: OTOLARYNGOLOGY
Payer: COMMERCIAL

## 2022-01-13 DIAGNOSIS — J32.4 CHRONIC PANSINUSITIS: ICD-10-CM

## 2022-01-13 DIAGNOSIS — J34.2 DEVIATED NASAL SEPTUM: ICD-10-CM

## 2022-01-13 DIAGNOSIS — R05.9 COUGH: ICD-10-CM

## 2022-01-13 PROCEDURE — G1004 CDSM NDSC: HCPCS

## 2022-01-13 PROCEDURE — 70486 CT MAXILLOFACIAL W/O DYE: CPT

## 2022-01-20 ENCOUNTER — TELEPHONE (OUTPATIENT)
Dept: GASTROENTEROLOGY | Facility: AMBULARY SURGERY CENTER | Age: 62
End: 2022-01-20

## 2022-01-20 ENCOUNTER — NURSE TRIAGE (OUTPATIENT)
Dept: OTHER | Facility: OTHER | Age: 62
End: 2022-01-20

## 2022-01-20 NOTE — TELEPHONE ENCOUNTER
Regarding: Problems going to the bathroom  ----- Message from Lina Montemayor sent at 1/20/2022 10:44 AM EST -----  " I am having problems going to the bathroom "

## 2022-01-20 NOTE — TELEPHONE ENCOUNTER
Patients GI provider:  Dr Ba Holbrook     Number to return call: [phone]     Reason for call: patient calling to schedule his EGD       Scheduled procedure/appointment date if applicable: Apt/procedure 3/30/22

## 2022-01-26 ENCOUNTER — ANESTHESIA (OUTPATIENT)
Dept: ANESTHESIOLOGY | Facility: HOSPITAL | Age: 62
End: 2022-01-26

## 2022-01-26 ENCOUNTER — ANESTHESIA EVENT (OUTPATIENT)
Dept: ANESTHESIOLOGY | Facility: HOSPITAL | Age: 62
End: 2022-01-26

## 2022-01-26 ENCOUNTER — OFFICE VISIT (OUTPATIENT)
Dept: GASTROENTEROLOGY | Facility: MEDICAL CENTER | Age: 62
End: 2022-01-26
Payer: COMMERCIAL

## 2022-01-26 VITALS
HEART RATE: 60 BPM | DIASTOLIC BLOOD PRESSURE: 78 MMHG | TEMPERATURE: 98.7 F | BODY MASS INDEX: 25.33 KG/M2 | WEIGHT: 143 LBS | SYSTOLIC BLOOD PRESSURE: 118 MMHG

## 2022-01-26 DIAGNOSIS — K59.00 CONSTIPATION, UNSPECIFIED CONSTIPATION TYPE: ICD-10-CM

## 2022-01-26 DIAGNOSIS — R10.11 RUQ PAIN: Primary | ICD-10-CM

## 2022-01-26 PROCEDURE — 1036F TOBACCO NON-USER: CPT | Performed by: PHYSICIAN ASSISTANT

## 2022-01-26 PROCEDURE — 99214 OFFICE O/P EST MOD 30 MIN: CPT | Performed by: PHYSICIAN ASSISTANT

## 2022-01-26 RX ORDER — PRUCALOPRIDE 2 MG/1
2 TABLET, FILM COATED ORAL DAILY
Qty: 30 TABLET | Refills: 3 | Status: SHIPPED | OUTPATIENT
Start: 2022-01-26 | End: 2022-01-27

## 2022-01-26 NOTE — PROGRESS NOTES
[facility] - Outpatient Follow-up Note  Chilo Daley 57 y o  male MRN: [id number]  Encounter: [id number]          ASSESSMENT AND PLAN:      1  RUQ pain: he continues to complain of ruq abdominal pain  He had ruq u/s in 11/21 that was negative  He never had EGD  Denies reflux, nausea, vomiting, dysphagia  Will plan for EGD at time of colonoscopy to rule out gastritis, duodenitis, pud, h pylori infection  - EGD; Future  -continue prilosec 40mg daily     2  Constipation, unspecified constipation type: he had colonoscopy in 2019 that was normal  He suffers from constipation and is on linzess 290 mcg which is not working well for him  We will have him drink a bowel prep and then start motegrity 2mg daily afterwards -will start prior auth  He also admits to occasional brbpr  He had normal colonoscopy in 2019, this could be anorectal in nature  If we get his constipation under good control and this still occurs, recommend repeating colonoscopy for further evaluation   - Prucalopride Succinate (Motegrity) 2 MG TABS; Take 2 mg by mouth in the morning  Dispense: 30 tablet; Refill: 3  - polyethylene glycol (GOLYTELY) 4000 mL solution; Sip to produce bowel movements, then start motegrity  Dispense: 4000 mL; Refill: 0    F/u after EGD  Risks and benefits of EGD were discussed including not limited to bleeding, infection, perforation  He understands and agrees to proceed with procedure    ______________________________________________________________________    SUBJECTIVE:  Malvina Mcburney is a 77-year-old male with a past medical history of chronic constipation, hypertension who is here with complaints of worsening right upper quadrant abdominal pain  He underwent a right upper quadrant ultrasound in November of 2021 for this same pain, which was negative  He denies any other upper GI symptoms reflux, dysphagia, nausea vomiting    He does admit abdominal pain Linzess 290 micro g daily but states this is not really working for him and he would like to try another medication  He also admits to some bright red blood per rectum with wiping with straining  He never had an EGD  He had a colonoscopy in 2019 that was normal       REVIEW OF SYSTEMS IS OTHERWISE NEGATIVE        Historical Information   Past Medical History:   Diagnosis Date    COVID-19 virus infection 2020    Exposure to COVID-19 virus 2020    History of chronic constipation     History of neck pain 2017    Hypertension      Past Surgical History:   Procedure Laterality Date    CYSTOSCOPY  2014    diagnostic managed by Jalen Mora    TOOTH EXTRACTION       Social History   Social History     Substance and Sexual Activity   Alcohol Use Yes    Comment: weekly     Social History     Substance and Sexual Activity   Drug Use Never     Social History     Tobacco Use   Smoking Status Former Smoker    Types: Cigarettes    Quit date: 2014    Years since quittin 0   Smokeless Tobacco Never Used     Family History   Problem Relation Age of Onset    Cancer Sister         malignant neoplasm of breast    Diabetes Paternal Grandmother     Diabetes Family     Arthritis Family     Cancer Family         malignant neoplasm    Substance Abuse Neg Hx         denied Mother and Father    Mental illness Neg Hx         no family history Mother and Father    Other Neg Hx         denied family history of Osteopertrosis       Meds/Allergies       Current Outpatient Medications:     amLODIPine (NORVASC) 5 mg tablet    omeprazole (PriLOSEC) 40 MG capsule    tamsulosin (FLOMAX) 0 4 mg    bisacodyl (DULCOLAX) 5 mg EC tablet    mometasone (NASONEX) 50 mcg/act nasal spray    nystatin (MYCOSTATIN) powder    polyethylene glycol (GOLYTELY) 4000 mL solution    polyethylene glycol (GOLYTELY) 4000 mL solution    Prucalopride Succinate (Motegrity) 2 MG TABS    No Known Allergies        Objective     Blood pressure 118/78, pulse 60, temperature 98 7 °F (37 1 °C), weight 64 9 kg (143 lb)  Body mass index is 25 33 kg/m²  PHYSICAL EXAM:      General Appearance:   Alert, cooperative, no distress   HEENT:   Normocephalic, atraumatic, anicteric      Neck:  Supple, symmetrical, trachea midline   Lungs:   Clear to auscultation bilaterally; no rales, rhonchi or wheezing; respirations unlabored    Heart[de-identified]   Regular rate and rhythm; no murmur, rub, or gallop  Abdomen:   Soft, diffuse tenderness, non-distended; normal bowel sounds; no masses, no organomegaly    Genitalia:   Deferred    Rectal:   Deferred    Extremities:  No cyanosis, clubbing or edema    Pulses:  2+ and symmetric    Skin:  No jaundice, rashes, or lesions    Lymph nodes:  No palpable cervical lymphadenopathy        Lab Results:   No visits with results within 1 Day(s) from this visit  Latest known visit with results is:   Office Visit on 12/20/2021   Component Date Value    LEUKOCYTE ESTERASE,UA 12/20/2021 NEGATIVE     NITRITE,UA 12/20/2021 NEGATIVE     SL AMB POCT UROBILINOGEN 12/20/2021 0 5     POCT URINE PROTEIN 12/20/2021 ++ 100      PH,UA 12/20/2021 6     BLOOD,UA 12/20/2021 TRACE     SPECIFIC GRAVITY,UA 12/20/2021 1 015     KETONES,UA 12/20/2021 NEGATIVE     BILIRUBIN,UA 12/20/2021 NEGATIVE     GLUCOSE, UA 12/20/2021 NEGATIVE      COLOR,UA 12/20/2021 YELLOW     CLARITY,UA 12/20/2021 CLEAR          Radiology Results:   US scrotum and testicles    Result Date: 1/10/2022  Narrative: SCROTAL ULTRASOUND INDICATION:    N50 812: Left testicular pain  COMPARISON: 11/14/2019 TECHNIQUE:   Ultrasound the scrotal contents was performed with a high frequency linear transducer utilizing volumetric sweep imaging as well as standard still image techniques  Imaging performed in longitudinal and transverse orientation  Color and spectral Doppler evaluation also performed bilaterally  FINDINGS: TESTES: Testes are symmetric and normal in size   RIGHT testis = 3 7 x 2 x 2 3 cm Normal contour with homogeneous smooth echotexture  No intratesticular mass lesion or calcifications  LEFT testis = 3 7 x 1 6 x 2 6 cm Normal contour with homogeneous smooth echotexture  No intratesticular mass lesion or calcifications  Doppler flow within both testes is present and symmetric  EPIDIDYMIDES: Normal Size  Doppler ultrasound demonstrates normal blood flow  Right epididymal cyst measures 1 x 0 6 x 1 1 cm  Left epididymal cyst measures 0 8 x 0 5 x 0 7 cm  Additional left epididymal cyst measures 4 x 3 x 3 mm  HYDROCELE:  No significant fluid present  VARICOCELE:  None present  SCROTUM:  Scrotal thickness and appearance within normal limits  No evidence for extratesticular mass or hernia demonstrated  Impression: 1  Unremarkable testes  2   Bilateral epididymal cysts  CT sinus wo contrast    Result Date: 1/14/2022  Narrative: CT SINUSES INDICATION: Deviated nasal septum  Evaluate for sinusitis  COMPARISON:  Head CT from January 16, 2019 TECHNIQUE:  Axial CT imaging through the sinuses was performed  In addition, sagittal and coronal reformatted images were submitted for interpretation  Radiation dose length product (DLP) for this visit:  279 mGy-cm   This examination, like all CT scans performed in the [facility], was performed utilizing techniques to minimize radiation dose exposure, including the use of iterative reconstruction and automated exposure control  IMAGE QUALITY:  Diagnostic  FINDINGS: FRONTAL SINUSES:  Clear  ETHMOID AIR CELLS:  Clear  MAXILLARY SINUSES:  Clear  SPHENOID SINUSES:  Clear  NASAL SEPTUM AND CAVITY:  Mild S-shaped nasal septal deviation with left-sided spurring  Normal nasal cavity  ANTERIOR OSTEOMEATAL COMPLEX:  Patent  OSSEOUS STRUCTURES:  No bony erosion or destruction  Normal cribriform plate and planum sphenoidale  Visualized mastoid temporal bones are clear  The bony walls of the carotid canals are intact   SOFT TISSUES:  Normal      Impression: Clear paranasal sinuses  Mild nasal septal deviation and spurring

## 2022-01-26 NOTE — PATIENT INSTRUCTIONS
Scheduled date of EGD (as of today): 01/27/2022  Physician performing: Dr Bryson Valdes   Location of procedure:  [place]  Bowel prep reviewed with patient: n/a  Instructions reviewed with patient by: MA  Clearances: n/a

## 2022-01-26 NOTE — ANESTHESIA PREPROCEDURE EVALUATION
Procedure:  PRE-OP ONLY    Relevant Problems   ANESTHESIA (within normal limits)      CARDIO   (+) Atypical chest pain   (+) Benign essential hypertension      ENDO (within normal limits)      GI/HEPATIC   (+) Gastroesophageal reflux disease      /RENAL   (+) Renal calculus, right      GYN (within normal limits)      HEMATOLOGY (within normal limits)      MUSCULOSKELETAL   (+) Chronic right-sided low back pain   (+) Right-sided low back pain with right-sided sciatica      NEURO/PSYCH   (+) Chronic tension headache      PULMONARY (within normal limits)             Anesthesia Plan  ASA Score- 2     Anesthesia Type- IV sedation with anesthesia with ASA Monitors  Additional Monitors:   Airway Plan:           Plan Factors-Exercise tolerance (METS): >4 METS  Chart reviewed  Patient summary reviewed  Patient is not a current smoker  Induction- intravenous  Postoperative Plan-     Informed Consent- Anesthetic plan and risks discussed with patient

## 2022-01-26 NOTE — TELEPHONE ENCOUNTER
Contacted GI lab regarding add on for Dr Karine Jordan per Ginger  Contacted Anesthesiologist on call [phone] spoke to Alina jordan for now  Will bring case to Anesthesiologist tomorrow morning for final review

## 2022-01-27 ENCOUNTER — HOSPITAL ENCOUNTER (OUTPATIENT)
Dept: GASTROENTEROLOGY | Facility: MEDICAL CENTER | Age: 62
Setting detail: OUTPATIENT SURGERY
Discharge: HOME | End: 2022-01-27
Admitting: INTERNAL MEDICINE
Payer: COMMERCIAL

## 2022-01-27 ENCOUNTER — ANESTHESIA (OUTPATIENT)
Dept: GASTROENTEROLOGY | Facility: MEDICAL CENTER | Age: 62
End: 2022-01-27

## 2022-01-27 ENCOUNTER — ANESTHESIA EVENT (OUTPATIENT)
Dept: GASTROENTEROLOGY | Facility: MEDICAL CENTER | Age: 62
End: 2022-01-27

## 2022-01-27 VITALS
TEMPERATURE: 97.7 F | WEIGHT: 160 LBS | SYSTOLIC BLOOD PRESSURE: 141 MMHG | HEART RATE: 53 BPM | DIASTOLIC BLOOD PRESSURE: 90 MMHG | RESPIRATION RATE: 16 BRPM | BODY MASS INDEX: 28.35 KG/M2 | OXYGEN SATURATION: 100 % | HEIGHT: 63 IN

## 2022-01-27 DIAGNOSIS — R10.11 RUQ PAIN: ICD-10-CM

## 2022-01-27 PROCEDURE — 88305 TISSUE EXAM BY PATHOLOGIST: CPT | Performed by: PATHOLOGY

## 2022-01-27 PROCEDURE — 43239 EGD BIOPSY SINGLE/MULTIPLE: CPT | Performed by: INTERNAL MEDICINE

## 2022-01-27 RX ORDER — LIDOCAINE HYDROCHLORIDE 20 MG/ML
INJECTION, SOLUTION EPIDURAL; INFILTRATION; INTRACAUDAL; PERINEURAL AS NEEDED
Status: DISCONTINUED | OUTPATIENT
Start: 2022-01-27 | End: 2022-01-27

## 2022-01-27 RX ORDER — PRUCALOPRIDE 2 MG/1
TABLET, FILM COATED ORAL
Qty: 30 TABLET | Refills: 3 | Status: SHIPPED | OUTPATIENT
Start: 2022-01-27 | End: 2022-02-02

## 2022-01-27 RX ORDER — SODIUM CHLORIDE 9 MG/ML
125 INJECTION, SOLUTION INTRAVENOUS CONTINUOUS
Status: DISCONTINUED | OUTPATIENT
Start: 2022-01-27 | End: 2022-01-31 | Stop reason: HOSPADM

## 2022-01-27 RX ORDER — PROPOFOL 10 MG/ML
INJECTION, EMULSION INTRAVENOUS AS NEEDED
Status: DISCONTINUED | OUTPATIENT
Start: 2022-01-27 | End: 2022-01-27

## 2022-01-27 RX ADMIN — LIDOCAINE HYDROCHLORIDE 80 MG: 20 INJECTION, SOLUTION EPIDURAL; INFILTRATION; INTRACAUDAL; PERINEURAL at 12:54

## 2022-01-27 RX ADMIN — SODIUM CHLORIDE 125 ML/HR: 0.9 INJECTION, SOLUTION INTRAVENOUS at 12:12

## 2022-01-27 RX ADMIN — Medication 0.4 MG: at 12:56

## 2022-01-27 RX ADMIN — PROPOFOL 50 MG: 10 INJECTION, EMULSION INTRAVENOUS at 12:59

## 2022-01-27 RX ADMIN — PROPOFOL 100 MG: 10 INJECTION, EMULSION INTRAVENOUS at 12:54

## 2022-01-27 RX ADMIN — PROPOFOL 50 MG: 10 INJECTION, EMULSION INTRAVENOUS at 12:55

## 2022-01-27 NOTE — ANESTHESIA PREPROCEDURE EVALUATION
Procedure:  EGD    Relevant Problems   CARDIO   (+) Atypical chest pain   (+) Benign essential hypertension      GI/HEPATIC   (+) Gastroesophageal reflux disease      /RENAL   (+) Renal calculus, right      MUSCULOSKELETAL   (+) Chronic right-sided low back pain   (+) Right-sided low back pain with right-sided sciatica      NEURO/PSYCH   (+) Chronic tension headache        Physical Exam    Airway    Mallampati score: I  TM Distance: >3 FB  Neck ROM: full     Dental   No notable dental hx     Cardiovascular  Rhythm: regular, Rate: normal, Cardiovascular exam normal    Pulmonary  Pulmonary exam normal Breath sounds clear to auscultation,     Other Findings        Anesthesia Plan  ASA Score- 2     Anesthesia Type-     Plan Factors-Exercise tolerance (METS): >4 METS  Chart reviewed  Patient is not a current smoker  Induction- intravenous  Postoperative Plan-     Informed Consent- Anesthetic plan and risks discussed with patient

## 2022-01-27 NOTE — DISCHARGE INSTRUCTIONS
Endoscopia superior   LO QUE NECESITA SABER:   Gin endoscopia superior también se conoce melvin endoscopia gastrointestinal (GI) superior o esofagogastroduodenoscopia (EGD)  Usted podría sentirse inflamado, con gases o sentir molestia abdominal después de harrison procedimiento  Harrison garganta podría estar adolorida por 24 a 36 horas  Usted podría eructar o expulsar gas debido al aire que todavía está en harrison cuerpo  INSTRUCCIONES SOBRE [facility]ITAL[facility]:   Llame al 911 en gail de presentar lo siguiente:   · Tiene dolor en el pecho o dificultad para respirar de forma repentina  Busque atención médica de inmediato si:   · Está mareado o siente que se va a desmayar  · Usted tiene dificultad para tragar  · Berenice evacuaciones intestinales son Jose Eduardo Lucas o negras  · Harrison abdomen está eli y firme y usted siente dolor intenso  · Usted vomita demetra  Pregúntele a harrison Cherylann Bimler vitaminas y minerales son adecuados para usted  · Usted se siente lleno o hinchado y no puede eructar o expulsar gas  · Usted no ha tenido gin evacuación intestinal después de 3 días de harrison procedimiento  · Usted tiene dolor de leonel  · Usted tiene fiebre o escalofríos  · Usted tiene náuseas o está vomitando  · Usted tiene salpullido o urticaria  · Usted tiene preguntas o inquietudes acerca de harrison endoscopia  Alivie el dolor de garganta:  Chupe pastillas para la garganta o hielo triturado  Veronica gárgaras con gin pequeña cantidad de agua tibia con sal  Mezcle 1 cucharadita de sal y 1 taza de agua tibia para hacer agua salada  Alivie el gas y la molestia de la inflamación:  Acuéstese sobre harrison costado derecho con gin almohada térmica sobre harrison abdomen  Camine un poco para ayudar a expulsar el gas  Coma comidas pequeñas hasta que se alivie de la inflamación  Descanse después de harrison procedimiento:  A usted le summers administrado medicamento para relajarse   No  maneje o tome decisiones importantes hasta el día siguiente de harrison procedimiento  Regrese a dewey actividades normales según le indiquen  Usted generalmente puede regresar al Trang Khan al día siguiente de bhagat procedimiento  Acuda a dewey consultas de control con bhagat médico según le indicaron  Anote dewey preguntas para que se acuerde de hacerlas demond dewey visitas  © 2017 neisha Greene  Information is for End User's use only and may not be sold, redistributed or otherwise used for commercial purposes  All illustrations and images included in CareNotes® are the copyrighted property of A D A M , Inc  or Leo Ewing  Esta información es sólo para uso en educación  Bhagat intención no es darle un consejo médico sobre enfermedades o tratamientos  Colsulte con bhagat Nicolas Elliott farmacéutico antes de seguir cualquier régimen médico para saber si es seguro y efectivo para usted

## 2022-01-27 NOTE — H&P
History and Physical - [facility]  Idalia Zhao 57 y o  male MRN: [id number]                  HPI: Idalia Zhao is a 57y o  year old male who presents for right abdominal pain      REVIEW OF SYSTEMS: Per the HPI, and otherwise unremarkable      Historical Information   Past Medical History:   Diagnosis Date    COVID-19 virus infection 2020    Exposure to COVID-19 virus 2020    History of chronic constipation     History of neck pain 2017    Hypertension      Past Surgical History:   Procedure Laterality Date    CYSTOSCOPY  2014    diagnostic managed by Jalen Mora    TOOTH EXTRACTION       Social History   Social History     Substance and Sexual Activity   Alcohol Use Yes    Comment: social     Social History     Substance and Sexual Activity   Drug Use Never     Social History     Tobacco Use   Smoking Status Former Smoker    Types: Cigarettes    Quit date: 2014    Years since quittin 0   Smokeless Tobacco Never Used     Family History   Problem Relation Age of Onset    Cancer Sister         malignant neoplasm of breast    Diabetes Paternal Grandmother     Diabetes Family     Arthritis Family     Cancer Family         malignant neoplasm    Substance Abuse Neg Hx         denied Mother and Father    Mental illness Neg Hx         no family history Mother and Father    Other Neg Hx         denied family history of Osteopertrosis       Meds/Allergies       Current Outpatient Medications:     amLODIPine (NORVASC) 5 mg tablet    omeprazole (PriLOSEC) 40 MG capsule    Prucalopride Succinate (Motegrity) 2 MG TABS    tamsulosin (FLOMAX) 0 4 mg    mometasone (NASONEX) 50 mcg/act nasal spray    nystatin (MYCOSTATIN) powder    polyethylene glycol (GOLYTELY) 4000 mL solution    Current Facility-Administered Medications:     sodium chloride 0 9 % infusion, 125 mL/hr, Intravenous, Continuous    No Known Allergies    Objective     /97   Pulse 60 Temp 97 7 °F (36 5 °C) (Temporal)   Resp 16   Ht 5' 3" (1 6 m)   Wt 72 6 kg (160 lb)   SpO2 100%   BMI 28 34 kg/m²       PHYSICAL EXAM    Gen: NAD  Head: NCAT  CV: RRR  CHEST: Clear  ABD: soft, NT/ND  EXT: no edema      ASSESSMENT/PLAN:  This is a 57y o  year old male here for right abdominal pain, and he is stable and optimized for his procedure

## 2022-01-28 ENCOUNTER — TELEPHONE (OUTPATIENT)
Dept: GASTROENTEROLOGY | Facility: MEDICAL CENTER | Age: 62
End: 2022-01-28

## 2022-02-14 ENCOUNTER — OFFICE VISIT (OUTPATIENT)
Dept: GASTROENTEROLOGY | Facility: MEDICAL CENTER | Age: 62
End: 2022-02-14
Payer: COMMERCIAL

## 2022-02-14 VITALS
DIASTOLIC BLOOD PRESSURE: 54 MMHG | WEIGHT: 160 LBS | HEART RATE: 78 BPM | SYSTOLIC BLOOD PRESSURE: 142 MMHG | TEMPERATURE: 97.6 F | BODY MASS INDEX: 28.34 KG/M2

## 2022-02-14 DIAGNOSIS — R10.11 RIGHT UPPER QUADRANT ABDOMINAL PAIN: ICD-10-CM

## 2022-02-14 DIAGNOSIS — K31.A0 INTESTINAL METAPLASIA OF STOMACH: Primary | ICD-10-CM

## 2022-02-14 DIAGNOSIS — K59.00 CONSTIPATION, UNSPECIFIED CONSTIPATION TYPE: ICD-10-CM

## 2022-02-14 PROCEDURE — 99214 OFFICE O/P EST MOD 30 MIN: CPT | Performed by: PHYSICIAN ASSISTANT

## 2022-02-19 DIAGNOSIS — J32.4 CHRONIC PANSINUSITIS: ICD-10-CM

## 2022-02-21 RX ORDER — MOMETASONE FUROATE 50 UG/1
SPRAY, METERED NASAL
Qty: 17 G | Refills: 0 | Status: SHIPPED | OUTPATIENT
Start: 2022-02-21

## 2022-02-21 NOTE — PROGRESS NOTES
[facility] - Outpatient Follow-up Note  Keturah Reina 57 y o  male MRN: [id number]  Encounter: [id number]          ASSESSMENT AND PLAN:      1  Intestinal metaplasia of stomach  2  Right upper quadrant abdominal pain: EGD 1/27 with granulated tissue in the stomach, biopsy showing intestinal metaplasia of the stomach and possible barretts  He does have family hx of gastric cancer in an uncle  Would recommend repeating EGD with gastric mapping   -continue prilosec 40mg daily   -repeat EGD with gastric mapping     3  Constipation, unspecified constipation type: we were going to start motegrity but this was not covered by his insurance  We will start amitiza after he drinks some golytely  His constipation is likely contributing to his abdominal pain  -amitiza 24mcg bid     F/u after repeat EGD    ______________________________________________________________________    SUBJECTIVE:  Woodrow Sanford is a 67-year-old male with a past medical history of chronic constipation, hypertension who is here for follow up after EGD  He was seen in the office 1/22 with RUQ abdominal pain and constipation  He underwent a right upper quadrant ultrasound in November of 2021 for this same pain, which was negative  He denies any other upper GI symptoms reflux, dysphagia, nausea vomiting  He does admit abdominal pain  He was on linzess daily but states this was not working for him  He had EGD 1/27 with granulated tissue in the stomach, biopsy showing intestinal metaplasia and barretts  We tried to start motegrity but this was not covered by his insurance, we are now trying amitiza instead  He had a colonoscopy in 2019 that was normal   Family hx of gastric cancer in an uncle  REVIEW OF SYSTEMS IS OTHERWISE NEGATIVE        Historical Information   Past Medical History:   Diagnosis Date    COVID-19 virus infection 5/1/2020    Exposure to COVID-19 virus 11/23/2020    History of chronic constipation     History of neck pain 2017    Hypertension      Past Surgical History:   Procedure Laterality Date    CYSTOSCOPY  2014    diagnostic managed by Jalen Mora    TOOTH EXTRACTION       Social History   Social History     Substance and Sexual Activity   Alcohol Use Yes    Comment: social     Social History     Substance and Sexual Activity   Drug Use Never     Social History     Tobacco Use   Smoking Status Former Smoker    Types: Cigarettes    Quit date: 2014    Years since quittin 1   Smokeless Tobacco Never Used     Family History   Problem Relation Age of Onset    Cancer Sister         malignant neoplasm of breast    Diabetes Paternal Grandmother     Diabetes Family     Arthritis Family     Cancer Family         malignant neoplasm    Substance Abuse Neg Hx         denied Mother and Father    Mental illness Neg Hx         no family history Mother and Father    Other Neg Hx         denied family history of Osteopertrosis       Meds/Allergies       Current Outpatient Medications:     amLODIPine (NORVASC) 5 mg tablet    lubiprostone (AMITIZA) 24 mcg capsule    omeprazole (PriLOSEC) 40 MG capsule    polyethylene glycol (GOLYTELY) 4000 mL solution    tamsulosin (FLOMAX) 0 4 mg    mometasone (NASONEX) 50 mcg/act nasal spray    nystatin (MYCOSTATIN) powder    No Known Allergies        Objective     Blood pressure 142/54, pulse 78, temperature 97 6 °F (36 4 °C), weight 72 6 kg (160 lb)  Body mass index is 28 34 kg/m²  PHYSICAL EXAM:      General Appearance:   Alert, cooperative, no distress   HEENT:   Normocephalic, atraumatic, anicteric      Neck:  Supple, symmetrical, trachea midline   Lungs:   Clear to auscultation bilaterally; no rales, rhonchi or wheezing; respirations unlabored    Heart[de-identified]   Regular rate and rhythm; no murmur, rub, or gallop     Abdomen:   Soft, non-tender, non-distended; normal bowel sounds; no masses, no organomegaly    Genitalia:   Deferred    Rectal:   Deferred    Extremities:  No cyanosis, clubbing or edema    Pulses:  2+ and symmetric    Skin:  No jaundice, rashes, or lesions    Lymph nodes:  No palpable cervical lymphadenopathy        Lab Results:   No visits with results within 1 Day(s) from this visit  Latest known visit with results is:   Hospital Outpatient Visit on 01/27/2022   Component Date Value    Case Report 01/27/2022                      Value:Surgical Pathology Report                         Case: [id number]                                   Authorizing Provider:  July Hamilton MD              Collected:           01/27/2022 1257              Ordering Location:     [facility]        Received:            01/27/2022 [facility]                                                     Pathologist:           Massiel Zapata MD                                                                 Specimens:   A) - Stomach, gastric bx r/o h pylori                                                               B) - Stomach, bx granulated tissue with cobblestone appearance r/o dyspasia vs                      intestional metaplasia                                                                              C) - Esophagogastric junction, bx ge junction r/o Barretts                                 Final Diagnosis 01/27/2022                      Value: This result contains rich text formatting which cannot be displayed here   Additional Information 01/27/2022                      Value: This result contains rich text formatting which cannot be displayed here  Zev Cross Gross Description 01/27/2022                      Value: This result contains rich text formatting which cannot be displayed here           Radiology Results:   EGD    Result Date: 1/27/2022  Narrative: [address] [facility] [phone]  [phone] DATE OF SERVICE: 1/27/22 PHYSICIAN(S): Deann Means MD Proceduralist INDICATION: RUQ pain POST-OP DIAGNOSIS: See the impression below  PREPROCEDURE: Informed consent was obtained for the procedure, including sedation  Risks of perforation, hemorrhage, adverse drug reaction and aspiration were discussed  The patient was placed in the left lateral decubitus position  Patient was explained about the risks and benefits of the procedure  Risks including but not limited to bleeding, infection, and perforation were explained in detail  Also explained about less than 100% sensitivity with the exam and other alternatives  DETAILS OF PROCEDURE: Patient was taken to the procedure room where a time out was performed to confirm correct patient and correct procedure  The patient underwent monitored anesthesia care, which was administered by an anesthesia professional  The patient's blood pressure, heart rate, level of consciousness, respirations and oxygen were monitored throughout the procedure  The scope was advanced to the second part of the duodenum  Retroflexion was performed in the fundus  The patient experienced no blood loss  The procedure was not difficult  The patient tolerated the procedure well  There were no apparent complications  ANESTHESIA INFORMATION: ASA: II Anesthesia Type: Anesthesia type not filed in the log  MEDICATIONS: simethicone (MYLICON) 0 4 mg in sterile water 60 mL 0 4 mg (Totals for administrations occurring from 1249 to 1306 on 01/27/22) FINDINGS: Irregular Z-line 37 cm from the incisors; performed cold forceps biopsy   (One small tongue measures 1 cm in length, biopsies taken) Patchy cobblestone and granular mucosa in the body of the stomach; performed 6 cold forceps biopsies Performed biopsies in the antrum The duodenum appeared normal  SPECIMENS: ID Type Source Tests Collected by Time Destination 1 : gastric bx r/o h pylori Tissue Stomach TISSUE EXAM Deann Means MD 1/27/2022 12:57 PM  2 : bx granulated tissue with cobblestone appearance r/o dyspasia vs  intestional metaplasia Tissue Stomach TISSUE EXAM Deann Means MD 1/27/2022 12:59 PM  3 : bx ge junction r/o Barretts Tissue Esophagogastric junction TISSUE EXAM Deann Means MD 1/27/2022  1:01 PM      Impression: Irregular Z-line, biopsied Granulated tissue in the stomach, biopsied RECOMMENDATION: Await pathology results Follow up with Dr Ishmael Arriola for right side abdominal pain in office     Deann Means MD

## 2022-02-22 ENCOUNTER — TELEPHONE (OUTPATIENT)
Dept: PREADMISSION TESTING | Facility: HOSPITAL | Age: 62
End: 2022-02-22

## 2022-02-22 ENCOUNTER — TELEPHONE (OUTPATIENT)
Dept: GASTROENTEROLOGY | Facility: CLINIC | Age: 62
End: 2022-02-22

## 2022-02-22 NOTE — TELEPHONE ENCOUNTER
Patient confirmed procedure & OV  Scheduled date of EGD(as of today): 3/10/22  Physician performing EGD: Ayana Montemayor  Location of EGD: [address] [place] [address]  Instructions reviewed with patient by:Chel/emailed to [email or web address]  Clearances: N/A

## 2022-02-22 NOTE — TELEPHONE ENCOUNTER
----- Message from Tameka Michel PA-C sent at 2/21/2022  3:27 PM EST -----  Hi,   Can we please set him up for repeat EGD with Dr Mary Garcia and follow up in office after   Thank you

## 2022-02-22 NOTE — TELEPHONE ENCOUNTER
JUANITO to contact the office regarding EGD scheduled on 3/10 at [facility]   Office visit scheduled on 4/4 with Dr Tariq

## 2022-03-04 ENCOUNTER — APPOINTMENT (OUTPATIENT)
Dept: LAB | Facility: HOSPITAL | Age: 62
End: 2022-03-04
Payer: COMMERCIAL

## 2022-03-04 DIAGNOSIS — Z12.5 PROSTATE CANCER SCREENING: ICD-10-CM

## 2022-03-04 LAB — PSA SERPL-MCNC: 0.5 NG/ML (ref 0–4)

## 2022-03-04 PROCEDURE — 36415 COLL VENOUS BLD VENIPUNCTURE: CPT

## 2022-03-04 PROCEDURE — G0103 PSA SCREENING: HCPCS

## 2022-03-08 ENCOUNTER — OFFICE VISIT (OUTPATIENT)
Dept: UROLOGY | Facility: AMBULATORY SURGERY CENTER | Age: 62
End: 2022-03-08
Payer: COMMERCIAL

## 2022-03-08 VITALS
WEIGHT: 136.2 LBS | SYSTOLIC BLOOD PRESSURE: 108 MMHG | HEIGHT: 63 IN | DIASTOLIC BLOOD PRESSURE: 82 MMHG | HEART RATE: 62 BPM | BODY MASS INDEX: 24.13 KG/M2

## 2022-03-08 DIAGNOSIS — N40.1 BENIGN PROSTATIC HYPERPLASIA WITH URINARY HESITANCY: ICD-10-CM

## 2022-03-08 DIAGNOSIS — Z12.5 PROSTATE CANCER SCREENING: Primary | ICD-10-CM

## 2022-03-08 DIAGNOSIS — R39.11 BENIGN PROSTATIC HYPERPLASIA WITH URINARY HESITANCY: ICD-10-CM

## 2022-03-08 PROCEDURE — 3008F BODY MASS INDEX DOCD: CPT | Performed by: PHYSICIAN ASSISTANT

## 2022-03-08 PROCEDURE — 99213 OFFICE O/P EST LOW 20 MIN: CPT | Performed by: NURSE PRACTITIONER

## 2022-03-08 RX ORDER — TAMSULOSIN HYDROCHLORIDE 0.4 MG/1
0.4 CAPSULE ORAL
Qty: 90 CAPSULE | Refills: 3 | Status: SHIPPED | OUTPATIENT
Start: 2022-03-08

## 2022-03-08 NOTE — PROGRESS NOTES
3/8/2022      Chief Complaint   Patient presents with    Follow-up    Benign Prostatic Hypertrophy    Prostate Check       Assessment and Plan    1  Prostate cancer screening  · PSA performed 03/04/2022 resulted 0 5  · JENN-prostate approximately 40 g in size with no nodules  · Repeat PSA and JENN in 1 year    2  Benign prostatic hyperplasia  · Continue tamsulosin  · Will continue to monitor for worsening/progression of urinary symptoms  · Bladder scan PVR next office visit in 1 year      History of Present Illness  Guadalupe Lee is a 57 y o  male here for follow up evaluation of  urinary symptoms secondary to benign prostatic hyperplasia, routine prostate cancer screening and microscopic hematuria   Patient also has a history of epididymal cysts which have not changed in size or given him any symptoms of pain and discomfort   Patient with a negative microscopic hematuria workup performed in 2014  He reports worsening urinary symptoms since his last office visit  [facility] reports significant urinary urgency, hesitancy and frequency   He reports postvoid dribbling   He is very dissatisfied with his current urinary status is is worsened to the point that it has  Familia Guzman denies any recent changes to his health  [facility] is currently satisfied with his urinary health  [facility] continues to take MiraLax avoid 4 mg p o  Daily with no side effects  Component PSA, Total   Latest Ref Rng & Units 0 0 - 4 0 ng/mL   11/30/2017 0 4   9/18/2018 0 4   10/22/2019 0 4   3/1/2021 0 4   3/4/2022 0 5       Review of Systems   Constitutional: Negative for chills and fever  Respiratory: Negative for cough and shortness of breath  Cardiovascular: Negative for chest pain  Gastrointestinal: Negative for abdominal distention, abdominal pain, blood in stool, nausea and vomiting  Genitourinary: Negative for difficulty urinating, dysuria, enuresis, flank pain, frequency, hematuria and urgency  Skin: Negative for rash       Past Medical History  Past Medical History:   Diagnosis Date    COVID-19 virus infection 2020    Exposure to COVID-19 virus 2020    History of chronic constipation     History of neck pain 2017    Hypertension        Past Social History  Past Surgical History:   Procedure Laterality Date    CYSTOSCOPY  2014    diagnostic managed by Jalen Mora    TOOTH EXTRACTION       Social History     Tobacco Use   Smoking Status Former Smoker    Types: Cigarettes    Quit date: 2014    Years since quittin 1   Smokeless Tobacco Never Used       Past Family History  Family History   Problem Relation Age of Onset    Cancer Sister         malignant neoplasm of breast    Diabetes Paternal Grandmother     Diabetes Family     Arthritis Family     Cancer Family         malignant neoplasm    Substance Abuse Neg Hx         denied Mother and Father    Mental illness Neg Hx         no family history Mother and Father    Other Neg Hx         denied family history of Osteopertrosis       Past Social history  Social History     Socioeconomic History    Marital status: /Civil Union     Spouse name: Not on file    Number of children: Not on file    Years of education: Not on file    Highest education level: Not on file   Occupational History    Not on file   Tobacco Use    Smoking status: Former Smoker     Types: Cigarettes     Quit date: 2014     Years since quittin 1    Smokeless tobacco: Never Used   Vaping Use    Vaping Use: Never used   Substance and Sexual Activity    Alcohol use: Yes     Comment: social    Drug use: Never    Sexual activity: Not Currently   Other Topics Concern    Not on file   Social History Narrative    No preference or Druze beliefs     Social Determinants of Health     Financial Resource Strain: Unknown    Difficulty of Paying Living Expenses: Patient refused   Food Insecurity: Unknown    Worried About Running Out of Food in the Last Year: Patient refused    Ran Out of Food in the Last Year: Patient refused   Transportation Needs: Unknown    Lack of Transportation (Medical): Patient refused    Lack of Transportation (Non-Medical): Patient refused   Physical Activity: Not on file   Stress: Not on file   Social Connections: Not on file   Intimate Partner Violence: Not on file   Housing Stability: Not on file       Current Medications  Current Outpatient Medications   Medication Sig Dispense Refill    amLODIPine (NORVASC) 5 mg tablet Take 1 tablet (5 mg total) by mouth daily 90 tablet 1    lubiprostone (AMITIZA) 24 mcg capsule Take 1 capsule (24 mcg total) by mouth 2 (two) times a day with meals 60 capsule 3    mometasone (NASONEX) 50 mcg/act nasal spray SPRAY 2 SPRAYS INTO EACH NOSTRIL EVERY DAY 17 g 0    omeprazole (PriLOSEC) 40 MG capsule Take 1 capsule (40 mg total) by mouth daily before breakfast 30 capsule 3    polyethylene glycol (GOLYTELY) 4000 mL solution Sip to produce bowel movements, then start motegrity 4000 mL 0    tamsulosin (FLOMAX) 0 4 mg Take 1 capsule (0 4 mg total) by mouth daily with dinner 90 capsule 3    nystatin (MYCOSTATIN) powder Apply topically 3 (three) times a day (Patient not taking: Reported on 1/11/2022 ) 60 g 0     No current facility-administered medications for this visit  Allergies  No Known Allergies      The following portions of the patient's history were reviewed and updated as appropriate: allergies, current medications, past medical history, past social history, past surgical history and problem list       Vitals  Vitals:    03/08/22 0808   BP: 108/82   BP Location: Left arm   Patient Position: Sitting   Cuff Size: Adult   Pulse: 62   Weight: 61 8 kg (136 lb 3 2 oz)   Height: 5' 3" (1 6 m)           Physical Exam  Physical Exam  Vitals reviewed  Constitutional:       General: He is not in acute distress  Appearance: Normal appearance  He is normal weight  HENT:      Head: Normocephalic  Pulmonary:      Effort: No respiratory distress  Breath sounds: Normal breath sounds  Genitourinary:     Comments: JENN-prostate 40 g with no nodules  Skin:     General: Skin is warm and dry  Neurological:      General: No focal deficit present  Mental Status: He is alert and oriented to person, place, and time  Psychiatric:         Mood and Affect: Mood normal          Behavior: Behavior normal        Results  No results found for this or any previous visit (from the past 1 hour(s)) ]  Lab Results   Component Value Date    PSA 0 5 03/04/2022    PSA 0 4 03/01/2021    PSA 0 4 10/22/2019     Lab Results   Component Value Date    GLUCOSE 113 09/15/2015    CALCIUM 8 6 11/20/2021     09/15/2015    K 4 5 11/20/2021    CO2 25 11/20/2021     11/20/2021    BUN 19 11/20/2021    CREATININE 1 26 11/20/2021     Lab Results   Component Value Date    WBC 4 04 (L) 11/20/2021    HGB 14 7 11/20/2021    HCT 44 4 11/20/2021    MCV 90 11/20/2021     11/20/2021     Orders  Orders Placed This Encounter   Procedures    PSA, Total Screen     This is a patient instruction: This test is non-fasting  Please drink two glasses of water morning of bloodwork          Standing Status:   Future     Standing Expiration Date:   9/8/2023       ALE Mckee

## 2022-03-10 ENCOUNTER — ANESTHESIA EVENT (OUTPATIENT)
Dept: GASTROENTEROLOGY | Facility: HOSPITAL | Age: 62
End: 2022-03-10

## 2022-03-10 ENCOUNTER — HOSPITAL ENCOUNTER (OUTPATIENT)
Dept: GASTROENTEROLOGY | Facility: HOSPITAL | Age: 62
Setting detail: OUTPATIENT SURGERY
Discharge: HOME | End: 2022-03-10
Attending: PHYSICIAN ASSISTANT | Admitting: INTERNAL MEDICINE
Payer: COMMERCIAL

## 2022-03-10 ENCOUNTER — ANESTHESIA (OUTPATIENT)
Dept: GASTROENTEROLOGY | Facility: HOSPITAL | Age: 62
End: 2022-03-10

## 2022-03-10 VITALS
SYSTOLIC BLOOD PRESSURE: 141 MMHG | DIASTOLIC BLOOD PRESSURE: 94 MMHG | OXYGEN SATURATION: 100 % | TEMPERATURE: 96.6 F | HEART RATE: 58 BPM | RESPIRATION RATE: 20 BRPM

## 2022-03-10 DIAGNOSIS — K31.A0 INTESTINAL METAPLASIA OF STOMACH: ICD-10-CM

## 2022-03-10 PROCEDURE — 88305 TISSUE EXAM BY PATHOLOGIST: CPT | Performed by: PATHOLOGY

## 2022-03-10 PROCEDURE — 88342 IMHCHEM/IMCYTCHM 1ST ANTB: CPT | Performed by: PATHOLOGY

## 2022-03-10 PROCEDURE — 88313 SPECIAL STAINS GROUP 2: CPT | Performed by: PATHOLOGY

## 2022-03-10 PROCEDURE — 43239 EGD BIOPSY SINGLE/MULTIPLE: CPT | Performed by: INTERNAL MEDICINE

## 2022-03-10 RX ORDER — LIDOCAINE HYDROCHLORIDE 10 MG/ML
INJECTION, SOLUTION EPIDURAL; INFILTRATION; INTRACAUDAL; PERINEURAL AS NEEDED
Status: DISCONTINUED | OUTPATIENT
Start: 2022-03-10 | End: 2022-03-10

## 2022-03-10 RX ORDER — PROPOFOL 10 MG/ML
INJECTION, EMULSION INTRAVENOUS AS NEEDED
Status: DISCONTINUED | OUTPATIENT
Start: 2022-03-10 | End: 2022-03-10

## 2022-03-10 RX ORDER — SODIUM CHLORIDE 9 MG/ML
100 INJECTION, SOLUTION INTRAVENOUS CONTINUOUS
Status: DISCONTINUED | OUTPATIENT
Start: 2022-03-10 | End: 2022-03-14 | Stop reason: HOSPADM

## 2022-03-10 RX ADMIN — SODIUM CHLORIDE 100 ML/HR: 0.9 INJECTION, SOLUTION INTRAVENOUS at 09:43

## 2022-03-10 RX ADMIN — LIDOCAINE HYDROCHLORIDE 50 MG: 10 INJECTION, SOLUTION EPIDURAL; INFILTRATION; INTRACAUDAL; PERINEURAL at 12:08

## 2022-03-10 RX ADMIN — PROPOFOL 120 MG: 10 INJECTION, EMULSION INTRAVENOUS at 12:08

## 2022-03-10 RX ADMIN — PROPOFOL 20 MG: 10 INJECTION, EMULSION INTRAVENOUS at 12:13

## 2022-03-10 RX ADMIN — PROPOFOL 30 MG: 10 INJECTION, EMULSION INTRAVENOUS at 12:10

## 2022-03-10 RX ADMIN — PROPOFOL 50 MG: 10 INJECTION, EMULSION INTRAVENOUS at 12:11

## 2022-03-10 NOTE — ANESTHESIA PREPROCEDURE EVALUATION
Procedure:  EGD    Relevant Problems   ANESTHESIA (within normal limits)      CARDIO   (+) Atypical chest pain   (+) Benign essential hypertension      GI/HEPATIC   (+) Gastroesophageal reflux disease      /RENAL   (+) Renal calculus, right      MUSCULOSKELETAL   (+) Chronic right-sided low back pain   (+) Right-sided low back pain with right-sided sciatica      NEURO/PSYCH   (+) Chronic tension headache        Physical Exam    Airway    Mallampati score: II  TM Distance: >3 FB  Neck ROM: full     Dental       Cardiovascular  Rate: normal,     Pulmonary  Pulmonary exam normal     Other Findings  Per pt denies anything remaining that is loose or removeable        Anesthesia Plan  ASA Score- 2     Anesthesia Type- IV sedation with anesthesia with ASA Monitors  Additional Monitors:   Airway Plan:     Comment: Per patient, appropriately NPO, denies active CP/SOB/wheezing/symptoms related to heartburn/nausea/vomiting  Plan Factors-Exercise tolerance (METS): >4 METS  Chart reviewed  Patient summary reviewed  Patient is not a current smoker  Induction- intravenous  Postoperative Plan-     Informed Consent- Anesthetic plan and risks discussed with patient  I personally reviewed this patient with the CRNA  Discussed and agreed on the Anesthesia Plan with the CRNA  Emma Taylor

## 2022-03-10 NOTE — ANESTHESIA POSTPROCEDURE EVALUATION
Post-Op Assessment Note    CV Status:  Stable    Pain management: satisfactory to patient     Mental Status:  Alert, awake and sleepy   Hydration Status:  Euvolemic   PONV Controlled:  Controlled   Airway Patency:  Patent      Post Op Vitals Reviewed: Yes      Staff: Anesthesiologist, CRNA         No complications documented      AVSS

## 2022-03-10 NOTE — H&P
History and Physical - [facility]  Syed Martinez 57 y o  male MRN: [id number]                  HPI: Syed Martinez is a 57y o  year old male who presents for intestinal metaplasia      REVIEW OF SYSTEMS: Per the HPI, and otherwise unremarkable      Historical Information   Past Medical History:   Diagnosis Date    COVID-19 virus infection 2020    Exposure to COVID-19 virus 2020    History of chronic constipation     History of neck pain 2017    Hypertension      Past Surgical History:   Procedure Laterality Date    CYSTOSCOPY  2014    diagnostic managed by Jalen Mora    TOOTH EXTRACTION       Social History   Social History     Substance and Sexual Activity   Alcohol Use Yes    Comment: social     Social History     Substance and Sexual Activity   Drug Use Never     Social History     Tobacco Use   Smoking Status Former Smoker    Types: Cigarettes    Quit date: 2014    Years since quittin 1   Smokeless Tobacco Never Used     Family History   Problem Relation Age of Onset    Cancer Sister         malignant neoplasm of breast    Diabetes Paternal Grandmother     Diabetes Family     Arthritis Family     Cancer Family         malignant neoplasm    Substance Abuse Neg Hx         denied Mother and Father    Mental illness Neg Hx         no family history Mother and Father    Other Neg Hx         denied family history of Osteopertrosis       Meds/Allergies       Current Outpatient Medications:     amLODIPine (NORVASC) 5 mg tablet    lubiprostone (AMITIZA) 24 mcg capsule    mometasone (NASONEX) 50 mcg/act nasal spray    tamsulosin (FLOMAX) 0 4 mg    nystatin (MYCOSTATIN) powder    omeprazole (PriLOSEC) 40 MG capsule    polyethylene glycol (GOLYTELY) 4000 mL solution    Current Facility-Administered Medications:     sodium chloride 0 9 % infusion, 100 mL/hr, Intravenous, Continuous, 100 mL/hr at 03/10/22 0943    No Known Allergies    Objective /86 (BP Location: Left arm)   Pulse 57   Temp (!) 97 3 °F (36 3 °C) (Temporal)   Resp 20   SpO2 100%       PHYSICAL EXAM    Gen: NAD  Head: NCAT  CV: RRR  CHEST: Clear  ABD: soft, NT/ND  EXT: no edema      ASSESSMENT/PLAN:  This is a 57y o  year old male here for intestinal metaplasia, and he is stable and optimized for his procedure

## 2022-03-29 ENCOUNTER — OFFICE VISIT (OUTPATIENT)
Dept: GASTROENTEROLOGY | Facility: MEDICAL CENTER | Age: 62
End: 2022-03-29
Payer: COMMERCIAL

## 2022-03-29 VITALS
DIASTOLIC BLOOD PRESSURE: 68 MMHG | SYSTOLIC BLOOD PRESSURE: 139 MMHG | WEIGHT: 136.3 LBS | BODY MASS INDEX: 24.14 KG/M2 | TEMPERATURE: 98 F | HEART RATE: 70 BPM

## 2022-03-29 DIAGNOSIS — R10.11 RIGHT UPPER QUADRANT ABDOMINAL PAIN: ICD-10-CM

## 2022-03-29 DIAGNOSIS — R10.31 CHRONIC RLQ PAIN: ICD-10-CM

## 2022-03-29 DIAGNOSIS — K31.A0 INTESTINAL METAPLASIA OF STOMACH: ICD-10-CM

## 2022-03-29 DIAGNOSIS — R12 HEARTBURN: ICD-10-CM

## 2022-03-29 DIAGNOSIS — R10.11 RIGHT UPPER QUADRANT PAIN: Primary | ICD-10-CM

## 2022-03-29 DIAGNOSIS — G89.29 CHRONIC RLQ PAIN: ICD-10-CM

## 2022-03-29 PROCEDURE — 1036F TOBACCO NON-USER: CPT | Performed by: PHYSICIAN ASSISTANT

## 2022-03-29 PROCEDURE — 99214 OFFICE O/P EST MOD 30 MIN: CPT | Performed by: PHYSICIAN ASSISTANT

## 2022-03-29 RX ORDER — HYOSCYAMINE SULFATE 0.125 MG
0.12 TABLET ORAL EVERY 4 HOURS PRN
Qty: 120 TABLET | Refills: 3 | Status: SHIPPED | OUTPATIENT
Start: 2022-03-29

## 2022-03-29 RX ORDER — OMEPRAZOLE 40 MG/1
40 CAPSULE, DELAYED RELEASE ORAL
Qty: 30 CAPSULE | Refills: 3 | Status: SHIPPED | OUTPATIENT
Start: 2022-03-29 | End: 2022-04-29 | Stop reason: SDUPTHER

## 2022-03-29 NOTE — PROGRESS NOTES
[facility] - Outpatient Follow-up Note  Britt Samuels 57 y o  male MRN: [id number]  Encounter: [id number]          ASSESSMENT AND PLAN:      1  Right upper quadrant pain  2  Chronic RLQ pain:  He continues to complain of right-sided abdominal pain in both right upper quadrant and right lower quadrant  When he has his right upper quadrant pain this radiates across his epigastric area into his left upper quadrant  He did have an EGD with no source of his pain found  Will do CT abdomen pelvis at this time for further evaluation and try Levsin  - CT abdomen pelvis w contrast; Future  - hyoscyamine (Levsin) 0 125 MG tablet; Take 1 tablet (0 125 mg total) by mouth every 4 (four) hours as needed for cramping  Dispense: 120 tablet; Refill: 3    3  Intestinal metaplasia of stomach  4  Barretts esophagus:  He had EGD 03/10/2022 consistent with Castellanos's esophagus and abnormal gastric mucosa with known and previously confirm intestinal metaplasia  Biopsies were benign but did show extensive gastric intestinal metaplasia visible on EGD and confirm with biopsy  Will rule out atrophic gastritis with labs as ordered below  He will need to continue PPI 40 mg daily at this time  He will have repeat EGD in 6 months  - CBC and differential  - Anti-parietal antibody; Future  - Intrinsic factor blocking antibody; Future  - Vitamin B12; Future  - Iron Panel (Includes Ferritin, Iron Sat%, Iron, and TIBC); Future  - EGD; Future  - omeprazole (PriLOSEC) 40 MG capsule; Take 1 capsule (40 mg total) by mouth daily before breakfast  Dispense: 30 capsule; Refill: 3    5   Chronic constipation:  He is currently on Amitiza 24 mcg twice daily, he states that his constipation is well controlled with this  -continue Amitiza 24 mcg twice daily    ______________________________________________________________________    SUBJECTIVE:  Taylor rPide is a 69-year-old male past medical history of chronic constipation, hypertension who is here for follow-up after his EGD  He underwent EGD 03/10/2022 with a known history of intestinal metaplasia and Castellanos's esophagus  This EGD again did reveal Castellanos's esophagus and extensive gastric metaplasia seen on EGD confirmed on biopsies  He continues to complain of right-sided abdominal pain both in the right upper and right lower quadrants  His right upper quadrant pain also radiates across his epigastric region and into his left upper quadrant  He denies significant acid reflux and is currently on Prilosec 40 mg once daily  He is also on Amitiza 24 mcg twice daily for chronic constipation which is working well for him  He previously tried Linzess in the past without success and Annmarie Lara was not approved by his insurance  His last colonoscopy was in 2019 that was normal   He has a family history of gastric cancer in an uncle  REVIEW OF SYSTEMS IS OTHERWISE NEGATIVE        Historical Information   Past Medical History:   Diagnosis Date    COVID-19 virus infection 2020    Exposure to COVID-19 virus 2020    History of chronic constipation     History of neck pain 2017    Hypertension      Past Surgical History:   Procedure Laterality Date    CYSTOSCOPY  2014    diagnostic managed by Jalen Mora    TOOTH EXTRACTION       Social History   Social History     Substance and Sexual Activity   Alcohol Use Yes    Comment: social     Social History     Substance and Sexual Activity   Drug Use Never     Social History     Tobacco Use   Smoking Status Former Smoker    Types: Cigarettes    Quit date: 2014    Years since quittin 2   Smokeless Tobacco Never Used     Family History   Problem Relation Age of Onset    Cancer Sister         malignant neoplasm of breast    Diabetes Paternal Grandmother     Diabetes Family     Arthritis Family     Cancer Family         malignant neoplasm    Substance Abuse Neg Hx         denied Mother and Father   Jacinto Ceja Mental illness Neg Hx         no family history Mother and Father    Other Neg Hx         denied family history of Osteopertrosis       Meds/Allergies       Current Outpatient Medications:     amLODIPine (NORVASC) 5 mg tablet    hyoscyamine (Levsin) 0 125 MG tablet    lubiprostone (AMITIZA) 24 mcg capsule    mometasone (NASONEX) 50 mcg/act nasal spray    nystatin (MYCOSTATIN) powder    omeprazole (PriLOSEC) 40 MG capsule    polyethylene glycol (GOLYTELY) 4000 mL solution    tamsulosin (FLOMAX) 0 4 mg    No Known Allergies        Objective     Blood pressure 139/68, pulse 70, temperature 98 °F (36 7 °C), weight 61 8 kg (136 lb 4 8 oz)  Body mass index is 24 14 kg/m²  PHYSICAL EXAM:      General Appearance:   Alert, cooperative, no distress   HEENT:   Normocephalic, atraumatic, anicteric      Neck:  Supple, symmetrical, trachea midline   Lungs:   Clear to auscultation bilaterally; no rales, rhonchi or wheezing; respirations unlabored    Heart[de-identified]   Regular rate and rhythm; no murmur, rub, or gallop  Abdomen:   Soft, diffusely tender, non-distended; normal bowel sounds; no masses, no organomegaly    Genitalia:   Deferred    Rectal:   Deferred    Extremities:  No cyanosis, clubbing or edema    Pulses:  2+ and symmetric    Skin:  No jaundice, rashes, or lesions    Lymph nodes:  No palpable cervical lymphadenopathy        Lab Results:   No visits with results within 1 Day(s) from this visit     Latest known visit with results is:   Hospital Outpatient Visit on 03/10/2022   Component Date Value    Case Report 03/10/2022                      Value:Surgical Pathology Report                         Case: [id number]                                   Authorizing Provider:  Marda Dubin, MD              Collected:           03/10/2022 1211              Ordering Location:     [facility] Received:            03/10/2022 1443                                     Heart Endoscopy Pathologist:           Nathan Perez MD                                                                 Specimens:   A) - Stomach, random antrum bx- evaluate for intestinal mepaplasia                                  B) - Stomach, incisuria bx- evaluate for intestinal mepaplasia                                      C) - Stomach, abnormal gastric mucosa bx - evaluate for dysplagia                          Final Diagnosis 03/10/2022                      Value: This result contains rich text formatting which cannot be displayed here   Additional Information 03/10/2022                      Value: This result contains rich text formatting which cannot be displayed here  Zev Cross Gross Description 03/10/2022                      Value: This result contains rich text formatting which cannot be displayed here  Radiology Results:   EGD    Result Date: 3/10/2022  Narrative: [facility] [address] [place] [id number] [phone] DATE OF SERVICE: 3/10/22 PHYSICIAN(S): July Hamilton MD Proceduralist INDICATION: Intestinal metaplasia of stomach POST-OP DIAGNOSIS: See the impression below  PREPROCEDURE: Informed consent was obtained for the procedure, including sedation  Risks of perforation, hemorrhage, adverse drug reaction and aspiration were discussed  The patient was placed in the left lateral decubitus position  Patient was explained about the risks and benefits of the procedure  Risks including but not limited to bleeding, infection, and perforation were explained in detail  Also explained about less than 100% sensitivity with the exam and other alternatives  DETAILS OF PROCEDURE: Patient was taken to the procedure room where a time out was performed to confirm correct patient and correct procedure   The patient underwent monitored anesthesia care, which was administered by an anesthesia professional  The patient's blood pressure, heart rate, level of consciousness, respirations and oxygen were monitored throughout the procedure  The scope was advanced to the second part of the duodenum  Retroflexion was performed in the fundus  The patient experienced no blood loss  The procedure was not difficult  The patient tolerated the procedure well  There were no apparent complications  ANESTHESIA INFORMATION: ASA: II Anesthesia Type: IV Sedation with Anesthesia MEDICATIONS: sodium chloride 0 9 % infusion 300 mL*  *From user-documented volume (Totals for administrations occurring from 1157 to 1225 on 03/10/22) FINDINGS: Irregular Z-line 38 cm from the incisors  One small tongue biopsied previously confirmed to be Castellanos's esophagus  Cobblestone, granular and salmon-colored mucosa in the body of the stomach; performed 8 cold forceps biopsies for dysplasia screening The incisura and antrum appeared normal  Performed random biopsy  The duodenum appeared normal  SPECIMENS: ID Type Source Tests Collected by Time Destination 1 : random antrum bx- evaluate for intestinal mepaplasia  Tissue Stomach TISSUE EXAM Yoselyn Shrestha MD 3/10/2022 12:11 PM  2 : incisuria bx- evaluate for intestinal mepaplasia  Tissue Stomach TISSUE EXAM Yoselyn Shrestha MD 3/10/2022 12:12 PM  3 : abnormal gastric mucosa bx - evaluate for dysplagia  Tissue Stomach TISSUE EXAM Yoselyn Shrestha MD 3/10/2022 12:13 PM      Impression: Castellanos's esophagus Abnormal gastric mucosa with previously confirmed intestinal metaplasia  RECOMMENDATION: Await pathology results Close follow up with me in office    Yoselyn Shrestha MD

## 2022-03-29 NOTE — PATIENT INSTRUCTIONS
Scheduled date of EGD (as of today) 6/21/22  Physician performing Dr Michelle Roth  Location of procedure  [facility]  Bowel prep reviewed with patient: ella  Instructions reviewed with patient by: ella  Clearances: ella

## 2022-03-30 ENCOUNTER — TELEPHONE (OUTPATIENT)
Dept: GASTROENTEROLOGY | Facility: AMBULARY SURGERY CENTER | Age: 62
End: 2022-03-30

## 2022-03-30 DIAGNOSIS — R10.11 RIGHT UPPER QUADRANT PAIN: Primary | ICD-10-CM

## 2022-03-30 NOTE — TELEPHONE ENCOUNTER
BMP labs ordered to be done prior to CT scan  Called patient left message patient needs blood work prior to scan and to give our office a call back

## 2022-03-30 NOTE — TELEPHONE ENCOUNTER
Patients GI provider:  DEREK michael    Number to return call: (  [phone],     Reason for call: Neftaly Krishnamurthy from radiology called to have orders placed for a bun / creatnine to be done prior to cat scan,  please notify patient to have labs done    Scheduled procedure/appointment date if applicable: Appt 9-25-76

## 2022-04-08 ENCOUNTER — APPOINTMENT (OUTPATIENT)
Dept: LAB | Facility: HOSPITAL | Age: 62
End: 2022-04-08
Payer: COMMERCIAL

## 2022-04-08 DIAGNOSIS — R10.11 RIGHT UPPER QUADRANT PAIN: ICD-10-CM

## 2022-04-08 DIAGNOSIS — K31.A0 INTESTINAL METAPLASIA OF STOMACH: ICD-10-CM

## 2022-04-08 LAB
ANION GAP SERPL CALCULATED.3IONS-SCNC: 8 MMOL/L (ref 4–13)
BASOPHILS # BLD AUTO: 0.03 THOUSANDS/ΜL (ref 0–0.1)
BASOPHILS NFR BLD AUTO: 1 % (ref 0–1)
BUN SERPL-MCNC: 19 MG/DL (ref 5–25)
CALCIUM SERPL-MCNC: 8.5 MG/DL (ref 8.3–10.1)
CHLORIDE SERPL-SCNC: 108 MMOL/L (ref 100–108)
CO2 SERPL-SCNC: 23 MMOL/L (ref 21–32)
CREAT SERPL-MCNC: 1.21 MG/DL (ref 0.6–1.3)
EOSINOPHIL # BLD AUTO: 0.1 THOUSAND/ΜL (ref 0–0.61)
EOSINOPHIL NFR BLD AUTO: 2 % (ref 0–6)
ERYTHROCYTE [DISTWIDTH] IN BLOOD BY AUTOMATED COUNT: 14.3 % (ref 11.6–15.1)
FERRITIN SERPL-MCNC: 30 NG/ML (ref 8–388)
GFR SERPL CREATININE-BSD FRML MDRD: 63 ML/MIN/1.73SQ M
GLUCOSE SERPL-MCNC: 80 MG/DL (ref 65–140)
HCT VFR BLD AUTO: 44.4 % (ref 36.5–49.3)
HGB BLD-MCNC: 14.7 G/DL (ref 12–17)
IMM GRANULOCYTES # BLD AUTO: 0 THOUSAND/UL (ref 0–0.2)
IMM GRANULOCYTES NFR BLD AUTO: 0 % (ref 0–2)
IRON SATN MFR SERPL: 22 % (ref 20–50)
IRON SERPL-MCNC: 77 UG/DL (ref 65–175)
LYMPHOCYTES # BLD AUTO: 1.54 THOUSANDS/ΜL (ref 0.6–4.47)
LYMPHOCYTES NFR BLD AUTO: 37 % (ref 14–44)
MCH RBC QN AUTO: 29.8 PG (ref 26.8–34.3)
MCHC RBC AUTO-ENTMCNC: 33.1 G/DL (ref 31.4–37.4)
MCV RBC AUTO: 90 FL (ref 82–98)
MONOCYTES # BLD AUTO: 0.34 THOUSAND/ΜL (ref 0.17–1.22)
MONOCYTES NFR BLD AUTO: 8 % (ref 4–12)
NEUTROPHILS # BLD AUTO: 2.15 THOUSANDS/ΜL (ref 1.85–7.62)
NEUTS SEG NFR BLD AUTO: 52 % (ref 43–75)
NRBC BLD AUTO-RTO: 0 /100 WBCS
PLATELET # BLD AUTO: 216 THOUSANDS/UL (ref 149–390)
PMV BLD AUTO: 10.8 FL (ref 8.9–12.7)
POTASSIUM SERPL-SCNC: 4 MMOL/L (ref 3.5–5.3)
PSA SERPL-MCNC: 0.6 NG/ML (ref 0–4)
RBC # BLD AUTO: 4.94 MILLION/UL (ref 3.88–5.62)
SODIUM SERPL-SCNC: 139 MMOL/L (ref 136–145)
TIBC SERPL-MCNC: 357 UG/DL (ref 250–450)
VIT B12 SERPL-MCNC: 266 PG/ML (ref 100–900)
WBC # BLD AUTO: 4.16 THOUSAND/UL (ref 4.31–10.16)

## 2022-04-08 PROCEDURE — G0103 PSA SCREENING: HCPCS

## 2022-04-08 PROCEDURE — 82728 ASSAY OF FERRITIN: CPT

## 2022-04-08 PROCEDURE — 83550 IRON BINDING TEST: CPT

## 2022-04-08 PROCEDURE — 83516 IMMUNOASSAY NONANTIBODY: CPT

## 2022-04-08 PROCEDURE — 85025 COMPLETE CBC W/AUTO DIFF WBC: CPT | Performed by: PHYSICIAN ASSISTANT

## 2022-04-08 PROCEDURE — 82607 VITAMIN B-12: CPT

## 2022-04-08 PROCEDURE — 83540 ASSAY OF IRON: CPT

## 2022-04-08 PROCEDURE — 36415 COLL VENOUS BLD VENIPUNCTURE: CPT

## 2022-04-08 PROCEDURE — 80048 BASIC METABOLIC PNL TOTAL CA: CPT

## 2022-04-08 PROCEDURE — 86340 INTRINSIC FACTOR ANTIBODY: CPT

## 2022-04-10 ENCOUNTER — HOSPITAL ENCOUNTER (OUTPATIENT)
Dept: CT IMAGING | Facility: HOSPITAL | Age: 62
Discharge: HOME | End: 2022-04-10
Payer: COMMERCIAL

## 2022-04-10 DIAGNOSIS — R10.31 CHRONIC RLQ PAIN: ICD-10-CM

## 2022-04-10 DIAGNOSIS — G89.29 CHRONIC RLQ PAIN: ICD-10-CM

## 2022-04-10 DIAGNOSIS — R10.11 RIGHT UPPER QUADRANT PAIN: ICD-10-CM

## 2022-04-10 PROCEDURE — G1004 CDSM NDSC: HCPCS

## 2022-04-10 PROCEDURE — 74177 CT ABD & PELVIS W/CONTRAST: CPT

## 2022-04-10 RX ADMIN — IOHEXOL 100 ML: 350 INJECTION, SOLUTION INTRAVENOUS at 15:39

## 2022-04-11 LAB
IF BLOCK AB SER QL RIA: 1 AU/ML (ref 0–1.1)
PCA AB SER-ACNC: 7.2 UNITS (ref 0–20)

## 2022-04-29 ENCOUNTER — OFFICE VISIT (OUTPATIENT)
Dept: FAMILY MEDICINE CLINIC | Facility: CLINIC | Age: 62
End: 2022-04-29

## 2022-04-29 VITALS
TEMPERATURE: 97.8 F | OXYGEN SATURATION: 97 % | HEIGHT: 63 IN | WEIGHT: 139 LBS | SYSTOLIC BLOOD PRESSURE: 132 MMHG | DIASTOLIC BLOOD PRESSURE: 80 MMHG | BODY MASS INDEX: 24.63 KG/M2 | RESPIRATION RATE: 16 BRPM | HEART RATE: 81 BPM

## 2022-04-29 DIAGNOSIS — R12 HEARTBURN: ICD-10-CM

## 2022-04-29 DIAGNOSIS — K59.00 CONSTIPATION, UNSPECIFIED CONSTIPATION TYPE: Primary | ICD-10-CM

## 2022-04-29 DIAGNOSIS — R10.11 RIGHT UPPER QUADRANT ABDOMINAL PAIN: ICD-10-CM

## 2022-04-29 PROCEDURE — 99213 OFFICE O/P EST LOW 20 MIN: CPT | Performed by: FAMILY MEDICINE

## 2022-04-29 PROCEDURE — 3008F BODY MASS INDEX DOCD: CPT | Performed by: PHYSICIAN ASSISTANT

## 2022-04-29 RX ORDER — SENNOSIDES 8.6 MG
8.6 TABLET ORAL
Qty: 30 TABLET | Refills: 0 | Status: SHIPPED | OUTPATIENT
Start: 2022-04-29

## 2022-04-29 RX ORDER — OMEPRAZOLE 40 MG/1
40 CAPSULE, DELAYED RELEASE ORAL
Qty: 30 CAPSULE | Refills: 3 | Status: SHIPPED | OUTPATIENT
Start: 2022-04-29 | End: 2022-07-29 | Stop reason: SDUPTHER

## 2022-04-29 RX ORDER — ACETAMINOPHEN 500 MG
500 TABLET ORAL EVERY 6 HOURS PRN
Qty: 28 TABLET | Refills: 0 | Status: SHIPPED | OUTPATIENT
Start: 2022-04-29

## 2022-04-29 NOTE — ASSESSMENT & PLAN NOTE
Chronic pain for 2 years  Patient has appointment with GI on monday   Prior workup: Ultrasound CT were both negative  Follows with Urology and GI Does  Has tried Naproxen, Bentyl and simethicone without relief      Patient reports non-compliance with omeprazole     - Advised patient to follow up with GI  - Refilled omeprazole   - Tylenol 500 mg Q8H for pain

## 2022-04-29 NOTE — PROGRESS NOTES
Assessment/Plan:    Abdominal pain, chronic, right lower quadrant  Chronic pain for 2 years  Patient has appointment with GI on monday   Prior workup: Ultrasound CT were both negative  Follows with Urology and GI Does  Has tried Naproxen, Bentyl and simethicone without relief  Patient reports non-compliance with omeprazole     - Advised patient to follow up with GI  - Refilled omeprazole   - Tylenol 500 mg Q8H for pain        No follow-ups on file  Diagnoses and all orders for this visit:    Constipation, unspecified constipation type  -     senna (SENOKOT) 8 6 mg; Take 1 tablet (8 6 mg total) by mouth daily at bedtime    Right upper quadrant abdominal pain  -     acetaminophen (TYLENOL) 500 mg tablet; Take 1 tablet (500 mg total) by mouth every 6 (six) hours as needed for mild pain  -     omeprazole (PriLOSEC) 40 MG capsule; Take 1 capsule (40 mg total) by mouth daily before breakfast    Heartburn  -     omeprazole (PriLOSEC) 40 MG capsule; Take 1 capsule (40 mg total) by mouth daily before breakfast          Subjective:     Dax Aceves is a 57 y o  male who  has a past medical history of COVID-19 virus infection, Exposure to COVID-19 virus, History of chronic constipation, History of neck pain, and Hypertension  He has no past medical history of Breathing difficulty, Chronic narcotic dependence ([address] ), or Hard to intubate  who presented to the office today for right sided pain  HPI  Location: Right lower quadrant  Quality: Dull  Severity: waxes and wanes, currently 5/10  Duration: Chronic  Timing: intermittent  Modifying factors: No relief with previous therapies  Associated signs and symptoms: None  Review of Systems   Constitutional: Negative for chills and fever  HENT: Negative for congestion, rhinorrhea and sinus pressure  Respiratory: Negative for chest tightness, shortness of breath and wheezing  Cardiovascular: Negative for chest pain and palpitations     Gastrointestinal: Negative for abdominal pain, nausea and vomiting  Endocrine: Negative for polyuria  Genitourinary: Negative for difficulty urinating, dysuria, frequency and urgency  Musculoskeletal: Negative for myalgias  Neurological: Negative for dizziness, syncope and light-headedness  Psychiatric/Behavioral: Negative for dysphoric mood  Objective:    /80 (BP Location: Right arm, Patient Position: Sitting, Cuff Size: Standard)   Pulse 81   Temp 97 8 °F (36 6 °C) (Temporal)   Resp 16   Ht 5' 3" (1 6 m)   Wt 63 kg (139 lb)   SpO2 97%   BMI 24 62 kg/m²     Physical Exam  Constitutional:       Appearance: Normal appearance  HENT:      Head: Normocephalic and atraumatic  Eyes:      Conjunctiva/sclera: Conjunctivae normal    Cardiovascular:      Rate and Rhythm: Normal rate and regular rhythm  Pulses: Normal pulses  Heart sounds: Normal heart sounds  Pulmonary:      Effort: Pulmonary effort is normal       Breath sounds: Normal breath sounds  Abdominal:      General: Abdomen is flat  Bowel sounds are normal  There is no distension  Palpations: Abdomen is soft  There is no mass  Tenderness: There is abdominal tenderness (diffuse tnederness in all 4 quadrants on palpation)  There is no guarding or rebound  Musculoskeletal:         General: Normal range of motion  Cervical back: Normal range of motion and neck supple  Neurological:      Mental Status: He is alert and oriented to person, place, and time  Psychiatric:         Behavior: Behavior normal          Thought Content:  Thought content normal          Brandan Pena MD  04/29/22  3:49 PM

## 2022-05-02 ENCOUNTER — OFFICE VISIT (OUTPATIENT)
Dept: GASTROENTEROLOGY | Facility: MEDICAL CENTER | Age: 62
End: 2022-05-02
Payer: COMMERCIAL

## 2022-05-02 VITALS
DIASTOLIC BLOOD PRESSURE: 78 MMHG | SYSTOLIC BLOOD PRESSURE: 124 MMHG | HEIGHT: 63 IN | BODY MASS INDEX: 24.59 KG/M2 | HEART RATE: 78 BPM | OXYGEN SATURATION: 98 % | WEIGHT: 138.8 LBS

## 2022-05-02 DIAGNOSIS — K31.A0 INTESTINAL METAPLASIA OF STOMACH: ICD-10-CM

## 2022-05-02 DIAGNOSIS — R10.11 RIGHT UPPER QUADRANT ABDOMINAL PAIN: Primary | ICD-10-CM

## 2022-05-02 DIAGNOSIS — R10.31 CHRONIC RLQ PAIN: ICD-10-CM

## 2022-05-02 DIAGNOSIS — R10.11 RIGHT UPPER QUADRANT PAIN: ICD-10-CM

## 2022-05-02 DIAGNOSIS — G89.29 CHRONIC RLQ PAIN: ICD-10-CM

## 2022-05-02 DIAGNOSIS — K59.00 CONSTIPATION, UNSPECIFIED CONSTIPATION TYPE: ICD-10-CM

## 2022-05-02 DIAGNOSIS — K22.70 BARRETT'S ESOPHAGUS WITHOUT DYSPLASIA: ICD-10-CM

## 2022-05-02 PROCEDURE — 3008F BODY MASS INDEX DOCD: CPT | Performed by: FAMILY MEDICINE

## 2022-05-02 PROCEDURE — 1036F TOBACCO NON-USER: CPT | Performed by: PHYSICIAN ASSISTANT

## 2022-05-02 PROCEDURE — 99214 OFFICE O/P EST MOD 30 MIN: CPT | Performed by: PHYSICIAN ASSISTANT

## 2022-05-02 PROCEDURE — 3008F BODY MASS INDEX DOCD: CPT | Performed by: PHYSICIAN ASSISTANT

## 2022-05-02 NOTE — PROGRESS NOTES
[facility] - Outpatient Follow-up Note  Merline Franco 57 y o  male MRN: [id number]  Encounter: [id number]          ASSESSMENT AND PLAN:      1  Right upper quadrant abdominal pain:  2  Castellanos's esophagus without dysplasia  3  Intestinal metaplasia of stomach: He continues to complain of right sided abdominal pain  He had CT s/p that was negative for source of his pain  He had EGD 03/10/2022 consistent with Castellanos's esophagus and abnormal gastric mucosa with known and previously confirm intestinal metaplasia  Biopsies were benign but did show extensive gastric intestinal metaplasia visible on EGD and confirm with biopsy  Labs for atrophic gastritis including intrinsic factor, anti-parietal antibody, vitamin B12 and iron panel were all normal   He will need to continue PPI 40 mg daily at this time  He will have repeat EGD shortly for surveillance  -repeat EGD for further evaluation  -continue prilosec 40mg daily   -has not yet tried prescribed levsin for his pain    4  Constipation, unspecified constipation type: currently on amitiza 24 mcg twice daily  He does not feel like it is working for him  We discussed changing this medication but he would like to stay on it and just add miralax    -continue amitiza 24 mcg daily  -start miralax 17 g twice daily  ______________________________________________________________________    SUBJECTIVE:  Brittni Daily is a 59-year-old male past medical history of chronic constipation, hypertension who is here for follow-up  He underwent EGD 03/10/2022 with a known history of intestinal metaplasia and Castellanos's esophagus  This EGD again did reveal Castellanos's esophagus and extensive gastric metaplasia seen on EGD confirmed on biopsies  He continues to complain of right-sided abdominal pain both in the right upper and right lower quadrants  His right upper quadrant pain also radiates across his epigastric region and into his left upper quadrant    He denies significant acid reflux and is currently on Prilosec 40 mg once daily  He is also on Amitiza 24 mcg twice daily for chronic constipation  He does not think this is working as well as it was previously for him  He would like to add miralax into his regimen instead of changing medications  He previously tried Linzess in the past without success and Aletha Anderson was not approved by his insurance  We also prescribed levsin to try for his pain but he does not think he tried it  His last colonoscopy was in 2019 that was normal   He has a family history of gastric cancer in an uncle  REVIEW OF SYSTEMS IS OTHERWISE NEGATIVE        Historical Information   Past Medical History:   Diagnosis Date    COVID-19 virus infection 2020    Exposure to COVID-19 virus 2020    History of chronic constipation     History of neck pain 2017    Hypertension      Past Surgical History:   Procedure Laterality Date    CYSTOSCOPY  2014    diagnostic managed by Jalen Mora    TOOTH EXTRACTION       Social History   Social History     Substance and Sexual Activity   Alcohol Use Yes    Comment: social     Social History     Substance and Sexual Activity   Drug Use Never     Social History     Tobacco Use   Smoking Status Former Smoker    Types: Cigarettes    Quit date: 2014    Years since quittin 3   Smokeless Tobacco Never Used     Family History   Problem Relation Age of Onset    Cancer Sister         malignant neoplasm of breast    Diabetes Paternal Grandmother     Diabetes Family     Arthritis Family     Cancer Family         malignant neoplasm    Substance Abuse Neg Hx         denied Mother and Father    Mental illness Neg Hx         no family history Mother and Father    Other Neg Hx         denied family history of Osteopertrosis       Meds/Allergies       Current Outpatient Medications:     acetaminophen (TYLENOL) 500 mg tablet    amLODIPine (NORVASC) 5 mg tablet    hyoscyamine (Levsin) 0 125 MG tablet    lubiprostone (AMITIZA) 24 mcg capsule    mometasone (NASONEX) 50 mcg/act nasal spray    omeprazole (PriLOSEC) 40 MG capsule    polyethylene glycol (GOLYTELY) 4000 mL solution    tamsulosin (FLOMAX) 0 4 mg    nystatin (MYCOSTATIN) powder    senna (SENOKOT) 8 6 mg    No Known Allergies        Objective     Blood pressure 124/78, pulse 78, height 5' 3" (1 6 m), weight 63 kg (138 lb 12 8 oz), SpO2 98 %  Body mass index is 24 59 kg/m²  PHYSICAL EXAM:      General Appearance:   Alert, cooperative, no distress   HEENT:   Normocephalic, atraumatic, anicteric      Neck:  Supple, symmetrical, trachea midline   Lungs:   Clear to auscultation bilaterally; no rales, rhonchi or wheezing; respirations unlabored    Heart[de-identified]   Regular rate and rhythm; no murmur, rub, or gallop  Abdomen:   Soft, non-tender, non-distended; normal bowel sounds; no masses, no organomegaly    Genitalia:   Deferred    Rectal:   Deferred    Extremities:  No cyanosis, clubbing or edema    Pulses:  2+ and symmetric    Skin:  No jaundice, rashes, or lesions    Lymph nodes:  No palpable cervical lymphadenopathy        Lab Results:   No visits with results within 1 Day(s) from this visit  Latest known visit with results is:   Appointment on 04/08/2022   Component Date Value    Parietal Cell Ab 04/08/2022 7 2     Intrinsic Factor 04/08/2022 1 0     Vitamin B-12 04/08/2022 266     Sodium 04/08/2022 139     Potassium 04/08/2022 4 0     Chloride 04/08/2022 108     CO2 04/08/2022 23     ANION GAP 04/08/2022 8     BUN 04/08/2022 19     Creatinine 04/08/2022 1 21     Glucose 04/08/2022 80     Calcium 04/08/2022 8 5     eGFR 04/08/2022 63     Iron Saturation 04/08/2022 22     TIBC 04/08/2022 357     Iron 04/08/2022 77     Ferritin 04/08/2022 30          Radiology Results:   CT abdomen pelvis w contrast    Result Date: 4/11/2022  Narrative: CT ABDOMEN AND PELVIS WITH IV CONTRAST INDICATION:   R10 11:  Right upper quadrant pain R10 31: Right lower quadrant pain G89 29: Other chronic pain  COMPARISON: Multiple prior CT examinations of the abdomen and pelvis commencing  April 25, 2014 with direct comparison made to July 13, 2021  TECHNIQUE:  CT examination of the abdomen and pelvis was performed  Axial, sagittal, and coronal 2D reformatted images were created from the source data and submitted for interpretation  Radiation dose length product (DLP) for this visit:  468 mGy-cm   This examination, like all CT scans performed in the [facility], was performed utilizing techniques to minimize radiation dose exposure, including the use of iterative reconstruction and automated exposure control  IV Contrast:  100 mL of iohexol (OMNIPAQUE) Enteric Contrast:  Enteric contrast was not administered  FINDINGS: ABDOMEN LOWER CHEST:  Solid 0 2 cm left lower lobe nodule is unchanged in size from April 25, 2014 and is considered benign; the nodule appears calcified on the 2014 CT (series 2, image 8 LIVER/BILIARY TREE:  Unremarkable  GALLBLADDER:  No calcified gallstones  No pericholecystic inflammatory change  SPLEEN:  Unremarkable  PANCREAS:  Unremarkable  ADRENAL GLANDS:  Unremarkable  KIDNEYS/URETERS:  Nonobstructing 3 mm right upper pole calculus  No hydronephrosis One or more sharply circumscribed subcentimeter renal hypodensities are present, too small to accurately characterize, and statistically most likely benign findings  According to recent literature (Radiology 2019) no further workup of these findings is recommended  STOMACH AND BOWEL:  There is mild colonic diverticulosis without evidence of acute diverticulitis  APPENDIX:  A normal appendix was visualized  ABDOMINOPELVIC CAVITY:  No ascites  No pneumoperitoneum  No lymphadenopathy  VESSELS:  Unremarkable for patient's age  PELVIS REPRODUCTIVE ORGANS:  Unremarkable for patient's age  URINARY BLADDER:  Unremarkable   ABDOMINAL WALL/INGUINAL REGIONS: Unremarkable  OSSEOUS STRUCTURES:  No acute fracture or destructive osseous lesion  Impression: No acute abdominopelvic pathology  Nonobstructing 3 mm right renal calculus

## 2022-06-15 ENCOUNTER — OFFICE VISIT (OUTPATIENT)
Dept: FAMILY MEDICINE CLINIC | Facility: CLINIC | Age: 62
End: 2022-06-15

## 2022-06-15 VITALS
OXYGEN SATURATION: 98 % | TEMPERATURE: 98.7 F | HEIGHT: 63 IN | RESPIRATION RATE: 16 BRPM | DIASTOLIC BLOOD PRESSURE: 80 MMHG | HEART RATE: 95 BPM | WEIGHT: 135 LBS | BODY MASS INDEX: 23.92 KG/M2 | SYSTOLIC BLOOD PRESSURE: 118 MMHG

## 2022-06-15 DIAGNOSIS — N50.3 EPIDIDYMAL CYST: Primary | ICD-10-CM

## 2022-06-15 PROCEDURE — 3008F BODY MASS INDEX DOCD: CPT

## 2022-06-15 PROCEDURE — 1036F TOBACCO NON-USER: CPT

## 2022-06-15 PROCEDURE — 99213 OFFICE O/P EST LOW 20 MIN: CPT

## 2022-06-15 PROCEDURE — 3008F BODY MASS INDEX DOCD: CPT | Performed by: PHYSICIAN ASSISTANT

## 2022-06-15 NOTE — ASSESSMENT & PLAN NOTE
Recommend patient f/u with urology for their recommendations since cysts are causing daily bothersome pain and he is not convinced that they are benign since they are causing pain     -Declined STD testing

## 2022-06-15 NOTE — PROGRESS NOTES
Assessment/Plan:    Epididymal cyst  Recommend patient f/u with urology for their recommendations since cysts are causing daily bothersome pain and he is not convinced that they are benign since they are causing pain  -Declined STD testing       Diagnoses and all orders for this visit:    Epididymal cyst          Subjective:      Patient ID: Kimberly Fleischer is a 57 y o  male  Kimberly Fleischer is a 57 y o  male  has a past medical history of COVID-19 virus infection, Exposure to COVID-19 virus, History of chronic constipation, History of neck pain, and Hypertension  has a past surgical history that includes Cystoscopy (05/29/2014) and Tooth extraction  He endorses bilateral testicle pain  He has had this pain for many years  It is unchanged  He does not take any medication for this  He last had a scrotum US on 01/2022 and it showed benign epididymal cysts  He follows with urology for this  The following portions of the patient's history were reviewed and updated as appropriate: allergies, current medications, past family history, past medical history, past social history, past surgical history and problem list     Review of Systems   Constitutional: Negative for chills and fever  HENT: Negative for ear pain and sore throat  Eyes: Negative for pain and visual disturbance  Respiratory: Negative for cough and shortness of breath  Cardiovascular: Negative for chest pain and palpitations  Gastrointestinal: Negative for abdominal pain and vomiting  Genitourinary: Positive for testicular pain  Negative for dysuria and hematuria  Musculoskeletal: Negative for arthralgias and back pain  (+) testicle pain   Skin: Negative for color change and rash  Neurological: Negative for seizures and syncope  All other systems reviewed and are negative          Objective:      /80 (BP Location: Right arm, Patient Position: Sitting, Cuff Size: Standard)   Pulse 95   Temp 98 7 °F (37 1 °C) (Temporal)   Resp 16   Ht 5' 3" (1 6 m)   Wt 61 2 kg (135 lb)   SpO2 98%   BMI 23 91 kg/m²          Physical Exam  Vitals and nursing note reviewed  Exam conducted with a chaperone present  Constitutional:       General: He is not in acute distress  Appearance: He is not ill-appearing, toxic-appearing or diaphoretic  HENT:      Head: Normocephalic and atraumatic  Right Ear: External ear normal       Left Ear: External ear normal       Nose: Nose normal    Eyes:      Conjunctiva/sclera: Conjunctivae normal    Cardiovascular:      Rate and Rhythm: Normal rate  Pulmonary:      Effort: Pulmonary effort is normal    Genitourinary:     Penis: Normal        Testes:         Right: Tenderness present  Mass, swelling, testicular hydrocele or varicocele not present  Right testis is descended  Cremasteric reflex is present  Left: Tenderness present  Mass, swelling, testicular hydrocele or varicocele not present  Left testis is descended  Cremasteric reflex is present  Epididymis:      Right: Normal       Left: Normal    Musculoskeletal:         General: Normal range of motion  Cervical back: Normal range of motion and neck supple  Skin:     General: Skin is warm and dry  Neurological:      Mental Status: He is alert and oriented to person, place, and time  Mental status is at baseline  Psychiatric:         Mood and Affect: Mood normal          Behavior: Behavior normal          Thought Content:  Thought content normal          Judgment: Judgment normal

## 2022-06-20 DIAGNOSIS — I10 BENIGN HYPERTENSION: ICD-10-CM

## 2022-06-21 RX ORDER — AMLODIPINE BESYLATE 5 MG/1
5 TABLET ORAL DAILY
Qty: 90 TABLET | Refills: 1 | Status: SHIPPED | OUTPATIENT
Start: 2022-06-21

## 2022-06-21 RX ORDER — AMLODIPINE BESYLATE 5 MG/1
TABLET ORAL
Qty: 90 TABLET | Refills: 1 | OUTPATIENT
Start: 2022-06-21

## 2022-07-19 ENCOUNTER — OFFICE VISIT (OUTPATIENT)
Dept: FAMILY MEDICINE CLINIC | Facility: CLINIC | Age: 62
End: 2022-07-19

## 2022-07-19 VITALS
BODY MASS INDEX: 24.27 KG/M2 | DIASTOLIC BLOOD PRESSURE: 76 MMHG | HEART RATE: 71 BPM | OXYGEN SATURATION: 98 % | SYSTOLIC BLOOD PRESSURE: 120 MMHG | HEIGHT: 63 IN | RESPIRATION RATE: 18 BRPM | WEIGHT: 137 LBS | TEMPERATURE: 98.4 F

## 2022-07-19 DIAGNOSIS — I10 BENIGN HYPERTENSION: ICD-10-CM

## 2022-07-19 DIAGNOSIS — M25.512 ACUTE PAIN OF LEFT SHOULDER: ICD-10-CM

## 2022-07-19 DIAGNOSIS — R25.1 TREMOR OF BOTH HANDS: Primary | ICD-10-CM

## 2022-07-19 PROCEDURE — 3725F SCREEN DEPRESSION PERFORMED: CPT | Performed by: FAMILY MEDICINE

## 2022-07-19 PROCEDURE — 1036F TOBACCO NON-USER: CPT | Performed by: FAMILY MEDICINE

## 2022-07-19 PROCEDURE — 99214 OFFICE O/P EST MOD 30 MIN: CPT | Performed by: FAMILY MEDICINE

## 2022-07-19 PROCEDURE — 3008F BODY MASS INDEX DOCD: CPT | Performed by: FAMILY MEDICINE

## 2022-07-19 NOTE — PROGRESS NOTES
Assessment/Plan:    No problem-specific Assessment & Plan notes found for this encounter  Diagnoses and all orders for this visit:    Tremor of both hands  -     Ambulatory Referral to Neurology; Future  -     TSH, 3rd generation with Free T4 reflex; Future  -     RPR; Future  -     Lyme Antibody Profile with reflex to WB; Future    Benign hypertension  -     Ambulatory Referral to Neurology; Future  -     CBC and differential; Future  -     Comprehensive metabolic panel; Future  -     Lipid panel; Future  -     Vitamin D 25 hydroxy; Future  -     Microalbumin / creatinine urine ratio; Future  -     TSH, 3rd generation with Free T4 reflex; Future  -     RPR; Future  -     Lyme Antibody Profile with reflex to WB; Future    Acute pain of left shoulder  -     Diclofenac Sodium (VOLTAREN) 1 %; Apply 2 g topically 4 (four) times a day        Shoulder pain  Patient declined PT at this time   Moist heat for 15 minutes BID  Stretching exercises discussed   ED precautions reviewed with patient   Subjective:      Patient ID: Guadalupe Lee is a 57 y o  male  58 yo  male states he ws treated for "Parkinson" in the past and feels it is now coming back  States he was treated by Telemedicine during the pandemic  Currently complains of tremor in both hands both at rest and with intention such as when reaching for a glass, however 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, driving a car or witting  Patient describes the tremor as minimal, only in his hands, has been getting worse over the last 2 weeks  Worse after working for 4 to 6 hours  Also complains of L shoulder pain, denies history of trauma  Pain is described as sharp, not radiated, worse with movement 6/10  Has not taken anything for it   Has been going on for about 2 weeks       The following portions of the patient's history were reviewed and updated as appropriate:   He  has a past medical history of COVID-19 virus infection (5/1/2020), Exposure to COVID-19 virus (11/23/2020), History of chronic constipation, History of neck pain (11/21/2017), and Hypertension  He   Patient Active Problem List    Diagnosis Date Noted    Epididymal cyst 06/15/2022    Chronic right-sided low back pain 08/15/2021    Right flank pain 06/09/2021    Acute bacterial conjunctivitis of right eye 05/15/2020    Irritant contact dermatitis 05/15/2020    Family history of parkinsonism 04/30/2020    Low vitamin B12 level 04/30/2020    Tremor 01/09/2020    Lumbar disc herniation 01/09/2020    Overweight 01/09/2020    Chronic constipation 01/06/2020    Dysphonia 11/23/2019    Cough 11/23/2019    Bilateral impacted cerumen 11/23/2019    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    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    Reflux laryngitis 11/23/2019    Vocal fold paresis, 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    Sulcus vocalis of vocal fold 11/23/2019    Gastroesophageal reflux disease 11/23/2019    Rash 11/07/2019    Abdominal pain, chronic, right lower quadrant 05/13/2019    Right-sided low back pain with right-sided sciatica 06/28/2018    Hoarseness of voice 05/17/2018    Acute pain of both knees 01/24/2018    Chronic tension headache 01/24/2018    Left shoulder pain 06/07/2017    Benign colon polyp 03/24/2017    Enlarged prostate without lower urinary tract symptoms (luts) 07/18/2016    Varicocele 01/12/2016    Mild vitamin D deficiency 06/10/2015    Impingement syndrome, shoulder, left 05/16/2014    Pain in joint of right hip 02/04/2014    Renal calculus, right 11/19/2013    Atypical chest pain 06/19/2013    Benign essential hypertension 10/10/2012    Abnormal glucose 10/10/2012     He  has a past surgical history that includes Cystoscopy (05/29/2014) and Tooth extraction  His family history includes Arthritis in his family; Cancer in his family and sister; Diabetes in his family and paternal grandmother  He  reports that he quit smoking about 8 years ago   His smoking use included cigarettes  He has never used smokeless tobacco  He reports current alcohol use  He reports that he does not use drugs  Current Outpatient Medications   Medication Sig Dispense Refill    Diclofenac Sodium (VOLTAREN) 1 % Apply 2 g topically 4 (four) times a day 150 g 0    acetaminophen (TYLENOL) 500 mg tablet Take 1 tablet (500 mg total) by mouth every 6 (six) hours as needed for mild pain 28 tablet 0    amLODIPine (NORVASC) 5 mg tablet Take 1 tablet (5 mg total) by mouth daily 90 tablet 1    hyoscyamine (Levsin) 0 125 MG tablet Take 1 tablet (0 125 mg total) by mouth every 4 (four) hours as needed for cramping 120 tablet 3    lubiprostone (AMITIZA) 24 mcg capsule Take 1 capsule (24 mcg total) by mouth 2 (two) times a day with meals 60 capsule 3    mometasone (NASONEX) 50 mcg/act nasal spray SPRAY 2 SPRAYS INTO EACH NOSTRIL EVERY DAY 17 g 0    nystatin (MYCOSTATIN) powder Apply topically 3 (three) times a day (Patient not taking: Reported on 1/11/2022 ) 60 g 0    omeprazole (PriLOSEC) 40 MG capsule Take 1 capsule (40 mg total) by mouth daily before breakfast 30 capsule 3    polyethylene glycol (GOLYTELY) 4000 mL solution Sip to produce bowel movements, then start motegrity 4000 mL 0    senna (SENOKOT) 8 6 mg Take 1 tablet (8 6 mg total) by mouth daily at bedtime (Patient not taking: Reported on 5/2/2022 ) 30 tablet 0    tamsulosin (FLOMAX) 0 4 mg Take 1 capsule (0 4 mg total) by mouth daily with dinner 90 capsule 3     No current facility-administered medications for this visit       Current Outpatient Medications on File Prior to Visit   Medication Sig    acetaminophen (TYLENOL) 500 mg tablet Take 1 tablet (500 mg total) by mouth every 6 (six) hours as needed for mild pain    amLODIPine (NORVASC) 5 mg tablet Take 1 tablet (5 mg total) by mouth daily    hyoscyamine (Levsin) 0 125 MG tablet Take 1 tablet (0 125 mg total) by mouth every 4 (four) hours as needed for cramping    lubiprostone (AMITIZA) 24 mcg capsule Take 1 capsule (24 mcg total) by mouth 2 (two) times a day with meals    mometasone (NASONEX) 50 mcg/act nasal spray SPRAY 2 SPRAYS INTO EACH NOSTRIL EVERY DAY    nystatin (MYCOSTATIN) powder Apply topically 3 (three) times a day (Patient not taking: Reported on 1/11/2022 )    omeprazole (PriLOSEC) 40 MG capsule Take 1 capsule (40 mg total) by mouth daily before breakfast    polyethylene glycol (GOLYTELY) 4000 mL solution Sip to produce bowel movements, then start motegrity    senna (SENOKOT) 8 6 mg Take 1 tablet (8 6 mg total) by mouth daily at bedtime (Patient not taking: Reported on 5/2/2022 )    tamsulosin (FLOMAX) 0 4 mg Take 1 capsule (0 4 mg total) by mouth daily with dinner     No current facility-administered medications on file prior to visit       Review of Systems   Musculoskeletal: Positive for arthralgias  Neurological: Positive for tremors  All other systems reviewed and are negative  Objective:      /76 (BP Location: Left arm, Patient Position: Sitting, Cuff Size: Standard)   Pulse 71   Temp 98 4 °F (36 9 °C) (Temporal)   Resp 18   Ht 5' 3" (1 6 m)   Wt 62 1 kg (137 lb)   SpO2 98%   BMI 24 27 kg/m²          Physical Exam  Vitals and nursing note reviewed  Constitutional:       Appearance: He is well-developed  HENT:      Head: Normocephalic  Right Ear: External ear normal       Left Ear: External ear normal       Nose: Nose normal    Eyes:      Conjunctiva/sclera: Conjunctivae normal       Pupils: Pupils are equal, round, and reactive to light  Neck:      Thyroid: No thyromegaly  Cardiovascular:      Rate and Rhythm: Normal rate and regular rhythm  Heart sounds: Normal heart sounds  Pulmonary:      Effort: Pulmonary effort is normal       Breath sounds: Normal breath sounds  Abdominal:      Palpations: Abdomen is soft  Tenderness: There is no abdominal tenderness  There is no guarding or rebound     Musculoskeletal: General: Tenderness present  Right shoulder: Normal       Left shoulder: Swelling and tenderness present  Decreased range of motion  Cervical back: Normal range of motion and neck supple  Skin:     General: Skin is dry  Neurological:      Mental Status: He is alert and oriented to person, place, and time  Cranial Nerves: Cranial nerves are intact  Sensory: Sensation is intact  Motor: Motor function is intact  Coordination: Coordination is intact  Gait: Gait is intact  Deep Tendon Reflexes: Reflexes are normal and symmetric        Comments: No tremor visualized

## 2022-07-24 RX ORDER — SODIUM CHLORIDE 9 MG/ML
125 INJECTION, SOLUTION INTRAVENOUS CONTINUOUS
Status: CANCELLED | OUTPATIENT
Start: 2022-07-24

## 2022-07-26 ENCOUNTER — ANESTHESIA (OUTPATIENT)
Dept: GASTROENTEROLOGY | Facility: MEDICAL CENTER | Age: 62
End: 2022-07-26

## 2022-07-26 ENCOUNTER — ANESTHESIA EVENT (OUTPATIENT)
Dept: GASTROENTEROLOGY | Facility: MEDICAL CENTER | Age: 62
End: 2022-07-26

## 2022-07-26 ENCOUNTER — HOSPITAL ENCOUNTER (OUTPATIENT)
Dept: GASTROENTEROLOGY | Facility: MEDICAL CENTER | Age: 62
Setting detail: OUTPATIENT SURGERY
Discharge: HOME | End: 2022-07-26
Admitting: INTERNAL MEDICINE
Payer: COMMERCIAL

## 2022-07-26 VITALS
WEIGHT: 137 LBS | SYSTOLIC BLOOD PRESSURE: 134 MMHG | HEIGHT: 63 IN | BODY MASS INDEX: 24.27 KG/M2 | HEART RATE: 90 BPM | DIASTOLIC BLOOD PRESSURE: 89 MMHG | TEMPERATURE: 97.8 F | RESPIRATION RATE: 18 BRPM | OXYGEN SATURATION: 100 %

## 2022-07-26 DIAGNOSIS — K31.A0 INTESTINAL METAPLASIA OF STOMACH: ICD-10-CM

## 2022-07-26 PROBLEM — N18.30 CKD (CHRONIC KIDNEY DISEASE) STAGE 3, GFR 30-59 ML/MIN (HCC): Status: ACTIVE | Noted: 2022-07-26

## 2022-07-26 PROCEDURE — 88313 SPECIAL STAINS GROUP 2: CPT | Performed by: PATHOLOGY

## 2022-07-26 PROCEDURE — 43239 EGD BIOPSY SINGLE/MULTIPLE: CPT | Performed by: INTERNAL MEDICINE

## 2022-07-26 PROCEDURE — 88305 TISSUE EXAM BY PATHOLOGIST: CPT | Performed by: PATHOLOGY

## 2022-07-26 RX ORDER — PROPOFOL 10 MG/ML
INJECTION, EMULSION INTRAVENOUS AS NEEDED
Status: DISCONTINUED | OUTPATIENT
Start: 2022-07-26 | End: 2022-07-26

## 2022-07-26 RX ORDER — SODIUM CHLORIDE 9 MG/ML
125 INJECTION, SOLUTION INTRAVENOUS CONTINUOUS
Status: DISCONTINUED | OUTPATIENT
Start: 2022-07-26 | End: 2022-07-30 | Stop reason: HOSPADM

## 2022-07-26 RX ORDER — LIDOCAINE HYDROCHLORIDE 20 MG/ML
INJECTION, SOLUTION EPIDURAL; INFILTRATION; INTRACAUDAL; PERINEURAL AS NEEDED
Status: DISCONTINUED | OUTPATIENT
Start: 2022-07-26 | End: 2022-07-26

## 2022-07-26 RX ADMIN — PROPOFOL 30 MG: 10 INJECTION, EMULSION INTRAVENOUS at 13:25

## 2022-07-26 RX ADMIN — LIDOCAINE HYDROCHLORIDE 60 MG: 20 INJECTION, SOLUTION EPIDURAL; INFILTRATION; INTRACAUDAL; PERINEURAL at 13:23

## 2022-07-26 RX ADMIN — PROPOFOL 30 MG: 10 INJECTION, EMULSION INTRAVENOUS at 13:30

## 2022-07-26 RX ADMIN — PROPOFOL 50 MG: 10 INJECTION, EMULSION INTRAVENOUS at 13:24

## 2022-07-26 RX ADMIN — SODIUM CHLORIDE 125 ML/HR: 9 INJECTION, SOLUTION INTRAVENOUS at 13:09

## 2022-07-26 RX ADMIN — PROPOFOL 20 MG: 10 INJECTION, EMULSION INTRAVENOUS at 13:31

## 2022-07-26 RX ADMIN — PROPOFOL 150 MG: 10 INJECTION, EMULSION INTRAVENOUS at 13:23

## 2022-07-26 RX ADMIN — PROPOFOL 20 MG: 10 INJECTION, EMULSION INTRAVENOUS at 13:29

## 2022-07-26 RX ADMIN — PROPOFOL 30 MG: 10 INJECTION, EMULSION INTRAVENOUS at 13:27

## 2022-07-26 NOTE — ANESTHESIA PREPROCEDURE EVALUATION
Procedure:  EGD    Relevant Problems   CARDIO   (+) Atypical chest pain   (+) Benign essential hypertension      GI/HEPATIC   (+) Gastroesophageal reflux disease      /RENAL   (+) CKD (chronic kidney disease) stage 3, GFR 30-59 ml/min (HCC)   (+) Renal calculus, right      MUSCULOSKELETAL   (+) Chronic right-sided low back pain   (+) Impingement syndrome, shoulder, left   (+) Right-sided low back pain with right-sided sciatica      NEURO/PSYCH   (+) Chronic right-sided low back pain   (+) Chronic tension headache      Respiratory   (+) Glottic insufficiency   (+) Reflux laryngitis   (+) Sulcus vocalis of vocal fold   (+) Vocal fold paresis, left      Digestive   (+) Chronic constipation      Other   (+) Abnormal glucose   (+) Cough   (+) Dysphonia        Physical Exam    Airway    Mallampati score: I  TM Distance: >3 FB  Neck ROM: full     Dental   Comment: Partials, upper dentures and lower dentures,     Cardiovascular  Cardiovascular exam normal    Pulmonary  Pulmonary exam normal     Other Findings        Anesthesia Plan  ASA Score- 3     Anesthesia Type- IV sedation with anesthesia with ASA Monitors  Additional Monitors:   Airway Plan:           Plan Factors-Exercise tolerance (METS): >4 METS  Chart reviewed  Patient is not a current smoker  Obstructive sleep apnea risk education given perioperatively  Induction- intravenous  Postoperative Plan-     Informed Consent- Anesthetic plan and risks discussed with patient

## 2022-07-26 NOTE — H&P
History and Physical - [facility]  Bryanna Pang 57 y o  male MRN: [id number]                  HPI: Bryanna Pang is a 57y o  year old male who presents for EGD for history of Castellanos's esophagus and gastric intestinal metaplasia  REVIEW OF SYSTEMS: Per the HPI, and otherwise unremarkable      Historical Information   Past Medical History:   Diagnosis Date    COVID-19 virus infection 2020    Exposure to COVID-19 virus 2020    History of chronic constipation     History of neck pain 2017    Hypertension      Past Surgical History:   Procedure Laterality Date    CYSTOSCOPY  2014    diagnostic managed by Jalen Mora    TOOTH EXTRACTION       Social History   Social History     Substance and Sexual Activity   Alcohol Use Yes    Comment: social     Social History     Substance and Sexual Activity   Drug Use Never     Social History     Tobacco Use   Smoking Status Former Smoker    Types: Cigarettes    Quit date: 2014    Years since quittin 5   Smokeless Tobacco Never Used     Family History   Problem Relation Age of Onset    Cancer Sister         malignant neoplasm of breast    Diabetes Paternal Grandmother     Diabetes Family     Arthritis Family     Cancer Family         malignant neoplasm    Substance Abuse Neg Hx         denied Mother and Father    Mental illness Neg Hx         no family history Mother and Father    Other Neg Hx         denied family history of Osteopertrosis       Meds/Allergies       Current Outpatient Medications:     amLODIPine (NORVASC) 5 mg tablet    Diclofenac Sodium (VOLTAREN) 1 %    hyoscyamine (Levsin) 0 125 MG tablet    lubiprostone (AMITIZA) 24 mcg capsule    mometasone (NASONEX) 50 mcg/act nasal spray    omeprazole (PriLOSEC) 40 MG capsule    tamsulosin (FLOMAX) 0 4 mg    acetaminophen (TYLENOL) 500 mg tablet    nystatin (MYCOSTATIN) powder    senna (SENOKOT) 8 6 mg    Current Facility-Administered Medications:     sodium chloride 0 9 % infusion, 125 mL/hr, Intravenous, Continuous, 125 mL/hr at 07/26/22 1309    No Known Allergies    Objective     /94   Pulse 75   Temp 97 8 °F (36 6 °C) (Temporal)   Resp 16   Ht 5' 3" (1 6 m)   Wt 62 1 kg (137 lb)   SpO2 99%   BMI 24 27 kg/m²       PHYSICAL EXAM    Gen: NAD  Head: NCAT  CV: RRR  CHEST: Clear  ABD: soft, NT/ND  EXT: no edema      Ino Coleman is a 57y o  year old male who presents for EGD for history of Castellanos's esophagus and gastric intestinal metaplasia  The patient is stable and optimized for the procedure, we reviewed risk and benefits  Risk include but not limited to infection, bleeding, perforation and missing a lesion

## 2022-07-27 ENCOUNTER — TELEPHONE (OUTPATIENT)
Dept: OTHER | Facility: OTHER | Age: 62
End: 2022-07-27

## 2022-07-27 NOTE — ANESTHESIA POSTPROCEDURE EVALUATION
Post-Op Assessment Note    CV Status:  Stable    Pain management: adequate     Mental Status:  Alert and awake   Hydration Status:  Euvolemic   PONV Controlled:  Controlled   Airway Patency:  Patent      Post Op Vitals Reviewed: Yes      Staff: Anesthesiologist         No complications documented      BP      Temp      Pulse     Resp      SpO2      /89   Pulse 90   Temp 97 8 °F (36 6 °C) (Temporal)   Resp 18   Ht 5' 3" (1 6 m)   Wt 62 1 kg (137 lb)   SpO2 100%   BMI 24 27 kg/m²

## 2022-07-27 NOTE — TELEPHONE ENCOUNTER
[phone] with pt on phone  He states he is feeling better   Encouraged PO hydration and rest  Letter for work written and provided to patient

## 2022-07-27 NOTE — LETTER
July 27, 2022       No Recipients    Patient: Manuel Green   YOB: 1960   Date of Visit: 7/27/2022       Dear Dr Harish Boswell Recipients: Thank you for referring Rodríguez Vance to me for evaluation  Below are my notes for this consultation  If you have questions, please do not hesitate to call me  I look forward to following your patient along with you  Sincerely,        Olena Perez RN        CC:   No Recipients  Francisco Parker  7/27/2022  7:52 AM  Signed  Please provide work note    Feliz Stauffer

## 2022-07-27 NOTE — TELEPHONE ENCOUNTER
Patient calling in stating that he had his EGD done yesterday and he states he is still feeling "drowsy" from the anesthesia and he would like to know if he could get a note for work today  Please call patient if he is able to get a note for work

## 2022-07-29 DIAGNOSIS — R10.11 RIGHT UPPER QUADRANT ABDOMINAL PAIN: ICD-10-CM

## 2022-07-29 DIAGNOSIS — R12 HEARTBURN: ICD-10-CM

## 2022-07-29 RX ORDER — OMEPRAZOLE 40 MG/1
40 CAPSULE, DELAYED RELEASE ORAL
Qty: 30 CAPSULE | Refills: 3 | Status: SHIPPED | OUTPATIENT
Start: 2022-07-29 | End: 2022-08-13

## 2022-08-13 DIAGNOSIS — R10.11 RIGHT UPPER QUADRANT ABDOMINAL PAIN: ICD-10-CM

## 2022-08-13 DIAGNOSIS — R12 HEARTBURN: ICD-10-CM

## 2022-08-13 RX ORDER — OMEPRAZOLE 40 MG/1
CAPSULE, DELAYED RELEASE ORAL
Qty: 90 CAPSULE | Refills: 2 | Status: SHIPPED | OUTPATIENT
Start: 2022-08-13

## 2022-09-15 ENCOUNTER — TELEPHONE (OUTPATIENT)
Dept: NEPHROLOGY | Facility: CLINIC | Age: 62
End: 2022-09-15

## 2022-09-15 NOTE — TELEPHONE ENCOUNTER
Spoke with Patient and schedule appointment for 11/16 with Aditi Brown in the [facility] location   Mailed appointment reminder card

## 2022-09-15 NOTE — TELEPHONE ENCOUNTER
Left message to schedule a follow up with Dr Spencer Kinney  This is a overdue follow up   This is the first attempt/

## 2022-10-17 ENCOUNTER — NURSE TRIAGE (OUTPATIENT)
Dept: OTHER | Facility: OTHER | Age: 62
End: 2022-10-17

## 2022-10-17 NOTE — TELEPHONE ENCOUNTER
I spoke to Kenzie and the pain is chronic  He saw Keith Dunaway PA-C in May for this pain  He is agreeable to scheduling an office visit to discuss further  Please call him, thank you

## 2022-10-17 NOTE — TELEPHONE ENCOUNTER
Regarding: back pain/ constipation / bloating   ----- Message from Leon Negrete MA sent at 10/17/2022  2:24 PM EDT -----  "I am back pain on right side near the ribs, a lot of issues w/ constipation, the pill they gave me is not working at all   It has been 2 days since last BM, bloated and think something is wrong "

## 2022-10-17 NOTE — TELEPHONE ENCOUNTER
Patient called in to report moderate right upper quadrant pain was intermittent and now is constant since 10/12/22  Patient reports no bowel movement since 10/15/22  Triage RN reviewed medications with patient to identify which medicaiton patient reports is not working  Tamsulosin is for urinary BPH with hesitancy  Omeprazole daily for esophagus and stomach  Amitiza is for his constipation  Reports he takes daily; advised patient to take twice a day as prescribed to help with his constipation  Please follow up with patient for further review of medications and right upper quadrant pain  Reason for Disposition  • MODERATE pain (e g , interferes with normal activities) that comes and goes (cramps) lasts > 24 hours  (Exception: pain with Vomiting or Diarrhea - see that Protocol)    Answer Assessment - Initial Assessment Questions  1  LOCATION: "Where does it hurt?"       Right side near ribs    2  RADIATION: "Does the pain shoot anywhere else?" (e g , chest, back)      Denies    3  ONSET: "When did the pain begin?" (Minutes, hours or days ago)       10/12/22    4  SUDDEN: "Gradual or sudden onset?"      Gradually    5  PATTERN "Does the pain come and go, or is it constant?"     - If constant: "Is it getting better, staying the same, or worsening?"       (Note: Constant means the pain never goes away completely; most serious pain is constant and it progresses)      - If intermittent: "How long does it last?" "Do you have pain now?"      (Note: Intermittent means the pain goes away completely between bouts)      Before 10/12 pain was intermittent; since 10/12 pain is constant    6   SEVERITY: "How bad is the pain?"  (e g , Scale 1-10; mild, moderate, or severe)     - MILD (1-3): doesn't interfere with normal activities, abdomen soft and not tender to touch      - MODERATE (4-7): interferes with normal activities or awakens from sleep, tender to touch      - SEVERE (8-10): excruciating pain, doubled over, unable to do any normal activities        Moderate (5)    7  RECURRENT SYMPTOM: "Have you ever had this type of stomach pain before?" If Yes, ask: "When was the last time?" and "What happened that time?"       Yes; tests done with no resolution    8  CAUSE: "What do you think is causing the stomach pain?"      Unknown    9  RELIEVING/AGGRAVATING FACTORS: "What makes it better or worse?" (e g , movement, antacids, bowel movement)      Nothing aggravating factors; no improvement with pain    10   OTHER SYMPTOMS: "Has there been any vomiting, diarrhea, constipation, or urine problems?"        Constipation; last BM 10/15/22; taking    Protocols used: ABDOMINAL PAIN - MALE-ADULT-OH

## 2022-10-18 NOTE — TELEPHONE ENCOUNTER
Just DORIAN  Offered patient first available with Rahlu 10/25/22  Patient stated he could only do after 3pm  I explained she did not have that available that day  He explained because of work he could not do that  Stated he would find another doctor and hung up on me before I could offer another option    Thank you

## 2022-11-15 ENCOUNTER — TELEPHONE (OUTPATIENT)
Dept: NEPHROLOGY | Facility: CLINIC | Age: 62
End: 2022-11-15

## 2022-11-15 PROBLEM — R80.1 PERSISTENT PROTEINURIA: Status: ACTIVE | Noted: 2022-11-15

## 2022-11-15 PROBLEM — N18.2 CKD (CHRONIC KIDNEY DISEASE) STAGE 2, GFR 60-89 ML/MIN: Status: ACTIVE | Noted: 2022-07-26

## 2022-11-15 NOTE — TELEPHONE ENCOUNTER
Appointment Confirmation   Person confirmed appointment with  If not patient, name of the person Answering Machine    Date and time of appointment 11/16/22  4:00 pm   Patient acknowledged and will be at appointment? no    Did you advise the patient that they will need a urine sample if they are a new patient?  N/A    Did you advise the patient to bring their current medications for verification? (including any OTC) Yes    Additional Information

## 2022-11-16 ENCOUNTER — OFFICE VISIT (OUTPATIENT)
Dept: NEPHROLOGY | Facility: CLINIC | Age: 62
End: 2022-11-16

## 2022-11-16 VITALS
WEIGHT: 152 LBS | HEIGHT: 63 IN | DIASTOLIC BLOOD PRESSURE: 78 MMHG | SYSTOLIC BLOOD PRESSURE: 130 MMHG | BODY MASS INDEX: 26.93 KG/M2

## 2022-11-16 DIAGNOSIS — N18.2 CKD (CHRONIC KIDNEY DISEASE) STAGE 2, GFR 60-89 ML/MIN: Primary | ICD-10-CM

## 2022-11-16 DIAGNOSIS — R80.1 PERSISTENT PROTEINURIA: ICD-10-CM

## 2022-11-16 DIAGNOSIS — I10 BENIGN ESSENTIAL HYPERTENSION: ICD-10-CM

## 2022-11-16 DIAGNOSIS — N20.0 RENAL CALCULUS, RIGHT: ICD-10-CM

## 2022-11-16 NOTE — PROGRESS NOTES
Assessment and Plan:  Nephrolithiasis:  -no history of acute stone events or gross hematuria  -CT imaging dating back to 2021 with small nonobstructive right renal calculus without hydronephrosis again documented on recent CT 10/28/2022 at [facility]  -renal ultrasound 6/22/2021:  Normal echogenicity and contour bilaterally  6 x 5 x 5 mm simple right renal cyst, 5 x 5 x 7 nonobstructing left renal calculi  No hydronephrosis or masses   -chronic history right flank/abdominal pain  -not followed by urology   -24 hr urine stone risk profile ordered but not obtained  -check 24 hour urine stone risk profile   -push hydration  Stay well hydrated and drink at least 2 5 Lfluid daily  -low-sodium and low oxalate diet  -repeat BMP in 1 month due to recent slight elevation in creatinine   -return to office in 6 months with Dr Sandra Reeves with repeat blood durine studies    CKD II/proteinuria:  -UA with 1+ protein, no RBCs since 2017  -most recent spot urinalysis in the ER at [facility] with trace blood, 100 protein  -baseline creatinine 1 1-1 4  -most recent creatinine 1 50 on 10/28/2022 in the ER at [facility]  -check UPCr  -check SPEP/UPEP/FLC ratio  -avoid nephrotoxins, NSAIDs, IV contrast if possible   -avoid hypotension  -continue to monitor    Hypertension/Volume status:  -BP well controlled, at goal  -volume status euvolemic  -continue current medications:  Amlodipine 5 mg daily  -Avoid hypotension or fluctuations in blood pressure  -low sodium (2 gm) diet  -encourage healthy diet and regular aerobic exercise  -continue to monitor BP at home    Manjinder Amaya was seen today for follow-up and chronic kidney disease  Diagnoses and all orders for this visit:    CKD (chronic kidney disease) stage 2, GFR 60-89 ml/min  -     Protein / creatinine ratio, urine; Future  -     CBC; Future  -     Comprehensive metabolic panel; Future  -     Protein electrophoresis, serum; Future  -     Immunoglobulin free LT chains blood;  Future  -     Protein electrophoresis, urine; Future    Renal calculus, right  -     Stone risk profile; Future    Benign essential hypertension    Persistent proteinuria  -     Protein / creatinine ratio, urine; Future  -     CBC; Future  -     Comprehensive metabolic panel; Future  -     Protein electrophoresis, serum; Future  -     Immunoglobulin free LT chains blood; Future  -     Protein electrophoresis, urine; Future        Repeat BMP in 1 month  Follow up with Dr Wei Vizcaino in 6 months with repeat blood and urine studies  Please call the office with any questions or concerns  Reason for Visit: Follow-up and Chronic Kidney Disease    HPI: Keturah Reina is a 60 y o  male former smoker with nephrolithiasis, proteinuria and HTN who presents for follow up of nephrolithiasis  Patient followed by Dr Wei Vizcaino, last seen 11/18/21  Most recent creatinine 1 50 in the ER at [facility] 10/28/22  Patient recently evaluated in the ER at [facility] for right flank pain on 10/28/22  CT imaging demonstrated small nonobstructive right renal calculus without hydronephrosis  Patient treated with Toradol and IV fluids and discharged  Patient denies NSAID use otherwise, although patient cannot recall all of his medications  Patient denies nausea, vomiting, diarrhea, dyspnea, orthopnea, edema, hematuria or foamy urine  His main complaint is right lower quadrant abdominal pain that he has had for years  He has a GI evaluation scheduled in 2 weeks       ROS: A complete review of systems was performed and was negative unless otherwise noted in the history of present illness  Allergies:   Patient has no known allergies      Medications:     Current Outpatient Medications:   •  amLODIPine (NORVASC) 5 mg tablet, Take 1 tablet (5 mg total) by mouth daily, Disp: 90 tablet, Rfl: 1  •  omeprazole (PriLOSEC) 40 MG capsule, TAKE 1 CAPSULE BY MOUTH EVERY DAY BEFORE BREAKFAST, Disp: 90 capsule, Rfl: 2  •  senna (SENOKOT) 8 6 mg, Take 1 tablet (8 6 mg total) by mouth daily at bedtime, Disp: 30 tablet, Rfl: 0  •  tamsulosin (FLOMAX) 0 4 mg, Take 1 capsule (0 4 mg total) by mouth daily with dinner, Disp: 90 capsule, Rfl: 3    Past Medical History:   Diagnosis Date   • COVID-19 virus infection 5/1/2020   • Exposure to COVID-19 virus 11/23/2020   • History of chronic constipation    • History of neck pain 11/21/2017   • Hypertension      Past Surgical History:   Procedure Laterality Date   • CYSTOSCOPY  05/29/2014    diagnostic managed by Jalen Mora   • TOOTH EXTRACTION       Family History   Problem Relation Age of Onset   • Cancer Sister         malignant neoplasm of breast   • Diabetes Paternal Grandmother    • Diabetes Family    • Arthritis Family    • Cancer Family         malignant neoplasm   • Substance Abuse Neg Hx         denied Mother and Father   • Mental illness Neg Hx         no family history Mother and Father   • Other Neg Hx         denied family history of Osteopertrosis      reports that he quit smoking about 8 years ago  His smoking use included cigarettes  He has never used smokeless tobacco  He reports current alcohol use  He reports that he does not use drugs  Physical Exam:   Vitals:    11/16/22 1551 11/16/22 1616   BP: 150/80 130/78   BP Location: Left arm Left arm   Patient Position: Sitting Sitting   Cuff Size: Standard Standard   Weight: 68 9 kg (152 lb)    Height: 5' 3" (1 6 m)      Body mass index is 26 93 kg/m²  General:  Awake, alert, appears comfortable and in no acute distress  Nontoxic  Skin:  No rash, warm, good skin turgor   Eyes:  PERRL, EOMI, sclerae nonicteric   no periorbital edema   ENT:  Moist mucous membranes  Neck:  Trachea midline, symmetric  No JVD  No carotid bruits  Chest:  Clear to auscultation bilaterally without wheezes, crackles or rhonchi  CVS:  Regular rate and rhythm without murmur, gallop or rub  S1 and S2 identified and normal   No S3, S4    Abdomen:  Soft, nontender, nondistended without masses    Normal bowel sounds x 4 quadrants  No bruit  Extremities:  Warm, pink, motor and sensory intact and well perfused  No cyanosis, pallor  No BLE edema  Neuro:  Awake, alert, oriented x3  Grossly intact  Psych:  Appropriate affect  Mentating appropriately  Normal mental status exam      Procedure:  No results found for this or any previous visit  Lab Results   Component Value Date    GLUCOSE 113 09/15/2015    CALCIUM 8 5 04/08/2022     09/15/2015    K 4 0 04/08/2022    CO2 23 04/08/2022     04/08/2022    BUN 19 04/08/2022    CREATININE 1 21 04/08/2022             Invalid input(s): ALBUMIN    EMR, including Epic, Care Everywhere and outside scanned documents reviewed  I have personally reviewed the blood work as stated above and in my note  I have personally reviewed CT abdomen and pelvis imaging studies  I have personally reviewed PCP, consultants and prior nephrology notes

## 2022-11-16 NOTE — PATIENT INSTRUCTIONS
Your kidney function remains relative stable  Most recent creatinine 1 5, slightly above baseline  Repeat labs in 1 month  We will continue routine surveillance as outlined below  Urine studies demonstrate protein in your urine  Will obtain blood and urine studies for further investigation   Recommend 24 hour urine study to evaluate urine for stone risk  This will help guide medical management of your kidney stone disease  Avoid all NSAIDs to include ibuprofen, Motrin, Aleve, Advil, Naproxen, Celebrex, Indomethacin, Toradol  Stay well hydrated  Drink at least 96 fluid oz daily to help prevent kidney stones  Continue all prescribed medications  Call if blood pressure consistently more than 140/90 or less than 110/50s  High and low blood pressures may affect your kidney function  Recommend low salt diet (2 gm sodium diet)  Recommend low animal protein and oxalate diet  Please let us know if you are scheduled for any studies with IV contrast (ex: CT scan, arteriogram or cardiac catheterization)   Follow up in 6 months with Dr Esvin Wilburn with repeat labs prior to appointment  Please contact the office with new symptoms or concerns

## 2022-11-29 ENCOUNTER — OFFICE VISIT (OUTPATIENT)
Dept: GASTROENTEROLOGY | Facility: MEDICAL CENTER | Age: 62
End: 2022-11-29

## 2022-11-29 VITALS
BODY MASS INDEX: 24.8 KG/M2 | DIASTOLIC BLOOD PRESSURE: 88 MMHG | HEIGHT: 63 IN | TEMPERATURE: 98.9 F | WEIGHT: 140 LBS | HEART RATE: 71 BPM | SYSTOLIC BLOOD PRESSURE: 156 MMHG

## 2022-11-29 DIAGNOSIS — K59.00 CONSTIPATION, UNSPECIFIED CONSTIPATION TYPE: ICD-10-CM

## 2022-11-29 DIAGNOSIS — K21.9 GASTROESOPHAGEAL REFLUX DISEASE, UNSPECIFIED WHETHER ESOPHAGITIS PRESENT: Primary | ICD-10-CM

## 2022-11-29 DIAGNOSIS — R10.11 RUQ PAIN: ICD-10-CM

## 2022-11-29 DIAGNOSIS — K31.A0 GASTRIC INTESTINAL METAPLASIA: ICD-10-CM

## 2022-11-29 DIAGNOSIS — K22.70 BARRETT'S ESOPHAGUS WITHOUT DYSPLASIA: ICD-10-CM

## 2022-11-29 RX ORDER — HYOSCYAMINE SULFATE 0.125 MG
0.12 TABLET ORAL EVERY 4 HOURS PRN
Qty: 30 TABLET | Refills: 0 | Status: SHIPPED | OUTPATIENT
Start: 2022-11-29

## 2022-11-29 NOTE — PROGRESS NOTES
[facility] - Outpatient Follow-up Note  Daisy Rey 60 y o  male MRN: [id number]  Encounter: [id number]          ASSESSMENT AND PLAN:      1  Gastroesophageal reflux disease, unspecified whether esophagitis present  2  Castellanos's esophagus without dysplasia  3  Gastric intestinal metaplasia: last seen in office in 5/22  He had EGD 03/10/2022 consistent with Castellanos's esophagus and abnormal gastric mucosa with known and previously confirm intestinal metaplasia   Biopsies were benign but did show extensive gastric intestinal metaplasia visible on EGD and confirm with biopsy   Labs for atrophic gastritis including intrinsic factor, anti-parietal antibody, vitamin B12 and iron panel were all normal   He will need to continue PPI 40 mg daily at this time  [facility] did have repeat EGD 7/2022 again with barretts esophagus and atrophic and cobblestone and nodular mucosa in cardia, body and incisura  Biopsies again showing intestinal metaplasia  Repeat EGD recommended in 1 year  -repeat EGD due to extensive intestinal metaplasia 1 year  -continue 40mg PPI daily     4  Constipation, unspecified constipation type: he is on amitiza 24 mcg twice daily  At last visit he still did not feel like this was working for him and we started miralax 17 g daily in addition to his amitiza  He states he never tried this due to fear of taking both together  He is willing to try this and will start prior to changing his medication again   -continue amitiza 24 mcg bid  -start miralax daily PRN    5  RUQ abdominal pain: s/p CT scan and ruq u/s and EGDs with no source of pain found  This is most likely secondary to his constipation  ______________________________________________________________________    SUBJECTIVE:  Kane Kayleenmarielle a 40-year-old male past medical history of chronic constipation, hypertension who is here for follow-up after EGD   He was last seen in the office 5/2022    [facility] underwent EGD 03/10/2022 with a known history of intestinal metaplasia and Castellanos's esophagus  This EGD again did reveal Castellanos's esophagus and extensive gastric metaplasia seen on EGD confirmed on biopsies   He was continuing to complain of right-sided abdominal pain both in the right upper and right lower quadrants   His right upper quadrant pain also radiated across his epigastric region and into his left upper quadrant   He denied significant acid reflux and is currently on Prilosec 40 mg once daily   He is also on Amitiza 24 mcg twice daily for chronic constipation  He does not think this is working as well as it was previously for him  We added miralax into his regimen instead of changing medications at his last visit  Jamison Harry previously tried [place] Postin in the past without success and Marian James was not approved by his insurance  We also prescribed levsin to try for his pain but he does not think he tried it  He recently had repeat EGD 7/2022 again with barretts esophagus and atrophic and cobblestone and nodular mucosa in cardia, body and incisura  Biopsies again showing intestinal metaplasia  Repeat EGD recommended in 1 year  His last colonoscopy was in 2019 that was normal   He has a family history of gastric cancer in an uncle  Today he continues to complain of ruq pain and hard stools  He did not take miralax as recommended  He was nervous to take this with Protivin but is going to try  He is concerned regarding his prostate and testicles as he experiences pain and will discuss with his pcp  REVIEW OF SYSTEMS IS OTHERWISE NEGATIVE        Historical Information   Past Medical History:   Diagnosis Date   • COVID-19 virus infection 5/1/2020   • Exposure to COVID-19 virus 11/23/2020   • History of chronic constipation    • History of neck pain 11/21/2017   • Hypertension      Past Surgical History:   Procedure Laterality Date   • CYSTOSCOPY  05/29/2014    diagnostic managed by Jalen Mora   • TOOTH EXTRACTION       Social History Social History     Substance and Sexual Activity   Alcohol Use Yes    Comment: social     Social History     Substance and Sexual Activity   Drug Use Never     Social History     Tobacco Use   Smoking Status Former   • Types: Cigarettes   • Quit date: 2014   • Years since quittin 8   Smokeless Tobacco Never     Family History   Problem Relation Age of Onset   • Cancer Sister         malignant neoplasm of breast   • Diabetes Paternal Grandmother    • Diabetes Family    • Arthritis Family    • Cancer Family         malignant neoplasm   • Substance Abuse Neg Hx         denied Mother and Father   • Mental illness Neg Hx         no family history Mother and Father   • Other Neg Hx         denied family history of Osteopertrosis       Meds/Allergies       Current Outpatient Medications:   •  amLODIPine (NORVASC) 5 mg tablet  •  senna (SENOKOT) 8 6 mg  •  tamsulosin (FLOMAX) 0 4 mg  •  omeprazole (PriLOSEC) 40 MG capsule    No Known Allergies        Objective     There were no vitals taken for this visit  There is no height or weight on file to calculate BMI  PHYSICAL EXAM:      General Appearance:   Alert, cooperative, no distress   HEENT:   Normocephalic, atraumatic, anicteric      Neck:  Supple, symmetrical, trachea midline   Lungs:   Clear to auscultation bilaterally; no rales, rhonchi or wheezing; respirations unlabored    Heart[de-identified]   Regular rate and rhythm; no murmur, rub, or gallop  Abdomen:   Soft, non-tender, non-distended; normal bowel sounds; no masses, no organomegaly    Genitalia:   Deferred    Rectal:   Deferred    Extremities:  No cyanosis, clubbing or edema        Skin:  No jaundice, rashes, or lesions          Lab Results:   No visits with results within 1 Day(s) from this visit     Latest known visit with results is:   Hospital Outpatient Visit on 2022   Component Date Value   • Case Report 2022                      Value:Surgical Pathology Report                         Case: [id number]                                   Authorizing Provider:  Hakan Mak MD           Collected:           07/26/2022 1326              Ordering Location:     [facility]        Received:            07/26/2022 [address] [place] [address] Endoscopy                                                     Pathologist:           Ines Zapata MD                                                                 Specimens:   A) - Stomach, bx distal antrum r/o gastro intestinal metaplasia                                     B) - Stomach, bx proximal antrum r/o gastro intestinal metaplasia                                   C) - Stomach, bx distal gastric body r/o gastro intestinal metaplasia                               D) - Stomach, bx incisuara r/o gastro intestinal metaplasia                                                                   E) - Stomach, bx gastric mid body r/o gastro intestinal metaplasia                                  F) - Stomach, bx gastric proximal body r/o gastro intestinal metaplasia                             G) - Stomach, bx gastric cardia r/o gastro intestinal metaplasia                                    H) - Esophagus, bx Barretts esophagus                                                     • Final Diagnosis 07/26/2022                      Value: This result contains rich text formatting which cannot be displayed here  • Additional Information 07/26/2022                      Value: This result contains rich text formatting which cannot be displayed here  • Gross Description 07/26/2022                      Value: This result contains rich text formatting which cannot be displayed here  Radiology Results:   No results found

## 2022-12-27 ENCOUNTER — TELEPHONE (OUTPATIENT)
Dept: FAMILY MEDICINE CLINIC | Facility: CLINIC | Age: 62
End: 2022-12-27

## 2022-12-27 ENCOUNTER — OFFICE VISIT (OUTPATIENT)
Dept: FAMILY MEDICINE CLINIC | Facility: CLINIC | Age: 62
End: 2022-12-27

## 2022-12-27 VITALS
RESPIRATION RATE: 18 BRPM | TEMPERATURE: 96.8 F | HEIGHT: 63 IN | BODY MASS INDEX: 25.16 KG/M2 | HEART RATE: 83 BPM | WEIGHT: 142 LBS | DIASTOLIC BLOOD PRESSURE: 92 MMHG | SYSTOLIC BLOOD PRESSURE: 130 MMHG | OXYGEN SATURATION: 98 %

## 2022-12-27 DIAGNOSIS — N50.819 CHRONIC PAIN IN TESTICLE: Primary | ICD-10-CM

## 2022-12-27 DIAGNOSIS — Z12.5 SCREENING PSA (PROSTATE SPECIFIC ANTIGEN): ICD-10-CM

## 2022-12-27 DIAGNOSIS — I10 BENIGN HYPERTENSION: ICD-10-CM

## 2022-12-27 DIAGNOSIS — G89.29 CHRONIC PAIN IN TESTICLE: Primary | ICD-10-CM

## 2022-12-27 PROBLEM — H61.23 BILATERAL IMPACTED CERUMEN: Status: RESOLVED | Noted: 2019-11-23 | Resolved: 2022-12-27

## 2022-12-27 RX ORDER — AMLODIPINE BESYLATE 5 MG/1
5 TABLET ORAL DAILY
Qty: 90 TABLET | Refills: 1 | Status: SHIPPED | OUTPATIENT
Start: 2022-12-27

## 2022-12-27 NOTE — TELEPHONE ENCOUNTER
12/27/22    PT REQUESTED A CHANGE OF PCP DUE TO NEEDING LATE AFTERNOON APPTS TIME  PCP HAS BEEN CHANGED

## 2022-12-27 NOTE — ASSESSMENT & PLAN NOTE
- Will check for STDs & repeat US of scrotum  - Recommend using tylenol 500 mg prn for discomfort  - Pt was advised to schedule & f/u with urology

## 2022-12-27 NOTE — PROGRESS NOTES
Name: Demi Adame      : 1960      MRN: [id number]  Encounter Provider: ALE Granados  Encounter Date: 2022   Encounter department: [facility]    Assessment & Plan     1  Chronic pain in testicle  Assessment & Plan:  - Will check for STDs & repeat US of scrotum  - Recommend using tylenol 500 mg prn for discomfort  - Pt was advised to schedule & f/u with urology  Orders:  -     US scrotum and testicles; Future; Expected date: 2022  -     Trichomonas Vaginalis, GADIEL; Future  -     Chlamydia/GC amplified DNA by PCR; Future  -     HIV 1/2 Antigen/Antibody (4th Generation) w Reflex [facility]; Future  -     Ambulatory Referral to Urology; Future  -     UA (URINE) with reflex to Scope    2  Screening PSA (prostate specific antigen)  -     PSA, total and free; Future    3  Benign hypertension  Assessment & Plan:  BP Readings from Last 3 Encounters:   22 130/92   22 156/88   22 130/78     - Ran out of amlodipine  - refilled today  - to f/u with pcp next month    Orders:  -     amLODIPine (NORVASC) 5 mg tablet; Take 1 tablet (5 mg total) by mouth daily         Subjective      Demi Adame is a 60 y o  male  has a past medical history of COVID-19 virus infection, Exposure to COVID-19 virus, History of chronic constipation, History of neck pain, and Hypertension  has a past surgical history that includes Cystoscopy (2014) and Tooth extraction  He presents today endorsing bilateral testicle pain present for over one year  He has not tried medication for this  He has seen urology for this but has not seen them in almost a year  He is sexually active  Denies  symptoms as per ROS  He did have 2  Bilateral epididymal cysts on an US of his scrotum done about one year prior  Review of Systems   Constitutional: Negative for chills and fever  HENT: Negative for ear pain and sore throat      Eyes: Negative for pain and visual disturbance  Respiratory: Negative for cough and shortness of breath  Cardiovascular: Negative for chest pain and palpitations  Gastrointestinal: Negative for abdominal pain and vomiting  Genitourinary: Positive for testicular pain  Negative for decreased urine volume, difficulty urinating, dysuria, enuresis, flank pain, frequency, genital sores, hematuria, penile discharge, penile pain, penile swelling, scrotal swelling and urgency  Musculoskeletal: Negative for arthralgias and back pain  Skin: Negative for color change and rash  Neurological: Negative for seizures and syncope  All other systems reviewed and are negative  Current Outpatient Medications on File Prior to Visit   Medication Sig   • hyoscyamine (Levsin) 0 125 MG tablet Take 1 tablet (0 125 mg total) by mouth every 4 (four) hours as needed for cramping   • omeprazole (PriLOSEC) 40 MG capsule TAKE 1 CAPSULE BY MOUTH EVERY DAY BEFORE BREAKFAST (Patient not taking: Reported on 11/29/2022)   • senna (SENOKOT) 8 6 mg Take 1 tablet (8 6 mg total) by mouth daily at bedtime   • tamsulosin (FLOMAX) 0 4 mg Take 1 capsule (0 4 mg total) by mouth daily with dinner   • [DISCONTINUED] amLODIPine (NORVASC) 5 mg tablet Take 1 tablet (5 mg total) by mouth daily       Objective     /92 (BP Location: Right arm, Patient Position: Sitting, Cuff Size: Standard)   Pulse 83   Temp (!) 96 8 °F (36 °C) (Temporal)   Resp 18   Ht 5' 3" (1 6 m)   Wt 64 4 kg (142 lb)   SpO2 98%   BMI 25 15 kg/m²     Physical Exam  Vitals and nursing note reviewed  Exam conducted with a chaperone present (Anthony menchaca)  Constitutional:       General: He is not in acute distress  Appearance: He is not ill-appearing  HENT:      Head: Normocephalic and atraumatic  Right Ear: External ear normal  There is no impacted cerumen  Left Ear: External ear normal  There is no impacted cerumen  Nose: Nose normal  No congestion     Eyes:      Pupils: Pupils are equal, round, and reactive to light  Cardiovascular:      Rate and Rhythm: Normal rate and regular rhythm  Pulses: Normal pulses  Heart sounds: Normal heart sounds  No murmur heard  Pulmonary:      Effort: Pulmonary effort is normal       Breath sounds: Normal breath sounds  Abdominal:      General: Bowel sounds are normal       Palpations: Abdomen is soft  Tenderness: There is no abdominal tenderness  Genitourinary:     Penis: Normal        Testes: Normal  Cremasteric reflex is present  Epididymis:      Right: Normal       Left: Normal    Musculoskeletal:         General: No tenderness  Normal range of motion  Cervical back: Normal range of motion  No tenderness  Skin:     General: Skin is warm and dry  Neurological:      General: No focal deficit present  Mental Status: He is alert and oriented to person, place, and time  Psychiatric:         Mood and Affect: Mood normal          Behavior: Behavior normal          Thought Content:  Thought content normal          Judgment: Judgment normal        Darren Ramos

## 2022-12-27 NOTE — ASSESSMENT & PLAN NOTE
BP Readings from Last 3 Encounters:   12/27/22 130/92   11/29/22 156/88   11/16/22 130/78     - Ran out of amlodipine  - refilled today  - to f/u with pcp next month

## 2022-12-29 ENCOUNTER — HOSPITAL ENCOUNTER (OUTPATIENT)
Dept: ULTRASOUND IMAGING | Facility: HOSPITAL | Age: 62
Discharge: HOME | End: 2022-12-29

## 2022-12-29 DIAGNOSIS — G89.29 CHRONIC PAIN IN TESTICLE: ICD-10-CM

## 2022-12-29 DIAGNOSIS — N50.819 CHRONIC PAIN IN TESTICLE: ICD-10-CM

## 2023-01-05 ENCOUNTER — TELEPHONE (OUTPATIENT)
Dept: FAMILY MEDICINE CLINIC | Facility: CLINIC | Age: 63
End: 2023-01-05

## 2023-01-05 NOTE — TELEPHONE ENCOUNTER
PT CALLED THE OFFICE FOR R/S HIS APPT FOR EARLIER, I ADVISED THE PT THAT WE DONT HAVE APPT AVAILABLE FOR THE MORNING WITH HIS PCP

## 2023-01-10 ENCOUNTER — OFFICE VISIT (OUTPATIENT)
Dept: UROLOGY | Facility: AMBULATORY SURGERY CENTER | Age: 63
End: 2023-01-10

## 2023-01-10 VITALS
BODY MASS INDEX: 25.16 KG/M2 | OXYGEN SATURATION: 98 % | DIASTOLIC BLOOD PRESSURE: 82 MMHG | WEIGHT: 142 LBS | RESPIRATION RATE: 18 BRPM | HEIGHT: 63 IN | HEART RATE: 78 BPM | SYSTOLIC BLOOD PRESSURE: 130 MMHG

## 2023-01-10 DIAGNOSIS — R39.11 BENIGN PROSTATIC HYPERPLASIA WITH URINARY HESITANCY: ICD-10-CM

## 2023-01-10 DIAGNOSIS — G89.29 CHRONIC PAIN IN TESTICLE: ICD-10-CM

## 2023-01-10 DIAGNOSIS — N40.1 BENIGN PROSTATIC HYPERPLASIA WITH URINARY HESITANCY: ICD-10-CM

## 2023-01-10 DIAGNOSIS — Z12.5 PROSTATE CANCER SCREENING: Primary | ICD-10-CM

## 2023-01-10 DIAGNOSIS — N50.819 CHRONIC PAIN IN TESTICLE: ICD-10-CM

## 2023-01-10 RX ORDER — TAMSULOSIN HYDROCHLORIDE 0.4 MG/1
0.4 CAPSULE ORAL
Qty: 90 CAPSULE | Refills: 3 | Status: SHIPPED | OUTPATIENT
Start: 2023-01-10

## 2023-01-10 NOTE — PROGRESS NOTES
1/10/2023    Jason Hunter  1960  [id number]        Assessment  Resolved orchialgia, benign spermatocele, routine prostate cancer screening, BPH      Discussion  I provided Marlene Colindresly with reassurance that his physical examination and ultrasound of the scrotum reveals only a benign right sided spermatocele  His orchialgia has resolved  With regards to his BPH he wishes to continue on tamsulosin as he feels that this is working well for him  Follow-up will be in 1 year with a PSA, digital rectal examination, postvoid residual assessment and uroflow evaluation  History of Present Illness  60 y o  male with a history of transient orchialgia  This has resolved  This prompted a scrotal ultrasound  He returns in follow-up today  A benign right-sided epididymal cyst consistent with a spermatocele is noted  His PSA levels have been consistently below 1 0 for many years  There is no family history of prostate cancer  He states he is voiding well on tamsulosin without significant lower urinary tract symptoms  He requests a refill on the tamsulosin which was provided today  He denies any hematuria  AUA Symptom Score      Review of Systems  Review of Systems   Constitutional: Negative  HENT: Negative  Eyes: Negative  Respiratory: Negative  Cardiovascular: Negative  Gastrointestinal: Negative  Endocrine: Negative  Genitourinary:        Per HPI   Musculoskeletal: Negative  Skin: Negative  Allergic/Immunologic: Negative  Neurological: Negative  Hematological: Negative  Psychiatric/Behavioral: Negative            Past Medical History  Past Medical History:   Diagnosis Date   • COVID-19 virus infection 5/1/2020   • Exposure to COVID-19 virus 11/23/2020   • History of chronic constipation    • History of neck pain 11/21/2017   • Hypertension        Past Social History  Past Surgical History:   Procedure Laterality Date   • CYSTOSCOPY  05/29/2014    diagnostic managed by Jalen Mora   • TOOTH EXTRACTION         Past Family History  Family History   Problem Relation Age of Onset   • Cancer Sister         malignant neoplasm of breast   • Diabetes Paternal Grandmother    • Diabetes Family    • Arthritis Family    • Cancer Family         malignant neoplasm   • Substance Abuse Neg Hx         denied Mother and Father   • Mental illness Neg Hx         no family history Mother and Father   • Other Neg Hx         denied family history of Osteopertrosis       Past Social history  Social History     Socioeconomic History   • Marital status: /Civil Union     Spouse name: Not on file   • Number of children: Not on file   • Years of education: Not on file   • Highest education level: Not on file   Occupational History   • Not on file   Tobacco Use   • Smoking status: Former     Types: Cigarettes     Quit date: 2014     Years since quittin 0   • Smokeless tobacco: Never   Vaping Use   • Vaping Use: Never used   Substance and Sexual Activity   • Alcohol use: Yes     Comment: social   • Drug use: Never   • Sexual activity: Not Currently   Other Topics Concern   • Not on file   Social History Narrative    No preference or Adventism beliefs     Social Determinants of Health     Financial Resource Strain: Low Risk    • Difficulty of Paying Living Expenses: Not hard at all   Food Insecurity: No Food Insecurity   • Worried About Running Out of Food in the Last Year: Never true   • Ran Out of Food in the Last Year: Never true   Transportation Needs: No Transportation Needs   • Lack of Transportation (Medical): No   • Lack of Transportation (Non-Medical):  No   Physical Activity: Not on file   Stress: Not on file   Social Connections: Not on file   Intimate Partner Violence: Not on file   Housing Stability: Not on file       Current Medications  Current Outpatient Medications   Medication Sig Dispense Refill   • amLODIPine (NORVASC) 5 mg tablet Take 1 tablet (5 mg total) by mouth daily 90 tablet 1   • hyoscyamine (Levsin) 0 125 MG tablet Take 1 tablet (0 125 mg total) by mouth every 4 (four) hours as needed for cramping 30 tablet 0   • senna (SENOKOT) 8 6 mg Take 1 tablet (8 6 mg total) by mouth daily at bedtime 30 tablet 0   • tamsulosin (FLOMAX) 0 4 mg Take 1 capsule (0 4 mg total) by mouth daily with dinner 90 capsule 3   • omeprazole (PriLOSEC) 40 MG capsule TAKE 1 CAPSULE BY MOUTH EVERY DAY BEFORE BREAKFAST (Patient not taking: Reported on 11/29/2022) 90 capsule 2     No current facility-administered medications for this visit  Allergies  No Known Allergies    Past Medical History, Social History, Family History, medications and allergies were reviewed  Vitals  Vitals:    01/10/23 1501   BP: 130/82   BP Location: Right arm   Patient Position: Sitting   Cuff Size: Standard   Pulse: 78   Resp: 18   SpO2: 98%   Weight: 64 4 kg (142 lb)   Height: 5' 3" (1 6 m)       Physical Exam  Physical Exam  On examination he is in no acute distress  His abdomen is soft nontender nondistended   examination feels normal phallus, scrotum and scrotal contents  A small benign right-sided spermatoceles appreciated at the head of the epididymis  Both testes are otherwise properly descended within the scrotum  Skin is warm  Extremities without edema  Neurologic is grossly intact and nonfocal   Gait normal   Affect normal   Digital rectal examination by the advanced practitioner in March 2022 within normal limits      Results  Lab Results   Component Value Date    PSA 0 6 04/08/2022    PSA 0 5 03/04/2022    PSA 0 4 03/01/2021     Lab Results   Component Value Date    GLUCOSE 113 09/15/2015    CALCIUM 8 5 04/08/2022     09/15/2015    K 4 0 04/08/2022    CO2 23 04/08/2022     04/08/2022    BUN 19 04/08/2022    CREATININE 1 21 04/08/2022     Lab Results   Component Value Date    WBC 4 16 (L) 04/08/2022    HGB 14 7 04/08/2022    HCT 44 4 04/08/2022    MCV 90 04/08/2022     04/08/2022 Office Urine Dip  No results found for this or any previous visit (from the past 1 hour(s)) ]

## 2023-01-12 ENCOUNTER — APPOINTMENT (EMERGENCY)
Dept: RADIOLOGY | Facility: HOSPITAL | Age: 63
End: 2023-01-12

## 2023-01-12 ENCOUNTER — HOSPITAL ENCOUNTER (EMERGENCY)
Facility: HOSPITAL | Age: 63
Discharge: HOME | End: 2023-01-12
Attending: EMERGENCY MEDICINE

## 2023-01-12 VITALS
RESPIRATION RATE: 16 BRPM | SYSTOLIC BLOOD PRESSURE: 151 MMHG | OXYGEN SATURATION: 99 % | WEIGHT: 144.62 LBS | DIASTOLIC BLOOD PRESSURE: 88 MMHG | TEMPERATURE: 97.8 F | HEART RATE: 80 BPM | BODY MASS INDEX: 25.62 KG/M2

## 2023-01-12 DIAGNOSIS — G89.29 CHRONIC LEFT SHOULDER PAIN: Primary | ICD-10-CM

## 2023-01-12 DIAGNOSIS — M25.512 CHRONIC LEFT SHOULDER PAIN: Primary | ICD-10-CM

## 2023-01-12 RX ORDER — METHOCARBAMOL 500 MG/1
500 TABLET, FILM COATED ORAL ONCE
Status: COMPLETED | OUTPATIENT
Start: 2023-01-12 | End: 2023-01-12

## 2023-01-12 RX ORDER — METHOCARBAMOL 500 MG/1
500 TABLET, FILM COATED ORAL 2 TIMES DAILY
Qty: 20 TABLET | Refills: 0 | Status: SHIPPED | OUTPATIENT
Start: 2023-01-12

## 2023-01-12 RX ORDER — ACETAMINOPHEN 325 MG/1
975 TABLET ORAL ONCE
Status: COMPLETED | OUTPATIENT
Start: 2023-01-12 | End: 2023-01-12

## 2023-01-12 RX ORDER — KETOROLAC TROMETHAMINE 30 MG/ML
15 INJECTION, SOLUTION INTRAMUSCULAR; INTRAVENOUS ONCE
Status: COMPLETED | OUTPATIENT
Start: 2023-01-12 | End: 2023-01-12

## 2023-01-12 RX ADMIN — KETOROLAC TROMETHAMINE 15 MG: 30 INJECTION, SOLUTION INTRAMUSCULAR; INTRAVENOUS at 19:32

## 2023-01-12 RX ADMIN — METHOCARBAMOL 500 MG: 500 TABLET ORAL at 19:32

## 2023-01-12 RX ADMIN — ACETAMINOPHEN 975 MG: 325 TABLET ORAL at 19:32

## 2023-01-12 NOTE — PROGRESS NOTES
Name: Joselyn Vidales      : 1960      MRN: [id number]  Encounter Provider: ALE Cabrales  Encounter Date: 2023   Encounter department: [facility]     Assessment & Plan     1  Chronic left shoulder pain  Assessment & Plan:  - Recommend PT, heat/ice application, home exercises  - May use tylenol 500 mg prn  Orders:  -     Ambulatory Referral to Physical Therapy; Future    2  Benign hypertension  Assessment & Plan:  BP Readings from Last 3 Encounters:   23 144/82   23 151/88   01/10/23 130/82     - reports compliance with amlodipine 5 mg daily   - Increase to amlodipine 10 mg daily  -reviewed eating a low salt diet (such as the DASH diet), limiting alcohol, exercising, and wt loss  Orders:  -     amLODIPine (NORVASC) 10 mg tablet; Take 1 tablet (10 mg total) by mouth daily    3  Allergic rhinitis due to other allergic trigger, unspecified seasonality  -     fluticasone (FLONASE) 50 mcg/act nasal spray; 1 spray into each nostril daily    BMI Counseling: There is no height or weight on file to calculate BMI  The BMI is above normal  Nutrition recommendations include decreasing portion sizes, encouraging healthy choices of fruits and vegetables, decreasing fast food intake, consuming healthier snacks, limiting drinks that contain sugar, moderation in carbohydrate intake, increasing intake of lean protein, reducing intake of saturated and trans fat and reducing intake of cholesterol  Exercise recommendations include moderate physical activity 150 minutes/week  No pharmacotherapy was ordered  Rationale for BMI follow-up plan is due to patient being overweight or obese  Subjective      Joselyn Vidales is a 60 y o  male  has a past medical history of COVID-19 virus infection, Exposure to COVID-19 virus, History of chronic constipation, History of neck pain, and Hypertension    has a past surgical history that includes Cystoscopy (05/29/2014) and Tooth extraction  He presents today endorsing right arm pain present for one month  Denies any recent trauma  He has not tried anything for the pain  He went to the ED for this yesterday  XR was done & was normal  He was given robaxin and advised to f/u with pcp  Review of Systems   Constitutional: Negative for chills and fever  HENT: Negative for ear pain and sore throat  Eyes: Negative for pain and visual disturbance  Respiratory: Negative for cough and shortness of breath  Cardiovascular: Negative for chest pain and palpitations  Gastrointestinal: Negative for abdominal pain and vomiting  Genitourinary: Negative for dysuria and hematuria  Musculoskeletal: Positive for arthralgias and myalgias  Negative for back pain  Skin: Negative for color change and rash  Neurological: Negative for seizures and syncope  All other systems reviewed and are negative  Current Outpatient Medications on File Prior to Visit   Medication Sig   • hyoscyamine (Levsin) 0 125 MG tablet Take 1 tablet (0 125 mg total) by mouth every 4 (four) hours as needed for cramping   • methocarbamol (ROBAXIN) 500 mg tablet Take 1 tablet (500 mg total) by mouth 2 (two) times a day   • omeprazole (PriLOSEC) 40 MG capsule TAKE 1 CAPSULE BY MOUTH EVERY DAY BEFORE BREAKFAST (Patient not taking: Reported on 11/29/2022)   • senna (SENOKOT) 8 6 mg Take 1 tablet (8 6 mg total) by mouth daily at bedtime   • tamsulosin (FLOMAX) 0 4 mg Take 1 capsule (0 4 mg total) by mouth daily with dinner   • [DISCONTINUED] amLODIPine (NORVASC) 5 mg tablet Take 1 tablet (5 mg total) by mouth daily       Objective     /82 (BP Location: Right arm, Patient Position: Sitting, Cuff Size: Standard)   Pulse 95   Temp 97 8 °F (36 6 °C) (Temporal)   Resp 18   Ht 5' 3" (1 6 m)   Wt 67 8 kg (149 lb 8 oz)   SpO2 98%   BMI 26 48 kg/m²     Physical Exam  Vitals and nursing note reviewed     Constitutional:       General: He is not in acute distress  Appearance: He is not ill-appearing  HENT:      Head: Normocephalic and atraumatic  Right Ear: External ear normal  There is no impacted cerumen  Left Ear: External ear normal  There is no impacted cerumen  Nose: Nose normal  No congestion  Mouth/Throat:      Mouth: Mucous membranes are moist       Pharynx: Oropharynx is clear  Eyes:      Extraocular Movements: Extraocular movements intact  Conjunctiva/sclera: Conjunctivae normal       Pupils: Pupils are equal, round, and reactive to light  Cardiovascular:      Rate and Rhythm: Normal rate and regular rhythm  Pulses: Normal pulses  Heart sounds: Normal heart sounds  No murmur heard  Pulmonary:      Effort: Pulmonary effort is normal       Breath sounds: Normal breath sounds  Abdominal:      General: Bowel sounds are normal       Palpations: Abdomen is soft  Tenderness: There is no abdominal tenderness  Musculoskeletal:         General: No tenderness  Normal range of motion  Cervical back: Normal range of motion  No tenderness  Comments: subacromial tenderness, pain with appley scratch test, normal PROM, normal strength, pain with impingement testing  Skin:     General: Skin is warm and dry  Neurological:      General: No focal deficit present  Mental Status: He is alert and oriented to person, place, and time  Psychiatric:         Mood and Affect: Mood normal          Behavior: Behavior normal          Thought Content:  Thought content normal          Judgment: Judgment normal        Shaila Vasques

## 2023-01-12 NOTE — Clinical Note
Laurence Langley was seen and treated in our emergency department on 1/12/2023  Diagnosis: Acute on chronic left shoulder pain    Elodia Morelos  may return to work on return date  He may return on this date: 01/14/2023         If you have any questions or concerns, please don't hesitate to call        Duane Tapia PA-C    ______________________________           _______________          _______________  [facility] Representative                              Date                                Time

## 2023-01-13 ENCOUNTER — APPOINTMENT (OUTPATIENT)
Dept: LAB | Facility: HOSPITAL | Age: 63
End: 2023-01-13

## 2023-01-13 ENCOUNTER — OFFICE VISIT (OUTPATIENT)
Dept: FAMILY MEDICINE CLINIC | Facility: CLINIC | Age: 63
End: 2023-01-13

## 2023-01-13 VITALS
OXYGEN SATURATION: 98 % | RESPIRATION RATE: 18 BRPM | SYSTOLIC BLOOD PRESSURE: 144 MMHG | DIASTOLIC BLOOD PRESSURE: 82 MMHG | HEART RATE: 95 BPM | HEIGHT: 63 IN | BODY MASS INDEX: 26.49 KG/M2 | TEMPERATURE: 97.8 F | WEIGHT: 149.5 LBS

## 2023-01-13 DIAGNOSIS — I10 BENIGN HYPERTENSION: ICD-10-CM

## 2023-01-13 DIAGNOSIS — M25.512 CHRONIC LEFT SHOULDER PAIN: Primary | ICD-10-CM

## 2023-01-13 DIAGNOSIS — N50.819 CHRONIC PAIN IN TESTICLE: ICD-10-CM

## 2023-01-13 DIAGNOSIS — J30.89 ALLERGIC RHINITIS DUE TO OTHER ALLERGIC TRIGGER, UNSPECIFIED SEASONALITY: ICD-10-CM

## 2023-01-13 DIAGNOSIS — G89.29 CHRONIC LEFT SHOULDER PAIN: Primary | ICD-10-CM

## 2023-01-13 DIAGNOSIS — Z12.5 SCREENING PSA (PROSTATE SPECIFIC ANTIGEN): ICD-10-CM

## 2023-01-13 DIAGNOSIS — G89.29 CHRONIC PAIN IN TESTICLE: ICD-10-CM

## 2023-01-13 PROBLEM — R49.0 HOARSENESS OF VOICE: Status: RESOLVED | Noted: 2018-05-17 | Resolved: 2023-01-13

## 2023-01-13 PROBLEM — J38.3 GLOTTIC INSUFFICIENCY: Status: RESOLVED | Noted: 2019-11-23 | Resolved: 2023-01-13

## 2023-01-13 PROBLEM — R25.1 TREMOR: Status: RESOLVED | Noted: 2020-01-09 | Resolved: 2023-01-13

## 2023-01-13 PROBLEM — L24.9 IRRITANT CONTACT DERMATITIS: Status: RESOLVED | Noted: 2020-05-15 | Resolved: 2023-01-13

## 2023-01-13 PROBLEM — R10.31 ABDOMINAL PAIN, CHRONIC, RIGHT LOWER QUADRANT: Status: RESOLVED | Noted: 2019-05-13 | Resolved: 2023-01-13

## 2023-01-13 PROBLEM — R49.0 MUSCLE TENSION DYSPHONIA: Status: RESOLVED | Noted: 2019-11-23 | Resolved: 2023-01-13

## 2023-01-13 PROBLEM — M25.562 ACUTE PAIN OF BOTH KNEES: Status: RESOLVED | Noted: 2018-01-24 | Resolved: 2023-01-13

## 2023-01-13 PROBLEM — R05.9 COUGH: Status: RESOLVED | Noted: 2019-11-23 | Resolved: 2023-01-13

## 2023-01-13 PROBLEM — E53.8 LOW VITAMIN B12 LEVEL: Status: RESOLVED | Noted: 2020-04-30 | Resolved: 2023-01-13

## 2023-01-13 PROBLEM — R10.9 RIGHT FLANK PAIN: Status: RESOLVED | Noted: 2021-06-09 | Resolved: 2023-01-13

## 2023-01-13 PROBLEM — M54.50 CHRONIC RIGHT-SIDED LOW BACK PAIN: Status: RESOLVED | Noted: 2021-08-15 | Resolved: 2023-01-13

## 2023-01-13 PROBLEM — R79.89 LOW VITAMIN B12 LEVEL: Status: RESOLVED | Noted: 2020-04-30 | Resolved: 2023-01-13

## 2023-01-13 PROBLEM — M25.561 ACUTE PAIN OF BOTH KNEES: Status: RESOLVED | Noted: 2018-01-24 | Resolved: 2023-01-13

## 2023-01-13 PROBLEM — Z82.0 FAMILY HISTORY OF PARKINSONISM: Status: RESOLVED | Noted: 2020-04-30 | Resolved: 2023-01-13

## 2023-01-13 PROBLEM — R49.0 DYSPHONIA: Status: RESOLVED | Noted: 2019-11-23 | Resolved: 2023-01-13

## 2023-01-13 PROBLEM — H10.31 ACUTE BACTERIAL CONJUNCTIVITIS OF RIGHT EYE: Status: RESOLVED | Noted: 2020-05-15 | Resolved: 2023-01-13

## 2023-01-13 LAB
BACTERIA UR QL AUTO: NORMAL /HPF
BILIRUB UR QL STRIP: NEGATIVE
CLARITY UR: CLEAR
COLOR UR: YELLOW
GLUCOSE UR STRIP-MCNC: NEGATIVE MG/DL
HGB UR QL STRIP.AUTO: NEGATIVE
KETONES UR STRIP-MCNC: NEGATIVE MG/DL
LEUKOCYTE ESTERASE UR QL STRIP: NEGATIVE
NITRITE UR QL STRIP: NEGATIVE
NON-SQ EPI CELLS URNS QL MICRO: NORMAL /HPF
PH UR STRIP.AUTO: 6 [PH]
PROT UR STRIP-MCNC: ABNORMAL MG/DL
RBC #/AREA URNS AUTO: NORMAL /HPF
SP GR UR STRIP.AUTO: 1.02 (ref 1–1.04)
UROBILINOGEN UA: NEGATIVE MG/DL
WBC #/AREA URNS AUTO: NORMAL /HPF

## 2023-01-13 RX ORDER — FLUTICASONE PROPIONATE 50 MCG
1 SPRAY, SUSPENSION (ML) NASAL DAILY
Qty: 16 G | Refills: 0 | Status: SHIPPED | OUTPATIENT
Start: 2023-01-13

## 2023-01-13 RX ORDER — AMLODIPINE BESYLATE 10 MG/1
10 TABLET ORAL DAILY
Qty: 90 TABLET | Refills: 0 | Status: SHIPPED | OUTPATIENT
Start: 2023-01-13

## 2023-01-13 NOTE — PATIENT INSTRUCTIONS
Ejercicios de rotación interna y externa del hombro   CUIDADO AMBULATORIO:   Músculos trabajados demond ejercicios internos y externos del hombro: Los ejercicios de rotación interna del hombro trabajan los músculos del pecho y la parte delantera del hombro  Los ejercicios de rotación externa del hombro trabajan los músculos de la parte posterior del hombro y la parte superior de la espalda  Comuníquese con harrison médico si:  Tiene dolor sukhdeep o creciente cuando hace ejercico o está en reposo  Tiene preguntas o inquietudes acerca de los ejercicios de hombros     Antes de ejercitarse: Entre en calor y estírese antes de hacer ejercicio  Use aggie bicicleta estacionaria o camine demond 5 a 10 minutos para que dewey músculos entren en calor  El [address] a aumentar el rango de [place]  También puede aliviar el dolor muscular y ayudar a prevenir otra Yao Hawking  Harrison médico le indicará cuál de los siguientes estiramientos debe hacer:  Estiramiento con brazo yoni: Relaje los hombros  Sostenga el antebrazo con la mano contraria  Presione el brazo sobre el pecho hasta que sienta un estiramiento  Sostenga el estiramiento por 30 segundos  Regrese a la posición original de pie  Estiramiento de flexión del hombro: Párese de frente a aggie pared  Lentamente camine hacia arriba con los dedos de la mano por la pared hasta que sienta un estiramiento  Sostenga el estiramiento por 30 segundos  Regrese a la posición original de pie  Tramo de cama: Recuéstese sobre harrison lado lesionado sobre aggie superficie firme y plana  Flexione el codo de harrison brazo lesionado a 90° con la davenport de la mano [place] arriba  Use el brazo que no está lesionado para presionar lentamente hacia abajo el brazo lesionado  Deténgase cuando sienta un estiramiento en la parte posterior de harrison hombro lesionado  Sostenga el estiramiento por 30 segundos   Despacio regrese a la posición inicial        Cómo hacer ejercicio con Miriam Peek pesa: Harrison médico le indicará el peso con el que debe ejercitarse  Rotación interna del hombro: Siéntese en aggie silla  Coloque aggie toalla enrollada entre harrison codo y harrison costado  Doble el codo a 90°  Apriete suavemente la toalla con el codo para evitar que Sheikh se caiga  Sostenga la pesa con el pulgar apuntando Timmothy Aric arriba  Mueva lentamente la pesa sobre el pecho  Deténgase cuando la mano llegue al brazo opuesto  Mantenga esta posición demond tantos segundos melvin se le indique  Despacio regrese a la posición inicial     Rotación externa del hombro: Recuéstese sobre harrison costado con harrison hombro lesionado Timmothy Aric arriba  Doble el codo a 90°  Coloque aggie toalla enrollada entre harrison codo y harrison costado  Sostenga aggie pesa en la mano  Apriete suavemente la toalla con el codo para evitar que Sheikh se caiga  Lentamente gire el brazo hacia afuera, damian mantenga el codo doblado  Pare cuando sienta un estiramiento  Mantenga esta posición demond 30 segundos o según se le indique  Despacio regrese a la posición inicial     Cómo ejercitarse con Luigi Crispin de ejercicio:  Rotación interna del hombro: Amarre un extremo de la turcios de ejercicio a un objeto pesado y fijo  Siéntese en aggie silla  Coloque aggie toalla enrollada entre harrison codo y harrison costado  Doble el codo a 90°  Apriete suavemente la toalla con el codo para evitar que Sheikh se caiga  Lentamente tire de la turcios de modo que Rich  Deténgase cuando la mano llegue al brazo opuesto  Mantenga esta posición demond tantos segundos melvin se le indique  Despacio regrese a la posición inicial     Rotación externa del hombro: Sujete un extremo de la turcios de ejercicio en el lado que no está lesionado  Coloque aggie toalla enrollada entre harrison codo y harrison costado  Doble el codo a 90°  Apriete la toalla con el codo  [place] el extremo de la turcios y lentamente gire el brazo hacia afuera, damian mantenga el codo flexionado  Pare cuando sienta un estiramiento   Mantenga esta posición demond 30 segundos o según se le indique  Despacio regrese a la posición inicial     Programe aggie najma de seguimiento con harrison fisioterapeuta según se le indique: Escriba las preguntas que tenga para que recuerde hacerlas demond dewey citas  © Copyright [employer] 2022 Information is for End User's use only and may not be sold, redistributed or otherwise used for commercial purposes  All illustrations and images included in CareNotes® are the copyrighted property of [employer]  or [address] [place] [address] es sólo para uso en educación  Harrison intención no es darle un consejo médico sobre enfermedades o tratamientos  Colsulte con harrison [place] farmacéutico antes de seguir cualquier régimen médico para saber si es seguro y efectivo para usted  Ejercicios de abducción y aducción del hombro   LO QUE NECESITA SABER:   ¿Qué son los ejercicios de abducción y aducción del hombro? Los ejercicios de abducción y aducción del hombro trabajan los músculos en la parte posterior del hombro y la parte superior de la espalda  ¿Qué jaret hacer antes de ejercitarme? Entre en calor y estírese antes de hacer ejercicio  Use gagie bicicleta estacionaria o camine demond 5 a 10 minutos para que dewey músculos entren en calor  El [address] a aumentar el rango de [place]  También puede aliviar el dolor muscular y ayudar a prevenir otra Finesse Pion  Harrison médico le indicará cuál de los siguientes estiramientos debe hacer:  Estiramiento con brazo yoni: Relaje los hombros  Sostenga el antebrazo con la mano contraria  Presione el brazo sobre el pecho hasta que sienta un estiramiento  Sostenga el estiramiento por 30 segundos  Regrese a la posición original de pie  Estiramiento de flexión del hombro: Párese de frente a aggie pared  Lentamente camine hacia arriba con los dedos de la mano por la pared hasta que sienta un estiramiento  Sostenga el estiramiento por 30 segundos  Regrese a la posición original de pie           Tramo de cama: Recuéstese sobre harrison lado lesionado sobre aggie superficie firme y plana  Flexione el codo de harrison brazo lesionado a 90° con la davenport de la mano [place] arriba  Use el brazo que no está lesionado para presionar lentamente hacia abajo el brazo lesionado  Deténgase cuando sienta un estiramiento en la parte posterior de harrison hombro lesionado  Sostenga el estiramiento por 30 segundos  Despacio regrese a la posición inicial        ¿Cómo hago ejercicio con aggie pesa? Harrison médico le indicará el peso con el que debe ejercitarse  Abducción del hombro: De pie, sostenga aggie pesa en la mano con la davenport hacia harrison cuerpo  Lentamente levante el brazo hacia el costado con el pulgar apuntando [place]  Luego levante el brazo sobre harrison denis tan lejos melvin pueda sin sentir dolor  Mantenga esta posición por el tiempo que se le indique  No levante el brazo sobre harrison denis a menos que harrison médico lo autorice  Aducción del hombro: Acuéstese boca arriba sobre aggie superficie firme  Extienda el Cheryl Canary hacia fuera hasta formar aggie "T  Doble el codo hasta el antebrazo quede en el aire  Sostenga aggie pesa en la mano  Levante lentamente el Cheryl Canary hacia el techo y estire el codo  Mantenga esta posición por el tiempo que se le indique  Despacio regrese a la posición inicial        ¿Cómo hago ejercicio con Romel Ill de ejercicio? Abducción del hombro: Envuelva la turcios de ejercicio alrededor de un objeto pesado y estable cerca de harrison pie  [place] la turcios con la mano del hombro lesionado  Mantenga el Navi Rash  Lentamente levante el brazo hacia el costado con el pulgar apuntando [place]  Luego, lentamente jale la turcios sobre la denis tan lejos melvin pueda sin sentir dolor  No levante el brazo sobre harrison denis a menos que harrison médico lo autorice  No encoja el hombro  Mantenga esta posición por el tiempo que se le indique   Despacio regrese a la posición inicial          Aducción del hombro: Envuelva la turcios de ejercicio alrededor de un objeto pesado y estable  Póngase de pie, de espaldas al [facility] donde está anclada la turcios  Sujete cada extremo de la turcios con ambas yissel con los codos flexionados  Los codos no deben estar detrás de sahu cuerpo  Mantenga los brazos paralelos al suelo y lentamente enderece los codos  Mantenga esta posición por el tiempo que se le indique  Despacio regrese a la posición inicial        ¿Cuándo jaret comunicarme con mi médico?  Tiene dolor sukhdeep o creciente cuando hace ejercico o está en reposo  Tiene preguntas o inquietudes acerca de los ejercicios de hombros     ACUERDOS SOBRE SAHU CUIDADO:   Usted tiene el derecho de ayudar a planear sahu cuidado  Aprenda todo lo que pueda sobre sahu condición y melvin darle tratamiento  Discuta dewey opciones de tratamiento con dewey médicos para decidir el cuidado que usted desea recibir  Usted siempre tiene el derecho de rechazar el tratamiento  Esta información es sólo para uso en educación  [place] intención no es darle un consejo médico sobre enfermedades o tratamientos  Colsulte con sahu Vivian Gauri farmacéutico antes de seguir cualquier régimen médico para saber si es seguro y efectivo para usted  © Copyright [employer] 2022 Information is for End User's use only and may not be sold, redistributed or otherwise used for commercial purposes   All illustrations and images included in CareNotes® are the copyrighted property of A D A M [employer]  or Madonna Leal

## 2023-01-13 NOTE — DISCHARGE INSTRUCTIONS
You were seen in the emergency department today for left shoulder pain  Radiologic imaging did not reveal any acute abnormalities  Please follow-up with your primary care physician and orthopedics regarding your symptoms  Return sooner to the Emergency Department if increased pain, swelling, numbness, weakness, fever, redness, vomiting  Tylenol 975 every 6 hours  Motrin 600 mg every 6 hours  Robaxin as needed, this medication can make you sleepy  Rest, ice, elevation  Close follow-up with orthopedics

## 2023-01-13 NOTE — ED PROVIDER NOTES
History  Chief Complaint   Patient presents with   • Arm Pain     Pt reports left arm pain x1 month  Pt report tingling and tremors to that arm  No injuries  Gifty Hennessy is a 60 y o   male with PMH of HTN who presents to the emergency department with left shoulder pain  Patient states for the last month or so he has had persistent left shoulder pain  Noticed that is worse with certain movements including extension and external rotation  He states at times he gets a sharp shooting pain down the arm but this is intermittent  He states he currently does not have any numbness, tingling or weakness in the upper extremity(on clarification of triage)  He denies any neck or back pain  He denies any fevers, chills, drug use, steroid use, or immunosuppressive status  He notes he has full range of motion of the elbow, wrist as well as the shoulder but has exacerbation of pain with certain movements of the shoulder  He denies any color changes  He denies any trauma to the shoulder  No medications prior to arrival             History provided by:  Patient   used: Yes    Shoulder Pain  Location:  Shoulder  Shoulder location:  L shoulder  Injury: no    Pain details:     Quality:  Aching    Radiates to:  Does not radiate    Severity:  Moderate    Onset quality:  Gradual    Duration:  1 month    Timing:  Constant    Progression:  Unchanged  Dislocation: no    Foreign body present:  No foreign bodies  Tetanus status:  Up to date  Prior injury to area:  No  Relieved by:  None tried  Worsened by:   Movement  Ineffective treatments:  None tried  Associated symptoms: no back pain, no decreased range of motion, no fatigue, no fever, no muscle weakness, no neck pain, no numbness, no stiffness, no swelling and no tingling    Risk factors: no concern for non-accidental trauma, no known bone disorder, no frequent fractures and no recent illness        Prior to Admission Medications   Prescriptions Last Dose Informant Patient Reported? Taking? amLODIPine (NORVASC) 5 mg tablet   No No   Sig: Take 1 tablet (5 mg total) by mouth daily   hyoscyamine (Levsin) 0 125 MG tablet   No No   Sig: Take 1 tablet (0 125 mg total) by mouth every 4 (four) hours as needed for cramping   omeprazole (PriLOSEC) 40 MG capsule   No No   Sig: TAKE 1 CAPSULE BY MOUTH EVERY DAY BEFORE BREAKFAST   Patient not taking: Reported on 2022   senna (SENOKOT) 8 6 mg   No No   Sig: Take 1 tablet (8 6 mg total) by mouth daily at bedtime   tamsulosin (FLOMAX) 0 4 mg   No No   Sig: Take 1 capsule (0 4 mg total) by mouth daily with dinner      Facility-Administered Medications: None       Past Medical History:   Diagnosis Date   • COVID-19 virus infection 2020   • Exposure to COVID-19 virus 2020   • History of chronic constipation    • History of neck pain 2017   • Hypertension        Past Surgical History:   Procedure Laterality Date   • CYSTOSCOPY  2014    diagnostic managed by Jalen Mora   • TOOTH EXTRACTION         Family History   Problem Relation Age of Onset   • Cancer Sister         malignant neoplasm of breast   • Diabetes Paternal Grandmother    • Diabetes Family    • Arthritis Family    • Cancer Family         malignant neoplasm   • Substance Abuse Neg Hx         denied Mother and Father   • Mental illness Neg Hx         no family history Mother and Father   • Other Neg Hx         denied family history of Osteopertrosis     I have reviewed and agree with the history as documented      E-Cigarette/Vaping   • E-Cigarette Use Never User      E-Cigarette/Vaping Substances     Social History     Tobacco Use   • Smoking status: Former     Types: Cigarettes     Quit date: 2014     Years since quittin 0   • Smokeless tobacco: Never   Vaping Use   • Vaping Use: Never used   Substance Use Topics   • Alcohol use: Yes     Comment: social   • Drug use: Never       Review of Systems   Constitutional: Negative for activity change, appetite change, chills, fatigue and fever  HENT: Negative for ear pain and sore throat  Eyes: Negative for pain and visual disturbance  Respiratory: Negative for apnea, cough, chest tightness and shortness of breath  Cardiovascular: Negative for chest pain and palpitations  Gastrointestinal: Negative for abdominal pain and vomiting  Genitourinary: Negative for dysuria and hematuria  Musculoskeletal: Positive for arthralgias  Negative for back pain, gait problem, joint swelling, myalgias, neck pain, neck stiffness and stiffness  Skin: Negative for color change, pallor, rash and wound  Neurological: Negative for dizziness, seizures, syncope, facial asymmetry and light-headedness  Hematological: Negative for adenopathy  All other systems reviewed and are negative  Physical Exam  Physical Exam  Vitals and nursing note reviewed  Constitutional:       General: He is not in acute distress  Appearance: Normal appearance  He is normal weight  He is not ill-appearing or toxic-appearing  HENT:      Head: Normocephalic and atraumatic  Nose: Nose normal       Mouth/Throat:      Mouth: Mucous membranes are moist       Pharynx: Oropharynx is clear  No oropharyngeal exudate  Eyes:      Pupils: Pupils are equal, round, and reactive to light  Cardiovascular:      Rate and Rhythm: Normal rate and regular rhythm  Pulses: Normal pulses  Heart sounds: Normal heart sounds  No murmur heard  No friction rub  No gallop  Pulmonary:      Effort: Pulmonary effort is normal  No respiratory distress  Breath sounds: Normal breath sounds  No stridor  No wheezing, rhonchi or rales  Abdominal:      General: Abdomen is flat  Bowel sounds are normal  There is no distension  Palpations: Abdomen is soft  There is no mass  Tenderness: There is no abdominal tenderness  There is no guarding or rebound  Hernia: No hernia is present     Musculoskeletal: General: No swelling or deformity  Normal range of motion  Right shoulder: Normal       Left shoulder: Tenderness present  No swelling, deformity, effusion, laceration, bony tenderness or crepitus  Normal range of motion  Normal strength  Normal pulse  Arms:       Cervical back: Normal range of motion  No rigidity or tenderness  Comments: Left upper extremity:  No obvious deformity/abrasions/lacerations  2+ radial Pulse/ Cap Refill <2 seconds  Shoulder: Mild tenderness palpation anteriorly, Strength 5/5- FROM including ER/IR/Abduction/Adduction/Flexion/Extension  Elbow: NTTP, Strength 5/5- FROM including Flexion/Extension/ Pronation/supination  Wrist: NTTP, Strength 5/5- FROM including Flexion/Extension/Ulnar Deviation/Radial Deviation  Hand: NTTP: Strength 5/5- FROM at all fingers including flexion, extension, abduction, adduction, and opposition of the thumb  FDS/FDP tendons intact by exam, Extensor tendons intact by exam    Radial/Ulnar/ Median/ and Axillary sensation intact  Skin:     General: Skin is warm and dry  Capillary Refill: Capillary refill takes less than 2 seconds  Neurological:      General: No focal deficit present  Mental Status: He is alert and oriented to person, place, and time  Mental status is at baseline  Cranial Nerves: No cranial nerve deficit  Sensory: No sensory deficit  Motor: No weakness           Vital Signs  ED Triage Vitals   Temperature Pulse Respirations Blood Pressure SpO2   01/12/23 1727 01/12/23 1723 01/12/23 1723 01/12/23 1723 01/12/23 1723   97 8 °F (36 6 °C) 80 16 151/88 99 %      Temp Source Heart Rate Source Patient Position - Orthostatic VS BP Location FiO2 (%)   01/12/23 1727 01/12/23 1723 01/12/23 1723 01/12/23 1723 --   Oral Monitor Sitting Right arm       Pain Score       01/12/23 1723       7           Vitals:    01/12/23 1723   BP: 151/88   Pulse: 80   Patient Position - Orthostatic VS: Sitting         Visual Acuity      ED Medications  Medications   acetaminophen (TYLENOL) tablet 975 mg (975 mg Oral Given 1/12/23 1932)   ketorolac (TORADOL) injection 15 mg (15 mg Intramuscular Given 1/12/23 1932)   methocarbamol (ROBAXIN) tablet 500 mg (500 mg Oral Given 1/12/23 1932)       Diagnostic Studies  Results Reviewed     None                 XR shoulder 2+ views LEFT   ED Interpretation by Juan Reid PA-C (01/12 2015)   NO acute fxr/dislocation                   Procedures  Procedures         ED Course                                             Medical Decision Making  Patient was seen and examined  in the emergency department for chief complaint of left shoulder pain  The patient presented chronic left shoulder pain  Present for over a month  No acute changes  Patient states he is due to get an x-ray tomorrow and see his family doctor and just wanted to get the x-ray done  No trauma  No redness, swelling  No immunosuppressive states  No steroid or drug use  No neck or back pain  Patient had originally reported angling and tremors to triage however he denies this to me  Does admit to some sharp shooting pain with certain movements  No neck trauma  On exam patient is well-appearing in no acute distress  Lungs are clear  Regular rate and rhythm, no murmurs rubs or gallops  Left shoulder has some mild tenderness palpation the anterior shoulder  Left upper extremities Norvasc intact with full sensation and strength  DDx including but not limited to: tendinitis, bursitis, arthritis, rotator cuff injury, fracture, dislocation, contusion, strain, sprain, brachial plexus impingement, radiculopathy; doubt septic arthritis or cardiac etiology or DVT or arterial occlusion  Workup: We will check x-ray to rule out osseous abnormality  Pain control  Reassessment  On reassessment patient is symptomatically feeling better  No acute osseous abnormalities interpreted myself    Follow-up family medicine as well as orthopedics  Disposition: General impression 54-year-old male with left shoulder pain  Chronic  Symptoms improved with treatment  Follow-up primary care  Follow-up orthopedics  Return precautions discussed  The patient was discharged in stable condition  Patient ambulated off the department  Extensive return to emergency department precautions were discussed  Follow up with appropriate providers including primary care physician was discussed  Patient and/or their  primary decision maker expressed understanding  Patient remained stable during entire emergency department stay  Chronic left shoulder pain: self-limited or minor problem  Amount and/or Complexity of Data Reviewed  Radiology: ordered and independent interpretation performed  Decision-making details documented in ED Course  Risk  OTC drugs  Prescription drug management  Disposition  Final diagnoses:   Chronic left shoulder pain     Time reflects when diagnosis was documented in both MDM as applicable and the Disposition within this note     Time User Action Codes Description Comment    1/12/2023  8:55 PM Meeta Mcbride Add [B61 066,  G89 29] Chronic left shoulder pain       ED Disposition     ED Disposition   Discharge    Condition   Stable    Date/Time   Thu Jan 12, 2023  8:55 PM    [address] [address] discharge to home/self care                 Follow-up Information     Follow up With Specialties Details Why Contact Info Additional [address] [facility] Emergency Department Emergency Medicine Go to  As needed, If symptoms worsen [facility] [address]  112 [facility] Emergency Department, [address] [place] [address] 200  Call  To schedule an appointment with a primary care physician [phone]       Gerry Monahan, [address] Nurse Practitioner, Family Medicine Schedule an appointment as soon as possible for a visit   [address] [address]  Alpa Funes [address] Orthopedic Surgery Go to  As needed, If symptoms worsen [address]  [address] [facility] [address]  [address] [facility], [address], [address] [facility], [place], [place], [address] [phone]          Discharge Medication List as of 1/12/2023  8:58 PM      START taking these medications    Details   methocarbamol (ROBAXIN) 500 mg tablet Take 1 tablet (500 mg total) by mouth 2 (two) times a day, Starting Thu 1/12/2023, Normal         CONTINUE these medications which have NOT CHANGED    Details   amLODIPine (NORVASC) 5 mg tablet Take 1 tablet (5 mg total) by mouth daily, Starting Tue 12/27/2022, Normal      hyoscyamine (Levsin) 0 125 MG tablet Take 1 tablet (0 125 mg total) by mouth every 4 (four) hours as needed for cramping, Starting Tue 11/29/2022, Normal      omeprazole (PriLOSEC) 40 MG capsule TAKE 1 CAPSULE BY MOUTH EVERY DAY BEFORE BREAKFAST, Normal      senna (SENOKOT) 8 6 mg Take 1 tablet (8 6 mg total) by mouth daily at bedtime, Starting Fri 4/29/2022, Normal      tamsulosin (FLOMAX) 0 4 mg Take 1 capsule (0 4 mg total) by mouth daily with dinner, Starting Tue 1/10/2023, Normal             No discharge procedures on file      PDMP Review     None          ED Provider  Electronically Signed by           Zacarias Jackson PA-C  01/12/23 0954

## 2023-01-13 NOTE — ASSESSMENT & PLAN NOTE
BP Readings from Last 3 Encounters:   01/13/23 144/82   01/12/23 151/88   01/10/23 130/82     - reports compliance with amlodipine 5 mg daily   - Increase to amlodipine 10 mg daily  -reviewed eating a low salt diet (such as the DASH diet), limiting alcohol, exercising, and wt loss

## 2023-01-14 LAB
C TRACH DNA SPEC QL NAA+PROBE: NEGATIVE
HIV 1+2 AB+HIV1 P24 AG SERPL QL IA: NORMAL
HIV 2 AB SERPL QL IA: NORMAL
HIV1 AB SERPL QL IA: NORMAL
HIV1 P24 AG SERPL QL IA: NORMAL
N GONORRHOEA DNA SPEC QL NAA+PROBE: NEGATIVE
PSA FREE MFR SERPL: 42 %
PSA FREE SERPL-MCNC: 0.21 NG/ML
PSA SERPL-MCNC: 0.5 NG/ML (ref 0–4)

## 2023-01-16 ENCOUNTER — OFFICE VISIT (OUTPATIENT)
Dept: GASTROENTEROLOGY | Facility: MEDICAL CENTER | Age: 63
End: 2023-01-16

## 2023-01-16 VITALS
BODY MASS INDEX: 25.23 KG/M2 | TEMPERATURE: 97.9 F | WEIGHT: 142.4 LBS | HEART RATE: 80 BPM | DIASTOLIC BLOOD PRESSURE: 86 MMHG | SYSTOLIC BLOOD PRESSURE: 136 MMHG

## 2023-01-16 DIAGNOSIS — R12 HEARTBURN: ICD-10-CM

## 2023-01-16 DIAGNOSIS — K59.09 CHRONIC CONSTIPATION: ICD-10-CM

## 2023-01-16 DIAGNOSIS — K21.9 GASTROESOPHAGEAL REFLUX DISEASE, UNSPECIFIED WHETHER ESOPHAGITIS PRESENT: Primary | ICD-10-CM

## 2023-01-16 DIAGNOSIS — K31.A0 GASTRIC INTESTINAL METAPLASIA: ICD-10-CM

## 2023-01-16 DIAGNOSIS — K22.70 BARRETT'S ESOPHAGUS WITHOUT DYSPLASIA: ICD-10-CM

## 2023-01-16 DIAGNOSIS — R10.11 RIGHT UPPER QUADRANT ABDOMINAL PAIN: ICD-10-CM

## 2023-01-16 LAB — T VAGINALIS RRNA SPEC QL NAA+PROBE: NEGATIVE

## 2023-01-16 RX ORDER — OMEPRAZOLE 40 MG/1
40 CAPSULE, DELAYED RELEASE ORAL
Qty: 90 CAPSULE | Refills: 2 | Status: SHIPPED | OUTPATIENT
Start: 2023-01-16

## 2023-01-16 RX ORDER — LUBIPROSTONE 24 UG/1
24 CAPSULE, GELATIN COATED ORAL 2 TIMES DAILY WITH MEALS
Qty: 60 CAPSULE | Refills: 1 | Status: SHIPPED | OUTPATIENT
Start: 2023-01-16

## 2023-01-16 NOTE — PROGRESS NOTES
[facility] - Outpatient Follow-up Note  Rachana Houser 60 y o  male MRN: [id number]  Encounter: [id number]          ASSESSMENT AND PLAN:      1  Gastroesophageal reflux disease, unspecified whether esophagitis present  2  Gastric intestinal metaplasia  3  Castellanos's esophagus without dysplasia: EGD 03/10/2022 consistent with Castellanos's esophagus and abnormal gastric mucosa with known and previously confirm intestinal metaplasia   Biopsies were benign but did show extensive gastric intestinal metaplasia visible on EGD and confirm with biopsy   Labs for atrophic gastritis including intrinsic factor, anti-parietal antibody, vitamin B12 and iron panel were all normal   He will need to continue PPI 40 mg daily at this time  [facility] did have repeat EGD 7/2022 again with barretts esophagus and atrophic and cobblestone and nodular mucosa in cardia, body and incisura  Biopsies again showing intestinal metaplasia  Repeat EGD recommended in 1 year  -repeat EGD due to extensive intestinal metaplasia 1 year  -continue 40mg PPI daily     4  Chronic constipation:  5  RUQ pain:   he is on amitiza 24 mcg twice daily  At last visit we started miralax 17 g daily in addition to his amitiza  He had never tried this before  He states it is still very difficult to have a BM every 2 days  We will do a golytely bowel prep and then restart amitiza   -golytely  -continue amitiza 24 mcg bid    ______________________________________________________________________    SUBJECTIVE:  Rolando Carry a 76-year-old male past medical history of chronic constipation, hypertension who is here for follow-up pf constipation and abdominal pain    He underwent EGD 03/10/2022 with a known history of intestinal metaplasia and Castellanos's esophagus   This EGD again did reveal Castellanos's esophagus and extensive gastric metaplasia seen on EGD confirmed on biopsies   He was continuing to complain of right-sided abdominal pain both in the right upper and right lower quadrants   His right upper quadrant pain also radiated across his epigastric region and into his left upper quadrant   He denied significant acid reflux and is currently on Prilosec 40 mg once daily   He is also on Amitiza 24 mcg twice daily for chronic constipation  At his last visit, he did not think this was working for him and we started miralax 17g daily  [facility] previously tried Linzess in the past without success and Renetta Milligan was not approved by his insurance  We also prescribed levsin to try for his pain but he does not think he tried it  He recently had repeat EGD 7/2022 again with barretts esophagus and atrophic and cobblestone and nodular mucosa in cardia, body and incisura  Biopsies again showing intestinal metaplasia  Repeat EGD recommended in 1 year  His last colonoscopy was in 2019 that was normal   He has a family history of gastric cancer in an uncle      Today  he continues to complain of severe constipation  He has tried Linzess in the past without success  Insurance denied Renetta Chel  He has been on Amitiza 24 mcg twice daily with the addition of MiraLAX  He states that he still has difficulty having a bowel movement and he has to strain to have a bowel movement every 2 days  He also admits that when he eats food late at night he is worsening of his reflux  He denies any dysphagia  REVIEW OF SYSTEMS IS OTHERWISE NEGATIVE        Historical Information   Past Medical History:   Diagnosis Date   • COVID-19 virus infection 5/1/2020   • Exposure to COVID-19 virus 11/23/2020   • Family history of parkinsonism 4/30/2020   • History of chronic constipation    • History of neck pain 11/21/2017   • Hypertension      Past Surgical History:   Procedure Laterality Date   • CYSTOSCOPY  05/29/2014    diagnostic managed by Jalen Mora   • TOOTH EXTRACTION       Social History   Social History     Substance and Sexual Activity   Alcohol Use Yes    Comment: social     Social History     Substance and Sexual Activity   Drug Use Never     Social History     Tobacco Use   Smoking Status Former   • Types: Cigarettes   • Quit date: 2014   • Years since quittin 0   Smokeless Tobacco Never     Family History   Problem Relation Age of Onset   • Cancer Sister         malignant neoplasm of breast   • Diabetes Paternal Grandmother    • Diabetes Family    • Arthritis Family    • Cancer Family         malignant neoplasm   • Substance Abuse Neg Hx         denied Mother and Father   • Mental illness Neg Hx         no family history Mother and Father   • Other Neg Hx         denied family history of Osteopertrosis       Meds/Allergies       Current Outpatient Medications:   •  amLODIPine (NORVASC) 10 mg tablet  •  fluticasone (FLONASE) 50 mcg/act nasal spray  •  hyoscyamine (Levsin) 0 125 MG tablet  •  methocarbamol (ROBAXIN) 500 mg tablet  •  omeprazole (PriLOSEC) 40 MG capsule  •  senna (SENOKOT) 8 6 mg  •  tamsulosin (FLOMAX) 0 4 mg    No Known Allergies        Objective     Blood pressure 136/86, pulse 80, temperature 97 9 °F (36 6 °C), weight 64 6 kg (142 lb 6 4 oz)  Body mass index is 25 23 kg/m²  PHYSICAL EXAM:      General Appearance:   Alert, cooperative, no distress   HEENT:   Normocephalic, atraumatic, anicteric      Neck:  Supple, symmetrical, trachea midline   Lungs:   Clear to auscultation bilaterally; no rales, rhonchi or wheezing; respirations unlabored    Heart[de-identified]   Regular rate and rhythm; no murmur, rub, or gallop  Abdomen:   Soft, non-tender, non-distended; normal bowel sounds; no masses, no organomegaly    Genitalia:   Deferred    Rectal:   Deferred    Extremities:  No cyanosis, clubbing or edema        Skin:  No jaundice, rashes, or lesions          Lab Results:   No visits with results within 1 Day(s) from this visit     Latest known visit with results is:   Appointment on 2023   Component Date Value   • N gonorrhoeae, DNA Probe 2023 Negative    • Chlamydia trachomatis, D* 01/13/2023 Negative    • Prostate Specific Antige* 01/13/2023 0 5    • PSA, Free 01/13/2023 0 21    • PSA, Free Pct 01/13/2023 42 0    • HIV-1 p24 Antigen 01/13/2023 Non-Reactive    • HIV-1 Antibody 01/13/2023 Non-Reactive    • HIV-2 Antibody 01/13/2023 Non-Reactive    • HIV Ag-Ab 5th Gen 01/13/2023 Non-Reactive          Radiology Results:   XR shoulder 2+ views LEFT    Result Date: 1/13/2023  Narrative: LEFT SHOULDER INDICATION:   Chronic left shouldert pain  COMPARISON:  6/7/2017 VIEWS:  XR SHOULDER 2+ VW LEFT FINDINGS: There is no acute fracture or dislocation  Mild osteoarthritis acromioclavicular joint  No lytic or blastic osseous lesion  Soft tissues are unremarkable  Impression: No acute osseous abnormality  Degenerative changes as described  Workstation performed: Lavelle Salvage scrotum and testicles    Result Date: 1/5/2023  Narrative: SCROTAL ULTRASOUND INDICATION:    N50 819: Testicular pain, unspecified G89 29: Other chronic pain  COMPARISON: 1/8/2022 TECHNIQUE:   Ultrasound the scrotal contents was performed with a high frequency linear transducer utilizing volumetric sweep imaging as well as standard still image techniques  Imaging performed in longitudinal and transverse orientation  Color and spectral Doppler evaluation also performed bilaterally  FINDINGS: TESTES: Testes are symmetric and normal in size  RIGHT testis = 3 4 x 2 7 x 1 9 cm  Volume 8 9 mL Normal contour with homogeneous smooth echotexture  No intratesticular mass lesion or calcifications  4 x 4 by 4 mm testicular appendix incidentally seen  LEFT testis = 3 5 x 2 5 x 2 0 cm  Volume 9 2 mL Normal contour with homogeneous smooth echotexture  No intratesticular mass lesion or calcifications  Doppler flow within both testes is present and symmetric  EPIDIDYMIDES: 1 0 x 0 5 x 0 7 cm right spermatic cord region cyst is again seen    Similar appearing 8 x 7 x 4 mm and 4 x 3 x 3 mm left epididymal cyst also visualized  Doppler ultrasound demonstrates normal blood flow  No epididymal lesions  HYDROCELE:  No significant fluid present  VARICOCELE:  None present  SCROTUM:  Scrotal thickness and appearance within normal limits  No evidence for extratesticular mass or hernia demonstrated  Impression:  No sonographic evidence for testicular mass or torsion  1 0 cm right spermatic cord cyst and 2 subcentimeter left epididymal cysts are again noted without significant interval change compared to the prior study   Workstation performed: [id number]

## 2023-01-28 DIAGNOSIS — J30.89 ALLERGIC RHINITIS DUE TO OTHER ALLERGIC TRIGGER, UNSPECIFIED SEASONALITY: ICD-10-CM

## 2023-01-30 RX ORDER — FLUTICASONE PROPIONATE 50 MCG
SPRAY, SUSPENSION (ML) NASAL
Qty: 24 ML | Refills: 1 | Status: SHIPPED | OUTPATIENT
Start: 2023-01-30

## 2023-02-15 NOTE — PROGRESS NOTES
Name: Stella Koyanagi      : 1960      MRN: [id number]  Encounter Provider: ALE Mckeon  Encounter Date: 2023   Encounter department: [facility]    Assessment & Plan     1  REEVES (dyspnea on exertion)  Assessment & Plan:  - Will check labs & ECG  Likely due to over exertion   - If this issue worsens, may consider CXR/PFTs    Orders:  -     CBC and differential; Future  -     Comprehensive metabolic panel; Future  -     TSH, 3rd generation with Free T4 reflex; Future  -     ECG 12 lead; Future    2  Allergic rhinitis due to other allergic trigger, unspecified seasonality  -     fluticasone (FLONASE) 50 mcg/act nasal spray; 1 spray into each nostril daily    3  Cervical pain (neck)  Assessment & Plan:  - Recommend PT, heat/ice application, home exercises  - Will rx lidocaine to use prn      Orders:  -     Ambulatory Referral to Physical Therapy; Future  -     lidocaine (LMX) 4 % cream; Apply topically as needed for mild pain    4  Encounter for immunization  -     influenza vaccine, quadrivalent, recombinant, PF, 0 5 mL, for patients 18 yr+ (FLUBLOK)    5  Tremor  Assessment & Plan:  - Will check labs but given risk factors (asymmetrical tremor, fam hx,  Age, male gender), will refer to neurology  Orders:  -     Ambulatory Referral to Neurology; Future         Subjective      Stella Koyanagi is a 60 y o  male  has a past medical history of COVID-19 virus infection, Exposure to COVID-19 virus, Family history of parkinsonism, History of chronic constipation, History of neck pain, and Hypertension  has a past surgical history that includes Cystoscopy (2014) and Tooth extraction  He presents today endorsing nontraumatic left sided cervical pain that worsens with movement present for about 2 weeks  He has used tylenol, voltaren gel & ibuprofen without relief  Tremor  He complains of tremor  Tremor primarily involves the left hand    Onset of symptoms was gradual, starting about 4 years ago  Symptoms are currently of mild and constant severity  Tremor exacerbated by activity (intention)  Tremor is alleviated by resting extremity  Symptoms occur intermittently and last seconds  He denies bilateral hand tremor, voice change, sleep disturbance, drooling during sleep (wet pillows), rigidity, postural changes, difficulty in initiating movement, change in handwriting, difficulty with walking, difficult with posture, difficulty with swallowing and balance problems  He also has a history of parkinson's in his family (father's side)    He also notices that over the last few weeks he has been experiencing REEVES particularly when he is exerting himself at work  Resolves with rest  He works as a  at [employer]  He does not experience this at any other time  He does go to the gym & participate in cardio exercise which does not cause the REEVES  He denies chest pain, wheezing, increased sputum production  He did smoke an occasional cigarette ( less than 0 25 pack per week) when he was younger but he quit over 15 years ago  Review of Systems   Constitutional: Negative  Negative for chills and fever  HENT: Negative  Negative for ear pain and sore throat  Eyes: Negative  Negative for pain and visual disturbance  Respiratory: Positive for shortness of breath  Negative for apnea, cough, choking, chest tightness, wheezing and stridor  Cardiovascular: Negative  Negative for chest pain and palpitations  Gastrointestinal: Negative  Negative for abdominal pain and vomiting  Endocrine: Negative  Genitourinary: Negative  Negative for dysuria and hematuria  Musculoskeletal: Positive for myalgias and neck pain  Negative for arthralgias and back pain  Skin: Negative  Negative for color change and rash  Neurological: Positive for tremors   Negative for dizziness, seizures, syncope, facial asymmetry, speech difficulty, weakness, light-headedness, numbness and headaches  Hematological: Negative  Psychiatric/Behavioral: Negative  All other systems reviewed and are negative  Current Outpatient Medications on File Prior to Visit   Medication Sig   • amLODIPine (NORVASC) 10 mg tablet Take 1 tablet (10 mg total) by mouth daily   • hyoscyamine (Levsin) 0 125 MG tablet Take 1 tablet (0 125 mg total) by mouth every 4 (four) hours as needed for cramping   • lubiprostone (AMITIZA) 24 mcg capsule Take 1 capsule (24 mcg total) by mouth 2 (two) times a day with meals   • methocarbamol (ROBAXIN) 500 mg tablet Take 1 tablet (500 mg total) by mouth 2 (two) times a day   • omeprazole (PriLOSEC) 40 MG capsule Take 1 capsule (40 mg total) by mouth daily before breakfast   • polyethylene glycol (GOLYTELY) 4000 mL solution Take 4,000 mL by mouth once for 1 dose   • senna (SENOKOT) 8 6 mg Take 1 tablet (8 6 mg total) by mouth daily at bedtime   • tamsulosin (FLOMAX) 0 4 mg Take 1 capsule (0 4 mg total) by mouth daily with dinner   • [DISCONTINUED] fluticasone (FLONASE) 50 mcg/act nasal spray SPRAY 1 SPRAY INTO EACH NOSTRIL EVERY DAY       Objective     /74 (BP Location: Left arm, Patient Position: Sitting, Cuff Size: Standard)   Pulse 84   Temp 97 6 °F (36 4 °C) (Temporal)   Resp 18   Ht 5' 3" (1 6 m)   Wt 64 4 kg (142 lb)   SpO2 98%   BMI 25 15 kg/m²     Physical Exam  Vitals and nursing note reviewed  Constitutional:       General: He is not in acute distress  Appearance: He is normal weight  He is not ill-appearing  HENT:      Head: Normocephalic and atraumatic  Right Ear: External ear normal  There is no impacted cerumen  Left Ear: External ear normal  There is no impacted cerumen  Nose: Nose normal  No congestion  Eyes:      Conjunctiva/sclera: Conjunctivae normal       Pupils: Pupils are equal, round, and reactive to light  Cardiovascular:      Rate and Rhythm: Normal rate and regular rhythm  Pulses: Normal pulses  Heart sounds: Normal heart sounds  No murmur heard  Pulmonary:      Effort: Pulmonary effort is normal       Breath sounds: Normal breath sounds  Abdominal:      General: Bowel sounds are normal       Palpations: Abdomen is soft  Tenderness: There is no abdominal tenderness  Musculoskeletal:         General: No tenderness  Normal range of motion  Cervical back: Normal range of motion  No tenderness  Pain with movement and muscular tenderness present  Skin:     General: Skin is warm and dry  Neurological:      General: No focal deficit present  Mental Status: He is alert and oriented to person, place, and time  Cranial Nerves: Cranial nerves 2-12 are intact  Sensory: Sensation is intact  Motor: Tremor present  Coordination: Coordination is intact  Gait: Gait is intact  Comments: (+) LUE tremor at rest   Psychiatric:         Mood and Affect: Mood normal          Behavior: Behavior normal          Thought Content:  Thought content normal          Judgment: Judgment normal        Francisco Barfield

## 2023-02-17 ENCOUNTER — OFFICE VISIT (OUTPATIENT)
Dept: FAMILY MEDICINE CLINIC | Facility: CLINIC | Age: 63
End: 2023-02-17

## 2023-02-17 ENCOUNTER — APPOINTMENT (OUTPATIENT)
Dept: LAB | Facility: HOSPITAL | Age: 63
End: 2023-02-17

## 2023-02-17 VITALS
WEIGHT: 142 LBS | RESPIRATION RATE: 18 BRPM | SYSTOLIC BLOOD PRESSURE: 126 MMHG | BODY MASS INDEX: 25.16 KG/M2 | HEART RATE: 84 BPM | HEIGHT: 63 IN | TEMPERATURE: 97.6 F | DIASTOLIC BLOOD PRESSURE: 74 MMHG | OXYGEN SATURATION: 98 %

## 2023-02-17 DIAGNOSIS — R06.09 DOE (DYSPNEA ON EXERTION): ICD-10-CM

## 2023-02-17 DIAGNOSIS — J30.89 ALLERGIC RHINITIS DUE TO OTHER ALLERGIC TRIGGER, UNSPECIFIED SEASONALITY: ICD-10-CM

## 2023-02-17 DIAGNOSIS — R25.1 TREMOR: ICD-10-CM

## 2023-02-17 DIAGNOSIS — R06.09 DOE (DYSPNEA ON EXERTION): Primary | ICD-10-CM

## 2023-02-17 DIAGNOSIS — Z23 ENCOUNTER FOR IMMUNIZATION: ICD-10-CM

## 2023-02-17 DIAGNOSIS — M54.2 CERVICAL PAIN (NECK): ICD-10-CM

## 2023-02-17 LAB
ALBUMIN SERPL BCP-MCNC: 4 G/DL (ref 3.5–5)
ALP SERPL-CCNC: 102 U/L (ref 43–122)
ALT SERPL W P-5'-P-CCNC: 20 U/L
ANION GAP SERPL CALCULATED.3IONS-SCNC: 9 MMOL/L (ref 5–14)
AST SERPL W P-5'-P-CCNC: 28 U/L (ref 17–59)
BASOPHILS # BLD AUTO: 0.03 THOUSANDS/ÂΜL (ref 0–0.1)
BASOPHILS NFR BLD AUTO: 1 % (ref 0–1)
BILIRUB SERPL-MCNC: 0.7 MG/DL (ref 0.2–1)
BUN SERPL-MCNC: 25 MG/DL (ref 5–25)
CALCIUM SERPL-MCNC: 9.2 MG/DL (ref 8.4–10.2)
CHLORIDE SERPL-SCNC: 109 MMOL/L (ref 96–108)
CO2 SERPL-SCNC: 21 MMOL/L (ref 21–32)
CREAT SERPL-MCNC: 1.34 MG/DL (ref 0.7–1.5)
EOSINOPHIL # BLD AUTO: 0.16 THOUSAND/ÂΜL (ref 0–0.61)
EOSINOPHIL NFR BLD AUTO: 3 % (ref 0–6)
ERYTHROCYTE [DISTWIDTH] IN BLOOD BY AUTOMATED COUNT: 13.9 % (ref 11.6–15.1)
GFR SERPL CREATININE-BSD FRML MDRD: 55 ML/MIN/1.73SQ M
GLUCOSE P FAST SERPL-MCNC: 87 MG/DL (ref 70–99)
HCT VFR BLD AUTO: 43.3 % (ref 36.5–49.3)
HGB BLD-MCNC: 14.6 G/DL (ref 12–17)
IMM GRANULOCYTES # BLD AUTO: 0.01 THOUSAND/UL (ref 0–0.2)
IMM GRANULOCYTES NFR BLD AUTO: 0 % (ref 0–2)
LYMPHOCYTES # BLD AUTO: 1.74 THOUSANDS/ÂΜL (ref 0.6–4.47)
LYMPHOCYTES NFR BLD AUTO: 35 % (ref 14–44)
MCH RBC QN AUTO: 30.7 PG (ref 26.8–34.3)
MCHC RBC AUTO-ENTMCNC: 33.7 G/DL (ref 31.4–37.4)
MCV RBC AUTO: 91 FL (ref 82–98)
MONOCYTES # BLD AUTO: 0.39 THOUSAND/ÂΜL (ref 0.17–1.22)
MONOCYTES NFR BLD AUTO: 8 % (ref 4–12)
NEUTROPHILS # BLD AUTO: 2.68 THOUSANDS/ÂΜL (ref 1.85–7.62)
NEUTS SEG NFR BLD AUTO: 53 % (ref 43–75)
NRBC BLD AUTO-RTO: 0 /100 WBCS
PLATELET # BLD AUTO: 232 THOUSANDS/UL (ref 149–390)
PMV BLD AUTO: 10.6 FL (ref 8.9–12.7)
POTASSIUM SERPL-SCNC: 4.7 MMOL/L (ref 3.5–5.3)
PROT SERPL-MCNC: 7.1 G/DL (ref 6.4–8.4)
RBC # BLD AUTO: 4.76 MILLION/UL (ref 3.88–5.62)
SODIUM SERPL-SCNC: 139 MMOL/L (ref 135–147)
TSH SERPL DL<=0.05 MIU/L-ACNC: 2.43 UIU/ML (ref 0.45–4.5)
WBC # BLD AUTO: 5.01 THOUSAND/UL (ref 4.31–10.16)

## 2023-02-17 RX ORDER — LIDOCAINE 40 MG/G
CREAM TOPICAL AS NEEDED
Qty: 30 G | Refills: 0 | Status: SHIPPED | OUTPATIENT
Start: 2023-02-17

## 2023-02-17 RX ORDER — FLUTICASONE PROPIONATE 50 MCG
1 SPRAY, SUSPENSION (ML) NASAL DAILY
Qty: 24 ML | Refills: 1 | Status: SHIPPED | OUTPATIENT
Start: 2023-02-17

## 2023-02-17 NOTE — ASSESSMENT & PLAN NOTE
- Will check labs & ECG   Likely due to over exertion   - If this issue worsens, may consider CXR/PFTs

## 2023-02-17 NOTE — ASSESSMENT & PLAN NOTE
- Will check labs but given risk factors (asymmetrical tremor, fam hx,  Age, male gender), will refer to neurology

## 2023-02-20 LAB
ATRIAL RATE: 67 BPM
P AXIS: 57 DEGREES
PR INTERVAL: 196 MS
QRS AXIS: 24 DEGREES
QRSD INTERVAL: 68 MS
QT INTERVAL: 374 MS
QTC INTERVAL: 395 MS
T WAVE AXIS: 36 DEGREES
VENTRICULAR RATE: 67 BPM

## 2023-02-21 ENCOUNTER — TELEPHONE (OUTPATIENT)
Dept: OTHER | Facility: OTHER | Age: 63
End: 2023-02-21
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
## 2023-02-23 ENCOUNTER — OFFICE VISIT (OUTPATIENT)
Dept: GASTROENTEROLOGY | Facility: MEDICAL CENTER | Age: 63
End: 2023-02-23

## 2023-02-23 VITALS
HEART RATE: 74 BPM | BODY MASS INDEX: 25.05 KG/M2 | TEMPERATURE: 98.3 F | SYSTOLIC BLOOD PRESSURE: 108 MMHG | DIASTOLIC BLOOD PRESSURE: 74 MMHG | WEIGHT: 141.4 LBS

## 2023-02-23 DIAGNOSIS — R10.11 RUQ PAIN: Primary | ICD-10-CM

## 2023-02-23 DIAGNOSIS — K21.00 GASTROESOPHAGEAL REFLUX DISEASE WITH ESOPHAGITIS WITHOUT HEMORRHAGE: ICD-10-CM

## 2023-02-23 DIAGNOSIS — K59.09 CHRONIC CONSTIPATION: ICD-10-CM

## 2023-02-23 RX ORDER — LUBIPROSTONE 24 UG/1
24 CAPSULE, GELATIN COATED ORAL 2 TIMES DAILY WITH MEALS
Qty: 60 CAPSULE | Refills: 5 | Status: SHIPPED | OUTPATIENT
Start: 2023-02-23

## 2023-02-23 NOTE — PROGRESS NOTES
Assessment/Plan:     Diagnoses and all orders for this visit:    RUQ pain  Chronic constipation  Patient was previously complaining of right upper quadrant abdominal pain, currently states it has improved but he does get it intermittently  Previous work-up including ultrasound and CAT scan as well as endoscopy and colonoscopy without source  Likely related to constipation/intestinal spasms  Would commend plan previously discussed as far as drinking GoLytely bowel prep and then resuming Amitiza as he has tried multiple other medications  If this is ineffective can consider Movantik or lactulose  -     polyethylene glycol (GOLYTELY) 4000 mL solution; Take 4,000 mL by mouth once for 1 dose  -     lubiprostone (AMITIZA) 24 mcg capsule; Take 1 capsule (24 mcg total) by mouth 2 (two) times a day with meals    Gastroesophageal reflux disease with esophagitis without hemorrhage  He does have a history of GERD with Castellanos's and intestinal metaplasia, currently on omeprazole 20 mg daily with good control of his symptoms  He will be due for repeat endoscopy in July of this year  We will see him back in 3 months or sooner if necessary  Subjective:      Patient ID: Jason [place] is a 60 y o  male  HPI     This is a follow-up for GERD, Castellanos's, intestinal metaplasia, abdominal pain and constipation  He denies any heartburn symptoms, currently on omeprazole 40 mg daily  He was complaining of right upper quadrant abdominal pain previous ultrasound and CAT scan did not show any source  His endoscopy was repeated in July of last year again showing Castellanos's and atrophic, cobblestone, nodular mucosa in the stomach, again positive for intestinal metaplasia, recommended to be repeated in 1 years time  He states his abdominal pain has improved somewhat and has become intermittent in nature  He cannot Benefiel specific triggers  He does continued with constipation    He is currently on Amitiza 24 mcg twice daily and describes hard stools  At his last office visit it was recommended he do a GoLytely bowel prep and then resume the Amitiza as he has already tried Mykel Fee and insurance denied Rut Puls  He states he did not get this prescription and has only been using the Amitiza  His last colonoscopy was in 2019 and was within normal limits      Patient Active Problem List   Diagnosis   • Benign colon polyp   • Benign hypertension   • Enlarged prostate without lower urinary tract symptoms (luts)   • Varicocele   • Left shoulder pain   • Renal calculus, right   • Chronic tension headache   • Right-sided low back pain with right-sided sciatica   • Rash   • Reflux laryngitis   • Vocal fold paresis, left   • Sulcus vocalis of vocal fold   • Gastroesophageal reflux disease   • Chronic constipation   • Tremor   • Lumbar disc herniation   • Overweight   • Epididymal cyst   • CKD (chronic kidney disease) stage 2, GFR 60-89 ml/min   • Persistent proteinuria   • Chronic pain in testicle   • Cervical pain (neck)   • REEVES (dyspnea on exertion)   • RUQ pain     No Known Allergies  Current Outpatient Medications on File Prior to Visit   Medication Sig   • amLODIPine (NORVASC) 10 mg tablet Take 1 tablet (10 mg total) by mouth daily   • fluticasone (FLONASE) 50 mcg/act nasal spray 1 spray into each nostril daily   • hyoscyamine (Levsin) 0 125 MG tablet Take 1 tablet (0 125 mg total) by mouth every 4 (four) hours as needed for cramping   • lidocaine (LMX) 4 % cream Apply topically as needed for mild pain   • methocarbamol (ROBAXIN) 500 mg tablet Take 1 tablet (500 mg total) by mouth 2 (two) times a day   • omeprazole (PriLOSEC) 40 MG capsule Take 1 capsule (40 mg total) by mouth daily before breakfast   • senna (SENOKOT) 8 6 mg Take 1 tablet (8 6 mg total) by mouth daily at bedtime   • tamsulosin (FLOMAX) 0 4 mg Take 1 capsule (0 4 mg total) by mouth daily with dinner   • [DISCONTINUED] lubiprostone (AMITIZA) 24 mcg capsule Take 1 capsule (24 mcg total) by mouth 2 (two) times a day with meals   • [DISCONTINUED] polyethylene glycol (GOLYTELY) 4000 mL solution Take 4,000 mL by mouth once for 1 dose     No current facility-administered medications on file prior to visit       Family History   Problem Relation Age of Onset   • Cancer Sister         malignant neoplasm of breast   • Diabetes Paternal Grandmother    • Diabetes Family    • Arthritis Family    • Cancer Family         malignant neoplasm   • Substance Abuse Neg Hx         denied Mother and Father   • Mental illness Neg Hx         no family history Mother and Father   • Other Neg Hx         denied family history of Osteopertrosis     Past Medical History:   Diagnosis Date   • COVID-19 virus infection 2020   • Exposure to COVID-19 virus 2020   • Family history of parkinsonism 2020   • History of chronic constipation    • History of neck pain 2017   • Hypertension      Social History     Socioeconomic History   • Marital status: /Civil Union     Spouse name: None   • Number of children: None   • Years of education: None   • Highest education level: None   Occupational History   • None   Tobacco Use   • Smoking status: Former     Packs/day: 0 25     Years: 20 00     Pack years: 5 00     Types: Cigarettes     Quit date: 2014     Years since quittin 1     Passive exposure: Never   • Smokeless tobacco: Never   Vaping Use   • Vaping Use: Never used   Substance and Sexual Activity   • Alcohol use: Yes     Comment: social   • Drug use: Never   • Sexual activity: Not Currently   Other Topics Concern   • None   Social History Narrative    No preference or Rastafarian beliefs     Social Determinants of Health     Financial Resource Strain: Low Risk    • Difficulty of Paying Living Expenses: Not hard at all   Food Insecurity: No Food Insecurity   • Worried About Running Out of Food in the Last Year: Never true   • Ran Out of Food in the Last Year: Never true Transportation Needs: No Transportation Needs   • Lack of Transportation (Medical): No   • Lack of Transportation (Non-Medical): No   Physical Activity: Not on file   Stress: Not on file   Social Connections: Not on file   Intimate Partner Violence: Not on file   Housing Stability: Not on file     Past Surgical History:   Procedure Laterality Date   • CYSTOSCOPY  05/29/2014    diagnostic managed by Jalen Mora   • TOOTH EXTRACTION           Review of Systems   All other systems reviewed and are negative  Objective:      /74 (BP Location: Right arm)   Pulse 74   Temp 98 3 °F (36 8 °C)   Wt 64 1 kg (141 lb 6 4 oz)   BMI 25 05 kg/m²          Physical Exam  Constitutional:       Appearance: Normal appearance  He is well-developed  HENT:      Head: Normocephalic and atraumatic  Eyes:      Conjunctiva/sclera: Conjunctivae normal    Cardiovascular:      Rate and Rhythm: Normal rate and regular rhythm  Pulmonary:      Effort: Pulmonary effort is normal       Breath sounds: Normal breath sounds  Abdominal:      General: Bowel sounds are normal  There is no distension  Palpations: Abdomen is soft  Tenderness: There is no abdominal tenderness  Musculoskeletal:         General: Normal range of motion  Cervical back: Normal range of motion  Skin:     General: Skin is warm and dry  Neurological:      Mental Status: He is alert and oriented to person, place, and time     Psychiatric:         Mood and Affect: Mood normal          Behavior: Behavior normal

## 2023-03-03 DIAGNOSIS — I10 BENIGN HYPERTENSION: ICD-10-CM

## 2023-03-03 RX ORDER — AMLODIPINE BESYLATE 10 MG/1
10 TABLET ORAL DAILY
Qty: 90 TABLET | Refills: 0 | OUTPATIENT
Start: 2023-03-03

## 2023-03-17 ENCOUNTER — OFFICE VISIT (OUTPATIENT)
Dept: FAMILY MEDICINE CLINIC | Facility: CLINIC | Age: 63
End: 2023-03-17

## 2023-03-17 VITALS
SYSTOLIC BLOOD PRESSURE: 126 MMHG | OXYGEN SATURATION: 98 % | BODY MASS INDEX: 25.52 KG/M2 | HEART RATE: 90 BPM | RESPIRATION RATE: 18 BRPM | HEIGHT: 63 IN | TEMPERATURE: 96.9 F | DIASTOLIC BLOOD PRESSURE: 72 MMHG | WEIGHT: 144 LBS

## 2023-03-17 DIAGNOSIS — M54.2 CERVICAL PAIN (NECK): Primary | ICD-10-CM

## 2023-03-17 DIAGNOSIS — I10 BENIGN HYPERTENSION: ICD-10-CM

## 2023-03-17 DIAGNOSIS — J30.89 ALLERGIC RHINITIS DUE TO OTHER ALLERGIC TRIGGER, UNSPECIFIED SEASONALITY: ICD-10-CM

## 2023-03-17 DIAGNOSIS — R25.1 TREMOR: ICD-10-CM

## 2023-03-17 PROBLEM — J04.0 REFLUX LARYNGITIS: Status: RESOLVED | Noted: 2019-11-23 | Resolved: 2023-03-17

## 2023-03-17 PROBLEM — R21 RASH: Status: RESOLVED | Noted: 2019-11-07 | Resolved: 2023-03-17

## 2023-03-17 PROBLEM — J30.9 ALLERGIC RHINITIS: Status: ACTIVE | Noted: 2023-03-17

## 2023-03-17 PROBLEM — G44.229 CHRONIC TENSION HEADACHE: Status: RESOLVED | Noted: 2018-01-24 | Resolved: 2023-03-17

## 2023-03-17 PROBLEM — R10.11 RUQ PAIN: Status: RESOLVED | Noted: 2023-02-23 | Resolved: 2023-03-17

## 2023-03-17 PROBLEM — E66.3 OVERWEIGHT: Status: RESOLVED | Noted: 2020-01-09 | Resolved: 2023-03-17

## 2023-03-17 PROBLEM — K21.9 REFLUX LARYNGITIS: Status: RESOLVED | Noted: 2019-11-23 | Resolved: 2023-03-17

## 2023-03-17 RX ORDER — CETIRIZINE HYDROCHLORIDE 10 MG/1
10 TABLET ORAL DAILY
Qty: 90 TABLET | Refills: 1 | Status: SHIPPED | OUTPATIENT
Start: 2023-03-17

## 2023-03-17 NOTE — ASSESSMENT & PLAN NOTE
BP Readings from Last 3 Encounters:   03/17/23 126/72   02/23/23 108/74   02/17/23 126/74     - Continue amlodipine 10 mg daily  -reviewed eating a low salt diet (such as the DASH diet), limiting alcohol, exercising, and wt loss

## 2023-03-17 NOTE — PROGRESS NOTES
Name: Dionne Acosta      : 1960      MRN: [id number]  Encounter Provider: ALE Pandey  Encounter Date: 3/17/2023   Encounter department: [place] [address]    Assessment & Plan     1  Cervical pain (neck)  Assessment & Plan:  - To see PT as scheduled  - Recommend using tylenol PRN  2  Allergic rhinitis due to other allergic trigger, unspecified seasonality  Assessment & Plan:  - reports using flonase BID daily  Flonase is only mildly effective  Will add on zrytec daily    Orders:  -     cetirizine (ZyrTEC) 10 mg tablet; Take 1 tablet (10 mg total) by mouth daily    3  Tremor  Assessment & Plan:  - Reminded patient to schedule with neurology      4  Benign hypertension  Assessment & Plan:  BP Readings from Last 3 Encounters:   23 126/72   23 108/74   23 126/74     - Continue amlodipine 10 mg daily  -reviewed eating a low salt diet (such as the DASH diet), limiting alcohol, exercising, and wt loss  Subjective      Dionne Acosta is a 60 y o  male  has a past medical history of COVID-19 virus infection, Exposure to COVID-19 virus, Family history of parkinsonism, History of chronic constipation, History of neck pain, and Hypertension  has a past surgical history that includes Cystoscopy (2014) and Tooth extraction  He presents today for cervical radiculopathy follow-up  He is continuing to have severe pain (mainly in left side) that is radiating down his left arm  He has a PT appt scheduled at the end of the month  He has not tried medication for his symptoms  Review of Systems   Constitutional: Negative for chills and fever  HENT: Negative for ear pain and sore throat  Eyes: Negative for pain and visual disturbance  Respiratory: Positive for cough  Negative for shortness of breath  Cardiovascular: Negative for chest pain and palpitations  Gastrointestinal: Negative for abdominal pain and vomiting  Genitourinary: Negative for dysuria and hematuria  Musculoskeletal: Positive for myalgias and neck pain  Negative for arthralgias and back pain  Skin: Negative for color change and rash  Allergic/Immunologic: Positive for environmental allergies  Neurological: Positive for tremors  Negative for seizures and syncope  All other systems reviewed and are negative  Current Outpatient Medications on File Prior to Visit   Medication Sig   • amLODIPine (NORVASC) 10 mg tablet Take 1 tablet (10 mg total) by mouth daily   • fluticasone (FLONASE) 50 mcg/act nasal spray 1 spray into each nostril daily   • hyoscyamine (Levsin) 0 125 MG tablet Take 1 tablet (0 125 mg total) by mouth every 4 (four) hours as needed for cramping   • lidocaine (LMX) 4 % cream Apply topically as needed for mild pain   • lubiprostone (AMITIZA) 24 mcg capsule Take 1 capsule (24 mcg total) by mouth 2 (two) times a day with meals   • methocarbamol (ROBAXIN) 500 mg tablet Take 1 tablet (500 mg total) by mouth 2 (two) times a day   • omeprazole (PriLOSEC) 40 MG capsule Take 1 capsule (40 mg total) by mouth daily before breakfast   • polyethylene glycol (GOLYTELY) 4000 mL solution Take 4,000 mL by mouth once for 1 dose   • senna (SENOKOT) 8 6 mg Take 1 tablet (8 6 mg total) by mouth daily at bedtime   • tamsulosin (FLOMAX) 0 4 mg Take 1 capsule (0 4 mg total) by mouth daily with dinner       Objective     /72 (BP Location: Right arm, Patient Position: Sitting, Cuff Size: Standard)   Pulse 90   Temp (!) 96 9 °F (36 1 °C) (Temporal)   Resp 18   Ht 5' 3" (1 6 m)   Wt 65 3 kg (144 lb)   SpO2 98%   BMI 25 51 kg/m²     Physical Exam  Vitals and nursing note reviewed  Constitutional:       General: He is not in acute distress  Appearance: He is overweight  He is not ill-appearing  HENT:      Head: Normocephalic and atraumatic  Right Ear: External ear normal  There is no impacted cerumen        Left Ear: External ear normal  There is no impacted cerumen  Nose: Nose normal  No congestion  Eyes:      Pupils: Pupils are equal, round, and reactive to light  Cardiovascular:      Rate and Rhythm: Normal rate and regular rhythm  Pulses: Normal pulses  Heart sounds: Normal heart sounds  No murmur heard  Pulmonary:      Effort: Pulmonary effort is normal       Breath sounds: Normal breath sounds  Abdominal:      General: Bowel sounds are normal       Palpations: Abdomen is soft  Tenderness: There is no abdominal tenderness  Musculoskeletal:         General: No tenderness  Normal range of motion  Cervical back: Normal range of motion  No tenderness  Skin:     General: Skin is warm and dry  Neurological:      General: No focal deficit present  Mental Status: He is alert and oriented to person, place, and time  Psychiatric:         Mood and Affect: Mood normal          Behavior: Behavior normal          Thought Content:  Thought content normal          Judgment: Judgment normal        Dm Banerjee

## 2023-03-24 RX ORDER — AMLODIPINE BESYLATE 5 MG/1
TABLET ORAL
Qty: 90 TABLET | OUTPATIENT
Start: 2023-03-24

## 2023-03-30 ENCOUNTER — EVALUATION (OUTPATIENT)
Dept: PHYSICAL THERAPY | Facility: CLINIC | Age: 63
End: 2023-03-30

## 2023-03-30 VITALS — SYSTOLIC BLOOD PRESSURE: 118 MMHG | DIASTOLIC BLOOD PRESSURE: 84 MMHG

## 2023-03-30 DIAGNOSIS — M25.512 ACUTE PAIN OF LEFT SHOULDER: Primary | ICD-10-CM

## 2023-03-30 DIAGNOSIS — M54.2 CERVICAL PAIN (NECK): ICD-10-CM

## 2023-03-30 NOTE — PROGRESS NOTES
PT Evaluation     Today's date: 3/30/2023  Patient name: Teresita Lennon  : 1960  MRN: [id number]  Referring provider: Yoko Navarro, *  Dx:   Encounter Diagnosis     ICD-10-CM    1  Acute pain of left shoulder  M25 512       2  Cervical pain (neck)  M54 2 Ambulatory Referral to Physical Therapy                     Assessment  Assessment details: Teresita Lennon  is a pleasant 60 y o  male who presents with L shoulder pain and neck pain   The primary movement problem is decreased shoulder ROM, strength and stability as well as decreased scapular activation resulting in postural deficits, ant restrictions and strength limitations , which limit his ability to sleep, perform overhead motion and lifting due to pain   No referral is necessary at this time based on examination results  Tolerated session without adverse effects  Cervical exam was negative for palpation and provocation with testing  Problem List:  1) Postural stability deficits   2) Decreased tissue extensibility/ muscle restrictions  3) UE strength deficits     Pt  will benefit from skilled PT services that includes manual therapy techniques to enhance tissue extensibility, neuromuscular re-education to facilitate motor control, therapeutic exercise to increase functional mobility, and modalities prn to reduce pain and inflammation  Access Code: [email or web address]  URL: [email or web address]  Date: 2023  Prepared by: Emy Esquivel    Exercises  - Doorway Pec Stretch at 90 Degrees Abduction  - 1 x daily - 7 x weekly - 13 reps - 30 hold  - Shoulder External Rotation and Scapular Retraction with Resistance  - 1 x daily - 7 x weekly - 15 reps  - Standing Shoulder Row with Anchored Resistance  - 1 x daily - 7 x weekly - 20 reps  Impairments: pain with function and scapular dyskinesis    Symptom irritability: moderateUnderstanding of Dx/Px/POC: good   Prognosis: good    Goals  Impairment Goals  - Pt I with initial HEP in 1-2 visits  - Improve ROM equal to contralateral side in 4-6 weeks without increased pain   - Increase strength to 5/5 in all affected areas in 4-6 weeks  - Patient will be able to sleep throughout the night without increased pain in 4-6 weeks     Functional Goals  - Increase Functional Status Measure to expected outcome for improved functional mobility   - Patient will be independent with comprehensive HEP in 6-8 weeks  - Patient will be able to reach for object from shelf at shoulder height with min reports of difficulty in 2-4 weeks  - Patient will be able to reach for object overhead with min to difficulty in 6-8 weeks  - Patient will be able to lift/carry objects without provocation of symptoms in 6-8 weeks    Plan  Patient would benefit from: skilled physical therapy and PT eval  Referral necessary: No  Planned modality interventions: TENS, thermotherapy: hydrocollator packs, unattended electrical stimulation and cryotherapy  Planned therapy interventions: therapeutic activities, therapeutic exercise, stretching, strengthening, patient education, postural training, manual therapy, joint mobilization, neuromuscular re-education, home exercise program and functional ROM exercises  Frequency: 2x week  Duration in weeks: 10  Plan of Care beginning date: 3/30/2023  Plan of Care expiration date: 6/8/2023  Treatment plan discussed with: patient        Subjective Evaluation    History of Present Illness  Mechanism of injury: Derek House presents with c/c of L shoulder pain radiating from the elbow to the shoulder  Reports symptom began about 2-3 months ago with no known ALVARO  Denies numbness/ tingling in the L UE  Reports pain is pinching  Denies HA, dizziness, and neck pain  Denies arm weakness  Pain is constant as per patient     Aggravating factors: denies that anything makes his pain worse reports pain occurs intermittently   Relieving factors: none reports he hasn't taken medicine from the physician  24hr pain "pattern: 7/10 (current), 5/10 (best),7/10 (worst), location:top of the shoulder ant ant deltoid/ arm to elbow , descriptors: \" puncture\"   Imaging: x-ray revealed OA in SA  Previous treatments: none as per patient   Patient goals: \" to try and see if the pain gets better\"           Recurrent probem    Quality of life: good    Hand dominance: right          Objective     Postural Observations  Seated posture: poor  Standing posture: poor        Palpation     Additional Palpation Details  TTP throughout the bicep long head tendon  pec tendon tenderness  tricep tenderness  Infra/ supra spinatus and teres major/ minor   No UT/ levator tenderness and SOR throughout       Cervical/Thoracic Screen   Cervical range of motion within normal limits with the following exceptions: Cervical ROM WNL no increased neck or arm pain     25% decreased SB no pain     Neurological Testing     Sensation     Shoulder   Left Shoulder   Intact: light touch    Right Shoulder   Intact: light touch    Comments   Left light touch: C2-T1  Right light touch: C2-T1    Reflexes   Left   Deltoid (C5): normal (2+)  Brachioradialis (C6): normal (2+)  Triceps (C7): normal (2+)    Right   Deltoid (C5): normal (2+)  Brachioradialis (C6): normal (2+)  Triceps (C7): normal (2+)    Active Range of Motion   Left Shoulder   Flexion: 178 degrees with pain  Abduction: 180 degrees   External rotation BTH: T3   Internal rotation BTB: T10 with pain    Right Shoulder   Normal active range of motion  External rotation BTH: T3   Internal rotation BTB: T3     Passive Range of Motion   Left Shoulder   Flexion: 180 degrees with pain  Abduction: 180 degrees with pain  External rotation 90°: 70 degrees with pain  Internal rotation 90°: 90 degrees     Right Shoulder   Normal passive range of motion    Joint Play   Joints within functional limits: C1, C2, C3, C4, C5, C6, C7 and T1     Strength/Myotome Testing     Left Shoulder     Planes of Motion   Flexion: 4+   Abduction: " "4   External rotation at 0°: 4-   Internal rotation at 0°: 4     Isolated Muscles   Biceps: 4+   Middle trapezius: 3   Serratus anterior: 4+   Upper trapezius: 5     Right Shoulder     Planes of Motion   Flexion: 5   Abduction: 5   External rotation at 0°: 4+   Internal rotation at 0°: 4+     Isolated Muscles   Biceps: 5   Middle trapezius: 3   Serratus anterior: 4+   Upper trapezius: 5     Left Elbow   Extension: 4    Right Elbow   Extension: 4    Tests   Cervical     Left   Negative Spurling's Test A  Right   Negative Spurling's Test A  Left Shoulder   Positive clunk and empty can  Negative belly press, Hawkin's, Neer's, ULTT1, ULTT2, ULTT3 and ULTT4                Precautions: lumbar disc herniation, HTN, L shoulder pain, tremor, CKD 2, Welsh speaking      Manuals 3/30            Traction cervical              Check  NV             AP shoulder mobs  Grade III KR                          Neuro Re-Ed 3/30            timmy rows/ ext  (HEP) x20            Chin tucks              No moneys (HEP) PTB x 20            timmy rot              Reverse flys                                        Ther Ex 3/30            SNAGS             Thoracic ext              Wall slides              Serratus punches              Doorway pec stretch (HEP) 3x30\"                                                    Ther Activity 3/30            UBE             Pulleys              Paitent education                                        Modalities                                          "

## 2023-05-16 ENCOUNTER — TELEPHONE (OUTPATIENT)
Dept: UROLOGY | Facility: AMBULATORY SURGERY CENTER | Age: 63
End: 2023-05-16

## 2023-05-16 DIAGNOSIS — R39.11 BENIGN PROSTATIC HYPERPLASIA WITH URINARY HESITANCY: ICD-10-CM

## 2023-05-16 DIAGNOSIS — N40.1 BENIGN PROSTATIC HYPERPLASIA WITH URINARY HESITANCY: ICD-10-CM

## 2023-05-16 DIAGNOSIS — R39.9 UTI SYMPTOMS: Primary | ICD-10-CM

## 2023-05-16 NOTE — TELEPHONE ENCOUNTER
Pt under care of: Debbie Celaya     Last Seen: 1/10/23     Reason for call:    Patient needs apt     Patient calling in with left testicle and rectum pain especially when passing stool    No swelling in testicle at this time    Burning with urination       Pt can be reached at: [phone]

## 2023-05-16 NOTE — TELEPHONE ENCOUNTER
Pt called back, reports for the past week having burning with urination  Pt does not believe any blood in his urine  Pt does reprots has to push fr bowel movements and it's hard  Pt reports some urgency  Pt denies fever, chills, nausea or vomiting  Orders placed for ua/uc, pt will go today for testing  tEncouraged hydration with water 40-60oz of water daily  Avoiding Bladder irritants such as coffee,tea,soda,carbonated beverages  Advised patient monitor/contact our office with fever above 101 0, chills, decreased urination or unable to urinate, blood or clots in the urine  Health Call after hours protocol reviewed  Ed precautions reviewed   Patient verbalized understanding/ Agreement

## 2023-05-16 NOTE — TELEPHONE ENCOUNTER
Agree with nurses recommendations to obtain urine testing  We will call with results  Would also advise patient to start bowel regimen as constipation will exacerbate his symptoms

## 2023-05-17 ENCOUNTER — TELEPHONE (OUTPATIENT)
Dept: NEPHROLOGY | Facility: CLINIC | Age: 63
End: 2023-05-17

## 2023-05-17 ENCOUNTER — LAB (OUTPATIENT)
Dept: LAB | Facility: HOSPITAL | Age: 63
End: 2023-05-17

## 2023-05-17 DIAGNOSIS — R80.1 PERSISTENT PROTEINURIA: ICD-10-CM

## 2023-05-17 DIAGNOSIS — N18.2 CKD (CHRONIC KIDNEY DISEASE) STAGE 2, GFR 60-89 ML/MIN: ICD-10-CM

## 2023-05-17 DIAGNOSIS — R39.9 UTI SYMPTOMS: ICD-10-CM

## 2023-05-17 LAB
ALBUMIN SERPL BCP-MCNC: 3.7 G/DL (ref 3.5–5)
ALP SERPL-CCNC: 100 U/L (ref 34–104)
ALT SERPL W P-5'-P-CCNC: 16 U/L (ref 7–52)
ANION GAP SERPL CALCULATED.3IONS-SCNC: 5 MMOL/L (ref 4–13)
AST SERPL W P-5'-P-CCNC: 20 U/L (ref 13–39)
BACTERIA UR QL AUTO: ABNORMAL /HPF
BILIRUB SERPL-MCNC: 0.55 MG/DL (ref 0.2–1)
BILIRUB UR QL STRIP: NEGATIVE
BUN SERPL-MCNC: 21 MG/DL (ref 5–25)
CALCIUM SERPL-MCNC: 8.7 MG/DL (ref 8.4–10.2)
CHLORIDE SERPL-SCNC: 111 MMOL/L (ref 96–108)
CLARITY UR: CLEAR
CO2 SERPL-SCNC: 21 MMOL/L (ref 21–32)
COLOR UR: ABNORMAL
CREAT SERPL-MCNC: 1.26 MG/DL (ref 0.6–1.3)
CREAT UR-MCNC: 73.5 MG/DL
ERYTHROCYTE [DISTWIDTH] IN BLOOD BY AUTOMATED COUNT: 14 % (ref 11.6–15.1)
GFR SERPL CREATININE-BSD FRML MDRD: 60 ML/MIN/1.73SQ M
GLUCOSE SERPL-MCNC: 107 MG/DL (ref 65–140)
GLUCOSE UR STRIP-MCNC: NEGATIVE MG/DL
HCT VFR BLD AUTO: 39.7 % (ref 36.5–49.3)
HGB BLD-MCNC: 13.3 G/DL (ref 12–17)
HGB UR QL STRIP.AUTO: NEGATIVE
KETONES UR STRIP-MCNC: NEGATIVE MG/DL
LEUKOCYTE ESTERASE UR QL STRIP: NEGATIVE
MCH RBC QN AUTO: 30 PG (ref 26.8–34.3)
MCHC RBC AUTO-ENTMCNC: 33.5 G/DL (ref 31.4–37.4)
MCV RBC AUTO: 89 FL (ref 82–98)
NITRITE UR QL STRIP: NEGATIVE
NON-SQ EPI CELLS URNS QL MICRO: ABNORMAL /HPF
PH UR STRIP.AUTO: 6 [PH]
PLATELET # BLD AUTO: 243 THOUSANDS/UL (ref 149–390)
PMV BLD AUTO: 10.5 FL (ref 8.9–12.7)
POTASSIUM SERPL-SCNC: 3.9 MMOL/L (ref 3.5–5.3)
PROT SERPL-MCNC: 6.7 G/DL (ref 6.4–8.4)
PROT UR STRIP-MCNC: ABNORMAL MG/DL
PROT UR-MCNC: 41 MG/DL
PROT/CREAT UR: 0.56 MG/G{CREAT} (ref 0–0.1)
RBC # BLD AUTO: 4.44 MILLION/UL (ref 3.88–5.62)
RBC #/AREA URNS AUTO: ABNORMAL /HPF
SODIUM SERPL-SCNC: 137 MMOL/L (ref 135–147)
SP GR UR STRIP.AUTO: 1.01 (ref 1–1.03)
UROBILINOGEN UR STRIP-ACNC: <2 MG/DL
WBC # BLD AUTO: 4.41 THOUSAND/UL (ref 4.31–10.16)
WBC #/AREA URNS AUTO: ABNORMAL /HPF

## 2023-05-18 LAB — BACTERIA UR CULT: NORMAL

## 2023-05-18 NOTE — RESULT ENCOUNTER NOTE
Labs reviewed  Renal function stable  Results to be reviewed at scheduled follow-up appoint with Dr Jewell Agarwal next week

## 2023-05-19 NOTE — TELEPHONE ENCOUNTER
Called and spoke with patient  Informed that urine culture was negative for an infection  Advised patient to make sure that he is increasing his water intake and avoiding bladder irritants  Patient states that his symptoms have resolved since  He did request a refill on his Flomax  Confirmed [facility] pharmacy on [facility] as his preferred pharmacy  He also needed a refill on his Flonase, deferred to PCP who prescribed medication

## 2023-05-22 ENCOUNTER — TELEPHONE (OUTPATIENT)
Dept: NEPHROLOGY | Facility: CLINIC | Age: 63
End: 2023-05-22

## 2023-05-22 RX ORDER — TAMSULOSIN HYDROCHLORIDE 0.4 MG/1
0.4 CAPSULE ORAL
Qty: 90 CAPSULE | Refills: 3 | Status: SHIPPED | OUTPATIENT
Start: 2023-05-22

## 2023-05-23 ENCOUNTER — TELEPHONE (OUTPATIENT)
Dept: NEPHROLOGY | Facility: CLINIC | Age: 63
End: 2023-05-23

## 2023-05-23 ENCOUNTER — OFFICE VISIT (OUTPATIENT)
Dept: GASTROENTEROLOGY | Facility: MEDICAL CENTER | Age: 63
End: 2023-05-23

## 2023-05-23 VITALS
WEIGHT: 144.3 LBS | TEMPERATURE: 98.1 F | BODY MASS INDEX: 25.57 KG/M2 | HEART RATE: 78 BPM | DIASTOLIC BLOOD PRESSURE: 79 MMHG | SYSTOLIC BLOOD PRESSURE: 143 MMHG | HEIGHT: 63 IN

## 2023-05-23 DIAGNOSIS — K59.09 CHRONIC CONSTIPATION: Primary | ICD-10-CM

## 2023-05-23 DIAGNOSIS — R10.11 RIGHT UPPER QUADRANT ABDOMINAL PAIN: ICD-10-CM

## 2023-05-23 DIAGNOSIS — K22.70 BARRETT'S ESOPHAGUS WITHOUT DYSPLASIA: ICD-10-CM

## 2023-05-23 DIAGNOSIS — R12 HEARTBURN: ICD-10-CM

## 2023-05-23 RX ORDER — OMEPRAZOLE 40 MG/1
40 CAPSULE, DELAYED RELEASE ORAL
Qty: 90 CAPSULE | Refills: 2 | Status: SHIPPED | OUTPATIENT
Start: 2023-05-23

## 2023-05-23 NOTE — PATIENT INSTRUCTIONS
- Start taking fiber supplementation (Metamucil, Citrucel, Benefiber, etc)  Please mix one heaping tablespoon with an 8 ounce glass of water and drink every morning  This can be increased to twice per day if needed  Fiber tablets and gummies will also work  The goal is to have regular, formed, and easy-to-pass bowel movements  The effect is gradual, so please use it consistently for at least 2 weeks  Stay well-hydrated to avoid constipation

## 2023-05-23 NOTE — TELEPHONE ENCOUNTER
Patient show to day at [facility] office at 4:25 pm and his appointment for today was 4 pm   Let him know that our office policy is to reschedule your appointment if you are 15 minutes late  Asked patient if he would like to reschedule his appointment but he said no, was upset and left the office

## 2023-05-23 NOTE — PROGRESS NOTES
[facility] - Outpatient Consultation  Micheal Lovell 60 y o  male MRN: [id number]  Encounter: [id number]      Assessment and Plan:    1  Chronic constipation    2  Right upper quadrant abdominal pain    3  Heartburn    4  Castellanos's esophagus without dysplasia        60 y o  male w/ hx of GERD, non-dysplastic short segment Castellanos's esophagus, gastric intestinal metaplasia, chronic constipation who presents for follow-up of constipation, gastric intestinal metaplasia, and chronic right-sided abdominal pain  Right-sided abdominal pain has been extensively worked up and is overall improved  Constipation is better on Amitiza 24 mcg BID, but he still has hard stools and straining  Recommended adding fiber supplements to his regimen  He has extensive gastric intestinal metaplasia found on 3 EGDs in 2022  He was recommended to repeat in 1 year  He has a family history of gastric cancer in his uncle  - EGD recall for 7/2023 already in place with Dr Vince Cruz  - Continue omeprazole 40 mg daily  - Continue Amitiza 24 mcg BID  - Trial of fiber supplementation to add to Amitiza    Follow up after EGD with GI [facility] or Dr Vince Cruz    No orders of the defined types were placed in this encounter     ______________________________________________________________________    History of Present Illness:    Micheal Lovell is a 60 y o  male w/ hx of GERD, non-dysplastic short segment Castellanos's esophagus, gastric intestinal metaplasia, chronic constipation who presents for follow-up of constipation, gastric intestinal metaplasia, and chronic right-sided abdominal pain  Patient's RLQ abdominal pain is overall better with random pangs of pain 4-5 times per month  The pain resolves on its own without specific intervention  Not associate with bowel movements  Cross-sectional imaging has been unrevealing in the past   Last colonoscopy 2019 was normal; 10-year follow-up recommended      He has been taking Amitiza 24 mcg twice daily with 1 BM per day  However the stools are usually hard and associated with significant straining at times  However, this is better than it used to be  Has tried MiraLAX and Linzess in the past without improvement  Patient had EGD 2022, 3/2022, and 2022 due to extensive gastric intestinal metaplasia without H  pylori  He also has biopsy-confirmed, nondysplastic short-segment Castellanos's esophagus  He is adherent to omeprazole 40 mg daily  He was recommended repeat EGD in 1 year (2023) by Dr Sandoval Brice who has been managing the patient's GIM  Review of Systems:  As per HPI  Otherwise negative        Historical Information   Past Medical History:   Diagnosis Date   • COVID-19 virus infection 2020   • Exposure to COVID-19 virus 2020   • Family history of parkinsonism 2020   • History of chronic constipation    • History of neck pain 2017   • Hypertension      Past Surgical History:   Procedure Laterality Date   • CYSTOSCOPY  2014    diagnostic managed by Jalen Mora   • TOOTH EXTRACTION       Social History   Social History     Substance and Sexual Activity   Alcohol Use Yes    Comment: social     Social History     Substance and Sexual Activity   Drug Use Never     Social History     Tobacco Use   Smoking Status Former   • Packs/day: 0 25   • Years: 20 00   • Pack years: 5 00   • Types: Cigarettes   • Quit date: 2014   • Years since quittin 3   • Passive exposure: Never   Smokeless Tobacco Never     Family History   Problem Relation Age of Onset   • Cancer Sister         malignant neoplasm of breast   • Diabetes Paternal Grandmother    • Colon cancer Paternal Uncle    • Diabetes Family    • Arthritis Family    • Cancer Family         malignant neoplasm   • Substance Abuse Neg Hx         denied Mother and Father   • Mental illness Neg Hx         no family history Mother and Father   • Other Neg Hx         denied family history of Osteopertrosis "      Meds/Allergies       Current Outpatient Medications:   •  amLODIPine (NORVASC) 10 mg tablet  •  cetirizine (ZyrTEC) 10 mg tablet  •  fluticasone (FLONASE) 50 mcg/act nasal spray  •  hyoscyamine (Levsin) 0 125 MG tablet  •  lidocaine (LMX) 4 % cream  •  lubiprostone (AMITIZA) 24 mcg capsule  •  methocarbamol (ROBAXIN) 500 mg tablet  •  omeprazole (PriLOSEC) 40 MG capsule  •  psyllium (METAMUCIL) 58 6 % powder  •  senna (SENOKOT) 8 6 mg  •  tamsulosin (FLOMAX) 0 4 mg    No Known Allergies        Objective     Blood pressure 143/79, pulse 78, temperature 98 1 °F (36 7 °C), temperature source Tympanic, height 5' 3\" (1 6 m), weight 65 5 kg (144 lb 4 8 oz)  Body mass index is 25 56 kg/m²          Physical Exam:      General: No acute distress  Abdomen: Soft, mild RLQ tenderness to deep palpation, non-distended, normoactive bowel sounds  Neuro: Awake, alert, oriented x 3    Lab Results:   Lab Results   Component Value Date/Time    WBC 4 41 05/17/2023 04:51 PM    WBC 5 24 09/15/2015 06:12 PM    HGB 13 3 05/17/2023 04:51 PM    HGB 13 8 09/15/2015 06:12 PM     05/17/2023 04:51 PM     09/15/2015 06:12 PM    SODIUM 137 05/17/2023 04:51 PM    K 3 9 05/17/2023 04:51 PM    K 4 2 09/15/2015 06:12 PM     (H) 05/17/2023 04:51 PM     09/15/2015 06:12 PM    CO2 21 05/17/2023 04:51 PM    CO2 23 4 09/15/2015 06:12 PM    BUN 21 05/17/2023 04:51 PM    BUN 25 09/15/2015 06:12 PM    CREATININE 1 26 05/17/2023 04:51 PM    CREATININE 1 47 (H) 09/15/2015 06:12 PM    AST 20 05/17/2023 04:51 PM    AST 26 09/15/2015 06:12 PM    ALT 16 05/17/2023 04:51 PM    ALT 26 09/15/2015 06:12 PM    ALKPHOS 100 05/17/2023 04:51 PM    ALKPHOS 107 09/15/2015 06:12 PM    TBILI 0 55 05/17/2023 04:51 PM    BILIDIR 0 15 01/21/2019 09:18 AM    ALB 3 7 05/17/2023 04:51 PM    ALB 3 2 (L) 09/15/2015 06:12 PM    INR 0 90 05/12/2017 07:21 PM    INR 0 93 07/30/2014 03:32 PM    LIPASE 178 07/13/2021 08:55 PM    FERRITIN 30 04/08/2022 05:55 PM " CONCFE 22 04/08/2022 05:55 PM    TIBC 357 04/08/2022 05:55 PM

## 2023-05-24 ENCOUNTER — TELEPHONE (OUTPATIENT)
Dept: GASTROENTEROLOGY | Facility: CLINIC | Age: 63
End: 2023-05-24

## 2023-05-24 NOTE — TELEPHONE ENCOUNTER
Patients GI provider:  Dr Sampson Jean Baptiste    Number to return call: [phone]    Reason for call: Pt calling because he states he has been nauseous and having stomach pain all day  He has not had vomiting but feels like he might   He would like to know what he can do    Scheduled procedure/appointment date if applicable: N/A

## 2023-05-24 NOTE — TELEPHONE ENCOUNTER
"OV  5/24/23  FU not scheduled    H/o as per last visit   1  Chronic constipation   2  Right upper quadrant abdominal pain   3  Heartburn   4  Castellanos's esophagus without dysplasia    Medications   Omeprazole 40 mg daily  Amixia 24 mcg BID  Add fiber supplement to [employer] with pt using  #[id number]  Pt reporting new onset of symptoms since seeing Dr Cliff Broussard in office yesterday  Reporting woke up this morning feeling nausea w/o vomiting (but feels like he will), 2/10 constant generalized abdominal pain  + cough and states \"feels like I have a cold\"  Pt denies alarming s/s at the time of this call, including fever, bleeding, denies sick exposure  Tolerating PO hydration by drinking water and lemonade  Reviewed alarming s/s, reviewed symptom management for Nausea & vomiting by following a bland diet, eating frequent small meals, reducing fat intake, avoiding indigestible foods, eliminating carbonated beverages to decrease gastric distention  Encouraged hydration and protein intake  Pt verbalized understanding and agreeable to recommendations       "

## 2023-05-27 ENCOUNTER — NURSE TRIAGE (OUTPATIENT)
Dept: OTHER | Facility: OTHER | Age: 63
End: 2023-05-27

## 2023-05-27 NOTE — TELEPHONE ENCOUNTER
Patient was advised to obtain Senna OTC for the weekend, Metamucil Rx was sent to the [facility] pharmacy on 5/23/23, to call office when it is open to follow up  Patient agreeable, verbalized understanding

## 2023-05-27 NOTE — TELEPHONE ENCOUNTER
"Regarding: medication refill ( needed )  ----- Message from Abilio Ibarra sent at 5/27/2023  7:18 AM EDT -----  \" Im missing my medicine that helps me go the bathroom  I dont know the name of it but its in a small white bottle  \"    "

## 2023-05-27 NOTE — TELEPHONE ENCOUNTER
"  Reason for Disposition  • [1] Caller has NON-URGENT medicine question about med that PCP prescribed AND [2] triager unable to answer question    Answer Assessment - Initial Assessment Questions  1  NAME of MEDICATION: \"What medicine are you calling about? \"      Senna, Metamucil   2  QUESTION: Teresa Randhawa is your question? \" (e g , medication refill, side effect)      Patient asked what medications he was prescribed for his chronic constipations   3  PRESCRIBING HCP: \"Who prescribed it? \" Reason: if prescribed by specialist, call should be referred to that group  Gastroenterologist   4  SYMPTOMS: \"Do you have any symptoms? \"      Constipations    Protocols used: MEDICATION QUESTION CALL-ADULT-    "

## 2023-05-29 ENCOUNTER — HOSPITAL ENCOUNTER (EMERGENCY)
Facility: HOSPITAL | Age: 63
Discharge: HOME | End: 2023-05-29
Attending: EMERGENCY MEDICINE

## 2023-05-29 ENCOUNTER — APPOINTMENT (EMERGENCY)
Dept: CT IMAGING | Facility: HOSPITAL | Age: 63
End: 2023-05-29

## 2023-05-29 VITALS
TEMPERATURE: 97.7 F | RESPIRATION RATE: 18 BRPM | SYSTOLIC BLOOD PRESSURE: 133 MMHG | BODY MASS INDEX: 24.84 KG/M2 | HEART RATE: 70 BPM | WEIGHT: 140.21 LBS | OXYGEN SATURATION: 97 % | DIASTOLIC BLOOD PRESSURE: 80 MMHG

## 2023-05-29 DIAGNOSIS — R10.84 GENERALIZED ABDOMINAL PAIN: Primary | ICD-10-CM

## 2023-05-29 LAB
ALBUMIN SERPL BCP-MCNC: 3.3 G/DL (ref 3.5–5)
ALP SERPL-CCNC: 90 U/L (ref 34–104)
ALT SERPL W P-5'-P-CCNC: 20 U/L (ref 7–52)
ANION GAP SERPL CALCULATED.3IONS-SCNC: 2 MMOL/L (ref 4–13)
AST SERPL W P-5'-P-CCNC: 22 U/L (ref 13–39)
BASOPHILS # BLD MANUAL: 0 THOUSAND/UL (ref 0–0.1)
BASOPHILS NFR MAR MANUAL: 0 % (ref 0–1)
BILIRUB SERPL-MCNC: 0.51 MG/DL (ref 0.2–1)
BUN SERPL-MCNC: 16 MG/DL (ref 5–25)
CALCIUM ALBUM COR SERPL-MCNC: 8.7 MG/DL (ref 8.3–10.1)
CALCIUM SERPL-MCNC: 8.1 MG/DL (ref 8.4–10.2)
CHLORIDE SERPL-SCNC: 112 MMOL/L (ref 96–108)
CO2 SERPL-SCNC: 24 MMOL/L (ref 21–32)
CREAT SERPL-MCNC: 1.18 MG/DL (ref 0.6–1.3)
EOSINOPHIL # BLD MANUAL: 0.12 THOUSAND/UL (ref 0–0.4)
EOSINOPHIL NFR BLD MANUAL: 3 % (ref 0–6)
ERYTHROCYTE [DISTWIDTH] IN BLOOD BY AUTOMATED COUNT: 14 % (ref 11.6–15.1)
GFR SERPL CREATININE-BSD FRML MDRD: 65 ML/MIN/1.73SQ M
GLUCOSE SERPL-MCNC: 84 MG/DL (ref 65–140)
HCT VFR BLD AUTO: 41.2 % (ref 36.5–49.3)
HGB BLD-MCNC: 13.7 G/DL (ref 12–17)
LIPASE SERPL-CCNC: 78 U/L (ref 11–82)
LYMPHOCYTES # BLD AUTO: 1.07 THOUSAND/UL (ref 0.6–4.47)
LYMPHOCYTES # BLD AUTO: 26 % (ref 14–44)
MCH RBC QN AUTO: 30 PG (ref 26.8–34.3)
MCHC RBC AUTO-ENTMCNC: 33.3 G/DL (ref 31.4–37.4)
MCV RBC AUTO: 90 FL (ref 82–98)
MONOCYTES # BLD AUTO: 0.33 THOUSAND/UL (ref 0–1.22)
MONOCYTES NFR BLD: 8 % (ref 4–12)
NEUTROPHILS # BLD MANUAL: 2.6 THOUSAND/UL (ref 1.85–7.62)
NEUTS SEG NFR BLD AUTO: 63 % (ref 43–75)
PLATELET # BLD AUTO: 232 THOUSANDS/UL (ref 149–390)
PLATELET BLD QL SMEAR: ADEQUATE
PMV BLD AUTO: 10.3 FL (ref 8.9–12.7)
POTASSIUM SERPL-SCNC: 4.1 MMOL/L (ref 3.5–5.3)
PROT SERPL-MCNC: 6 G/DL (ref 6.4–8.4)
RBC # BLD AUTO: 4.56 MILLION/UL (ref 3.88–5.62)
RBC MORPH BLD: NORMAL
SODIUM SERPL-SCNC: 138 MMOL/L (ref 135–147)
WBC # BLD AUTO: 4.13 THOUSAND/UL (ref 4.31–10.16)

## 2023-05-29 RX ADMIN — IOHEXOL 100 ML: 350 INJECTION, SOLUTION INTRAVENOUS at 16:13

## 2023-05-29 NOTE — ED PROVIDER NOTES
"Pt Name: Xochitl Mallory  MRN: [id number]  [place] 1960  Age/Sex: 60 y o  male  Date of evaluation: 2023  PCP: Danny Wallace [address] [place] [address]    Chief Complaint   Patient presents with   • Abdominal Pain     Pt reports RLQ abdominal pain that he has been seen for previously and is continuing today  Pt reports being bloated, decrease in appetite and feeling full on a regular basis  Pt did not eat today but ate yesterday  Pt also reports diarrhea but a small amount and not having a regular BM in \"a long time\"  HPI    Elham Marie presents to the Emergency Department for right sided abdominal pain  This has been ongoing for a long time  He has been worked up for it and they never figured out what it was          HPI      Past Medical and Surgical History    Past Medical History:   Diagnosis Date   • COVID-19 virus infection 2020   • Exposure to COVID-19 virus 2020   • Family history of parkinsonism 2020   • History of chronic constipation    • History of neck pain 2017   • Hypertension        Past Surgical History:   Procedure Laterality Date   • CYSTOSCOPY  2014    diagnostic managed by Jalen Mora   • TOOTH EXTRACTION         Family History   Problem Relation Age of Onset   • Cancer Sister         malignant neoplasm of breast   • Diabetes Paternal Grandmother    • Colon cancer Paternal Uncle    • Diabetes Family    • Arthritis Family    • Cancer Family         malignant neoplasm   • Substance Abuse Neg Hx         denied Mother and Father   • Mental illness Neg Hx         no family history Mother and Father   • Other Neg Hx         denied family history of Osteopertrosis       Social History     Tobacco Use   • Smoking status: Former     Packs/day: 0 25     Years: 20 00     Total pack years: 5 00     Types: Cigarettes     Quit date: 2014     Years since quittin 3     Passive exposure: Never   • Smokeless tobacco: Never   Vaping Use   • Vaping Use: Never " used   Substance Use Topics   • Alcohol use: Yes     Comment: social   • Drug use: Never           Allergies    No Known Allergies    Home Medications    Prior to Admission medications    Medication Sig Start Date End Date Taking? Authorizing Provider   amLODIPine (NORVASC) 10 mg tablet Take 1 tablet (10 mg total) by mouth daily 1/13/23   ALE Anthony   cetirizine (ZyrTEC) 10 mg tablet Take 1 tablet (10 mg total) by mouth daily 3/17/23   ALE Anthony   fluticasone [facility]) 50 mcg/act nasal spray 1 spray into each nostril daily 2/17/23   ALE Anthony   hyoscyamine (Levsin) 0 125 MG tablet Take 1 tablet (0 125 mg total) by mouth every 4 (four) hours as needed for cramping 11/29/22   Laurie Wayne PA-C   lidocaine (LMX) 4 % cream Apply topically as needed for mild pain 2/17/23   ALE Anhtony   lubiprostone (AMITIZA) 24 mcg capsule Take 1 capsule (24 mcg total) by mouth 2 (two) times a day with meals 2/23/23   Laurita Liu PA-C   methocarbamol (ROBAXIN) 500 mg tablet Take 1 tablet (500 mg total) by mouth 2 (two) times a day 1/12/23   Jordy Dennis PA-C   omeprazole (PriLOSEC) 40 MG capsule Take 1 capsule (40 mg total) by mouth daily before breakfast 5/23/23   Jonathan Mandujano MD   psyllium (METAMUCIL) 58 6 % powder Take 1 packet by mouth daily 5/23/23   Jonathan Mandujano MD   senna (SENOKOT) 8 6 mg Take 1 tablet (8 6 mg total) by mouth daily at bedtime 4/29/22   Sarthak Metzger MD   tamsulosin [facility]) 0 4 mg Take 1 capsule (0 4 mg total) by mouth daily with dinner 5/22/23   ALE Lester           Review of Systems    Review of Systems   Constitutional: Negative for chills and fever  HENT: Negative for ear pain and sore throat  Eyes: Negative for pain and visual disturbance  Respiratory: Negative for cough and shortness of breath  Cardiovascular: Negative for chest pain and palpitations     Gastrointestinal: Positive for abdominal distention, abdominal pain, constipation and diarrhea  Negative for vomiting  Genitourinary: Negative for dysuria and hematuria  Musculoskeletal: Negative for arthralgias and back pain  Skin: Negative for color change and rash  Neurological: Negative for seizures and syncope  All other systems reviewed and are negative  Physical Exam      ED Triage Vitals [05/29/23 1408]   Temperature Pulse Respirations Blood Pressure SpO2   97 7 °F (36 5 °C) 95 20 107/92 97 %      Temp Source Heart Rate Source Patient Position - Orthostatic VS BP Location FiO2 (%)   Oral Monitor Sitting Right arm --      Pain Score       --               Physical Exam  Vitals and nursing note reviewed  Constitutional:       General: He is not in acute distress  Appearance: He is well-developed  He is not diaphoretic  HENT:      Head: Normocephalic and atraumatic  Nose: Nose normal    Eyes:      Conjunctiva/sclera: Conjunctivae normal       Pupils: Pupils are equal, round, and reactive to light  Cardiovascular:      Rate and Rhythm: Normal rate and regular rhythm  Heart sounds: Normal heart sounds  No murmur heard  No friction rub  No gallop  Pulmonary:      Effort: Pulmonary effort is normal  No respiratory distress  Breath sounds: Normal breath sounds  No wheezing or rales  Abdominal:      General: Bowel sounds are normal       Palpations: Abdomen is soft  Tenderness: There is no abdominal tenderness  There is no guarding or rebound  Musculoskeletal:         General: Normal range of motion  Cervical back: Normal range of motion and neck supple  Skin:     General: Skin is warm and dry  Neurological:      Mental Status: He is alert and oriented to person, place, and time  Psychiatric:         Behavior: Behavior normal                 Assessment and Plan    Peace Capellan is a 60 y o  male who presents with abdominal pain   Physical examination remarkable for right sided pain but none at the time of my exam  Differential diagnosis (not completely inclusive) includes acute vs more chronic causes of abdominal pain  Plan will be to perform diagnostic testing and treat symptomatically        MDM    Diagnostic Results        Labs:    Results for orders placed or performed during the hospital encounter of 05/29/23   CBC and differential   Result Value Ref Range    WBC 4 13 (L) 4 31 - 10 16 Thousand/uL    RBC 4 56 3 88 - 5 62 Million/uL    Hemoglobin 13 7 12 0 - 17 0 g/dL    Hematocrit 41 2 36 5 - 49 3 %    MCV 90 82 - 98 fL    MCH 30 0 26 8 - 34 3 pg    MCHC 33 3 31 4 - 37 4 g/dL    RDW 14 0 11 6 - 15 1 %    MPV 10 3 8 9 - 12 7 fL    Platelets 594 547 - 017 Thousands/uL   Comprehensive metabolic panel   Result Value Ref Range    Sodium 138 135 - 147 mmol/L    Potassium 4 1 3 5 - 5 3 mmol/L    Chloride 112 (H) 96 - 108 mmol/L    CO2 24 21 - 32 mmol/L    ANION GAP 2 (L) 4 - 13 mmol/L    BUN 16 5 - 25 mg/dL    Creatinine 1 18 0 60 - 1 30 mg/dL    Glucose 84 65 - 140 mg/dL    Calcium 8 1 (L) 8 4 - 10 2 mg/dL    Corrected Calcium 8 7 8 3 - 10 1 mg/dL    AST 22 13 - 39 U/L    ALT 20 7 - 52 U/L    Alkaline Phosphatase 90 34 - 104 U/L    Total Protein 6 0 (L) 6 4 - 8 4 g/dL    Albumin 3 3 (L) 3 5 - 5 0 g/dL    Total Bilirubin 0 51 0 20 - 1 00 mg/dL    eGFR 65 ml/min/1 73sq m   Lipase   Result Value Ref Range    Lipase 78 11 - 82 u/L   Manual Differential(PHLEBS Do Not Order)   Result Value Ref Range    Segmented % 63 43 - 75 %    Lymphocytes % 26 14 - 44 %    Monocytes % 8 4 - 12 %    Eosinophils, % 3 0 - 6 %    Basophils % 0 0 - 1 %    Absolute Neutrophils 2 60 1 85 - 7 62 Thousand/uL    Lymphocytes Absolute 1 07 0 60 - 4 47 Thousand/uL    Monocytes Absolute 0 33 0 00 - 1 22 Thousand/uL    Eosinophils Absolute 0 12 0 00 - 0 40 Thousand/uL    Basophils Absolute 0 00 0 00 - 0 10 Thousand/uL    Total Counted      RBC Morphology Normal     Platelet Estimate Adequate Adequate       All labs reviewed and utilized in the medical decision making process    Radiology:    CT abdomen pelvis with contrast   Final Result      No acute findings in the abdomen or pelvis  Unchanged 2 mm right renal calculus  Workstation performed: [id number]             All radiology studies independently viewed by me and interpreted by the radiologist     Procedure    Procedures      ED Course of Care and Re-Assessments        Medications   iohexol (OMNIPAQUE) 350 MG/ML injection (SINGLE-DOSE) 100 mL (100 mL Intravenous Given 5/29/23 1613)           FINAL IMPRESSION    Final diagnoses:   Generalized abdominal pain         DISPOSITION/PLAN      Time reflects when diagnosis was documented in both MDM as applicable and the Disposition within this note     Time User Action Codes Description Comment    5/29/2023  5:43 PM Carrielisa Sanderson Add [R10 84] Generalized abdominal pain       ED Disposition     ED Disposition   Discharge    Condition   Stable    Date/Time   Mon May 29, 2023  5:43 PM    [id number] [address] discharge to home/self care                 Follow-up Information     Follow up With Specialties Details Why Contact Info Additional Information    ALE Dunlap Nurse Practitioner, Family Medicine Schedule an appointment as soon as possible for a visit   [address] [facility] [id number] [phone]       [facility] Schedule an appointment as soon as possible for a visit   [address] [facility]  [address] [facility] [address]  [facility] [place], [address], [address], [place], [place], [address] [phone]            PATIENT REFERRED TO:    [facility] [address] [place] [address] [phone]    Schedule an appointment as soon as possible for a visit       [facility] [place]  [address] [facility] [address] [facility] [address]  [phone]  Schedule an appointment as soon as possible for a visit         DISCHARGE MEDICATIONS:    Patient's Medications   Discharge Prescriptions    No medications on file       No discharge procedures on file           Prince Drummond, [address] [place] [address],   05/29/23 180

## 2023-05-30 DIAGNOSIS — K59.00 CONSTIPATION, UNSPECIFIED CONSTIPATION TYPE: ICD-10-CM

## 2023-05-30 NOTE — TELEPHONE ENCOUNTER
I spoke with the patient  #[id number]  Verified and reviewed with the patient regarding GI medication questions  Pt reports has not picked up Amitiza or Metamucil at pharmacy  Pt states taking omeprazole and has medication refill  I called [facility]  Amitiza ready for  and metamucil OTC  I called and spoke with the patient  Patient states at work and to leave a detailed message on voicemail   # [phone]  Left voicemail informing patient Amitiza medication is ready for  at [facility] and Metamucil powder will need to be picked up OTC and to reach out to the pharmacy for help and to call back with any questions

## 2023-05-30 NOTE — TELEPHONE ENCOUNTER
Pt called requesting medication refills and requesting a order for a cat scan on pancreas and liver       Please advise, thanks

## 2023-05-31 RX ORDER — SENNOSIDES 8.6 MG
8.6 TABLET ORAL
Qty: 90 TABLET | Refills: 0 | Status: SHIPPED | OUTPATIENT
Start: 2023-05-31

## 2023-06-06 NOTE — TELEPHONE ENCOUNTER
Pt left a message requesting a CT Scan due to pt is having liver issues   I attempted to call pt and lm to call us back to schedule a visit to discuss Ct request

## 2023-06-19 ENCOUNTER — OFFICE VISIT (OUTPATIENT)
Dept: FAMILY MEDICINE CLINIC | Facility: CLINIC | Age: 63
End: 2023-06-19

## 2023-06-19 VITALS
SYSTOLIC BLOOD PRESSURE: 120 MMHG | RESPIRATION RATE: 18 BRPM | HEART RATE: 99 BPM | HEIGHT: 63 IN | BODY MASS INDEX: 23.88 KG/M2 | WEIGHT: 134.8 LBS | OXYGEN SATURATION: 98 % | DIASTOLIC BLOOD PRESSURE: 70 MMHG | TEMPERATURE: 97.8 F

## 2023-06-19 DIAGNOSIS — M54.2 CERVICAL PAIN (NECK): ICD-10-CM

## 2023-06-19 DIAGNOSIS — G89.29 CHRONIC PAIN IN TESTICLE: ICD-10-CM

## 2023-06-19 DIAGNOSIS — N50.811 PAIN IN BOTH TESTICLES: ICD-10-CM

## 2023-06-19 DIAGNOSIS — N50.819 CHRONIC PAIN IN TESTICLE: ICD-10-CM

## 2023-06-19 DIAGNOSIS — N50.812 PAIN IN BOTH TESTICLES: ICD-10-CM

## 2023-06-19 DIAGNOSIS — I10 BENIGN HYPERTENSION: ICD-10-CM

## 2023-06-19 DIAGNOSIS — Z00.00 ANNUAL PHYSICAL EXAM: Primary | ICD-10-CM

## 2023-06-19 PROCEDURE — 99213 OFFICE O/P EST LOW 20 MIN: CPT

## 2023-06-19 PROCEDURE — 99396 PREV VISIT EST AGE 40-64: CPT

## 2023-06-19 RX ORDER — LIDOCAINE 40 MG/G
CREAM TOPICAL AS NEEDED
Qty: 120 G | Refills: 0 | Status: SHIPPED | OUTPATIENT
Start: 2023-06-19

## 2023-06-19 NOTE — PATIENT INSTRUCTIONS
[phone]: urology  Wellness Visit for Adults   AMBULATORY CARE:   A wellness visit  is when you see your healthcare provider to get screened for health problems  Your healthcare provider will also give you advice on how to stay healthy  Write down your questions so you remember to ask them  Ask your healthcare provider how often you should have a wellness visit  What happens at a wellness visit:  Your healthcare provider will ask about your health, and your family history of health problems  This includes high blood pressure, heart disease, and cancer  He or she will ask if you have symptoms that concern you, if you smoke, and about your mood  You may also be asked about your intake of medicines, supplements, food, and alcohol  Any of the following may be done:  • Your weight  will be checked  Your height may also be checked so your body mass index (BMI) can be calculated  Your BMI shows if you are at a healthy weight  • Your blood pressure  and heart rate will be checked  Your temperature may also be checked  • Blood and urine tests  may be done  Blood tests may be done to check your cholesterol levels  Abnormal cholesterol levels increase your risk for heart disease and stroke  You may also need a blood or urine test to check for diabetes if you are at increased risk  Urine tests may be done to look for signs of an infection or kidney disease  • A physical exam  includes checking your heartbeat and lungs with a stethoscope  Your healthcare provider may also check your skin to look for sun damage  • Screening tests  may be recommended  A screening test is done to check for diseases that may not cause symptoms  The screening tests you may need depend on your age, gender, family history, and lifestyle habits  For example, colorectal screening may be recommended if you are 48years old or older  Screening tests you need if you are a woman:   • A Pap smear  is used to screen for cervical cancer   Pap smears are usually done every 3 to 5 years depending on your age  You may need them more often if you have had abnormal Pap smear test results in the past  Ask your healthcare provider how often you should have a Pap smear  • A mammogram  is an x-ray of your breasts to screen for breast cancer  Experts recommend mammograms every 2 years starting at age 48 years  You may need a mammogram at age 52 years or younger if you have an increased risk for breast cancer  Talk to your healthcare provider about when you should start having mammograms and how often you need them  Vaccines you may need:   • Get an influenza vaccine  every year  The influenza vaccine protects you from the flu  Several types of viruses cause the flu  The viruses change over time, so new vaccines are made each year  • Get a tetanus-diphtheria (Td) booster vaccine  every 10 years  This vaccine protects you against tetanus and diphtheria  Tetanus is a severe infection that may cause painful muscle spasms and lockjaw  Diphtheria is a severe bacterial infection that causes a thick covering in the back of your mouth and throat  • Get a human papillomavirus (HPV) vaccine  if you are female and aged 23 to 32 or male 23 to 24 and never received it  This vaccine protects you from HPV infection  HPV is the most common infection spread by sexual contact  HPV may also cause vaginal, penile, and anal cancers  • Get a pneumococcal vaccine  if you are aged 72 years or older  The pneumococcal vaccine is an injection given to protect you from pneumococcal disease  Pneumococcal disease is an infection caused by pneumococcal bacteria  The infection may cause pneumonia, meningitis, or an ear infection  • Get a shingles vaccine  if you are 60 or older, even if you have had shingles before  The shingles vaccine is an injection to protect you from the varicella-zoster virus  This is the same virus that causes chickenpox   Shingles is a painful rash that develops in people who had chickenpox or have been exposed to the virus  How to eat healthy:  My Plate is a model for planning healthy meals  It shows the types and amounts of foods that should go on your plate  Fruits and vegetables make up about half of your plate, and grains and protein make up the other half  A serving of dairy is included on the side of your plate  The amount of calories and serving sizes you need depends on your age, gender, weight, and height  Examples of healthy foods are listed below:  • Eat a variety of vegetables  such as dark green, red, and orange vegetables  You can also include canned vegetables low in sodium (salt) and frozen vegetables without added butter or sauces  • Eat a variety of fresh fruits , canned fruit in 100% juice, frozen fruit, and dried fruit  • Include whole grains  At least half of the grains you eat should be whole grains  Examples include whole-wheat bread, wheat pasta, brown rice, and whole-grain cereals such as oatmeal     • Eat a variety of protein foods such as seafood (fish and shellfish), lean meat, and poultry without skin (turkey and chicken)  Examples of lean meats include pork leg, shoulder, or tenderloin, and beef round, sirloin, tenderloin, and extra lean ground beef  Other protein foods include eggs and egg substitutes, beans, peas, soy products, nuts, and seeds  • Choose low-fat dairy products such as skim or 1% milk or low-fat yogurt, cheese, and cottage cheese  • Limit unhealthy fats  such as butter, hard margarine, and shortening  Exercise:  Exercise at least 30 minutes per day on most days of the week  Some examples of exercise include walking, biking, dancing, and swimming  You can also fit in more physical activity by taking the stairs instead of the elevator or parking farther away from stores  Include muscle strengthening activities 2 days each week  Regular exercise provides many health benefits   It helps you manage your weight, and decreases your risk for type 2 diabetes, heart disease, stroke, and high blood pressure  Exercise can also help improve your mood  Ask your healthcare provider about the best exercise plan for you  General health and safety guidelines:   • Do not smoke  Nicotine and other chemicals in cigarettes and cigars can cause lung damage  Ask your healthcare provider for information if you currently smoke and need help to quit  E-cigarettes or smokeless tobacco still contain nicotine  Talk to your healthcare provider before you use these products  • Limit alcohol  A drink of alcohol is 12 ounces of beer, 5 ounces of wine, or 1½ ounces of liquor  • Lose weight, if needed  Being overweight increases your risk of certain health conditions  These include heart disease, high blood pressure, type 2 diabetes, and certain types of cancer  • Protect your skin  Do not sunbathe or use tanning beds  Use sunscreen with a SPF 15 or higher  Apply sunscreen at least 15 minutes before you go outside  Reapply sunscreen every 2 hours  Wear protective clothing, hats, and sunglasses when you are outside  • Drive safely  Always wear your seatbelt  Make sure everyone in your car wears a seatbelt  A seatbelt can save your life if you are in an accident  Do not use your cell phone when you are driving  This could distract you and cause an accident  Pull over if you need to make a call or send a text message  • Practice safe sex  Use latex condoms if are sexually active and have more than one partner  Your healthcare provider may recommend screening tests for sexually transmitted infections (STIs)  • Wear helmets, lifejackets, and protective gear  Always wear a helmet when you ride a bike or motorcycle, go skiing, or play sports that could cause a head injury  Wear protective equipment when you play sports  Wear a lifejacket when you are on a boat or doing water sports      © Copyright [employer]ative 2022 Information is for End User's use only and may not be sold, redistributed or otherwise used for commercial purposes  The above information is an  only  It is not intended as medical advice for individual conditions or treatments  Talk to your doctor, nurse or pharmacist before following any medical regimen to see if it is safe and effective for you

## 2023-06-19 NOTE — PROGRESS NOTES
[address] [facility]    NAME: Mounika Dunlap  AGE: 60 y o  SEX: male  : 1960     DATE: 2023     Assessment and Plan:     Problem List Items Addressed This Visit        Cardiovascular and Mediastinum    Benign hypertension     BP Readings from Last 3 Encounters:   23 120/70   23 133/80   23 143/79     - Continue amlodipine 10 mg daily  -reviewed eating a low salt diet (such as the DASH diet), limiting alcohol, exercising, and wt loss  Other    Chronic pain in testicle     - Recommend tylenol prn  F/u with urology if pain persists/worsens         Cervical pain (neck)     - continues with neck pain that has been unchanged  - Pt did not complete PT given work obligations, reports he is able to attend again and will schedule  Continue with prn lidocaine, tylenol         Relevant Medications    lidocaine (LMX) 4 % cream    RESOLVED: Pain in both testicles       Other Visit Diagnoses     Annual physical exam    -  Primary          Immunizations and preventive care screenings were discussed with patient today  Appropriate education was printed on patient's after visit summary  Discussed risks and benefits of prostate cancer screening  We discussed the controversial history of PSA screening for prostate cancer in the [place] as well as the risk of over detection and over treatment of prostate cancer by way of PSA screening  The patient understands that PSA blood testing is an imperfect way to screen for prostate cancer and that elevated PSA levels in the blood may also be caused by infection, inflammation, prostatic trauma or manipulation, urological procedures, or by benign prostatic enlargement      The role of the digital rectal examination in prostate cancer screening was also discussed and I discussed with him that there is large interobserver variability in the findings of digital rectal examination  Counseling:  Alcohol/drug use: discussed moderation in alcohol intake, the recommendations for healthy alcohol use, and avoidance of illicit drug use  Dental Health: discussed importance of regular tooth brushing, flossing, and dental visits  Injury prevention: discussed safety/seat belts, safety helmets, smoke detectors, carbon dioxide detectors, and smoking near bedding or upholstery  Sexual health: discussed sexually transmitted diseases, partner selection, use of condoms, avoidance of unintended pregnancy, and contraceptive alternatives  Exercise: the importance of regular exercise/physical activity was discussed  Recommend exercise 3-5 times per week for at least 30 minutes  Return in about 6 months (around 12/19/2023) for Recheck HTN  Chief Complaint:     Chief Complaint   Patient presents with   • Follow-up      History of Present Illness:     Adult Annual Physical   Patient here for a comprehensive physical exam  The patient reports problems - he reports bilateral testicular pain unchanged from previous pain  He does have a history of chronic transient orchalgia and has had unremarkable work-up done  He has not tried medication for the pain yet  Diet and Physical Activity  · Diet/Nutrition: well balanced diet  · Exercise: moderate cardiovascular exercise and 3-4 times a week on average  Depression Screening  PHQ-2/9 Depression Screening    Little interest or pleasure in doing things: 0 - not at all  Feeling down, depressed, or hopeless: 0 - not at all  PHQ-2 Score: 0  PHQ-2 Interpretation: Negative depression screen       General Health  · Sleep: sleeps well and gets 7-8 hours of sleep on average  · Hearing: normal - bilateral   · Vision: most recent eye exam >1 year ago and wears glasses  · Dental: regular dental visits, brushes teeth twice daily and does not floss          Health  · Symptoms include: none     Review of Systems:     Review of Systems   Constitutional: Negative for chills and fever  HENT: Negative for ear pain and sore throat  Eyes: Negative for pain and visual disturbance  Respiratory: Negative for cough and shortness of breath  Cardiovascular: Negative for chest pain and palpitations  Gastrointestinal: Negative for abdominal pain and vomiting  Genitourinary: Positive for testicular pain  Negative for dysuria and hematuria  Musculoskeletal: Positive for neck pain  Negative for arthralgias and back pain  Skin: Negative for color change and rash  Neurological: Negative for seizures and syncope  All other systems reviewed and are negative       Past Medical History:     Past Medical History:   Diagnosis Date   • COVID-19 virus infection 5/1/2020   • Exposure to COVID-19 virus 11/23/2020   • Family history of parkinsonism 4/30/2020   • History of chronic constipation    • History of neck pain 11/21/2017   • Hypertension       Past Surgical History:     Past Surgical History:   Procedure Laterality Date   • CYSTOSCOPY  05/29/2014    diagnostic managed by Jalen Mora   • TOOTH EXTRACTION        Family History:     Family History   Problem Relation Age of Onset   • Cancer Sister         malignant neoplasm of breast   • Diabetes Paternal Grandmother    • Colon cancer Paternal Uncle    • Diabetes Family    • Arthritis Family    • Cancer Family         malignant neoplasm   • Substance Abuse Neg Hx         denied Mother and Father   • Mental illness Neg Hx         no family history Mother and Father   • Other Neg Hx         denied family history of Osteopertrosis      Social History:     Social History     Socioeconomic History   • Marital status: /Civil Union     Spouse name: None   • Number of children: None   • Years of education: None   • Highest education level: None   Occupational History   • None   Tobacco Use   • Smoking status: Former     Packs/day: 0 25     Years: 20 00     Total pack years: 5 00     Types: Cigarettes     Quit date: 2014     Years since quittin 4     Passive exposure: Never   • Smokeless tobacco: Never   Vaping Use   • Vaping Use: Never used   Substance and Sexual Activity   • Alcohol use: Yes     Comment: social   • Drug use: Never   • Sexual activity: Not Currently   Other Topics Concern   • None   Social History Narrative    No preference or Sabianist beliefs     Social Determinants of Health     Financial Resource Strain: Low Risk  (2022)    Overall Financial Resource Strain (CARDIA)    • Difficulty of Paying Living Expenses: Not hard at all   Food Insecurity: No Food Insecurity (2022)    Hunger Vital Sign    • Worried About Running Out of Food in the Last Year: Never true    • Ran Out of Food in the Last Year: Never true   Transportation Needs: No Transportation Needs (2022)    PRAPARE - Transportation    • Lack of Transportation (Medical): No    • Lack of Transportation (Non-Medical):  No   Physical Activity: Not on file   Stress: Not on file   Social Connections: Not on file   Intimate Partner Violence: Not on file   Housing Stability: Not on file      Current Medications:     Current Outpatient Medications   Medication Sig Dispense Refill   • lidocaine (LMX) 4 % cream Apply topically as needed for mild pain 120 g 0   • amLODIPine (NORVASC) 10 mg tablet TAKE 1 TABLET BY MOUTH EVERY DAY 90 tablet 2   • cetirizine (ZyrTEC) 10 mg tablet Take 1 tablet (10 mg total) by mouth daily 90 tablet 1   • fluticasone (FLONASE) 50 mcg/act nasal spray 1 spray into each nostril daily 24 mL 1   • hyoscyamine (Levsin) 0 125 MG tablet Take 1 tablet (0 125 mg total) by mouth every 4 (four) hours as needed for cramping 30 tablet 0   • lubiprostone (AMITIZA) 24 mcg capsule Take 1 capsule (24 mcg total) by mouth 2 (two) times a day with meals 60 capsule 5   • methocarbamol (ROBAXIN) 500 mg tablet Take 1 tablet (500 mg total) by mouth 2 (two) times a day 20 tablet 0   • omeprazole (PriLOSEC) 40 MG "capsule Take 1 capsule (40 mg total) by mouth daily before breakfast 90 capsule 2   • psyllium (METAMUCIL) 58 6 % powder Take 1 packet by mouth daily 1040 g 2   • senna (SENOKOT) 8 6 mg Take 1 tablet (8 6 mg total) by mouth daily at bedtime 90 tablet 0   • tamsulosin (FLOMAX) 0 4 mg Take 1 capsule (0 4 mg total) by mouth daily with dinner 90 capsule 3     No current facility-administered medications for this visit  Allergies:     No Known Allergies   Physical Exam:     /70 (BP Location: Left arm, Patient Position: Sitting, Cuff Size: Standard)   Pulse 99   Temp 97 8 °F (36 6 °C) (Temporal)   Resp 18   Ht 5' 3\" (1 6 m)   Wt 61 1 kg (134 lb 12 8 oz)   SpO2 98%   BMI 23 88 kg/m²     Physical Exam  Vitals and nursing note reviewed  Constitutional:       Appearance: He is well-developed and overweight  HENT:      Head: Normocephalic and atraumatic  Right Ear: External ear normal       Left Ear: External ear normal       Nose: Nose normal       Mouth/Throat:      Mouth: Mucous membranes are moist       Pharynx: Oropharynx is clear  Eyes:      Extraocular Movements: Extraocular movements intact  Conjunctiva/sclera: Conjunctivae normal       Pupils: Pupils are equal, round, and reactive to light  Cardiovascular:      Rate and Rhythm: Normal rate and regular rhythm  Pulses: Normal pulses  Heart sounds: Normal heart sounds  No murmur heard  Pulmonary:      Effort: Pulmonary effort is normal  No respiratory distress  Breath sounds: Normal breath sounds  Abdominal:      General: Bowel sounds are normal       Palpations: Abdomen is soft  Tenderness: There is no abdominal tenderness  Musculoskeletal:         General: Normal range of motion  Cervical back: Normal range of motion and neck supple  Skin:     General: Skin is warm and dry  Capillary Refill: Capillary refill takes less than 2 seconds     Neurological:      Mental Status: He is alert and oriented to " person, place, and time  Mental status is at baseline  Psychiatric:         Mood and Affect: Mood normal          Behavior: Behavior normal          Thought Content:  Thought content normal          Judgment: Judgment normal           Toni Zhao, [address]

## 2023-06-19 NOTE — ASSESSMENT & PLAN NOTE
BP Readings from Last 3 Encounters:   06/19/23 120/70   05/29/23 133/80   05/23/23 143/79     - Continue amlodipine 10 mg daily  -reviewed eating a low salt diet (such as the DASH diet), limiting alcohol, exercising, and wt loss

## 2023-06-19 NOTE — ASSESSMENT & PLAN NOTE
- continues with neck pain that has been unchanged  - Pt did not complete PT given work obligations, reports he is able to attend again and will schedule   Continue with prn lidocaine, tylenol

## 2023-07-14 NOTE — TELEPHONE ENCOUNTER
Hi, Plan doesn't require an insurance referral, last order from 5/2019 so he needs an updated Order from the Dr Kasey Oakes you No

## 2023-08-28 ENCOUNTER — TELEPHONE (OUTPATIENT)
Age: 63
End: 2023-08-28

## 2023-08-28 NOTE — TELEPHONE ENCOUNTER
Patient called with burning while urinating, dark urine, low abdominal pain and some low back pain, also has testicular pain. All started about a month ago.     CB: [phone]

## 2023-08-29 NOTE — TELEPHONE ENCOUNTER
Please see previous encounter. Patient is having pain with urinating. Low back and testicular pain to a pain level of six. No nausea, vomiting, fevers, chills, shakes, sweats. He was advised to go to urgent care or ER and that a message would be sent to the provider to see if we can squeeze him in or double book and appointment. Please advise.

## 2023-08-30 NOTE — TELEPHONE ENCOUNTER
Call Pt left a generic vm encourage Pt to call back at the office [phone] for us set up an appt.

## 2023-08-31 ENCOUNTER — NURSE TRIAGE (OUTPATIENT)
Age: 63
End: 2023-08-31

## 2023-09-06 NOTE — TELEPHONE ENCOUNTER
Appointment needs to be scheduled as soon as possible. See recommendations from [facility] [place], [facility].

## 2023-09-07 NOTE — TELEPHONE ENCOUNTER
Pt called and was confused and stated that he had an appointment today. He doesn't then he was disconnected.

## 2023-10-19 RX ORDER — POLYETHYLENE GLYCOL 3350
17 POWDER (GRAM) MISCELLANEOUS DAILY
COMMUNITY
Start: 2023-10-18 | End: 2023-10-21

## 2023-10-20 ENCOUNTER — OFFICE VISIT (OUTPATIENT)
Dept: UROLOGY | Facility: CLINIC | Age: 63
End: 2023-10-20
Payer: COMMERCIAL

## 2023-10-20 ENCOUNTER — TELEPHONE (OUTPATIENT)
Dept: FAMILY MEDICINE CLINIC | Facility: CLINIC | Age: 63
End: 2023-10-20

## 2023-10-20 ENCOUNTER — TELEPHONE (OUTPATIENT)
Dept: GASTROENTEROLOGY | Facility: MEDICAL CENTER | Age: 63
End: 2023-10-20

## 2023-10-20 ENCOUNTER — OFFICE VISIT (OUTPATIENT)
Dept: GASTROENTEROLOGY | Facility: MEDICAL CENTER | Age: 63
End: 2023-10-20

## 2023-10-20 VITALS
WEIGHT: 145.2 LBS | DIASTOLIC BLOOD PRESSURE: 73 MMHG | TEMPERATURE: 99.6 F | BODY MASS INDEX: 25.72 KG/M2 | HEART RATE: 77 BPM | SYSTOLIC BLOOD PRESSURE: 111 MMHG

## 2023-10-20 VITALS
RESPIRATION RATE: 18 BRPM | OXYGEN SATURATION: 98 % | BODY MASS INDEX: 24.8 KG/M2 | HEART RATE: 74 BPM | WEIGHT: 140 LBS | HEIGHT: 63 IN | SYSTOLIC BLOOD PRESSURE: 122 MMHG | DIASTOLIC BLOOD PRESSURE: 80 MMHG

## 2023-10-20 DIAGNOSIS — K59.00 CONSTIPATION, UNSPECIFIED CONSTIPATION TYPE: ICD-10-CM

## 2023-10-20 DIAGNOSIS — I10 BENIGN HYPERTENSION: ICD-10-CM

## 2023-10-20 DIAGNOSIS — Z12.5 PROSTATE CANCER SCREENING: ICD-10-CM

## 2023-10-20 DIAGNOSIS — R39.9 UTI SYMPTOMS: Primary | ICD-10-CM

## 2023-10-20 DIAGNOSIS — K31.A0 GASTRIC INTESTINAL METAPLASIA: Primary | ICD-10-CM

## 2023-10-20 LAB
BACTERIA UR QL AUTO: ABNORMAL /HPF
BILIRUB UR QL STRIP: NEGATIVE
CLARITY UR: CLEAR
COLOR UR: ABNORMAL
GLUCOSE UR STRIP-MCNC: NEGATIVE MG/DL
HGB UR QL STRIP.AUTO: NEGATIVE
KETONES UR STRIP-MCNC: NEGATIVE MG/DL
LEUKOCYTE ESTERASE UR QL STRIP: NEGATIVE
NITRITE UR QL STRIP: NEGATIVE
NON-SQ EPI CELLS URNS QL MICRO: ABNORMAL /HPF
PH UR STRIP.AUTO: 6.5 [PH]
PROT UR STRIP-MCNC: ABNORMAL MG/DL
RBC #/AREA URNS AUTO: ABNORMAL /HPF
SL AMB  POCT GLUCOSE, UA: NORMAL
SL AMB LEUKOCYTE ESTERASE,UA: NORMAL
SL AMB POCT BILIRUBIN,UA: NORMAL
SL AMB POCT BLOOD,UA: NORMAL
SL AMB POCT CLARITY,UA: CLEAR
SL AMB POCT COLOR,UA: YELLOW
SL AMB POCT KETONES,UA: NORMAL
SL AMB POCT NITRITE,UA: NORMAL
SL AMB POCT PH,UA: 7
SL AMB POCT SPECIFIC GRAVITY,UA: 1.01
SL AMB POCT URINE PROTEIN: 30
SL AMB POCT UROBILINOGEN: 0.2
SP GR UR STRIP.AUTO: 1.02 (ref 1–1.03)
UROBILINOGEN UR STRIP-ACNC: <2 MG/DL
WBC #/AREA URNS AUTO: ABNORMAL /HPF

## 2023-10-20 PROCEDURE — 87086 URINE CULTURE/COLONY COUNT: CPT | Performed by: PHYSICIAN ASSISTANT

## 2023-10-20 PROCEDURE — 81002 URINALYSIS NONAUTO W/O SCOPE: CPT | Performed by: PHYSICIAN ASSISTANT

## 2023-10-20 PROCEDURE — 99213 OFFICE O/P EST LOW 20 MIN: CPT | Performed by: PHYSICIAN ASSISTANT

## 2023-10-20 PROCEDURE — 81001 URINALYSIS AUTO W/SCOPE: CPT | Performed by: PHYSICIAN ASSISTANT

## 2023-10-20 RX ORDER — AMLODIPINE BESYLATE 10 MG/1
10 TABLET ORAL DAILY
Qty: 90 TABLET | Refills: 2 | Status: SHIPPED | OUTPATIENT
Start: 2023-10-20

## 2023-10-20 NOTE — PROGRESS NOTES
[facility] - Outpatient Follow-up Note  Heydi Tatum 60 y.o. male MRN: [id number]  Encounter: [id number]          ASSESSMENT AND PLAN:      1. Gastric intestinal metaplasia  2. GERD    History of gastric intestinal metaplasia found on 3 EGDs in 2022. She is due for recall EGD in 1 year. Currently taking omeprazole 40 mg daily. Denies any nausea, vomiting or dysphagia. No weight loss. Drinks 1 cup of caffeine per day. No NSAID use. Rare alcohol use. -Continue omeprazole 40 mg daily  -EGD with biopsies  -Follow-up in office after test    3. Chronic constipation    History of chronic constipation. Recently was prescribed MiraLAX 1 capful daily. Reports it does not help him produce a BM. BMs are 3-4 times per week. Denies any melena or hematochezia. Last colonoscopy was 8/19 which was normal.  Recall colonoscopy recommended in 10 years. -MiraLAX 1 capful daily to twice daily  -High-fiber diet    ______________________________________________________________________    SUBJECTIVE: 76-year-old male here for follow-up. He was last seen by Dr. Tono Marley 5/23/2023 for chronic constipation, heartburn, Castellanos's esophagus without dysplasia and right upper quadrant pain. Required [employer] for translation today as patient only speaks [employer] and [place]. History of gastric intestinal metaplasia found on 3 EGDs in 2022. She is due for recall EGD in 1 year. Currently taking omeprazole 40 mg daily. Denies any nausea, vomiting or dysphagia. No weight loss. History of chronic constipation. Recently was prescribed MiraLAX 1 capful daily. Reports it does not help him produce a BM. BMs are 3-4 times per week. Denies any melena or hematochezia. Last colonoscopy was 8/19 which was normal.  Recall colonoscopy recommended in 10 years. Prior EGD/colonoscopy     He has had extensive gastric intestinal metaplasia found on 3 EGDs in 2022. Recommended 1 year follow-up EGD.   He has a family history of gastric cancer in his uncle. Colonoscopy -normal.  Recall colon in 10 years    REVIEW OF SYSTEMS IS OTHERWISE NEGATIVE.   10 point review of systems negative other than per HPI      Historical Information   Past Medical History:   Diagnosis Date   • COVID-19 virus infection 2020   • Exposure to COVID-19 virus 2020   • Family history of parkinsonism 2020   • History of chronic constipation    • History of neck pain 2017   • Hypertension      Past Surgical History:   Procedure Laterality Date   • CYSTOSCOPY  2014    diagnostic managed by Jalen Mora   • TOOTH EXTRACTION       Social History   Social History     Substance and Sexual Activity   Alcohol Use Yes    Comment: social     Social History     Substance and Sexual Activity   Drug Use Never     Social History     Tobacco Use   Smoking Status Former   • Packs/day: 0.25   • Years: 20.00   • Total pack years: 5.00   • Types: Cigarettes   • Quit date: 2014   • Years since quittin.7   • Passive exposure: Never   Smokeless Tobacco Never     Family History   Problem Relation Age of Onset   • Cancer Sister         malignant neoplasm of breast   • Diabetes Paternal Grandmother    • Colon cancer Paternal Uncle    • Diabetes Family    • Arthritis Family    • Cancer Family         malignant neoplasm   • Substance Abuse Neg Hx         denied Mother and Father   • Mental illness Neg Hx         no family history Mother and Father   • Other Neg Hx         denied family history of Osteopertrosis       Meds/Allergies       Current Outpatient Medications:   •  amLODIPine (NORVASC) 10 mg tablet  •  cetirizine (ZyrTEC) 10 mg tablet  •  fluticasone (FLONASE) 50 mcg/act nasal spray  •  hyoscyamine (Levsin) 0.125 MG tablet  •  lubiprostone (AMITIZA) 24 mcg capsule  •  methocarbamol (ROBAXIN) 500 mg tablet  •  omeprazole (PriLOSEC) 40 MG capsule  •  Polyethylene Glycol 3350 POWD  •  psyllium (METAMUCIL) 58.6 % powder  •  senna (SENOKOT) bases appear unremarkable. LIVER: Normal in size and configuration. No suspicious mass. The portal veins are patent. BILE DUCTS: No intrahepatic or extrahepatic bile duct dilation. GALLBLADDER: Relatively decompressed, however appears grossly unremarkable. PANCREAS: Unremarkable. No main pancreatic duct dilation. SPLEEN: Unremarkable. ADRENAL GLANDS: Unremarkable. KIDNEYS/URETERS: Bilateral symmetric enhancement. No hydroureteronephrosis. No suspicious renal mass. Bilateral punctate nonobstructing nephrolithiasis within the upper pole moieties. BLADDER: Unremarkable. REPRODUCTIVE ORGANS: Normal size prostate. Symmetric seminal vesicles. BOWEL: No disproportionate dilation of the small or large bowel. The appendix is unremarkable. PERITONEUM/RETROPERITONEUM: No fluid collection, ascites, or pneumoperitoneum. LYMPH NODES: No abdominal or pelvic lymphadenopathy. VESSELS: No abdominal aortic aneurysm. No high grade disease of the proximal mesenteric arteries (SMA, celiac, or AJITH). ABDOMINAL/PELVIC WALL: Small fat-containing umbilical hernia. BONES: No acute osseous abnormalities. Multilevel degenerative changes of the visualized thoracolumbar spine. Impression: IMPRESSION: No acute findings within the abdomen or pelvis to explain the patient's clinical presentation. Bilateral nonobstructing punctate nephrolithiasis. CT examination performed with dose lowering protocol in accordance with [facility].    [id number]

## 2023-10-20 NOTE — PROGRESS NOTES
10/20/2023    Acacia Garcia  1960  [id number]        Assessment  Resolved orchialgia, benign spermatocele, routine prostate cancer screening, BPH      Discussion  Patient recently had a CT scan 2 days ago in the emergency department which shows his urinary tract is nicely decompressed and draining well. Urine appears negative for hematuria nor infection. Conservative measures reviewed. Patient verbalized understanding. Continue annual prostate cancer screening. History of Present Illness  60 y.o. male with a history of transient orchialgia. This has resolved. This prompted a scrotal ultrasound. He returns in follow-up today. A benign right-sided epididymal cyst consistent with a spermatocele is noted. His PSA levels have been consistently below 1.0 for many years. There is no family history of prostate cancer. He states he is voiding well on tamsulosin without significant lower urinary tract symptoms. He requests a refill on the tamsulosin which was provided today. He denies any hematuria. He was in the ER 10/18/23. CT with small punctate intrarenal stones without obstruction. Review of Systems  Review of Systems   Constitutional: Negative. HENT: Negative. Eyes: Negative. Respiratory: Negative. Cardiovascular: Negative. Gastrointestinal: Negative. Endocrine: Negative. Genitourinary:         Per HPI   Musculoskeletal: Negative. Skin: Negative. Allergic/Immunologic: Negative. Neurological: Negative. Hematological: Negative. Psychiatric/Behavioral: Negative.            Past Medical History  Past Medical History:   Diagnosis Date    COVID-19 virus infection 5/1/2020    Exposure to COVID-19 virus 11/23/2020    Family history of parkinsonism 4/30/2020    History of chronic constipation     History of neck pain 11/21/2017    Hypertension        Past Social History  Past Surgical History:   Procedure Laterality Date    CYSTOSCOPY  05/29/2014 diagnostic managed by William CAMPBELL         Past Family History  Family History   Problem Relation Age of Onset    Cancer Sister         malignant neoplasm of breast    Diabetes Paternal Grandmother     Colon cancer Paternal Uncle     Diabetes Family     Arthritis Family     Cancer Family         malignant neoplasm    Substance Abuse Neg Hx         denied Mother and Father    Mental illness Neg Hx         no family history Mother and Father    Other Neg Hx         denied family history of Osteopertrosis       Past Social history  Social History     Socioeconomic History    Marital status: /Civil Union     Spouse name: Not on file    Number of children: Not on file    Years of education: Not on file    Highest education level: Not on file   Occupational History    Not on file   Tobacco Use    Smoking status: Former     Packs/day: 0.25     Years: 20.00     Total pack years: 5.00     Types: Cigarettes     Quit date: 2014     Years since quittin.7     Passive exposure: Never    Smokeless tobacco: Never   Vaping Use    Vaping Use: Never used   Substance and Sexual Activity    Alcohol use: Yes     Comment: social    Drug use: Never    Sexual activity: Not Currently   Other Topics Concern    Not on file   Social History Narrative    No preference or Faith beliefs     Social Determinants of Health     Financial Resource Strain: Low Risk  (2022)    Overall Financial Resource Strain (CARDIA)     Difficulty of Paying Living Expenses: Not hard at all   Food Insecurity: No Food Insecurity (2022)    Hunger Vital Sign     Worried About Running Out of Food in the Last Year: Never true     [address] in the Last Year: Never true   Transportation Needs: No Transportation Needs (2022)    PRAPARE - Transportation     Lack of Transportation (Medical): No     Lack of Transportation (Non-Medical):  No   Physical Activity: Not on file   Stress: Not on file   Social Connections: Not on file   Intimate Partner Violence: Not on file   Housing Stability: Not on file       Current Medications  Current Outpatient Medications   Medication Sig Dispense Refill    Polyethylene Glycol 3350 POWD Take 17 g by mouth daily      amLODIPine (NORVASC) 10 mg tablet Take 1 tablet (10 mg total) by mouth daily 90 tablet 2    cetirizine (ZyrTEC) 10 mg tablet Take 1 tablet (10 mg total) by mouth daily 90 tablet 1    fluticasone (FLONASE) 50 mcg/act nasal spray 1 spray into each nostril daily 24 mL 1    hyoscyamine (Levsin) 0.125 MG tablet Take 1 tablet (0.125 mg total) by mouth every 4 (four) hours as needed for cramping 30 tablet 0    lidocaine (LMX) 4 % cream Apply topically as needed for mild pain 120 g 0    lubiprostone (AMITIZA) 24 mcg capsule Take 1 capsule (24 mcg total) by mouth 2 (two) times a day with meals 60 capsule 5    methocarbamol (ROBAXIN) 500 mg tablet Take 1 tablet (500 mg total) by mouth 2 (two) times a day 20 tablet 0    omeprazole (PriLOSEC) 40 MG capsule Take 1 capsule (40 mg total) by mouth daily before breakfast 90 capsule 2    psyllium (METAMUCIL) 58.6 % powder Take 1 packet by mouth daily 1040 g 2    senna (SENOKOT) 8.6 mg Take 1 tablet (8.6 mg total) by mouth daily at bedtime 90 tablet 0    tamsulosin (FLOMAX) 0.4 mg Take 1 capsule (0.4 mg total) by mouth daily with dinner 90 capsule 3     No current facility-administered medications for this visit. Allergies  No Known Allergies    Past Medical History, Social History, Family History, medications and allergies were reviewed. Vitals  There were no vitals filed for this visit. Physical Exam  Physical Exam  On examination he is in no acute distress. His abdomen is soft nontender nondistended.  examination feels normal phallus, scrotum and scrotal contents. A small benign right-sided spermatoceles appreciated at the head of the epididymis. Both testes are otherwise properly descended within the scrotum. Skin is warm. Extremities without edema. Neurologic is grossly intact and nonfocal.  Gait normal.  Affect normal.  Digital rectal examination by the advanced practitioner in March 2022 within normal limits.     Results  Lab Results   Component Value Date    PSA 0.5 01/13/2023    PSA 0.6 04/08/2022    PSA 0.5 03/04/2022     Lab Results   Component Value Date    GLUCOSE 113 09/15/2015    CALCIUM 8.1 (L) 05/29/2023     09/15/2015    K 4.1 05/29/2023    CO2 24 05/29/2023     (H) 05/29/2023    BUN 16 05/29/2023    CREATININE 1.18 05/29/2023     Lab Results   Component Value Date    WBC 4.13 (L) 05/29/2023    HGB 13.7 05/29/2023    HCT 41.2 05/29/2023    MCV 90 05/29/2023     05/29/2023         Office Urine Dip  No results found for this or any previous visit (from the past 1 hour(s)).]

## 2023-10-21 LAB — BACTERIA UR CULT: NORMAL

## 2023-10-23 ENCOUNTER — ANESTHESIA (OUTPATIENT)
Dept: ANESTHESIOLOGY | Facility: HOSPITAL | Age: 63
End: 2023-10-23

## 2023-10-23 ENCOUNTER — ANESTHESIA EVENT (OUTPATIENT)
Dept: ANESTHESIOLOGY | Facility: HOSPITAL | Age: 63
End: 2023-10-23

## 2023-10-24 RX ORDER — SODIUM CHLORIDE 9 MG/ML
125 INJECTION, SOLUTION INTRAVENOUS CONTINUOUS
Status: CANCELLED | OUTPATIENT
Start: 2023-10-24

## 2023-10-25 ENCOUNTER — ANESTHESIA EVENT (OUTPATIENT)
Dept: GASTROENTEROLOGY | Facility: MEDICAL CENTER | Age: 63
End: 2023-10-25

## 2023-10-25 ENCOUNTER — ANESTHESIA (OUTPATIENT)
Dept: GASTROENTEROLOGY | Facility: MEDICAL CENTER | Age: 63
End: 2023-10-25

## 2023-10-25 ENCOUNTER — HOSPITAL ENCOUNTER (OUTPATIENT)
Dept: GASTROENTEROLOGY | Facility: MEDICAL CENTER | Age: 63
Setting detail: OUTPATIENT SURGERY
Discharge: HOME | End: 2023-10-25
Payer: COMMERCIAL

## 2023-10-25 VITALS
TEMPERATURE: 97.5 F | DIASTOLIC BLOOD PRESSURE: 70 MMHG | RESPIRATION RATE: 15 BRPM | WEIGHT: 142 LBS | SYSTOLIC BLOOD PRESSURE: 116 MMHG | BODY MASS INDEX: 25.16 KG/M2 | OXYGEN SATURATION: 98 % | HEIGHT: 63 IN | HEART RATE: 71 BPM

## 2023-10-25 DIAGNOSIS — R12 HEARTBURN: ICD-10-CM

## 2023-10-25 DIAGNOSIS — K22.70 BARRETT'S ESOPHAGUS WITHOUT DYSPLASIA: ICD-10-CM

## 2023-10-25 DIAGNOSIS — R10.11 RIGHT UPPER QUADRANT ABDOMINAL PAIN: ICD-10-CM

## 2023-10-25 DIAGNOSIS — K31.A0 GASTRIC INTESTINAL METAPLASIA: ICD-10-CM

## 2023-10-25 PROCEDURE — 43239 EGD BIOPSY SINGLE/MULTIPLE: CPT | Performed by: INTERNAL MEDICINE

## 2023-10-25 PROCEDURE — 88342 IMHCHEM/IMCYTCHM 1ST ANTB: CPT | Performed by: PATHOLOGY

## 2023-10-25 PROCEDURE — 88305 TISSUE EXAM BY PATHOLOGIST: CPT | Performed by: PATHOLOGY

## 2023-10-25 PROCEDURE — 88313 SPECIAL STAINS GROUP 2: CPT | Performed by: PATHOLOGY

## 2023-10-25 PROCEDURE — 88341 IMHCHEM/IMCYTCHM EA ADD ANTB: CPT | Performed by: PATHOLOGY

## 2023-10-25 RX ORDER — SODIUM CHLORIDE 9 MG/ML
125 INJECTION, SOLUTION INTRAVENOUS CONTINUOUS
Status: DISCONTINUED | OUTPATIENT
Start: 2023-10-25 | End: 2023-10-29 | Stop reason: HOSPADM

## 2023-10-25 RX ORDER — OMEPRAZOLE 40 MG/1
40 CAPSULE, DELAYED RELEASE ORAL
Qty: 90 CAPSULE | Refills: 2 | Status: SHIPPED | OUTPATIENT
Start: 2023-10-25

## 2023-10-25 RX ORDER — PROPOFOL 10 MG/ML
INJECTION, EMULSION INTRAVENOUS AS NEEDED
Status: DISCONTINUED | OUTPATIENT
Start: 2023-10-25 | End: 2023-10-25

## 2023-10-25 RX ADMIN — SODIUM CHLORIDE 125 ML/HR: 0.9 INJECTION, SOLUTION INTRAVENOUS at 10:36

## 2023-10-25 RX ADMIN — PROPOFOL 200 MG: 10 INJECTION, EMULSION INTRAVENOUS at 11:01

## 2023-10-25 RX ADMIN — PROPOFOL 100 MG: 10 INJECTION, EMULSION INTRAVENOUS at 11:04

## 2023-10-25 NOTE — H&P
H&P EXAM - Outpatient Endoscopy   Naveen Hwang 58 y.o. male MRN: [id number]    [address] ENDOSCOPY   Encounter: [id number]        History and Physical - [facility]  Naveen Hwang 58 y.o. male MRN: [id number]                  HPI: Naveen Hwang is a 58y.o. year old male who presents for estrada's esophagus, gastric intestinal metaplasia      REVIEW OF SYSTEMS: Per the HPI, and otherwise unremarkable.     Historical Information   Past Medical History:   Diagnosis Date    COVID-19 virus infection 2020    Exposure to COVID-19 virus 2020    Family history of parkinsonism 2020    History of chronic constipation     History of neck pain 2017    Hypertension      Past Surgical History:   Procedure Laterality Date    CYSTOSCOPY  2014    diagnostic managed by Jalen Mora    TOOTH EXTRACTION       Social History   Social History     Substance and Sexual Activity   Alcohol Use Yes    Comment: social     Social History     Substance and Sexual Activity   Drug Use Never     Social History     Tobacco Use   Smoking Status Former    Packs/day: 0.25    Years: 20.00    Total pack years: 5.00    Types: Cigarettes    Quit date: 2014    Years since quittin.8    Passive exposure: Never   Smokeless Tobacco Never     Family History   Problem Relation Age of Onset    Cancer Sister         malignant neoplasm of breast    Diabetes Paternal Grandmother     Colon cancer Paternal Uncle     Diabetes Family     Arthritis Family     Cancer Family         malignant neoplasm    Substance Abuse Neg Hx         denied Mother and Father    Mental illness Neg Hx         no family history Mother and Father    Other Neg Hx         denied family history of Osteopertrosis       Meds/Allergies     (Not in a hospital admission)      No Known Allergies    Objective     /79   Pulse 66   Temp 97.5 °F (36.4 °C) (Temporal)   Resp 18   Ht 5' 3" (1.6 m)   Wt 64.4 kg (142 lb) SpO2 98%   BMI 25.15 kg/m²       PHYSICAL EXAM    Gen: NAD  CV: RRR  CHEST: Clear  ABD: soft, NT/ND  EXT: no edema      ASSESSMENT/PLAN:  This is a 58y.o. year old male here for egd, and he is stable and optimized for his procedure.

## 2023-10-25 NOTE — ANESTHESIA PREPROCEDURE EVALUATION
Procedure:  EGD    Relevant Problems   CARDIO   (+) Benign hypertension   (+) REEVES (dyspnea on exertion)      GI/HEPATIC   (+) Gastroesophageal reflux disease      /RENAL   (+) CKD (chronic kidney disease) stage 2, GFR 60-89 ml/min      MUSCULOSKELETAL   (+) Right-sided low back pain with right-sided sciatica      PULMONARY   (+) REEVES (dyspnea on exertion)        Physical Exam    Airway    Mallampati score: II         Dental       Cardiovascular  Rhythm: regular    Pulmonary   Breath sounds clear to auscultation    Other Findings        Anesthesia Plan  ASA Score- 3     Anesthesia Type- IV sedation with anesthesia with ASA Monitors. Additional Monitors:     Airway Plan:            Plan Factors-Exercise tolerance (METS): >4 METS. Chart reviewed. Existing labs reviewed. Patient summary reviewed. Patient is not a current smoker. Patient not instructed to abstain from smoking on day of procedure. Patient did not smoke on day of surgery. Obstructive sleep apnea risk education given perioperatively. Induction- intravenous. Postoperative Plan-     Informed Consent- Anesthetic plan and risks discussed with patient.

## 2023-10-25 NOTE — ANESTHESIA POSTPROCEDURE EVALUATION
Post-Op Assessment Note    CV Status:  Stable  Pain Score: 1    Pain management: adequate     Mental Status:  Alert and awake   Hydration Status:  Euvolemic   PONV Controlled:  Controlled   Airway Patency:  Patent      Post Op Vitals Reviewed: Yes      Staff: Anesthesiologist         No notable events documented.     /65 (10/25/23 1112)    Temp      Pulse 70 (10/25/23 1112)   Resp 16 (10/25/23 1112)    SpO2 94 % (10/25/23 1112)

## 2023-10-30 PROCEDURE — 88341 IMHCHEM/IMCYTCHM EA ADD ANTB: CPT | Performed by: PATHOLOGY

## 2023-10-30 PROCEDURE — 88342 IMHCHEM/IMCYTCHM 1ST ANTB: CPT | Performed by: PATHOLOGY

## 2023-10-30 PROCEDURE — 88305 TISSUE EXAM BY PATHOLOGIST: CPT | Performed by: PATHOLOGY

## 2023-10-30 PROCEDURE — 88313 SPECIAL STAINS GROUP 2: CPT | Performed by: PATHOLOGY

## 2023-10-30 NOTE — RESULT ENCOUNTER NOTE
Please call the patient with the results    Stomach biopsy shows "intestinal metaplasia." This is a pre-cancerous change to the stomach lining and should be monitored. We will repeat EGD in 3 years to take additional biopsies.   He is scheduled for office follow-up in January with Janet Jernigan

## 2023-11-01 ENCOUNTER — OFFICE VISIT (OUTPATIENT)
Dept: FAMILY MEDICINE CLINIC | Facility: CLINIC | Age: 63
End: 2023-11-01

## 2023-11-01 VITALS
RESPIRATION RATE: 16 BRPM | SYSTOLIC BLOOD PRESSURE: 120 MMHG | OXYGEN SATURATION: 99 % | TEMPERATURE: 97.9 F | BODY MASS INDEX: 25.52 KG/M2 | WEIGHT: 144 LBS | DIASTOLIC BLOOD PRESSURE: 80 MMHG | HEIGHT: 63 IN | HEART RATE: 80 BPM

## 2023-11-01 DIAGNOSIS — N40.0 ENLARGED PROSTATE WITHOUT LOWER URINARY TRACT SYMPTOMS (LUTS): ICD-10-CM

## 2023-11-01 DIAGNOSIS — J30.89 ALLERGIC RHINITIS DUE TO OTHER ALLERGIC TRIGGER, UNSPECIFIED SEASONALITY: ICD-10-CM

## 2023-11-01 DIAGNOSIS — R80.1 PERSISTENT PROTEINURIA: ICD-10-CM

## 2023-11-01 DIAGNOSIS — I10 BENIGN HYPERTENSION: Primary | ICD-10-CM

## 2023-11-01 DIAGNOSIS — K21.00 GASTROESOPHAGEAL REFLUX DISEASE WITH ESOPHAGITIS WITHOUT HEMORRHAGE: ICD-10-CM

## 2023-11-01 DIAGNOSIS — R10.11 RUQ PAIN: ICD-10-CM

## 2023-11-01 DIAGNOSIS — K59.09 CHRONIC CONSTIPATION: ICD-10-CM

## 2023-11-01 DIAGNOSIS — Z23 ENCOUNTER FOR IMMUNIZATION: ICD-10-CM

## 2023-11-01 PROCEDURE — 90686 IIV4 VACC NO PRSV 0.5 ML IM: CPT

## 2023-11-01 PROCEDURE — 90471 IMMUNIZATION ADMIN: CPT

## 2023-11-01 PROCEDURE — 99214 OFFICE O/P EST MOD 30 MIN: CPT

## 2023-11-01 RX ORDER — FLUTICASONE PROPIONATE 50 MCG
1 SPRAY, SUSPENSION (ML) NASAL DAILY
Qty: 24 ML | Refills: 1 | Status: SHIPPED | OUTPATIENT
Start: 2023-11-01 | End: 2023-11-01 | Stop reason: SDUPTHER

## 2023-11-01 RX ORDER — FLUTICASONE PROPIONATE 50 MCG
1 SPRAY, SUSPENSION (ML) NASAL DAILY
Qty: 24 ML | Refills: 1 | Status: SHIPPED | OUTPATIENT
Start: 2023-11-01

## 2023-11-01 NOTE — ASSESSMENT & PLAN NOTE
BP Readings from Last 3 Encounters:   11/01/23 120/80   10/25/23 116/70   10/20/23 111/73   -- Reports compliance with medications. Continue with amlodipine 10 mg daily. -reviewed eating a low salt diet (such as the DASH diet), limiting alcohol, exercising, and wt loss.

## 2023-11-01 NOTE — ASSESSMENT & PLAN NOTE
-this is chronic pain  -EGD obtained on 10/20/23 which showed C0M1 Castellanos's esophagus, moderate erythematous mucosa with loss of vascular pattern in the antrum and irregular Z-line, biopsy was obtained which reveals intestinal metaplasia.     -repeat EGD in 3 years  -F/U with GI on 1/18/23

## 2023-11-01 NOTE — PROGRESS NOTES
Name: Dannielle Bello      : 1960      MRN: [id number]  Encounter Provider: ALE Edwards  Encounter Date: 2023   Encounter department: [place] [facility]    Assessment & Plan     1. Benign hypertension  Assessment & Plan:  BP Readings from Last 3 Encounters:   23 120/80   10/25/23 116/70   10/20/23 111/73   -- Reports compliance with medications. Continue with amlodipine 10 mg daily. -reviewed eating a low salt diet (such as the DASH diet), limiting alcohol, exercising, and wt loss. 2. Chronic constipation  Assessment & Plan:  -continue miralax 1-2xdaily per GI  -increase adequate fluid and increase fiber intake      3. RUQ pain  Assessment & Plan:  -this is chronic pain  -EGD obtained on 10/20/23 which showed C0M1 Castellanos's esophagus, moderate erythematous mucosa with loss of vascular pattern in the antrum and irregular Z-line, biopsy was obtained which reveals intestinal metaplasia. -repeat EGD in 3 years  -F/U with GI on 23      4. Gastroesophageal reflux disease with esophagitis without hemorrhage  Assessment & Plan:  -stable  -continue prilosec 40 ng daily      5. Encounter for immunization  -     influenza vaccine, quadrivalent, 0.5 mL, preservative-free, for adult and pediatric patients 6 mos+ (AFLURIA, FLUARIX, FLULAVAL, FLUZONE)    6. Allergic rhinitis due to other allergic trigger, unspecified seasonality  -     fluticasone (FLONASE) 50 mcg/act nasal spray; 1 spray into each nostril daily    7. Persistent proteinuria  Assessment & Plan:  -follow treatment per urology      8. Enlarged prostate without lower urinary tract symptoms (luts)  Assessment & Plan:  Continue flomax 0.4mg with dinner  -continue treatment per urology             Subjective      Patient reports RLQ pain. This is a chronic condition.  Patient had EGD obtained on 10/20/23 which reveals C0M1 Castellanos's esophagus, moderate erythematous mucosa with loss of vascular pattern in the antrum and irregular Z-line, biopsy was obtained which reveals intestinal metaplasia. Patient was recently seen by GI on 10/20/23 and was advised to take miralax 1-2x daily which patient is compliant. Patient was also advised to repeat EGD in 3 years. Patient denies melena, hematochezia, unwanted weight loss, nausea/vomiting, fever, fatigue, and loss of appetite. Review of Systems   Constitutional: Negative. Negative for chills and fever. HENT: Negative. Negative for ear pain and sore throat. Eyes: Negative. Negative for pain and visual disturbance. Respiratory: Negative. Negative for cough and shortness of breath. Cardiovascular: Negative. Negative for chest pain and palpitations. Gastrointestinal:  Positive for abdominal pain. Negative for vomiting. Endocrine: Negative. Genitourinary: Negative. Negative for dysuria and hematuria. Musculoskeletal: Negative. Negative for arthralgias and back pain. Skin: Negative. Negative for color change and rash. Allergic/Immunologic: Negative. Neurological: Negative. Negative for seizures and syncope. Hematological: Negative. Psychiatric/Behavioral: Negative. All other systems reviewed and are negative.       Current Outpatient Medications on File Prior to Visit   Medication Sig    amLODIPine (NORVASC) 10 mg tablet Take 1 tablet (10 mg total) by mouth daily    cetirizine (ZyrTEC) 10 mg tablet Take 1 tablet (10 mg total) by mouth daily    lidocaine (LMX) 4 % cream Apply topically as needed for mild pain (Patient not taking: Reported on 10/20/2023)    lubiprostone (AMITIZA) 24 mcg capsule Take 1 capsule (24 mcg total) by mouth 2 (two) times a day with meals    methocarbamol (ROBAXIN) 500 mg tablet Take 1 tablet (500 mg total) by mouth 2 (two) times a day    omeprazole (PriLOSEC) 40 MG capsule Take 1 capsule (40 mg total) by mouth daily before breakfast    psyllium (METAMUCIL) 58.6 % powder Take 1 packet by mouth daily    senna (SENOKOT) 8.6 mg Take 1 tablet (8.6 mg total) by mouth daily at bedtime    tamsulosin (FLOMAX) 0.4 mg Take 1 capsule (0.4 mg total) by mouth daily with dinner       Objective     /80 (BP Location: Left arm, Patient Position: Sitting, Cuff Size: Standard)   Pulse 80   Temp 97.9 °F (36.6 °C) (Temporal)   Resp 16   Ht 5' 3" (1.6 m)   Wt 65.3 kg (144 lb)   SpO2 99%   BMI 25.51 kg/m²     Physical Exam  Vitals reviewed. Constitutional:       Appearance: Normal appearance. He is normal weight. HENT:      Head: Normocephalic. Right Ear: Tympanic membrane, ear canal and external ear normal.      Left Ear: Tympanic membrane, ear canal and external ear normal.      Nose: Nose normal.      Mouth/Throat:      Mouth: Mucous membranes are moist.   Eyes:      General:         Right eye: No discharge. Left eye: No discharge. Cardiovascular:      Rate and Rhythm: Normal rate and regular rhythm. Pulses: Normal pulses. Heart sounds: Normal heart sounds. Pulmonary:      Effort: Pulmonary effort is normal.      Breath sounds: Normal breath sounds. Abdominal:      General: Abdomen is flat. Bowel sounds are normal.      Palpations: Abdomen is soft. Tenderness: There is abdominal tenderness in the right lower quadrant. Musculoskeletal:         General: Normal range of motion. Cervical back: Normal range of motion. Right lower leg: No edema. Left lower leg: No edema. Skin:     General: Skin is warm and dry. Capillary Refill: Capillary refill takes less than 2 seconds. Neurological:      General: No focal deficit present. Mental Status: He is alert and oriented to person, place, and time. Mental status is at baseline. Psychiatric:         Mood and Affect: Mood normal.         Behavior: Behavior normal.         Thought Content:  Thought content normal.         Judgment: Judgment normal.       Vicky Rodriguez

## 2023-11-29 ENCOUNTER — OFFICE VISIT (OUTPATIENT)
Dept: FAMILY MEDICINE CLINIC | Facility: CLINIC | Age: 63
End: 2023-11-29

## 2023-11-29 VITALS
RESPIRATION RATE: 16 BRPM | OXYGEN SATURATION: 98 % | HEART RATE: 93 BPM | WEIGHT: 145 LBS | SYSTOLIC BLOOD PRESSURE: 140 MMHG | BODY MASS INDEX: 25.69 KG/M2 | TEMPERATURE: 98.6 F | HEIGHT: 63 IN | DIASTOLIC BLOOD PRESSURE: 88 MMHG

## 2023-11-29 DIAGNOSIS — J02.9 ACUTE PHARYNGITIS, UNSPECIFIED ETIOLOGY: ICD-10-CM

## 2023-11-29 DIAGNOSIS — K59.00 CONSTIPATION, UNSPECIFIED CONSTIPATION TYPE: ICD-10-CM

## 2023-11-29 DIAGNOSIS — B34.9 VIRAL SYNDROME: Primary | ICD-10-CM

## 2023-11-29 LAB — S PYO AG THROAT QL: NEGATIVE

## 2023-11-29 PROCEDURE — 87636 SARSCOV2 & INF A&B AMP PRB: CPT | Performed by: PHYSICIAN ASSISTANT

## 2023-11-29 PROCEDURE — 87880 STREP A ASSAY W/OPTIC: CPT | Performed by: PHYSICIAN ASSISTANT

## 2023-11-29 PROCEDURE — 99213 OFFICE O/P EST LOW 20 MIN: CPT | Performed by: PHYSICIAN ASSISTANT

## 2023-11-29 RX ORDER — BROMPHENIRAMINE MALEATE, PSEUDOEPHEDRINE HYDROCHLORIDE, AND DEXTROMETHORPHAN HYDROBROMIDE 2; 30; 10 MG/5ML; MG/5ML; MG/5ML
5 SYRUP ORAL 4 TIMES DAILY PRN
Qty: 120 ML | Refills: 0 | Status: SHIPPED | OUTPATIENT
Start: 2023-11-29

## 2023-11-29 RX ORDER — POLYETHYLENE GLYCOL 3350 17 G/17G
17 POWDER, FOR SOLUTION ORAL AS NEEDED
Qty: 500 G | Refills: 1 | Status: SHIPPED | OUTPATIENT
Start: 2023-11-29 | End: 2023-11-30

## 2023-11-29 RX ORDER — AMOXICILLIN 500 MG/1
500 TABLET, FILM COATED ORAL 2 TIMES DAILY
Qty: 20 TABLET | Refills: 0 | Status: SHIPPED | OUTPATIENT
Start: 2023-11-29 | End: 2023-12-09

## 2023-11-29 NOTE — PROGRESS NOTES
Assessment/Plan:    Acute pharyngitis  - POCT rapid strep negative. Will send throat culture. - COVID/flu swab collected and sent out today.   -Based on symptoms, will treat with amoxicillin 500 mg twice daily x10 days. Advised to take entire course of antibiotics even if feeling better before hand. - We will prescribe Bromfed DM, take 4 times daily as needed for cough/congestion.  - Continue Tylenol/ibuprofen as needed for body aches/headaches/fevers. - Advised to contact the office or report to ED if symptoms persist, worsen, or new symptoms arise. Diagnoses and all orders for this visit:    Viral syndrome  -     Covid/Flu- Office Collect  -     POCT rapid strepA  -     brompheniramine-pseudoephedrine-DM 30-2-10 MG/5ML syrup; Take 5 mL by mouth 4 (four) times a day as needed for allergies    Constipation, unspecified constipation type  -     polyethylene glycol (GLYCOLAX) 17 GM/SCOOP powder; Take 17 g by mouth if needed (constipation)    Acute pharyngitis, unspecified etiology  -     amoxicillin (AMOXIL) 500 MG tablet; Take 1 tablet (500 mg total) by mouth 2 (two) times a day for 10 days        All of patients questions were answered. Patient understands and agrees with the above plan. Return if symptoms worsen or fail to improve. Sonal Russo PA-C  11/29/23  [address] [facility]          Subjective:     Patient ID: Nino Gottron  is a 58 y.o. male with known PMH of hypertension, BPH, GERD, constipation, back pain who presents today in office for same day visit for sore throat. - Patient is a 58 y.o. male who presents today for same day visit for sore throat. Associated symptoms include nasal blockage, pain while swallowing, post nasal drip, sinus and nasal congestion, sore throat, and white spots in throat, productive cough with yellow/green phlegm, body aches, chills, fevers. Patient denies any sore throat, abdominal pain, nausea, vomiting. He denies any sick contacts.   He has been taking NyQuil with little relief. Onset of symptoms was 3 days ago, and have been gradually worsening since that time. He is drinking moderate amounts of fluids. He has not had recent close exposure to someone with proven streptococcal pharyngitis. The following portions of the patient's history were reviewed and updated as appropriate: allergies, current medications, past family history, past medical history, past social history, past surgical history, and problem list.        Review of Systems   Constitutional:  Positive for chills, fatigue and fever. HENT:  Positive for congestion, postnasal drip, rhinorrhea and sore throat. Negative for ear pain and trouble swallowing. Eyes:  Negative for pain. Respiratory:  Positive for cough. Negative for chest tightness, shortness of breath and wheezing. Cardiovascular:  Negative for chest pain, palpitations and leg swelling. Gastrointestinal:  Positive for constipation. Negative for abdominal pain, diarrhea, nausea and vomiting. Genitourinary:  Negative for difficulty urinating and dysuria. Musculoskeletal:  Positive for arthralgias and myalgias. Neurological:  Positive for headaches. Negative for dizziness. BMI Counseling: Body mass index is 25.69 kg/m². The BMI is above normal. Nutrition recommendations include decreasing portion sizes, encouraging healthy choices of fruits and vegetables, consuming healthier snacks, limiting drinks that contain sugar, moderation in carbohydrate intake, increasing intake of lean protein and reducing intake of cholesterol. Exercise recommendations include moderate physical activity 150 minutes/week. No pharmacotherapy was ordered. Rationale for BMI follow-up plan is due to patient being overweight or obese.            Objective:   Vitals:    11/29/23 1315   BP: 140/88   BP Location: Right arm   Patient Position: Sitting   Cuff Size: Standard   Pulse: 93   Resp: 16   Temp: 98.6 °F (37 °C)   TempSrc: Temporal   SpO2: 98%   Weight: 65.8 kg (145 lb)   Height: 5' 3" (1.6 m)         Physical Exam  Vitals and nursing note reviewed. Constitutional:       General: He is not in acute distress. Appearance: He is well-developed. He is ill-appearing. HENT:      Head: Normocephalic and atraumatic. Right Ear: Tympanic membrane and external ear normal.      Left Ear: Tympanic membrane and external ear normal.      Nose: Congestion present. Mouth/Throat:      Mouth: Mucous membranes are moist.      Pharynx: Oropharyngeal exudate and posterior oropharyngeal erythema present. Eyes:      Extraocular Movements: Extraocular movements intact. Conjunctiva/sclera: Conjunctivae normal.      Pupils: Pupils are equal, round, and reactive to light. Cardiovascular:      Rate and Rhythm: Normal rate and regular rhythm. Pulses: Normal pulses. Heart sounds: Normal heart sounds. Pulmonary:      Effort: Pulmonary effort is normal. No respiratory distress. Breath sounds: Normal breath sounds. No wheezing. Musculoskeletal:      Cervical back: Normal range of motion and neck supple. Skin:     General: Skin is warm and dry. Findings: No rash. Neurological:      Mental Status: He is alert and oriented to person, place, and time. Psychiatric:         Behavior: Behavior normal.           - Utilized [employer] services for translation.

## 2023-11-29 NOTE — PATIENT INSTRUCTIONS
[facility] Gastroenterology - stomach and constipation doctor  Phone Number: [phone]    [facility] Urology - prostate and bladder doctor  [phone]. [facility] - kidney doctor  [phone]

## 2023-11-30 DIAGNOSIS — K59.00 CONSTIPATION, UNSPECIFIED CONSTIPATION TYPE: ICD-10-CM

## 2023-11-30 LAB
FLUAV RNA RESP QL NAA+PROBE: NEGATIVE
FLUBV RNA RESP QL NAA+PROBE: NEGATIVE
SARS-COV-2 RNA RESP QL NAA+PROBE: POSITIVE

## 2023-11-30 RX ORDER — POLYETHYLENE GLYCOL 3350 17 G/17G
17 POWDER, FOR SOLUTION ORAL AS NEEDED
Qty: 850 G | Refills: 1 | Status: SHIPPED | OUTPATIENT
Start: 2023-11-30 | End: 2023-12-04

## 2023-12-01 DIAGNOSIS — K59.00 CONSTIPATION, UNSPECIFIED CONSTIPATION TYPE: ICD-10-CM

## 2023-12-04 ENCOUNTER — TELEPHONE (OUTPATIENT)
Dept: FAMILY MEDICINE CLINIC | Facility: CLINIC | Age: 63
End: 2023-12-04

## 2023-12-04 RX ORDER — POLYETHYLENE GLYCOL 3350 17 G/17G
POWDER ORAL
Qty: 850 G | Refills: 1 | Status: SHIPPED | OUTPATIENT
Start: 2023-12-04

## 2023-12-19 ENCOUNTER — OFFICE VISIT (OUTPATIENT)
Dept: FAMILY MEDICINE CLINIC | Facility: CLINIC | Age: 63
End: 2023-12-19

## 2023-12-19 VITALS
TEMPERATURE: 98.4 F | HEIGHT: 63 IN | SYSTOLIC BLOOD PRESSURE: 106 MMHG | WEIGHT: 147 LBS | BODY MASS INDEX: 26.05 KG/M2 | DIASTOLIC BLOOD PRESSURE: 64 MMHG | HEART RATE: 68 BPM | OXYGEN SATURATION: 99 % | RESPIRATION RATE: 18 BRPM

## 2023-12-19 DIAGNOSIS — G89.29 CHRONIC PAIN IN TESTICLE: ICD-10-CM

## 2023-12-19 DIAGNOSIS — K21.00 GASTROESOPHAGEAL REFLUX DISEASE WITH ESOPHAGITIS WITHOUT HEMORRHAGE: ICD-10-CM

## 2023-12-19 DIAGNOSIS — N50.819 CHRONIC PAIN IN TESTICLE: ICD-10-CM

## 2023-12-19 DIAGNOSIS — I10 BENIGN HYPERTENSION: Primary | ICD-10-CM

## 2023-12-19 PROCEDURE — 99214 OFFICE O/P EST MOD 30 MIN: CPT

## 2023-12-19 NOTE — ASSESSMENT & PLAN NOTE
BP Readings from Last 3 Encounters:   12/19/23 106/64   11/29/23 140/88   11/01/23 120/80     - Reports compliance with medications. Continue with amlodipine 10 mg daily.

## 2023-12-19 NOTE — PROGRESS NOTES
Name: Bryson Escoto      : 1960      MRN: [id number]  Encounter Provider: ALE Rojas  Encounter Date: 2023   Encounter department: [facility]    Assessment & Plan     1. Benign hypertension  Assessment & Plan:  BP Readings from Last 3 Encounters:   23 106/64   23 140/88   23 120/80     - Reports compliance with medications. Continue with amlodipine 10 mg daily.        2. Gastroesophageal reflux disease with esophagitis without hemorrhage  Assessment & Plan:  -stable  -continue prilosec 40 mg daily      3. Chronic pain in testicle  Assessment & Plan:  benign cysts noted in previous ultrasounds. F/u urology as scheduled in 2024.             Subjective      Bryson Escoto is a 63 y.o. male  has a past medical history of COVID-19 virus infection, Exposure to COVID-19 virus, Family history of parkinsonism, History of chronic constipation, History of neck pain, and Hypertension.  has a past surgical history that includes Cystoscopy (2014) and Tooth extraction.    He presents today for a HTN follow-up. Chronic conditions are stable unless otherwise noted.        Review of Systems   Constitutional:  Negative for chills and fever.   HENT:  Negative for ear pain and sore throat.    Eyes:  Negative for pain and visual disturbance.   Respiratory:  Negative for cough and shortness of breath.    Cardiovascular:  Negative for chest pain and palpitations.   Gastrointestinal:  Positive for abdominal pain. Negative for vomiting.        (+) chronic RUQ abdominal pain   Genitourinary:  Negative for dysuria and hematuria.        (+) chronic testicle pain   Musculoskeletal:  Negative for arthralgias and back pain.   Skin:  Negative for color change and rash.   Neurological:  Negative for seizures and syncope.   All other systems reviewed and are negative.      Current Outpatient Medications on File Prior to Visit   Medication Sig     "amLODIPine (NORVASC) 10 mg tablet Take 1 tablet (10 mg total) by mouth daily    cetirizine (ZyrTEC) 10 mg tablet Take 1 tablet (10 mg total) by mouth daily    fluticasone (FLONASE) 50 mcg/act nasal spray 1 spray into each nostril daily    lidocaine (LMX) 4 % cream Apply topically as needed for mild pain (Patient not taking: Reported on 10/20/2023)    lubiprostone (AMITIZA) 24 mcg capsule Take 1 capsule (24 mcg total) by mouth 2 (two) times a day with meals    methocarbamol (ROBAXIN) 500 mg tablet Take 1 tablet (500 mg total) by mouth 2 (two) times a day    omeprazole (PriLOSEC) 40 MG capsule Take 1 capsule (40 mg total) by mouth daily before breakfast    polyethylene glycol (GLYCOLAX) 17 GM/SCOOP TAKE 17 G BY MOUTH IF NEEDED (CONSTIPATION)    psyllium (METAMUCIL) 58.6 % powder Take 1 packet by mouth daily    senna (SENOKOT) 8.6 mg Take 1 tablet (8.6 mg total) by mouth daily at bedtime    tamsulosin (FLOMAX) 0.4 mg Take 1 capsule (0.4 mg total) by mouth daily with dinner    [DISCONTINUED] brompheniramine-pseudoephedrine-DM 30-2-10 MG/5ML syrup Take 5 mL by mouth 4 (four) times a day as needed for allergies       Objective     /64 (BP Location: Left arm, Patient Position: Sitting, Cuff Size: Standard)   Pulse 68   Temp 98.4 °F (36.9 °C) (Temporal)   Resp 18   Ht 5' 3\" (1.6 m)   Wt 66.7 kg (147 lb)   SpO2 99%   BMI 26.04 kg/m²     Physical Exam  Vitals and nursing note reviewed.   Constitutional:       General: He is not in acute distress.     Appearance: He is overweight. He is not ill-appearing.   HENT:      Head: Normocephalic and atraumatic.      Right Ear: External ear normal. There is no impacted cerumen.      Left Ear: External ear normal. There is no impacted cerumen.      Nose: Nose normal. No congestion.   Eyes:      Pupils: Pupils are equal, round, and reactive to light.   Cardiovascular:      Rate and Rhythm: Normal rate and regular rhythm.      Pulses: Normal pulses.      Heart sounds: Normal " heart sounds. No murmur heard.  Pulmonary:      Effort: Pulmonary effort is normal.      Breath sounds: Normal breath sounds.   Abdominal:      General: Bowel sounds are normal.      Palpations: Abdomen is soft.      Tenderness: There is no abdominal tenderness.   Musculoskeletal:         General: No tenderness. Normal range of motion.      Cervical back: Normal range of motion. No tenderness.   Skin:     General: Skin is warm and dry.   Neurological:      General: No focal deficit present.      Mental Status: He is alert and oriented to person, place, and time.   Psychiatric:         Mood and Affect: Mood normal.         Behavior: Behavior normal.         Thought Content: Thought content normal.         Judgment: Judgment normal.       ALE Rojas

## 2023-12-26 ENCOUNTER — TELEPHONE (OUTPATIENT)
Age: 63
End: 2023-12-26

## 2023-12-26 DIAGNOSIS — R39.9 UTI SYMPTOMS: Primary | ICD-10-CM

## 2023-12-26 NOTE — TELEPHONE ENCOUNTER
Patient is having the following symptoms testicle pain & urine leakage. He would like to speak to a nurse    Bryson  [phone]    Did not ask for an , but may need one

## 2023-12-26 NOTE — TELEPHONE ENCOUNTER
Patient is having burning with urination and leakage and testicle pain and pain level of 6. Patient is not doing any strenuous work. Patient is hydrating. Denies any vomiting, fevers or chills. He was reviewed ER precautions. Urine orders placed in chart and he is aware of the 24 hour turn around time for results. Please advise of any further recommendations.

## 2023-12-28 ENCOUNTER — APPOINTMENT (OUTPATIENT)
Dept: LAB | Facility: HOSPITAL | Age: 63
End: 2023-12-28
Payer: COMMERCIAL

## 2023-12-28 DIAGNOSIS — R39.9 UTI SYMPTOMS: ICD-10-CM

## 2023-12-28 LAB
BACTERIA UR QL AUTO: ABNORMAL /HPF
BILIRUB UR QL STRIP: NEGATIVE
CLARITY UR: CLEAR
COLOR UR: YELLOW
GLUCOSE UR STRIP-MCNC: NEGATIVE MG/DL
HGB UR QL STRIP.AUTO: NEGATIVE
KETONES UR STRIP-MCNC: NEGATIVE MG/DL
LEUKOCYTE ESTERASE UR QL STRIP: NEGATIVE
MUCOUS THREADS UR QL AUTO: ABNORMAL
NITRITE UR QL STRIP: NEGATIVE
NON-SQ EPI CELLS URNS QL MICRO: ABNORMAL /HPF
PH UR STRIP.AUTO: 5.5 [PH]
PROT UR STRIP-MCNC: ABNORMAL MG/DL
RBC #/AREA URNS AUTO: ABNORMAL /HPF
SP GR UR STRIP.AUTO: 1.02 (ref 1–1.03)
UROBILINOGEN UR STRIP-ACNC: <2 MG/DL
WBC #/AREA URNS AUTO: ABNORMAL /HPF

## 2023-12-28 PROCEDURE — 81001 URINALYSIS AUTO W/SCOPE: CPT

## 2023-12-28 PROCEDURE — 87086 URINE CULTURE/COLONY COUNT: CPT

## 2023-12-28 PROCEDURE — 87147 CULTURE TYPE IMMUNOLOGIC: CPT

## 2023-12-28 PROCEDURE — 87186 SC STD MICRODIL/AGAR DIL: CPT

## 2023-12-28 PROCEDURE — 87077 CULTURE AEROBIC IDENTIFY: CPT

## 2023-12-28 NOTE — TELEPHONE ENCOUNTER
Lab was calling because the orders were not added to the chart yet. Reached out to the office. They so kindly added them

## 2023-12-29 DIAGNOSIS — N45.1 EPIDIDYMITIS: Primary | ICD-10-CM

## 2023-12-29 RX ORDER — CIPROFLOXACIN 500 MG/1
500 TABLET, FILM COATED ORAL EVERY 12 HOURS SCHEDULED
Qty: 14 TABLET | Refills: 0 | Status: SHIPPED | OUTPATIENT
Start: 2023-12-29 | End: 2024-01-05

## 2023-12-29 NOTE — TELEPHONE ENCOUNTER
I called client with  and relayed the following:  Antibiotics sent to his/her pharmacy.   Reviewed Cipro or ciprofloxacin was sent to the [facility] pharmacy in [place], reviewed sig: Take 1 tablet (500 mg total) by mouth every 12 (twelve) hours for 7 days   Informed this is based on preliminary results and that once the final results come back we will let them know if the medication needs to be changed.     Client inquired if they should continue taking amoxicillin, I informed this was not prescribed by Urology and that in the computer it looks it was finished, and to check the name on the prescription bottle and contact that physician for further information.

## 2023-12-30 LAB — BACTERIA UR CULT: ABNORMAL

## 2024-01-04 ENCOUNTER — TELEPHONE (OUTPATIENT)
Dept: UROLOGY | Facility: CLINIC | Age: 64
End: 2024-01-04

## 2024-01-04 ENCOUNTER — TELEPHONE (OUTPATIENT)
Dept: GASTROENTEROLOGY | Facility: MEDICAL CENTER | Age: 64
End: 2024-01-04

## 2024-01-04 DIAGNOSIS — R39.9 UTI SYMPTOMS: Primary | ICD-10-CM

## 2024-01-04 RX ORDER — NITROFURANTOIN 25; 75 MG/1; MG/1
100 CAPSULE ORAL 2 TIMES DAILY
Qty: 10 CAPSULE | Refills: 0 | Status: SHIPPED | OUTPATIENT
Start: 2024-01-04 | End: 2024-01-09

## 2024-01-04 NOTE — TELEPHONE ENCOUNTER
Returned Pt called able to informed Pt in [place] that SANDRITA Hinson would like him to take 5 days of Macrobid to ensure clearance Cipro may have worked butof bacteria.  The species that is growing only a few antibiotics can target and Cipro is not listed in the susceptibility report.    Pt completely understanding.

## 2024-01-04 NOTE — TELEPHONE ENCOUNTER
Patient came into the office stating that he is dealing with pain in lower right hand abdomin. Has black stool and nauseous  . Feels like when he burps its a bitter taste. Has not been able to work because he's in pain . Please advise patient.

## 2024-01-04 NOTE — TELEPHONE ENCOUNTER
Pt returning call and req return call with someone who speaks Filipino.    Pt is at work and has break at 12:40 and finishes work at 4:00.    Pt [phone]

## 2024-01-04 NOTE — TELEPHONE ENCOUNTER
Call pt left a generic vm in Malagasy encourage pt to call back at the office at [phone].    ----- Message from Doug Hinson PA-C sent at 1/4/2024  8:51 AM EST -----  Please pan;l pt to inform him I would like him to take 5 days of Macrobid to ensure clearance Cipro may have worked butof bacteria.  The species that is growing only a few antibiotics can target and Cipro is not listed in the susceptibility report

## 2024-01-04 NOTE — TELEPHONE ENCOUNTER
Pt called stated that no one  has called him at 12:40 and he needs to speak with someone.   Pt wants to speak with someone to explain what is going on with him.     Please review

## 2024-01-18 ENCOUNTER — OFFICE VISIT (OUTPATIENT)
Dept: GASTROENTEROLOGY | Facility: MEDICAL CENTER | Age: 64
End: 2024-01-18
Payer: COMMERCIAL

## 2024-01-18 VITALS
HEIGHT: 63 IN | WEIGHT: 150 LBS | SYSTOLIC BLOOD PRESSURE: 114 MMHG | HEART RATE: 72 BPM | BODY MASS INDEX: 26.58 KG/M2 | DIASTOLIC BLOOD PRESSURE: 78 MMHG | TEMPERATURE: 98.2 F

## 2024-01-18 DIAGNOSIS — K22.70 BARRETT'S ESOPHAGUS WITHOUT DYSPLASIA: ICD-10-CM

## 2024-01-18 DIAGNOSIS — R12 HEARTBURN: ICD-10-CM

## 2024-01-18 DIAGNOSIS — R10.11 RIGHT UPPER QUADRANT ABDOMINAL PAIN: ICD-10-CM

## 2024-01-18 PROCEDURE — 99214 OFFICE O/P EST MOD 30 MIN: CPT | Performed by: NURSE PRACTITIONER

## 2024-01-18 RX ORDER — OMEPRAZOLE 40 MG/1
40 CAPSULE, DELAYED RELEASE ORAL
Qty: 90 CAPSULE | Refills: 3 | Status: SHIPPED | OUTPATIENT
Start: 2024-01-18

## 2024-01-18 NOTE — PROGRESS NOTES
[facility] - Outpatient Follow-up Note  Bryson Escoto 63 y.o. male MRN: [id number]  Encounter: [id number]          ASSESSMENT AND PLAN:      1.  GERD  2.  Gastric intestinal metaplasia    Recent EGD noted inactive gastritis with reactive gastropathy and intestinal metaplasia.  Repeat EGD recommended in 3 years.  Patient is not taking his PPI as recommended.  He does report heartburn/reflux symptoms and intermittent epigastric burning.  Cannot pinpoint specific food triggers.  Denies any nausea, vomiting or dysphagia.    -Restart omeprazole 40 mg daily  -Antireflux diet  -Follow-up in office in 4 months or sooner if needed        3.  Constipation    History of chronic constipation.  Currently taking MiraLAX 1 capful daily.  Reports BMs are every 2 to 3 days.  It was recommended he increase his MiraLAX to twice daily at last visit but he never did that.  Denies any melena hematochezia.  Last colonoscopy was 8/19 and it was normal.    -Fiber supplement  -Increase MiraLAX to 1 capful twice daily  ______________________________________________________________________    SUBJECTIVE: 63-year-old male here for follow-up.  Last seen by myself 10/20/2023 for gastric intestinal metaplasia, GERD and chronic constipation.    History of gastric intestinal metaplasia found on 3 EGDs in 2022.   Currently taking omeprazole 40 mg daily.  Denies any nausea, vomiting or dysphagia.  No weight loss.  Drinks 1 cup of caffeine per day.  No NSAID use.  Rare alcohol use.     Recent EGD noted inactive gastritis with reactive gastropathy and intestinal metaplasia.  Repeat EGD recommended in 3 years.  Patient is not taking his PPI as recommended.  He does report heartburn/reflux symptoms and intermittent epigastric burning.    Prior EGD/colonoscopy     EGD 10/23-rregular Z-line; performed cold forceps biopsy  C0M1 Castellanos's esophagus  Moderate erythematous mucosa with loss of vascular pattern in the antrum. Mapping  obtained given history of gastrointestinal metaplasia  The duodenum appeared normal.   Biopsies noted chronic inactive gastritis with gastropathy and intestinal metaplasia.  Repeat EGD recommended in 3 years.    He has had extensive gastric intestinal metaplasia found on 3 EGDs in 2022.  Recommended 1 year follow-up EGD.  He has a family history of gastric cancer in his uncle.     Colonoscopy 8/19-normal.  Recall colon in 10 years       REVIEW OF SYSTEMS IS OTHERWISE NEGATIVE.  10 point review of systems negative other than per HPI    Historical Information   Past Medical History:   Diagnosis Date   • COVID-19 virus infection 5/1/2020   • Exposure to COVID-19 virus 11/23/2020   • Family history of parkinsonism 4/30/2020   • History of chronic constipation    • History of neck pain 11/21/2017   • Hypertension      Past Surgical History:   Procedure Laterality Date   • CYSTOSCOPY  05/29/2014    diagnostic managed by Jalen Mora   • TOOTH EXTRACTION       Social History   Social History     Substance and Sexual Activity   Alcohol Use Yes    Comment: social     Social History     Substance and Sexual Activity   Drug Use Never     Social History     Tobacco Use   Smoking Status Former   • Current packs/day: 0.00   • Average packs/day: 0.3 packs/day for 20.0 years (5.0 ttl pk-yrs)   • Types: Cigarettes   • Start date: 1/8/1994   • Quit date: 1/8/2014   • Years since quitting: 10.0   • Passive exposure: Never   Smokeless Tobacco Never     Family History   Problem Relation Age of Onset   • Cancer Sister         malignant neoplasm of breast   • Diabetes Paternal Grandmother    • Stomach cancer Paternal Uncle         unsure if colon or stomach   • Diabetes Family    • Arthritis Family    • Cancer Family         malignant neoplasm   • Substance Abuse Neg Hx         denied Mother and Father   • Mental illness Neg Hx         no family history Mother and Father   • Other Neg Hx         denied family history of Osteopertrosis  "      Meds/Allergies       Current Outpatient Medications:   •  amLODIPine (NORVASC) 10 mg tablet  •  cetirizine (ZyrTEC) 10 mg tablet  •  fluticasone (FLONASE) 50 mcg/act nasal spray  •  lubiprostone (AMITIZA) 24 mcg capsule  •  methocarbamol (ROBAXIN) 500 mg tablet  •  omeprazole (PriLOSEC) 40 MG capsule  •  polyethylene glycol (GLYCOLAX) 17 GM/SCOOP  •  psyllium (METAMUCIL) 58.6 % powder  •  senna (SENOKOT) 8.6 mg  •  tamsulosin (FLOMAX) 0.4 mg  •  lidocaine (LMX) 4 % cream    No Known Allergies        Objective     Blood pressure 114/78, pulse 72, temperature 98.2 °F (36.8 °C), temperature source Tympanic, height 5' 3\" (1.6 m), weight 68 kg (150 lb). Body mass index is 26.57 kg/m².      PHYSICAL EXAM:      General Appearance:   Alert, cooperative, no distress   HEENT:   Normocephalic, atraumatic, anicteric.     Neck:  Supple, symmetrical, trachea midline   Lungs:   Clear to auscultation bilaterally; no rales, rhonchi or wheezing; respirations unlabored    Heart::   Regular rate and rhythm; no murmur, rub, or gallop.   Abdomen:   Soft, non-tender, non-distended; normal bowel sounds; no masses, no organomegaly    Genitalia:   Deferred    Rectal:   Deferred    Extremities:  No cyanosis, clubbing or edema    Pulses:  2+ and symmetric    Skin:  No jaundice, rashes, or lesions    Lymph nodes:  No palpable cervical lymphadenopathy        Lab Results:   No visits with results within 1 Day(s) from this visit.   Latest known visit with results is:   Appointment on 12/28/2023   Component Date Value   • Urine Culture 12/28/2023 20,000-29,000 cfu/ml Enterococcus faecalis (A)          Radiology Results:   No results found.  "

## 2024-02-22 ENCOUNTER — OFFICE VISIT (OUTPATIENT)
Dept: FAMILY MEDICINE CLINIC | Facility: CLINIC | Age: 64
End: 2024-02-22

## 2024-02-22 VITALS
SYSTOLIC BLOOD PRESSURE: 128 MMHG | HEIGHT: 63 IN | BODY MASS INDEX: 26.08 KG/M2 | OXYGEN SATURATION: 99 % | TEMPERATURE: 98.6 F | WEIGHT: 147.2 LBS | RESPIRATION RATE: 18 BRPM | DIASTOLIC BLOOD PRESSURE: 86 MMHG | HEART RATE: 70 BPM

## 2024-02-22 DIAGNOSIS — R39.11 BENIGN PROSTATIC HYPERPLASIA WITH URINARY HESITANCY: ICD-10-CM

## 2024-02-22 DIAGNOSIS — N50.812 PAIN IN BOTH TESTICLES: ICD-10-CM

## 2024-02-22 DIAGNOSIS — N50.811 PAIN IN BOTH TESTICLES: ICD-10-CM

## 2024-02-22 DIAGNOSIS — R30.0 DYSURIA: Primary | ICD-10-CM

## 2024-02-22 DIAGNOSIS — N40.1 BENIGN PROSTATIC HYPERPLASIA WITH URINARY HESITANCY: ICD-10-CM

## 2024-02-22 LAB
SL AMB  POCT GLUCOSE, UA: NEGATIVE
SL AMB LEUKOCYTE ESTERASE,UA: NEGATIVE
SL AMB POCT BILIRUBIN,UA: NEGATIVE
SL AMB POCT BLOOD,UA: NEGATIVE
SL AMB POCT CLARITY,UA: CLEAR
SL AMB POCT COLOR,UA: YELLOW
SL AMB POCT KETONES,UA: NEGATIVE
SL AMB POCT NITRITE,UA: NEGATIVE
SL AMB POCT PH,UA: 6
SL AMB POCT SPECIFIC GRAVITY,UA: 1.01
SL AMB POCT URINE PROTEIN: NORMAL
SL AMB POCT UROBILINOGEN: - 0.2

## 2024-02-22 PROCEDURE — 87086 URINE CULTURE/COLONY COUNT: CPT | Performed by: FAMILY MEDICINE

## 2024-02-22 PROCEDURE — 81003 URINALYSIS AUTO W/O SCOPE: CPT | Performed by: FAMILY MEDICINE

## 2024-02-22 PROCEDURE — 99214 OFFICE O/P EST MOD 30 MIN: CPT | Performed by: FAMILY MEDICINE

## 2024-02-22 RX ORDER — PHENAZOPYRIDINE HYDROCHLORIDE 100 MG/1
100 TABLET, FILM COATED ORAL 3 TIMES DAILY PRN
Qty: 10 TABLET | Refills: 0 | Status: SHIPPED | OUTPATIENT
Start: 2024-02-22

## 2024-02-22 RX ORDER — PHENAZOPYRIDINE HYDROCHLORIDE 100 MG/1
100 TABLET, FILM COATED ORAL 3 TIMES DAILY PRN
Qty: 10 TABLET | Refills: 0 | Status: SHIPPED | OUTPATIENT
Start: 2024-02-22 | End: 2024-02-22

## 2024-02-22 RX ORDER — TAMSULOSIN HYDROCHLORIDE 0.4 MG/1
0.4 CAPSULE ORAL
Qty: 90 CAPSULE | Refills: 3 | Status: SHIPPED | OUTPATIENT
Start: 2024-02-22

## 2024-02-22 NOTE — PROGRESS NOTES
Name: Bryson Escoto      : 1960      MRN: [id number]  Encounter Provider: David Churchill MD  Encounter Date: 2024   Encounter department: [facility]    Assessment & Plan     1. Dysuria  Assessment & Plan:  Urine dip unremarkable  Trial of Pyridium for symptom relief  Recommend outpatient follow-up with urology    Orders:  -     POCT urine dip auto non-scope  -     Urine culture; Future  -     phenazopyridine (PYRIDIUM) 100 mg tablet; Take 1 tablet (100 mg total) by mouth 3 (three) times a day as needed for bladder spasms  -     Urine culture    2. Pain in both testicles  Assessment & Plan:  Chronic testicular pain  Will check testicular ultrasound    Orders:  -     US scrotum and testicles; Future; Expected date: 2024    3. Benign prostatic hyperplasia with urinary hesitancy  -     tamsulosin (FLOMAX) 0.4 mg; Take 1 capsule (0.4 mg total) by mouth daily with dinner           Subjective      62 y/o with history of BPH who presents today for 2 to 3 months onset of dysuria and urgency. He has also been experiencing intermittent bilateral testicular pain.  He has a history of BPH.  He is taking Flomax.  He follows with urology outpatient.  He endorses occasional nausea but no vomiting.  He denies any fever fever, chills or any flank pain          Review of Systems   Constitutional:  Negative for chills, diaphoresis, fatigue and fever.   Respiratory:  Negative for wheezing.    Cardiovascular:  Negative for chest pain.   Gastrointestinal:  Positive for nausea. Negative for abdominal pain, constipation, diarrhea and vomiting.   Genitourinary:  Positive for dysuria, testicular pain and urgency. Negative for flank pain and hematuria.   Musculoskeletal:  Negative for back pain.   Skin:  Negative for rash.   Psychiatric/Behavioral:  Negative for confusion.        Current Outpatient Medications on File Prior to Visit   Medication Sig    amLODIPine (NORVASC) 10  "mg tablet Take 1 tablet (10 mg total) by mouth daily    cetirizine (ZyrTEC) 10 mg tablet Take 1 tablet (10 mg total) by mouth daily    fluticasone (FLONASE) 50 mcg/act nasal spray 1 spray into each nostril daily    lidocaine (LMX) 4 % cream Apply topically as needed for mild pain (Patient not taking: Reported on 10/20/2023)    lubiprostone (AMITIZA) 24 mcg capsule Take 1 capsule (24 mcg total) by mouth 2 (two) times a day with meals    methocarbamol (ROBAXIN) 500 mg tablet Take 1 tablet (500 mg total) by mouth 2 (two) times a day    omeprazole (PriLOSEC) 40 MG capsule Take 1 capsule (40 mg total) by mouth daily before breakfast    polyethylene glycol (GLYCOLAX) 17 GM/SCOOP TAKE 17 G BY MOUTH IF NEEDED (CONSTIPATION)    psyllium (METAMUCIL) 58.6 % powder Take 1 packet by mouth daily    senna (SENOKOT) 8.6 mg Take 1 tablet (8.6 mg total) by mouth daily at bedtime       Objective     /86 (BP Location: Right arm, Patient Position: Sitting, Cuff Size: Standard)   Pulse 70   Temp 98.6 °F (37 °C) (Temporal)   Resp 18   Ht 5' 3\" (1.6 m)   Wt 66.8 kg (147 lb 3.2 oz)   SpO2 99%   BMI 26.08 kg/m²     Physical Exam  Constitutional:       General: He is not in acute distress.     Appearance: Normal appearance. He is well-developed. He is not ill-appearing, toxic-appearing or diaphoretic.   HENT:      Head: Normocephalic and atraumatic.      Right Ear: External ear normal.      Left Ear: External ear normal.      Nose: Nose normal.      Mouth/Throat:      Pharynx: No oropharyngeal exudate.   Eyes:      General: No scleral icterus.        Right eye: No discharge.         Left eye: No discharge.      Extraocular Movements: Extraocular movements intact.      Conjunctiva/sclera: Conjunctivae normal.      Pupils: Pupils are equal, round, and reactive to light.   Cardiovascular:      Rate and Rhythm: Normal rate and regular rhythm.      Heart sounds: Normal heart sounds. No murmur heard.     No friction rub. No gallop. "   Pulmonary:      Effort: Pulmonary effort is normal. No respiratory distress.      Breath sounds: Normal breath sounds. No stridor. No wheezing.   Abdominal:      General: Bowel sounds are normal. There is no distension.      Palpations: Abdomen is soft. There is no mass.      Tenderness: There is no abdominal tenderness. There is no right CVA tenderness, left CVA tenderness or guarding.      Comments: Suprapubic tenderness   Musculoskeletal:         General: Normal range of motion.      Cervical back: Normal range of motion.   Skin:     General: Skin is warm.      Capillary Refill: Capillary refill takes less than 2 seconds.   Neurological:      Mental Status: He is alert and oriented to person, place, and time.      Cranial Nerves: No cranial nerve deficit.       David Churchill MD

## 2024-02-24 LAB — BACTERIA UR CULT: NORMAL

## 2024-02-26 PROBLEM — R30.0 DYSURIA: Status: ACTIVE | Noted: 2024-02-26

## 2024-02-26 PROBLEM — N40.1 BENIGN PROSTATIC HYPERPLASIA WITH URINARY HESITANCY: Status: ACTIVE | Noted: 2024-02-26

## 2024-02-26 PROBLEM — R39.11 BENIGN PROSTATIC HYPERPLASIA WITH URINARY HESITANCY: Status: ACTIVE | Noted: 2024-02-26

## 2024-02-26 NOTE — ASSESSMENT & PLAN NOTE
Urine dip unremarkable  Trial of Pyridium for symptom relief  Recommend outpatient follow-up with urology

## 2024-02-27 ENCOUNTER — HOSPITAL ENCOUNTER (OUTPATIENT)
Dept: RADIOLOGY | Facility: HOSPITAL | Age: 64
Discharge: HOME | End: 2024-02-27
Attending: FAMILY MEDICINE
Payer: COMMERCIAL

## 2024-02-27 DIAGNOSIS — N50.811 PAIN IN BOTH TESTICLES: ICD-10-CM

## 2024-02-27 DIAGNOSIS — N50.812 PAIN IN BOTH TESTICLES: ICD-10-CM

## 2024-02-27 PROCEDURE — 76870 US EXAM SCROTUM: CPT

## 2024-02-28 ENCOUNTER — TELEPHONE (OUTPATIENT)
Dept: NEPHROLOGY | Facility: CLINIC | Age: 64
End: 2024-02-28

## 2024-02-28 NOTE — TELEPHONE ENCOUNTER
Patient stopped in the [place] office to reschedule his follow up appt with Dr. Aguirre and  stating that he has back pain on his left side and his pain stared Monday. Patient would like Dr. Aguirre please sent medication and advise him what to do. Also patient is reschedule follow up on 6/3 at  office with Dr. Aguirre.

## 2024-03-13 ENCOUNTER — NURSE TRIAGE (OUTPATIENT)
Age: 64
End: 2024-03-13

## 2024-03-13 ENCOUNTER — APPOINTMENT (OUTPATIENT)
Dept: LAB | Facility: HOSPITAL | Age: 64
End: 2024-03-13
Payer: COMMERCIAL

## 2024-03-13 DIAGNOSIS — R39.9 UTI SYMPTOMS: Primary | ICD-10-CM

## 2024-03-13 LAB
BACTERIA UR QL AUTO: ABNORMAL /HPF
BILIRUB UR QL STRIP: NEGATIVE
CLARITY UR: CLEAR
COLOR UR: COLORLESS
GLUCOSE UR STRIP-MCNC: NEGATIVE MG/DL
HGB UR QL STRIP.AUTO: NEGATIVE
KETONES UR STRIP-MCNC: NEGATIVE MG/DL
LEUKOCYTE ESTERASE UR QL STRIP: NEGATIVE
NITRITE UR QL STRIP: NEGATIVE
NON-SQ EPI CELLS URNS QL MICRO: ABNORMAL /HPF
PH UR STRIP.AUTO: 6 [PH]
PROT UR STRIP-MCNC: ABNORMAL MG/DL
RBC #/AREA URNS AUTO: ABNORMAL /HPF
SP GR UR STRIP.AUTO: 1.02 (ref 1–1.03)
UROBILINOGEN UR STRIP-ACNC: <2 MG/DL
WBC #/AREA URNS AUTO: ABNORMAL /HPF

## 2024-03-13 PROCEDURE — 81001 URINALYSIS AUTO W/SCOPE: CPT

## 2024-03-13 PROCEDURE — 87086 URINE CULTURE/COLONY COUNT: CPT

## 2024-03-13 NOTE — TELEPHONE ENCOUNTER
"Pt of Elly last seen in the [place] office called in today with concerns of urinary frequency and dysuria. States his symptoms started last week. Denies any fevers. Unsure if blood is present but states he notices his urine sometimes looking yellow/brown in color. Advised pt to increase water intake and avoid bladder irritants. Urine orders placed to r/o infection. ED precautions reviewed with pt.     Pt also states that his appt for 4/19 was supposed to be in [place] but is scheduled for [place]. He would like to know if he can be seen sooner or on 4/19 in [place] as that's closer to him. Nothing available from what I could see. If not, he will keep his scheduled appt.       Reason for Disposition   The patient has an unknown case of mild dysuria    Answer Questions - Initial Assessment  When did your symptoms start: last week    Is burning with every void: sometimes    Do you have any other urinary symptoms, urinary frequency, urgency, incontinence: frequency    Have you become unable to urinate: no    Have you seen blood in your urine: unsure, states \"yellow/brown\"    Do you have any abdominal pain or flank pain: slight flank pain    Do you have a fever of 101 or higher: no    Do you have a history of urinary tract infections: no    Have you had any recent urine testing: yes    Have you had a recent urologic procedure or surgery: no    Protocols used: Dysuria    "

## 2024-03-14 LAB — BACTERIA UR CULT: NORMAL

## 2024-03-14 NOTE — TELEPHONE ENCOUNTER
Patient returned missed call today.    Pt states that the pain is in his entire genital area and that it is constant.    Pt states he had urine tests done yesterday and would like results when available.    Call back (Syriac speaking) [phone]

## 2024-03-14 NOTE — TELEPHONE ENCOUNTER
PT HAVING PAIN AND CAN'T WAIT UNTIL HIS APRIL APPT. I TRIED TO GET THE AREA HE IS HAVING PAIN IN BUT THERE IS A LANGUAGE BARRIER. PLEASE ASSIST

## 2024-03-14 NOTE — TELEPHONE ENCOUNTER
LM to contact office. If he calls back, please ask where is the pain? Is it when urinating? He went for urine testing and we have not received the culture yet.

## 2024-03-15 NOTE — TELEPHONE ENCOUNTER
Spoke with pt in Irish. Advised of recommendations. Pt states is taking something for pain but does not remember the name. ER precautions given. Pt verbalized understanding.

## 2024-03-15 NOTE — TELEPHONE ENCOUNTER
Urine testing negative. US negative except for cysts which are stable. Patient to perform scrotal support, elevation, ibuprofen, warm soaks. Appointment already scheduled. ER precautions. Thanks

## 2024-04-01 ENCOUNTER — NURSE TRIAGE (OUTPATIENT)
Age: 64
End: 2024-04-01

## 2024-04-01 DIAGNOSIS — K59.00 CONSTIPATION, UNSPECIFIED CONSTIPATION TYPE: ICD-10-CM

## 2024-04-01 NOTE — TELEPHONE ENCOUNTER
Regarding: constipation  ----- Message from Kiara Knox sent at 3/29/2024  4:10 PM EDT -----  Pt calling because he states he is having to push really hard to go to the bathroom. He states his medication is not helping.

## 2024-04-01 NOTE — TELEPHONE ENCOUNTER
"Last ov 1/18/24 COLLEEN Plasencia, Procedures EGD 10/25/23, Colon 8/13/19, currently scheduled for visit 5/24/24    Returned call to patient via  #[phone]    Patient answered call while at work, states no bowel movement for five days. He is taking his MiraLax twice a day and keeping hydrated, dietary fiber. I advised he add stool softener once a day at this time and to purchase Milk of Magnesia OTC for one dose to try to produce bowel movement. Patient advised to call back if no result.      Reason for Disposition   Constipation is a recurrent ongoing problem (i.e., < 3 BMs / week or straining > 25% of the time)    Answer Assessment - Initial Assessment Questions  1. STOOL PATTERN OR FREQUENCY: \"How often do you pass bowel movements (BMs)?\"  (Normal range: tid to q 3 days)  \"When was the last BM passed?\"        No bowel movement for past five days  2. STRAINING: \"Do you have to strain to have a BM?\"       yes  3. RECTAL PAIN: \"Does your rectum hurt when the stool comes out?\" If Yes, ask: \"Do you have hemorrhoids? How bad is the pain?\"  (Scale 1-10; or mild, moderate, severe)      yes  4. STOOL COMPOSITION: \"Are the stools hard?\"       Usually when passing  5. BLOOD ON STOOLS: \"Has there been any blood on the toilet tissue or on the surface of the BM?\" If Yes, ask: \"When was the last time?\"       denies  6. CHRONIC CONSTIPATION: \"Is this a new problem for you?\"  If no, ask: \"How long have you had this problem?\" (days, weeks, months)       Hx of chronic constipation  7. CHANGES IN DIET OR HYDRATION: \"Have there been any recent changes in your diet?\" \"How much fluids are you drinking consuming on a daily basis?\"  \"How much have you had to drink today?\"      denies  8. MEDICATIONS: \"Have you been taking any new medications?\" \"Are you taking any narcotic pain medications?\" (e.g., Vicoden, Percocet, morphine, dilaudid)      denies  9. LAXATIVES: \"Have you been using any stool softeners, laxatives, or enemas?\"  If yes, " "ask \"What, how often, and when was the last time?\" ]  MiraLax twice a day  10.ACTIVITY:  \"How much walking do you do every day? on a daily basis?\"  \"Has your activity level decreased in the past week?\"         Keeps active  11. CAUSE: \"What do you think is causing the constipation?\"         Hx of constipation  12. OTHER SYMPTOMS: \"Do you have any other symptoms?\" (e.g., abdominal pain, bloating, fever, vomiting)        Abdominal pain related to constipation    Protocols used: Constipation-ADULT-[place]    "

## 2024-04-02 RX ORDER — POLYETHYLENE GLYCOL 3350 17 G/17G
17 POWDER ORAL 2 TIMES DAILY
Qty: 850 G | Refills: 1 | Status: SHIPPED | OUTPATIENT
Start: 2024-04-02

## 2024-04-02 NOTE — TELEPHONE ENCOUNTER
Called and spoke to Pt using an  service and asked if Pt took Milk of Magnesia and if it worked for them. Pt states taking Milk of Magnesia yesterday but no BM has occurred for them up till now. Please advise, thank you!

## 2024-04-04 ENCOUNTER — TELEPHONE (OUTPATIENT)
Age: 64
End: 2024-04-04

## 2024-04-19 ENCOUNTER — OFFICE VISIT (OUTPATIENT)
Dept: UROLOGY | Facility: CLINIC | Age: 64
End: 2024-04-19
Payer: COMMERCIAL

## 2024-04-19 VITALS
DIASTOLIC BLOOD PRESSURE: 60 MMHG | HEIGHT: 63 IN | HEART RATE: 79 BPM | SYSTOLIC BLOOD PRESSURE: 120 MMHG | WEIGHT: 143 LBS | OXYGEN SATURATION: 98 % | BODY MASS INDEX: 25.34 KG/M2

## 2024-04-19 DIAGNOSIS — N50.819 PAIN IN TESTICLE, UNSPECIFIED LATERALITY: Primary | ICD-10-CM

## 2024-04-19 PROCEDURE — 99213 OFFICE O/P EST LOW 20 MIN: CPT | Performed by: PHYSICIAN ASSISTANT

## 2024-04-23 NOTE — PROGRESS NOTES
UROLOGY PROGRESS NOTE   Patient Identifiers: Bryson Escoto (MRN [id number])  Date of Service: 4/23/2024    Seen with the assistance of an online     Subjective:   63-year-old man seen previously for transient orchialgia.  It resolved on his last visit in October.  He has a right sided epididymal cyst.  PSA has been 1.  He complains of recurrent pain.  Scrotal ultrasound shows a stable spermatic cord cyst and left epididymal cyst.  Urinalysis clear.    Reason for visit: Orchialgia follow-up    Objective:     VITALS:    Vitals:    04/19/24 1443   BP: 120/60   Pulse: 79   SpO2: 98%           LABS:  Lab Results   Component Value Date    HGB 13.7 05/29/2023    HCT 41.2 05/29/2023    WBC 4.13 (L) 05/29/2023     05/29/2023   ]    Lab Results   Component Value Date     09/15/2015    K 4.1 10/18/2023     (H) 10/18/2023    CO2 22 10/18/2023    BUN 20 10/18/2023    CREATININE 1.30 10/18/2023    CALCIUM 8.6 10/18/2023    GLUCOSE 113 09/15/2015   ]        INPATIENT MEDS:    Current Outpatient Medications:     amLODIPine (NORVASC) 10 mg tablet, Take 1 tablet (10 mg total) by mouth daily, Disp: 90 tablet, Rfl: 2    cetirizine (ZyrTEC) 10 mg tablet, Take 1 tablet (10 mg total) by mouth daily, Disp: 90 tablet, Rfl: 1    fluticasone (FLONASE) 50 mcg/act nasal spray, 1 spray into each nostril daily, Disp: 24 mL, Rfl: 1    lubiprostone (AMITIZA) 24 mcg capsule, Take 1 capsule (24 mcg total) by mouth 2 (two) times a day with meals, Disp: 60 capsule, Rfl: 5    methocarbamol (ROBAXIN) 500 mg tablet, Take 1 tablet (500 mg total) by mouth 2 (two) times a day, Disp: 20 tablet, Rfl: 0    omeprazole (PriLOSEC) 40 MG capsule, Take 1 capsule (40 mg total) by mouth daily before breakfast, Disp: 90 capsule, Rfl: 3    phenazopyridine (PYRIDIUM) 100 mg tablet, Take 1 tablet (100 mg total) by mouth 3 (three) times a day as needed for bladder spasms, Disp: 10 tablet, Rfl: 0    polyethylene glycol (GLYCOLAX) 17  "GM/SCOOP, Take 17 g by mouth 2 (two) times a day, Disp: 850 g, Rfl: 1    psyllium (METAMUCIL) 58.6 % powder, Take 1 packet by mouth daily, Disp: 1040 g, Rfl: 2    senna (SENOKOT) 8.6 mg, Take 1 tablet (8.6 mg total) by mouth daily at bedtime, Disp: 90 tablet, Rfl: 0    tamsulosin (FLOMAX) 0.4 mg, Take 1 capsule (0.4 mg total) by mouth daily with dinner, Disp: 90 capsule, Rfl: 3    lidocaine (LMX) 4 % cream, Apply topically as needed for mild pain (Patient not taking: Reported on 10/20/2023), Disp: 120 g, Rfl: 0      Physical Exam:   /60 (BP Location: Left arm, Patient Position: Sitting, Cuff Size: Adult)   Pulse 79   Ht 5' 3\" (1.6 m)   Wt 64.9 kg (143 lb)   SpO2 98%   BMI 25.33 kg/m²   GEN: no acute distress    RESP: breathing comfortably with no accessory muscle use    ABD: soft, non-tender, non-distended   INCISION:    EXT: no significant peripheral edema   (Male): Penis uncircumcised, phallus normal, meatus patent.  Testicles descended into scrotum bilaterally without masses nor tenderness.  No inguinal hernias bilaterally.      RADIOLOGY:   IMPRESSION:     No testicular mass or testicular torsion.  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  Orchialgia    Plan:   -Return to clinic for spermatic cord block  -  -  -          "

## 2024-05-09 ENCOUNTER — PROCEDURE VISIT (OUTPATIENT)
Dept: UROLOGY | Facility: CLINIC | Age: 64
End: 2024-05-09
Payer: COMMERCIAL

## 2024-05-09 VITALS
BODY MASS INDEX: 25.48 KG/M2 | DIASTOLIC BLOOD PRESSURE: 80 MMHG | RESPIRATION RATE: 14 BRPM | HEART RATE: 68 BPM | OXYGEN SATURATION: 99 % | SYSTOLIC BLOOD PRESSURE: 120 MMHG | HEIGHT: 63 IN | WEIGHT: 143.8 LBS

## 2024-05-09 DIAGNOSIS — N40.0 BENIGN PROSTATIC HYPERPLASIA WITHOUT LOWER URINARY TRACT SYMPTOMS: Primary | ICD-10-CM

## 2024-05-09 PROCEDURE — 99213 OFFICE O/P EST LOW 20 MIN: CPT | Performed by: PHYSICIAN ASSISTANT

## 2024-05-09 NOTE — PROGRESS NOTES
UROLOGY PROGRESS NOTE   Patient Identifiers: Bryson Escoto (MRN [id number])  Date of Service: 5/9/2024    Subjective:   63-year-old male with history of BPH and persistent orchialgia.  He has a stable spermatic cord cyst and a left epididymal cyst.  He was scheduled today for a spermatic cord block.  He came in the office and after further discussion and examination it was determined that his pain and discomfort was most likely musculoskeletal.  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  He has anterior and posterior bilateral pelvic discomfort.  He reports radiation down his left leg with long periods of standing.  He also has radiation to the testicles which is not unusual.    Reason for visit: Orchialgia follow-up    Objective:     VITALS:    Vitals:    05/09/24 1402   BP: 120/80   Pulse: 68   Resp: 14   SpO2: 99%           LABS:  Lab Results   Component Value Date    HGB 13.7 05/29/2023    HCT 41.2 05/29/2023    WBC 4.13 (L) 05/29/2023     05/29/2023   ]    Lab Results   Component Value Date     09/15/2015    K 4.1 10/18/2023     (H) 10/18/2023    CO2 22 10/18/2023    BUN 20 10/18/2023    CREATININE 1.30 10/18/2023    CALCIUM 8.6 10/18/2023    GLUCOSE 113 09/15/2015   ]        INPATIENT MEDS:    Current Outpatient Medications:     amLODIPine (NORVASC) 10 mg tablet, Take 1 tablet (10 mg total) by mouth daily, Disp: 90 tablet, Rfl: 2    cetirizine (ZyrTEC) 10 mg tablet, Take 1 tablet (10 mg total) by mouth daily, Disp: 90 tablet, Rfl: 1    fluticasone (FLONASE) 50 mcg/act nasal spray, 1 spray into each nostril daily, Disp: 24 mL, Rfl: 1    lubiprostone (AMITIZA) 24 mcg capsule, Take 1 capsule (24 mcg total) by mouth 2 (two) times a day with meals, Disp: 60 capsule, Rfl: 5    methocarbamol (ROBAXIN) 500 mg tablet, Take 1 tablet (500 mg total) by mouth 2 (two) times a day, Disp: 20 tablet, Rfl: 0    omeprazole (PriLOSEC) 40 MG capsule, Take 1 capsule (40 mg total) by mouth daily before  "breakfast, Disp: 90 capsule, Rfl: 3    phenazopyridine (PYRIDIUM) 100 mg tablet, Take 1 tablet (100 mg total) by mouth 3 (three) times a day as needed for bladder spasms, Disp: 10 tablet, Rfl: 0    polyethylene glycol (GLYCOLAX) 17 GM/SCOOP, Take 17 g by mouth 2 (two) times a day, Disp: 850 g, Rfl: 1    senna (SENOKOT) 8.6 mg, Take 1 tablet (8.6 mg total) by mouth daily at bedtime, Disp: 90 tablet, Rfl: 0    tamsulosin (FLOMAX) 0.4 mg, Take 1 capsule (0.4 mg total) by mouth daily with dinner, Disp: 90 capsule, Rfl: 3    lidocaine (LMX) 4 % cream, Apply topically as needed for mild pain (Patient not taking: Reported on 10/20/2023), Disp: 120 g, Rfl: 0    psyllium (METAMUCIL) 58.6 % powder, Take 1 packet by mouth daily (Patient not taking: Reported on 5/9/2024), Disp: 1040 g, Rfl: 2      Physical Exam:   /80 (BP Location: Left arm, Patient Position: Sitting, Cuff Size: Standard)   Pulse 68   Resp 14   Ht 5' 3\" (1.6 m)   Wt 65.2 kg (143 lb 12.8 oz)   SpO2 99%   BMI 25.47 kg/m²   GEN: no acute distress    RESP: breathing comfortably with no accessory muscle use    ABD: soft, non-tender, non-distended   INCISION:   EXT: Discomfort was elicited with direct pressure over the anterior pelvis  (Male): Penis uncircumcised, phallus normal, meatus patent.  Testicles descended into scrotum bilaterally without masses nor tenderness.  No inguinal hernias bilaterally.      RADIOLOGY:   None     Assessment:   #1.  Orchialgia  #2.  BPH    Plan:   -I explained to the patient that I believe his discomfort is most likely musculoskeletal related  -He should see his family doctor and possibly be referred to physical therapy  -No need for spermatic cord nerve block  -He will follow-up with me in October with PSA prior to visit          "

## 2024-05-21 ENCOUNTER — NURSE TRIAGE (OUTPATIENT)
Age: 64
End: 2024-05-21

## 2024-05-21 NOTE — TELEPHONE ENCOUNTER
"EGD 10/25/23.Last ov 1/18/24. Pt c/o gas pains that came on suddenly, moderate pain, constant. Last Bm yesterday. Was also having chest pain and went to ER to be evaluated, in care everywhere.    Advised to try gas x and monitor symptoms and call back in a couple days with an update.    Final Diagnosis   A. Stomach, bx, antrum/incusaura, hx of intestinal metaplasia:  Chronic inactive gastritis with reactive gastropathy and intestinal metaplasia (present in all tissue fragments)  H. pylori immunostain is negative  No dysplasia or malignancy identified     B. Stomach, bx, gastric body for hx of intestinal metaplasia:  Chronic inactive gastritis with reactive gastropathy and intestinal metaplasia   H. pylori immunostain is negative  No dysplasia or malignancy identified     C. Esophagogastric junction, bx at 36cm for hx of barretts:  Gastroesophageal mucosa with chronic inflammation  No intestinal metaplasia (AB/PAS stain is negative, dysplasia or malignancy identified     Rash/hives on side no new meds advised to f/u PCP    Reason for Disposition   Mild abdominal pain    Answer Assessment - Initial Assessment Questions  1. LOCATION: \"Where does it hurt?\"       Near liver and all of stomach   2. RADIATION: \"Does the pain shoot anywhere else?\" (e.g., chest, back)      Back other day went to chest   3. ONSET: \"When did the pain begin?\" (Minutes, hours or days ago)       Have always had this  4. SUDDEN: \"Gradual or sudden onset?\"      Gas was sudden  5. PATTERN \"Does the pain come and go, or is it constant?\"     - If constant: \"Is it getting better, staying the same, or worsening?\"       (Note: Constant means the pain never goes away completely; most serious pain is constant and it progresses)      - If intermittent: \"How long does it last?\" \"Do you have pain now?\"      (Note: Intermittent means the pain goes away completely between bouts)      constant  6. SEVERITY: \"How bad is the pain?\"  (e.g., Scale 1-10; mild, " "moderate, or severe)     - MILD (1-3): doesn't interfere with normal activities, abdomen soft and not tender to touch      - MODERATE (4-7): interferes with normal activities or awakens from sleep, tender to touch      - SEVERE (8-10): excruciating pain, doubled over, unable to do any normal activities        moderate  7. RECURRENT SYMPTOM: \"Have you ever had this type of stomach pain before?\" If Yes, ask: \"When was the last time?\" and \"What happened that time?\"       Its been a while   8. CAUSE: \"What do you think is causing the stomach pain?\"      unknown  9. RELIEVING/AGGRAVATING FACTORS: \"What makes it better or worse?\" (e.g., movement, antacids, bowel movement)      denies  10. OTHER SYMPTOMS: \"Has there been any vomiting, diarrhea, constipation, or urine problems?\"        constipation    Protocols used: Abdominal Pain - Male-ADULT-[place]    "

## 2024-05-21 NOTE — TELEPHONE ENCOUNTER
Regarding: STOMACH PAIN  ----- Message from Alexandra LIZAMA sent at 5/21/2024  4:13 PM EDT -----  Patients GI provider:  DEREK SEGOVIA    Number to return call: [phone]    Reason for call: Pt contacted office with complaints of stomach pain. Please return patients call to further assist. Patient is Czech speaking.    Scheduled procedure/appointment date if applicable: 5/24/2024

## 2024-05-24 ENCOUNTER — APPOINTMENT (OUTPATIENT)
Dept: RADIOLOGY | Facility: MEDICAL CENTER | Age: 64
End: 2024-05-24
Payer: COMMERCIAL

## 2024-05-24 ENCOUNTER — OFFICE VISIT (OUTPATIENT)
Dept: GASTROENTEROLOGY | Facility: MEDICAL CENTER | Age: 64
End: 2024-05-24
Payer: COMMERCIAL

## 2024-05-24 VITALS
BODY MASS INDEX: 25.45 KG/M2 | HEART RATE: 81 BPM | WEIGHT: 143.6 LBS | SYSTOLIC BLOOD PRESSURE: 114 MMHG | DIASTOLIC BLOOD PRESSURE: 76 MMHG | HEIGHT: 63 IN | TEMPERATURE: 98.8 F | OXYGEN SATURATION: 97 %

## 2024-05-24 DIAGNOSIS — R10.31 CHRONIC RLQ PAIN: ICD-10-CM

## 2024-05-24 DIAGNOSIS — G89.29 CHRONIC RLQ PAIN: Primary | ICD-10-CM

## 2024-05-24 DIAGNOSIS — K21.9 GASTROESOPHAGEAL REFLUX DISEASE WITHOUT ESOPHAGITIS: ICD-10-CM

## 2024-05-24 DIAGNOSIS — R10.31 CHRONIC RLQ PAIN: Primary | ICD-10-CM

## 2024-05-24 DIAGNOSIS — K59.00 CONSTIPATION, UNSPECIFIED CONSTIPATION TYPE: ICD-10-CM

## 2024-05-24 DIAGNOSIS — K31.A0 GASTRIC INTESTINAL METAPLASIA: ICD-10-CM

## 2024-05-24 DIAGNOSIS — G89.29 CHRONIC RLQ PAIN: ICD-10-CM

## 2024-05-24 PROCEDURE — 74022 RADEX COMPL AQT ABD SERIES: CPT

## 2024-05-24 PROCEDURE — 99214 OFFICE O/P EST MOD 30 MIN: CPT | Performed by: NURSE PRACTITIONER

## 2024-05-24 RX ORDER — LINACLOTIDE 290 UG/1
290 CAPSULE, GELATIN COATED ORAL DAILY
Qty: 90 CAPSULE | Refills: 3 | Status: SHIPPED | OUTPATIENT
Start: 2024-05-24

## 2024-05-24 NOTE — PROGRESS NOTES
[facility] - Outpatient Follow-up Note  Bryson Escoto 63 y.o. male MRN: [id number]  Encounter: [id number]          ASSESSMENT AND PLAN:      1.  GERD  2.  Gastric intestinal metaplasia    History of gastric intestinal metaplasia and inactive gastritis on EGD 10/24.  Repeat EGD recommended in 3 years.  He is currently taking 40 mg of omeprazole daily.  Reflux is controlled.    -Continue omeprazole 40 mg daily  -Repeat EGD 10/27      3.  Chronic constipation    History of chronic constipation.  Currently taking Amitiza 24 mcg twice daily.  He has tried and failed MiraLAX and fiber supplement.  Reports BMs are every 2 to 3 days.   Denies any melena hematochezia.  Last colonoscopy was 8/19 and it was normal.  Reports his last BM was yesterday but he is having small stools and some right lower quadrant discomfort.  No fever or chills.  No rebound tenderness.  Patient requesting obstructive series.    -Discontinue Amitiza  -Obstructive series to assess for stool burden  -Start Linzess 290 mcg daily  -Contact office in 2 weeks with update  -Will contact patient with results of obstructive series  -Follow-up in office in 3 to 4 months or sooner if needed    ______________________________________________________________________    SUBJECTIVE: 63-year-old male here for follow-up.  He was last seen by myself 1/18/24 for history of gastric intestinal metaplasia, GERD and chronic constipation.  [employer] required today for translation as patient only speaks Nepali.    Recent EGD 10/24 noted inactive gastritis with reactive gastropathy and intestinal metaplasia. Repeat EGD recommended in 3 years. Patient is not taking his PPI as recommended. He does report heartburn/reflux symptoms and intermittent epigastric burning. Cannot pinpoint specific food triggers. Denies any nausea, vomiting or dysphagia.     History of chronic constipation.  Currently taking Amitiza 24 mcg twice daily.  He has tried and  failed MiraLAX and fiber supplement.  Reports BMs are every 2 to 3 days.   Denies any melena hematochezia.  Last colonoscopy was 8/19 and it was normal.  Reports his last BM was yesterday but he is having small stools and some right lower quadrant discomfort.  No fever or chills.  No rebound tenderness.  Patient requesting obstructive series.    Prior EGD/colonoscopy    EGD 10/24-Irregular Z-line; performed cold forceps biopsy  C0M1 Castellanos's esophagus  Moderate erythematous mucosa with loss of vascular pattern in the antrum. Mapping obtained given history of gastrointestinal metaplasia  The duodenum appeared normal.  Repeat EGD recommended in 3 years due to extensive gastric intestinal metaplasia.  Biopsies noted gastrointestinal.  No H. pylori.    He has had extensive gastric intestinal metaplasia found on 3 EGDs in 2022.  Recommended 1 year follow-up EGD.  He has a family history of gastric cancer in his uncle.     Colonoscopy 8/19-normal.  Recall colon in 10 years    REVIEW OF SYSTEMS IS OTHERWISE NEGATIVE.    10 point review of systems negative other than per HPI    Historical Information   Past Medical History:   Diagnosis Date   • COVID-19 virus infection 5/1/2020   • Exposure to COVID-19 virus 11/23/2020   • Family history of parkinsonism 4/30/2020   • History of chronic constipation    • History of neck pain 11/21/2017   • Hypertension      Past Surgical History:   Procedure Laterality Date   • CYSTOSCOPY  05/29/2014    diagnostic managed by Jalen Mora   • TOOTH EXTRACTION       Social History   Social History     Substance and Sexual Activity   Alcohol Use Yes    Comment: social     Social History     Substance and Sexual Activity   Drug Use Never     Social History     Tobacco Use   Smoking Status Former   • Current packs/day: 0.00   • Average packs/day: 0.3 packs/day for 20.0 years (5.0 ttl pk-yrs)   • Types: Cigarettes   • Start date: 1/8/1994   • Quit date: 1/8/2014   • Years since quitting: 10.3   •  "Passive exposure: Never   Smokeless Tobacco Never     Family History   Problem Relation Age of Onset   • Cancer Sister         malignant neoplasm of breast   • Diabetes Paternal Grandmother    • Stomach cancer Paternal Uncle         unsure if colon or stomach   • Diabetes Family    • Arthritis Family    • Cancer Family         malignant neoplasm   • Substance Abuse Neg Hx         denied Mother and Father   • Mental illness Neg Hx         no family history Mother and Father   • Other Neg Hx         denied family history of Osteopertrosis       Meds/Allergies       Current Outpatient Medications:   •  amLODIPine (NORVASC) 10 mg tablet  •  fluticasone (FLONASE) 50 mcg/act nasal spray  •  linaCLOtide (Linzess) 290 MCG CAPS  •  lubiprostone (AMITIZA) 24 mcg capsule  •  omeprazole (PriLOSEC) 40 MG capsule  •  cetirizine (ZyrTEC) 10 mg tablet  •  lidocaine (LMX) 4 % cream  •  methocarbamol (ROBAXIN) 500 mg tablet  •  phenazopyridine (PYRIDIUM) 100 mg tablet  •  polyethylene glycol (GLYCOLAX) 17 GM/SCOOP  •  psyllium (METAMUCIL) 58.6 % powder  •  senna (SENOKOT) 8.6 mg  •  tamsulosin (FLOMAX) 0.4 mg    No Known Allergies        Objective     Blood pressure 114/76, pulse 81, temperature 98.8 °F (37.1 °C), temperature source Tympanic, height 5' 3\" (1.6 m), weight 65.1 kg (143 lb 9.6 oz), SpO2 97%. Body mass index is 25.44 kg/m².      PHYSICAL EXAM:      General Appearance:   Alert, cooperative, no distress   HEENT:   Normocephalic, atraumatic, anicteric.     Neck:  Supple, symmetrical, trachea midline   Lungs:   Clear to auscultation bilaterally; no rales, rhonchi or wheezing; respirations unlabored    Heart::   Regular rate and rhythm; no murmur, rub, or gallop.   Abdomen:   Soft, non-tender, non-distended; normal bowel sounds; no masses, no organomegaly    Genitalia:   Deferred    Rectal:   Deferred    Extremities:  No cyanosis, clubbing or edema    Pulses:  2+ and symmetric    Skin:  No jaundice, rashes, or lesions  "   Lymph nodes:  No palpable cervical lymphadenopathy        Lab Results:   No visits with results within 1 Day(s) from this visit.   Latest known visit with results is:   Nurse Triage on 03/13/2024   Component Date Value   • Urine Culture 03/13/2024 No Growth <1000 cfu/mL    • Color, UA 03/13/2024 Colorless    • Clarity, UA 03/13/2024 Clear    • Specific Gravity, UA 03/13/2024 1.016    • pH, UA 03/13/2024 6.0    • Leukocytes, UA 03/13/2024 Negative    • Nitrite, UA 03/13/2024 Negative    • Protein, UA 03/13/2024 30 (1+) (A)    • Glucose, UA 03/13/2024 Negative    • Ketones, UA 03/13/2024 Negative    • Urobilinogen, UA 03/13/2024 <2.0    • Bilirubin, UA 03/13/2024 Negative    • Occult Blood, UA 03/13/2024 Negative    • RBC, UA 03/13/2024 1-2    • WBC, UA 03/13/2024 None Seen    • Epithelial Cells 03/13/2024 None Seen    • Bacteria, UA 03/13/2024 None Seen          Radiology Results:   XR chest pa & lateral    Result Date: 5/10/2024  Narrative: History: Sudden onset of left-sided chest pain. PA and lateral films of the chest were obtained. The lung fields are clear. The heart is not enlarged. The mediastinal structures are normal.    Impression: IMPRESSION: Normal chest examination. Workstation:[id number]

## 2024-05-28 NOTE — RESULT ENCOUNTER NOTE
Please call patient (I think he needs an ) and let him know that his x-ray looks good.  There was no large amount of stool in his colon.  I do recommend that we continue as planned with the Linzess.  Thanks.

## 2024-06-03 ENCOUNTER — OFFICE VISIT (OUTPATIENT)
Dept: NEPHROLOGY | Facility: CLINIC | Age: 64
End: 2024-06-03
Payer: COMMERCIAL

## 2024-06-03 ENCOUNTER — APPOINTMENT (OUTPATIENT)
Dept: LAB | Facility: HOSPITAL | Age: 64
End: 2024-06-03

## 2024-06-03 DIAGNOSIS — R80.1 PERSISTENT PROTEINURIA: ICD-10-CM

## 2024-06-03 DIAGNOSIS — N18.2 CKD (CHRONIC KIDNEY DISEASE) STAGE 2, GFR 60-89 ML/MIN: ICD-10-CM

## 2024-06-03 DIAGNOSIS — N20.0 NEPHROLITHIASIS: ICD-10-CM

## 2024-06-03 DIAGNOSIS — N20.0 NEPHROLITHIASIS: Primary | ICD-10-CM

## 2024-06-03 PROCEDURE — 99214 OFFICE O/P EST MOD 30 MIN: CPT | Performed by: INTERNAL MEDICINE

## 2024-06-03 NOTE — LETTER
June 4, 2024     ALE Rojas  [address] [facility] [address]    Patient: Bryson Escoto   YOB: 1960   Date of Visit: 6/3/2024       Dear Dr. Moore:    Thank you for referring Bryson Escoto to me for evaluation. Below are my notes for this consultation.    If you have questions, please do not hesitate to call me. I look forward to following your patient along with you.         Sincerely,        Dennis Aguirre MD        CC: No Recipients    Dennis Aguirre MD  6/4/2024 10:43 AM  Sign when Signing Visit  NEPHROLOGY PROGRESS NOTE    Bryson Escoto 63 y.o. male MRN: [id number]  Unit/Bed#:  Encounter: [id number]  Reason for Consult: Nephrolithiasis    The patient is here for follow-up I have not seen him in about 3 years and only saw him 1 time at that point.  When I looked back at my notes he had mild renal insufficiency and he was primarily being seen for incidentally discovered kidney stones.  He has had no clinical event that he is aware of since he was last seen.    I was able to communicate with the patient but I did use the translation line as well when necessary.    ASSESSMENT/PLAN:    1.  Nephrolithiasis    Patient has imaging that shows small bilateral kidney stones.  It does not appear that he is ever had a known kidney stone event but these were incidentally detected.  When I saw him 3 years ago I wanted to start the workup with a urine collection but that was really never done so I provided him with the urine collection given him instructions and will assess the cause of his kidney stones to see if dietary and/or medical therapy is necessary to reduce the growth and formation of stones.    24-hour urine collection for stone risk and I will contact patient    He follows with urology as well.  On tamsulosin.    2.  Renal    Patient has well-preserved kidney function creatinine stable at 1.2.  In the past he had protein estimation that was in the benign range of around 500 mg as  "he is nondiabetic.  Renal functions been stable we will monitor this once workup for kidney stones is completed.    I have spent a total time of 32 minutes on 06/04/24 in caring for this patient including Diagnostic results, Impressions, Reviewing / ordering tests, medicine, procedures  , and Obtaining or reviewing history  .     SUBJECTIVE:  Review of Systems   Constitutional: Negative for chills, decreased appetite, diaphoresis and fever.   HENT: Negative.     Eyes: Negative.    Cardiovascular:  Negative for chest pain, dyspnea on exertion, leg swelling and orthopnea.   Respiratory:  Negative for cough, shortness of breath, sputum production and wheezing.    Gastrointestinal:  Positive for constipation. Negative for abdominal pain, nausea and vomiting.   Genitourinary:  Positive for nocturia. Negative for dysuria, flank pain, hematuria and incomplete emptying.   Neurological:  Negative for dizziness, focal weakness, headaches and light-headedness.   Psychiatric/Behavioral:  Negative for altered mental status.        OBJECTIVE:  Current Weight: Weight - Scale: 66.7 kg (147 lb)  Vitals:@TMAX(24)@Current:     Height 5' 3\" (1.6 m), weight 66.7 kg (147 lb). , Body mass index is 26.04 kg/m².    [unfilled]    Physical Exam: Ht 5' 3\" (1.6 m)   Wt 66.7 kg (147 lb)   BMI 26.04 kg/m²   Physical Exam  Constitutional:       General: He is not in acute distress.     Appearance: He is not ill-appearing or toxic-appearing.   HENT:      Head: Normocephalic and atraumatic.      Nose: Nose normal.      Mouth/Throat:      Mouth: Mucous membranes are dry.   Eyes:      General: No scleral icterus.     Extraocular Movements: Extraocular movements intact.   Cardiovascular:      Rate and Rhythm: Normal rate and regular rhythm.      Heart sounds:      No friction rub. No gallop.      Comments: No edema.  Pulmonary:      Effort: Pulmonary effort is normal. No respiratory distress.      Breath sounds: No wheezing or rales.   Abdominal:      " General: Bowel sounds are normal. There is no distension.      Palpations: Abdomen is soft.      Tenderness: There is no abdominal tenderness. There is no rebound.   Musculoskeletal:      Cervical back: Normal range of motion and neck supple.   Neurological:      Mental Status: He is alert and oriented to person, place, and time.   Psychiatric:         Mood and Affect: Mood normal.         Behavior: Behavior normal.         Medications:    Current Outpatient Medications:   •  amLODIPine (NORVASC) 10 mg tablet, Take 1 tablet (10 mg total) by mouth daily, Disp: 90 tablet, Rfl: 2  •  cetirizine (ZyrTEC) 10 mg tablet, Take 1 tablet (10 mg total) by mouth daily, Disp: 90 tablet, Rfl: 1  •  fluticasone (FLONASE) 50 mcg/act nasal spray, 1 spray into each nostril daily, Disp: 24 mL, Rfl: 1  •  linaCLOtide (Linzess) 290 MCG CAPS, Take 1 capsule by mouth in the morning, Disp: 90 capsule, Rfl: 3  •  methocarbamol (ROBAXIN) 500 mg tablet, Take 1 tablet (500 mg total) by mouth 2 (two) times a day, Disp: 20 tablet, Rfl: 0  •  omeprazole (PriLOSEC) 40 MG capsule, Take 1 capsule (40 mg total) by mouth daily before breakfast, Disp: 90 capsule, Rfl: 3  •  polyethylene glycol (GLYCOLAX) 17 GM/SCOOP, Take 17 g by mouth 2 (two) times a day, Disp: 850 g, Rfl: 1  •  psyllium (METAMUCIL) 58.6 % powder, Take 1 packet by mouth daily, Disp: 1040 g, Rfl: 2  •  senna (SENOKOT) 8.6 mg, Take 1 tablet (8.6 mg total) by mouth daily at bedtime, Disp: 90 tablet, Rfl: 0  •  tamsulosin (FLOMAX) 0.4 mg, Take 1 capsule (0.4 mg total) by mouth daily with dinner, Disp: 90 capsule, Rfl: 3  •  lidocaine (LMX) 4 % cream, Apply topically as needed for mild pain (Patient not taking: Reported on 10/20/2023), Disp: 120 g, Rfl: 0  •  phenazopyridine (PYRIDIUM) 100 mg tablet, Take 1 tablet (100 mg total) by mouth 3 (three) times a day as needed for bladder spasms (Patient not taking: Reported on 5/24/2024), Disp: 10 tablet, Rfl: 0    Laboratory Results:  Lab  "Results   Component Value Date    WBC 4.13 (L) 05/29/2023    HGB 13.7 05/29/2023    HCT 41.2 05/29/2023    MCV 90 05/29/2023     05/29/2023     Lab Results   Component Value Date    SODIUM 133 (L) 05/10/2024    K 4.4 05/10/2024     05/10/2024    CO2 20 (L) 05/10/2024    BUN 18 05/10/2024    CREATININE 1.24 05/10/2024    GLUC 101 (H) 05/10/2024    CALCIUM 8.5 05/10/2024     Lab Results   Component Value Date    CALCIUM 8.5 05/10/2024     No results found for: \"LABPROT\"      "

## 2024-06-03 NOTE — PATIENT INSTRUCTIONS
I saw you about 3 years ago for kidney stone prevention.  When they did a CAT scan they saw stone in your kidneys and it was not causing any symptoms but the doctor sent you to figure out why you formed them.  It looks like on your last CAT scan they said there is a couple small stones in each kidney.  They are not causing you any symptoms our goal is to figure out why to prevent new ones from forming and these did not grow.    Do the 24-hour urine collection as we discussed and I will call you with results to discuss the reason.    With respect to the function of your kidneys the blood test is very close to normal it is stable so things are doing well with the function.

## 2024-06-04 ENCOUNTER — TELEPHONE (OUTPATIENT)
Dept: FAMILY MEDICINE CLINIC | Facility: CLINIC | Age: 64
End: 2024-06-04

## 2024-06-04 VITALS
DIASTOLIC BLOOD PRESSURE: 70 MMHG | WEIGHT: 147 LBS | SYSTOLIC BLOOD PRESSURE: 130 MMHG | BODY MASS INDEX: 26.05 KG/M2 | HEIGHT: 63 IN | HEART RATE: 68 BPM

## 2024-06-04 NOTE — TELEPHONE ENCOUNTER
Pt left message on nurse line.     Pt emphasizes that during next visit he would like to discuss further about his tremors. Pt states it its present all the time and he thinks it could be Parkinson's.     Pt also wants a referral to check his colon and advised that he can discuss further during his next visit.     Pt states they would like this message sent to his PCP. No further action needed until appt.     Pt reminded of next appt on : 6/19/24

## 2024-06-04 NOTE — PROGRESS NOTES
NEPHROLOGY PROGRESS NOTE    Bryson Escoto 63 y.o. male MRN: [id number]  Unit/Bed#:  Encounter: [id number]  Reason for Consult: Nephrolithiasis    The patient is here for follow-up I have not seen him in about 3 years and only saw him 1 time at that point.  When I looked back at my notes he had mild renal insufficiency and he was primarily being seen for incidentally discovered kidney stones.  He has had no clinical event that he is aware of since he was last seen.    I was able to communicate with the patient but I did use the translation line as well when necessary.    ASSESSMENT/PLAN:    1.  Nephrolithiasis    Patient has imaging that shows small bilateral kidney stones.  It does not appear that he is ever had a known kidney stone event but these were incidentally detected.  When I saw him 3 years ago I wanted to start the workup with a urine collection but that was really never done so I provided him with the urine collection given him instructions and will assess the cause of his kidney stones to see if dietary and/or medical therapy is necessary to reduce the growth and formation of stones.    24-hour urine collection for stone risk and I will contact patient    He follows with urology as well.  On tamsulosin.    2.  Renal    Patient has well-preserved kidney function creatinine stable at 1.2.  In the past he had protein estimation that was in the benign range of around 500 mg as he is nondiabetic.  Renal functions been stable we will monitor this once workup for kidney stones is completed.    I have spent a total time of 32 minutes on 06/04/24 in caring for this patient including Diagnostic results, Impressions, Reviewing / ordering tests, medicine, procedures  , and Obtaining or reviewing history  .     SUBJECTIVE:  Review of Systems   Constitutional: Negative for chills, decreased appetite, diaphoresis and fever.   HENT: Negative.     Eyes: Negative.    Cardiovascular:  Negative for chest pain, dyspnea on  "exertion, leg swelling and orthopnea.   Respiratory:  Negative for cough, shortness of breath, sputum production and wheezing.    Gastrointestinal:  Positive for constipation. Negative for abdominal pain, nausea and vomiting.   Genitourinary:  Positive for nocturia. Negative for dysuria, flank pain, hematuria and incomplete emptying.   Neurological:  Negative for dizziness, focal weakness, headaches and light-headedness.   Psychiatric/Behavioral:  Negative for altered mental status.        OBJECTIVE:  Current Weight: Weight - Scale: 66.7 kg (147 lb)  Vitals:@TMAX(24)@Current:     Height 5' 3\" (1.6 m), weight 66.7 kg (147 lb). , Body mass index is 26.04 kg/m².    [unfilled]    Physical Exam: Ht 5' 3\" (1.6 m)   Wt 66.7 kg (147 lb)   BMI 26.04 kg/m²   Physical Exam  Constitutional:       General: He is not in acute distress.     Appearance: He is not ill-appearing or toxic-appearing.   HENT:      Head: Normocephalic and atraumatic.      Nose: Nose normal.      Mouth/Throat:      Mouth: Mucous membranes are dry.   Eyes:      General: No scleral icterus.     Extraocular Movements: Extraocular movements intact.   Cardiovascular:      Rate and Rhythm: Normal rate and regular rhythm.      Heart sounds:      No friction rub. No gallop.      Comments: No edema.  Pulmonary:      Effort: Pulmonary effort is normal. No respiratory distress.      Breath sounds: No wheezing or rales.   Abdominal:      General: Bowel sounds are normal. There is no distension.      Palpations: Abdomen is soft.      Tenderness: There is no abdominal tenderness. There is no rebound.   Musculoskeletal:      Cervical back: Normal range of motion and neck supple.   Neurological:      Mental Status: He is alert and oriented to person, place, and time.   Psychiatric:         Mood and Affect: Mood normal.         Behavior: Behavior normal.         Medications:    Current Outpatient Medications:     amLODIPine (NORVASC) 10 mg tablet, Take 1 tablet (10 mg " "total) by mouth daily, Disp: 90 tablet, Rfl: 2    cetirizine (ZyrTEC) 10 mg tablet, Take 1 tablet (10 mg total) by mouth daily, Disp: 90 tablet, Rfl: 1    fluticasone (FLONASE) 50 mcg/act nasal spray, 1 spray into each nostril daily, Disp: 24 mL, Rfl: 1    linaCLOtide (Linzess) 290 MCG CAPS, Take 1 capsule by mouth in the morning, Disp: 90 capsule, Rfl: 3    methocarbamol (ROBAXIN) 500 mg tablet, Take 1 tablet (500 mg total) by mouth 2 (two) times a day, Disp: 20 tablet, Rfl: 0    omeprazole (PriLOSEC) 40 MG capsule, Take 1 capsule (40 mg total) by mouth daily before breakfast, Disp: 90 capsule, Rfl: 3    polyethylene glycol (GLYCOLAX) 17 GM/SCOOP, Take 17 g by mouth 2 (two) times a day, Disp: 850 g, Rfl: 1    psyllium (METAMUCIL) 58.6 % powder, Take 1 packet by mouth daily, Disp: 1040 g, Rfl: 2    senna (SENOKOT) 8.6 mg, Take 1 tablet (8.6 mg total) by mouth daily at bedtime, Disp: 90 tablet, Rfl: 0    tamsulosin (FLOMAX) 0.4 mg, Take 1 capsule (0.4 mg total) by mouth daily with dinner, Disp: 90 capsule, Rfl: 3    lidocaine (LMX) 4 % cream, Apply topically as needed for mild pain (Patient not taking: Reported on 10/20/2023), Disp: 120 g, Rfl: 0    phenazopyridine (PYRIDIUM) 100 mg tablet, Take 1 tablet (100 mg total) by mouth 3 (three) times a day as needed for bladder spasms (Patient not taking: Reported on 5/24/2024), Disp: 10 tablet, Rfl: 0    Laboratory Results:  Lab Results   Component Value Date    WBC 4.13 (L) 05/29/2023    HGB 13.7 05/29/2023    HCT 41.2 05/29/2023    MCV 90 05/29/2023     05/29/2023     Lab Results   Component Value Date    SODIUM 133 (L) 05/10/2024    K 4.4 05/10/2024     05/10/2024    CO2 20 (L) 05/10/2024    BUN 18 05/10/2024    CREATININE 1.24 05/10/2024    GLUC 101 (H) 05/10/2024    CALCIUM 8.5 05/10/2024     Lab Results   Component Value Date    CALCIUM 8.5 05/10/2024     No results found for: \"LABPROT\"      "

## 2024-06-06 ENCOUNTER — TELEPHONE (OUTPATIENT)
Dept: FAMILY MEDICINE CLINIC | Facility: CLINIC | Age: 64
End: 2024-06-06

## 2024-06-06 NOTE — TELEPHONE ENCOUNTER
Patient left a message in the Clinical Line about his next appointment in June 19. VM was forwarded to Clerical.      I called Patient who stated Clinical took care of his questions.

## 2024-06-16 ENCOUNTER — APPOINTMENT (OUTPATIENT)
Dept: LAB | Facility: HOSPITAL | Age: 64
End: 2024-06-16
Payer: COMMERCIAL

## 2024-06-16 PROCEDURE — 82507 ASSAY OF CITRATE: CPT

## 2024-06-16 PROCEDURE — 82570 ASSAY OF URINE CREATININE: CPT

## 2024-06-16 PROCEDURE — 83735 ASSAY OF MAGNESIUM: CPT

## 2024-06-16 PROCEDURE — 84300 ASSAY OF URINE SODIUM: CPT

## 2024-06-16 PROCEDURE — 84105 ASSAY OF URINE PHOSPHORUS: CPT

## 2024-06-16 PROCEDURE — 84133 ASSAY OF URINE POTASSIUM: CPT

## 2024-06-16 PROCEDURE — 82140 ASSAY OF AMMONIA: CPT

## 2024-06-16 PROCEDURE — 83935 ASSAY OF URINE OSMOLALITY: CPT

## 2024-06-16 PROCEDURE — 84560 ASSAY OF URINE/URIC ACID: CPT

## 2024-06-16 PROCEDURE — 84392 ASSAY OF URINE SULFATE: CPT

## 2024-06-16 PROCEDURE — 82131 AMINO ACIDS SINGLE QUANT: CPT

## 2024-06-16 PROCEDURE — 83945 ASSAY OF OXALATE: CPT

## 2024-06-16 PROCEDURE — 82436 ASSAY OF URINE CHLORIDE: CPT

## 2024-06-16 PROCEDURE — 82340 ASSAY OF CALCIUM IN URINE: CPT

## 2024-06-16 PROCEDURE — 81003 URINALYSIS AUTO W/O SCOPE: CPT

## 2024-06-18 NOTE — PROGRESS NOTES
Adult Annual Physical  Name: Bryson Escoto      : 1960      MRN: [id number]  Encounter Provider: ALE Rojas  Encounter Date: 2024   Encounter department: [facility]    Assessment & Plan   1. Annual physical exam  2. Benign hypertension  -     amLODIPine (NORVASC) 10 mg tablet; Take 1 tablet (10 mg total) by mouth daily  -     Ambulatory Referral to Social Work Care Management Program; Future  3. Other chest pain  -     Stress test only, exercise; Future; Expected date: 2024  -     Echo complete w/ contrast if indicated; Future; Expected date: 2024  4. Tremor  -     Ambulatory Referral to Neurology; Future  5. Screening PSA (prostate specific antigen)  -     PSA, Total Screen; Future    Immunizations and preventive care screenings were discussed with patient today. Appropriate education was printed on patient's after visit summary.        Counseling:  Dental Health: discussed importance of regular tooth brushing, flossing, and dental visits.  Exercise: the importance of regular exercise/physical activity was discussed. Recommend exercise 3-5 times per week for at least 30 minutes.       Depression Screening and Follow-up Plan: Patient was screened for depression during today's encounter. They screened negative with a PHQ-2 score of 0.        History of Present Illness     Adult Annual Physical:  Patient presents for annual physical. Patient presents today for follow-up of chronic conditions. He reports that he went to the ED last month for chest pain. He had an EKG and labs done which were unremarkable. He was dx with costochondritis but he continues to have pain. He had a stress test done in the past and would like another one for reassurance sake. He does have GERD but this is well controlled with omeprazole.      He is also requesting a neuro referral for tremor. He has a hx of parkinson's in his family. Given his risk factors, he was  "referred last year to neurology but did not make an appt. He also wants a PSA screening test ordered. It was ordered by urology but he wants PCP to order.     Chronic conditions are stable unless otherwise noted.  .     Diet and Physical Activity:  - Diet/Nutrition: well balanced diet.  - Exercise: walking, moderate cardiovascular exercise, strength training exercises and 5-7 times a week on average.    Depression Screening:  - PHQ-2 Score: 0    General Health:  - Sleep: sleeps well and > 8 hours of sleep on average.  - Hearing: normal hearing bilateral ears.  - Vision: most recent eye exam > 1 year ago and no vision problems.  - Dental: regular dental visits, brushes teeth three times daily and does not floss.     Health:  - History of STDs: no.   - Urinary symptoms: urinary frequency.     Review of Systems      Objective     /78 (BP Location: Right arm, Patient Position: Sitting, Cuff Size: Standard)   Pulse 69   Temp 98 °F (36.7 °C) (Temporal)   Resp 16   Ht 5' 3\" (1.6 m)   Wt 65.8 kg (145 lb)   SpO2 98%   BMI 25.69 kg/m²     Physical Exam  Vitals and nursing note reviewed.   Constitutional:       General: He is not in acute distress.     Appearance: He is well-developed and overweight.   HENT:      Head: Normocephalic and atraumatic.      Right Ear: External ear normal.      Left Ear: External ear normal.      Nose: Nose normal.   Eyes:      Conjunctiva/sclera: Conjunctivae normal.   Cardiovascular:      Rate and Rhythm: Normal rate and regular rhythm.      Pulses: Normal pulses.      Heart sounds: Normal heart sounds. No murmur heard.  Pulmonary:      Effort: Pulmonary effort is normal. No respiratory distress.      Breath sounds: Normal breath sounds.   Abdominal:      Palpations: Abdomen is soft.      Tenderness: There is no abdominal tenderness.   Musculoskeletal:         General: No swelling. Normal range of motion.      Cervical back: Normal range of motion and neck supple.      Comments: " Pain NOT reproducible with palpitation, arm flexion maneuver and crowing rooster maneuver.     Skin:     General: Skin is warm and dry.      Capillary Refill: Capillary refill takes less than 2 seconds.   Neurological:      General: No focal deficit present.      Mental Status: He is alert and oriented to person, place, and time. Mental status is at baseline.   Psychiatric:         Mood and Affect: Mood normal.         Behavior: Behavior normal.         Thought Content: Thought content normal.         Judgment: Judgment normal.       Administrative Statements

## 2024-06-19 ENCOUNTER — OFFICE VISIT (OUTPATIENT)
Dept: FAMILY MEDICINE CLINIC | Facility: CLINIC | Age: 64
End: 2024-06-19

## 2024-06-19 VITALS
HEIGHT: 63 IN | DIASTOLIC BLOOD PRESSURE: 78 MMHG | SYSTOLIC BLOOD PRESSURE: 119 MMHG | RESPIRATION RATE: 16 BRPM | WEIGHT: 145 LBS | OXYGEN SATURATION: 98 % | HEART RATE: 69 BPM | BODY MASS INDEX: 25.69 KG/M2 | TEMPERATURE: 98 F

## 2024-06-19 DIAGNOSIS — Z00.00 ANNUAL PHYSICAL EXAM: Primary | ICD-10-CM

## 2024-06-19 DIAGNOSIS — R07.89 OTHER CHEST PAIN: ICD-10-CM

## 2024-06-19 DIAGNOSIS — Z12.5 SCREENING PSA (PROSTATE SPECIFIC ANTIGEN): ICD-10-CM

## 2024-06-19 DIAGNOSIS — R25.1 TREMOR: ICD-10-CM

## 2024-06-19 DIAGNOSIS — I10 BENIGN HYPERTENSION: ICD-10-CM

## 2024-06-19 PROCEDURE — 99396 PREV VISIT EST AGE 40-64: CPT

## 2024-06-19 PROCEDURE — 99214 OFFICE O/P EST MOD 30 MIN: CPT

## 2024-06-19 RX ORDER — AMLODIPINE BESYLATE 10 MG/1
10 TABLET ORAL DAILY
Qty: 90 TABLET | Refills: 2 | Status: SHIPPED | OUTPATIENT
Start: 2024-06-19

## 2024-06-20 ENCOUNTER — APPOINTMENT (OUTPATIENT)
Dept: LAB | Facility: HOSPITAL | Age: 64
End: 2024-06-20
Payer: COMMERCIAL

## 2024-06-20 DIAGNOSIS — N40.0 BENIGN PROSTATIC HYPERPLASIA WITHOUT LOWER URINARY TRACT SYMPTOMS: ICD-10-CM

## 2024-06-20 DIAGNOSIS — Z12.5 SCREENING PSA (PROSTATE SPECIFIC ANTIGEN): ICD-10-CM

## 2024-06-20 DIAGNOSIS — Z12.5 PROSTATE CANCER SCREENING: ICD-10-CM

## 2024-06-20 LAB
PSA SERPL-MCNC: 0.45 NG/ML (ref 0–4)
PSA SERPL-MCNC: 0.49 NG/ML (ref 0–4)

## 2024-06-20 PROCEDURE — 84153 ASSAY OF PSA TOTAL: CPT

## 2024-06-20 PROCEDURE — G0103 PSA SCREENING: HCPCS

## 2024-06-20 PROCEDURE — 36415 COLL VENOUS BLD VENIPUNCTURE: CPT

## 2024-06-25 LAB
AMMONIA UR-SCNC: 33 MMOL/24 HR (ref 15–60)
CA H2 PHOS DIHYD 24H SATFR UR: 0.71 (ref 0.5–2)
CALCIUM 24H UR-MRATE: 147 MG/24 HR
CALCIUM/CREAT UR: 85 MG/G CREAT (ref 34–196)
CALCIUM/KG BODY WEIGHT: 2.2 MG/24 HR/KG
CAOX INDEX 24H UR-RTO: 5.51 (ref 6–10)
CHLORIDE 24H UR-SRATE: 217 MMOL/24 HR (ref 70–250)
CITRATE 24H UR-MCNC: 428 MG/24 HR
COMMENT: ABNORMAL
CREAT UR-MCNC: 1728 MG/24 HR
CREAT/BW 24H UR-RELMRAT: 25.9 MG/24 HR/KG (ref 11.9–24.4)
CYSTINE 24H UR-MCNC: ABNORMAL MG/DL
MAGNESIUM 24H UR-MRATE: 137 MG/24 HR (ref 30–120)
OXALATE UR-MCNC: 34 MG/24 HR (ref 20–40)
PH 24H UR: 5.99 [PH] (ref 5.8–6.2)
PHOSPHATE 24H UR-MRATE: 703 MG/24 HR (ref 600–1200)
POTASSIUM 24H UR-SCNC: 45 MMOL/24 HR (ref 20–100)
PROTEIN CATABOLIC RATE 24H UR-MRATE: 1.1 G/KG/24 HR (ref 0.8–1.4)
SODIUM 24H UR-SRATE: 218 MMOL/24 HR (ref 50–150)
SPECIMEN VOL 24H UR: 1430 ML/24 HR (ref 500–4000)
SULFATE 24H UR-SRATE: 38 MEQ/24 HR (ref 20–80)
URATE 24H SATFR UR: 0.89
URATE 24H UR-MRATE: 626 MG/24 HR
UUN 24H UR-MRATE: 9.68 G/24 HR (ref 6–14)

## 2024-07-12 ENCOUNTER — TELEPHONE (OUTPATIENT)
Dept: NEPHROLOGY | Facility: CLINIC | Age: 64
End: 2024-07-12

## 2024-07-12 ENCOUNTER — TELEPHONE (OUTPATIENT)
Age: 64
End: 2024-07-12

## 2024-07-12 DIAGNOSIS — N20.0 NEPHROLITHIASIS: Primary | ICD-10-CM

## 2024-07-12 DIAGNOSIS — R80.1 PERSISTENT PROTEINURIA: ICD-10-CM

## 2024-07-12 DIAGNOSIS — N18.2 CKD (CHRONIC KIDNEY DISEASE) STAGE 2, GFR 60-89 ML/MIN: ICD-10-CM

## 2024-07-12 DIAGNOSIS — R39.9 UTI SYMPTOMS: Primary | ICD-10-CM

## 2024-07-12 NOTE — TELEPHONE ENCOUNTER
I left a very detailed message for Bryson below and advised for him to call the office back and advise if he is willing to start the new medication as  is recommending.   - Needs a December f/u.  BMP in epic.         ----- Message from Dennis Aguirre MD sent at 7/12/2024 11:52 AM EDT -----  Let him know that I got his urine collection.  It showed that the citrate which is an inhibitor of stones was low in his urine so I would like to put him on a medication where he would take 1 pill twice a day and that would increase the citrate to help prevent the stones.    If he agrees let me know I will send in the prescription.    Also I do not know if he is on call back but you can make him a follow-up with me say December and I would like him to do a BMP and urine protein to creatinine ratio before that.    Thanks and please let me know he got the message.

## 2024-07-12 NOTE — TELEPHONE ENCOUNTER
Spoke with pt via  ([id number]) regarding symptoms. Pt sts he has been having a lot of pain in testicles and he is taking tamsulosin (FLOMAX) 0.4 mg for BPH with urinary hesitancy and he sts this medication is not helping him at all. Pt is requesting a call back from clinical to review symptoms.    Pt call back- [phone]    Pt will need  on the line to translate.

## 2024-07-15 NOTE — TELEPHONE ENCOUNTER
LM in English to contact office to assess symptoms. If he calls back, please get more information on exact symptoms patient is having.

## 2024-07-16 ENCOUNTER — APPOINTMENT (OUTPATIENT)
Dept: LAB | Facility: HOSPITAL | Age: 64
End: 2024-07-16
Payer: COMMERCIAL

## 2024-07-16 DIAGNOSIS — R39.9 UTI SYMPTOMS: ICD-10-CM

## 2024-07-16 LAB
BACTERIA UR QL AUTO: ABNORMAL /HPF
BILIRUB UR QL STRIP: NEGATIVE
CLARITY UR: CLEAR
COLOR UR: ABNORMAL
GLUCOSE UR STRIP-MCNC: NEGATIVE MG/DL
HGB UR QL STRIP.AUTO: NEGATIVE
KETONES UR STRIP-MCNC: NEGATIVE MG/DL
LEUKOCYTE ESTERASE UR QL STRIP: NEGATIVE
NITRITE UR QL STRIP: NEGATIVE
NON-SQ EPI CELLS URNS QL MICRO: ABNORMAL /HPF
PH UR STRIP.AUTO: 5.5 [PH]
PROT UR STRIP-MCNC: ABNORMAL MG/DL
RBC #/AREA URNS AUTO: ABNORMAL /HPF
SP GR UR STRIP.AUTO: 1.02 (ref 1–1.03)
UROBILINOGEN UR STRIP-ACNC: <2 MG/DL
WBC #/AREA URNS AUTO: ABNORMAL /HPF

## 2024-07-16 PROCEDURE — 81001 URINALYSIS AUTO W/SCOPE: CPT

## 2024-07-16 PROCEDURE — 87086 URINE CULTURE/COLONY COUNT: CPT

## 2024-07-16 NOTE — TELEPHONE ENCOUNTER
Spoke to patient through  [id number] and he is experiencing similar symptoms as he had at his last appointment on 5/9/2024 with [facility]. History of orchalgia and BPH. He states he is having bilateral orchalgia to a pain scale of five. Pain is going down both legs. He is experiencing BPH symptoms also of increased urinary frequency and urgency with voiding little amounts intermittently. He does not always feel empty and dysuria is present. He is on Tamsulosin. Denies hematuria. Urinary stream is weak at times. He was given care advice to hydrate well with water, utilize scrotal support and reviewed ER precautions. Patient is at work right now and he will try to go to the lab today and he currently does not have scrotal support measures to use. Urine orders placed and he is aware we will seek provider feedback for further recommendations and imaging if recommended. Please advise.

## 2024-07-16 NOTE — TELEPHONE ENCOUNTER
Pt called back on his own, he stated he is experiencing pain in his testicles and both legs have been feeling very weak. Pt stated he wants to be seen in the office as soon as possible.     Please advise.     Call back: [phone]

## 2024-07-16 NOTE — TELEPHONE ENCOUNTER
Patient called upset and confused regarding recent orders. Per patient, he understood from previous phone call that the office was going to order UA and UC and also CT-Scan or X-ray, for recent medical concern. Per patient, he said he came to lab, ready to do multiple tests but only UA and US was ordered. Patient would like a callback to review conversation he had earlier today, and would like to know why imaging procedures were not ordered. Please assist

## 2024-07-16 NOTE — TELEPHONE ENCOUNTER
Pain consistent from before. Per Sesar's last note, patient to follow up with PCP for workup of musculoskeletal source. Patient will need scrotal support, ibuprofen as needed, and warm soaks. Can have urine testing for urinary symptoms. Thanks

## 2024-07-17 LAB — BACTERIA UR CULT: NORMAL

## 2024-07-17 NOTE — TELEPHONE ENCOUNTER
"Called and spoke with patient in [place]ian. RN informed of recommendations from PA which are the same as when he was seen last. Patient yells \"tell the doctor that I pay for insurance and it's not what he says but what I say\" and then hung up the phone.   "

## 2024-07-17 NOTE — TELEPHONE ENCOUNTER
Patient called stating he went to the hospital and had urine test done but he thought there were other test to be ordered like a ct scan.  Patient then wanted someone that speaks [place].  I was able to help him in Citizen of [place].  He stated he is having back pain and it radiates to his legs.  He also stated he was having testicular pain.  He stated on his last office visit he complained of the same thing.  He still experience the discomfort on his testicles and wants to know how to proceed.  He must have been at work because then call got disconnected.  He did state he wants a call back.    Patient does speak English   [phone]

## 2024-07-19 ENCOUNTER — PATIENT OUTREACH (OUTPATIENT)
Dept: FAMILY MEDICINE CLINIC | Facility: CLINIC | Age: 64
End: 2024-07-19

## 2024-07-19 NOTE — PROGRESS NOTES
[facility] received a new referral on 06/19/2024 in regard to pt with need for social work follow up due to benign hypertension. [facility] attempted to reach pt using [employer],  # [phone] and we were unable to reach pt. A message was left to please return [facility] call.

## 2024-07-22 ENCOUNTER — PATIENT OUTREACH (OUTPATIENT)
Dept: FAMILY MEDICINE CLINIC | Facility: CLINIC | Age: 64
End: 2024-07-22

## 2024-07-30 ENCOUNTER — HOSPITAL ENCOUNTER (OUTPATIENT)
Dept: NON INVASIVE DIAGNOSTICS | Facility: HOSPITAL | Age: 64
Discharge: HOME | End: 2024-07-30
Payer: COMMERCIAL

## 2024-07-30 VITALS — HEIGHT: 63 IN | BODY MASS INDEX: 25.69 KG/M2 | WEIGHT: 145 LBS

## 2024-07-30 DIAGNOSIS — R07.89 OTHER CHEST PAIN: ICD-10-CM

## 2024-07-30 LAB
ARRHY DURING EX: NORMAL
CHEST PAIN STATEMENT: NORMAL
MAX DIASTOLIC BP: 70 MMHG
MAX PREDICTED HEART RATE: 156 BPM
PROTOCOL NAME: NORMAL
STRESS POST EXERCISE DUR MIN: 10 MIN
STRESS POST EXERCISE DUR SEC: 0 SEC
STRESS POST PEAK HR: 155 BPM
STRESS POST PEAK SYSTOLIC BP: 180 MMHG
TARGET HR FORMULA: NORMAL
TEST INDICATION: NORMAL

## 2024-07-30 PROCEDURE — 93016 CV STRESS TEST SUPVJ ONLY: CPT | Performed by: INTERNAL MEDICINE

## 2024-07-30 PROCEDURE — 93018 CV STRESS TEST I&R ONLY: CPT | Performed by: INTERNAL MEDICINE

## 2024-07-30 PROCEDURE — 93017 CV STRESS TEST TRACING ONLY: CPT

## 2024-08-01 LAB
MAX HR PERCENT: 99 %
MAX HR: 155 BPM
RATE PRESSURE PRODUCT: NORMAL
SL CV STRESS RECOVERY BP: NORMAL MMHG
SL CV STRESS RECOVERY HR: 81 BPM
SL CV STRESS RECOVERY O2 SAT: 98 %
SL CV STRESS STAGE REACHED: 4
STRESS ANGINA INDEX: 0
STRESS BASELINE BP: NORMAL MMHG
STRESS BASELINE HR: 65 BPM
STRESS O2 SAT REST: 97 %
STRESS PEAK HR: 155 BPM
STRESS POST ESTIMATED WORKLOAD: 11.7 METS
STRESS POST EXERCISE DUR MIN: 10 MIN
STRESS POST O2 SAT PEAK: 98 %
STRESS POST PEAK BP: 180 MMHG

## 2024-08-07 ENCOUNTER — TELEPHONE (OUTPATIENT)
Dept: FAMILY MEDICINE CLINIC | Facility: CLINIC | Age: 64
End: 2024-08-07

## 2024-08-07 NOTE — TELEPHONE ENCOUNTER
Pt left vm on nurse line stating they were returning a call about lab results     Unable to reach, Lvm asking for call back

## 2024-08-09 ENCOUNTER — TELEPHONE (OUTPATIENT)
Age: 64
End: 2024-08-09

## 2024-08-09 NOTE — TELEPHONE ENCOUNTER
He can add MiraLAX 1 capful daily to his regime of medication if he would like.  If he plans on going to ER I will watch for that report.  He may benefit from citrate of magnesia 1/2-1 bottle today but lets see what the ER says.

## 2024-08-20 ENCOUNTER — OFFICE VISIT (OUTPATIENT)
Dept: GASTROENTEROLOGY | Facility: MEDICAL CENTER | Age: 64
End: 2024-08-20
Payer: COMMERCIAL

## 2024-08-20 ENCOUNTER — TELEPHONE (OUTPATIENT)
Dept: GASTROENTEROLOGY | Facility: CLINIC | Age: 64
End: 2024-08-20

## 2024-08-20 VITALS
SYSTOLIC BLOOD PRESSURE: 126 MMHG | OXYGEN SATURATION: 98 % | DIASTOLIC BLOOD PRESSURE: 74 MMHG | TEMPERATURE: 98.4 F | BODY MASS INDEX: 25.16 KG/M2 | HEIGHT: 63 IN | WEIGHT: 142 LBS | HEART RATE: 71 BPM

## 2024-08-20 DIAGNOSIS — K59.09 OTHER CONSTIPATION: Primary | ICD-10-CM

## 2024-08-20 DIAGNOSIS — R14.0 GASSINESS: ICD-10-CM

## 2024-08-20 PROCEDURE — 99214 OFFICE O/P EST MOD 30 MIN: CPT | Performed by: NURSE PRACTITIONER

## 2024-08-20 RX ORDER — SIMETHICONE 180 MG
180 CAPSULE ORAL EVERY 6 HOURS PRN
Qty: 120 CAPSULE | Refills: 1 | Status: SHIPPED | OUTPATIENT
Start: 2024-08-20

## 2024-08-20 RX ORDER — POLYETHYLENE GLYCOL 3350, SODIUM CHLORIDE, SODIUM BICARBONATE, POTASSIUM CHLORIDE 420; 11.2; 5.72; 1.48 G/4L; G/4L; G/4L; G/4L
4000 POWDER, FOR SOLUTION ORAL ONCE
Qty: 4000 ML | Refills: 0 | Status: SHIPPED | OUTPATIENT
Start: 2024-08-20 | End: 2024-08-20

## 2024-08-20 NOTE — TELEPHONE ENCOUNTER
Procedure: Colonoscopy  Date: 08/29/2024  Physician performing: Dr. Guerrero   Location of procedure:  [place]  Instructions given to patient: Golytely  Diabetic: N/A  Clearances: N/A

## 2024-08-20 NOTE — TELEPHONE ENCOUNTER
Pt calling to clarify his scope date, he says the paper said 24th but he thought it should be the 29th. I verified the date as the 29th and that it may have just been a typo on the paper. He asked when he will know a time and I explained he will get a call one to two days before from surgery for that. He understands

## 2024-08-20 NOTE — PROGRESS NOTES
[facility] - Outpatient Follow-up Note  Bryson Escoto 63 y.o. male MRN: [id number]  Encounter: [id number]          ASSESSMENT AND PLAN:      1.  GERD  2.  Gastric intestinal metaplasia    History of gastric intestinal metaplasia and inactive gastritis on EGD 10/24.  Repeat EGD recommended in 3 years.  He is currently taking 40 mg of omeprazole daily.  Reflux is controlled.     -Repeat EGD 10/27    3.  Chronic constipation    History of chronic constipation.  He was on Amitiza 24 mcg twice daily with no help.  He also took MiraLAX with that as well as fiber.  At last visit patient was started on Linzess 290 mcg daily.  He is now reporting that he is having loose stools and increased gas.  He would like to stop this medication.  I did recommend that he continue fiber supplement.  He is reporting increase gassiness since starting the Linzess.  I did recommend lowering the dose but patient wants to stop the medication completely.  I did stress that he may develop more constipation but he still wants to stop it.  Last colonoscopy was 8/19 and was normal.  No weight loss.  No melena hematochezia.    -Stop Linzess  -Simethicone 180 g every 6 -8 as needed  -Colonoscopy with GoLytely/Dulcolax prep     The risks/benefits/alternatives of the procedure were discussed with the patient. Risks included, but not limited to, infection, bleeding, perforation, injury to organs in the abdomen, missed lesion and incomplete procedure were discussed.   ______________________________________________________________________    SUBJECTIVE: 63-year-old male here for follow-up.  He was last seen by myself 5/24/2024 for GERD, gastric intestinal metaplasia and chronic constipation.  [employer] required today for translation as patient only speaks [place].    History of chronic constipation.  He was on Amitiza 24 mcg twice daily with no help.  He also took MiraLAX with that as well as fiber.  At last visit patient was  started on Linzess 290 mcg daily.  He is now reporting that he is having loose stools and increased gas.  He would like to stop this medication.    Prior EGD/colonoscopy     EGD 10/24-Irregular Z-line; performed cold forceps biopsy  C0M1 Castellanos's esophagus  Moderate erythematous mucosa with loss of vascular pattern in the antrum. Mapping obtained given history of gastrointestinal metaplasia  The duodenum appeared normal.  Repeat EGD recommended in 3 years due to extensive gastric intestinal metaplasia.  Biopsies noted gastrointestinal.  No H. pylori.     He has had extensive gastric intestinal metaplasia found on 3 EGDs in 2022.  Recommended 1 year follow-up EGD.  He has a family history of gastric cancer in his uncle.     Colonoscopy 8/19-normal.  Recall colon in 10 years    REVIEW OF SYSTEMS IS OTHERWISE NEGATIVE.  10 point review of systems negative other than per HPI      Historical Information   Past Medical History:   Diagnosis Date   • COVID-19 virus infection 5/1/2020   • Exposure to COVID-19 virus 11/23/2020   • Family history of parkinsonism 4/30/2020   • History of chronic constipation    • History of neck pain 11/21/2017   • Hypertension      Past Surgical History:   Procedure Laterality Date   • CYSTOSCOPY  05/29/2014    diagnostic managed by Jalen Mora   • TOOTH EXTRACTION       Social History   Social History     Substance and Sexual Activity   Alcohol Use Yes    Comment: social     Social History     Substance and Sexual Activity   Drug Use Never     Social History     Tobacco Use   Smoking Status Former   • Current packs/day: 0.00   • Average packs/day: 0.3 packs/day for 20.0 years (5.0 ttl pk-yrs)   • Types: Cigarettes   • Start date: 1/8/1994   • Quit date: 1/8/2014   • Years since quitting: 10.6   • Passive exposure: Never   Smokeless Tobacco Never     Family History   Problem Relation Age of Onset   • Cancer Sister         malignant neoplasm of breast   • Diabetes Paternal Grandmother    •  "Stomach cancer Paternal Uncle         unsure if colon or stomach   • Diabetes Family    • Arthritis Family    • Cancer Family         malignant neoplasm   • Substance Abuse Neg Hx         denied Mother and Father   • Mental illness Neg Hx         no family history Mother and Father   • Other Neg Hx         denied family history of Osteopertrosis       Meds/Allergies       Current Outpatient Medications:   •  amLODIPine (NORVASC) 10 mg tablet  •  cetirizine (ZyrTEC) 10 mg tablet  •  fluticasone (FLONASE) 50 mcg/act nasal spray  •  linaCLOtide (Linzess) 290 MCG CAPS  •  methocarbamol (ROBAXIN) 500 mg tablet  •  omeprazole (PriLOSEC) 40 MG capsule  •  polyethylene glycol (GLYCOLAX) 17 GM/SCOOP  •  polyethylene glycol-electrolytes (NULYTELY) 4000 mL solution  •  psyllium (METAMUCIL) 58.6 % powder  •  senna (SENOKOT) 8.6 mg  •  simethicone (Gas-X Ultra Strength) 180 MG capsule  •  tamsulosin (FLOMAX) 0.4 mg  •  lidocaine (LMX) 4 % cream  •  phenazopyridine (PYRIDIUM) 100 mg tablet    No Known Allergies        Objective     Blood pressure 126/74, pulse 71, temperature 98.4 °F (36.9 °C), height 5' 3\" (1.6 m), weight 64.4 kg (142 lb), SpO2 98%. Body mass index is 25.15 kg/m².      PHYSICAL EXAM:      General Appearance:   Alert, cooperative, no distress   HEENT:   Normocephalic, atraumatic, anicteric.     Neck:  Supple, symmetrical, trachea midline   Lungs:   Clear to auscultation bilaterally; no rales, rhonchi or wheezing; respirations unlabored    Heart::   Regular rate and rhythm; no murmur, rub, or gallop.   Abdomen:   Soft, non-tender, non-distended; normal bowel sounds; no masses, no organomegaly    Genitalia:   Deferred    Rectal:   Deferred    Extremities:  No cyanosis, clubbing or edema    Pulses:  2+ and symmetric    Skin:  No jaundice, rashes, or lesions    Lymph nodes:  No palpable cervical lymphadenopathy        Lab Results:   No visits with results within 1 Day(s) from this visit.   Latest known visit with " results is:   Hospital Outpatient Visit on 07/30/2024   Component Date Value   • Baseline HR 07/30/2024 65    • Baseline BP 07/30/2024 110/72    • O2 sat rest 07/30/2024 97    • Stress peak HR 07/30/2024 155    • Post peak BP 07/30/2024 180    • O2 sat peak 07/30/2024 98    • Recovery HR 07/30/2024 81    • Recovery BP 07/30/2024 130/76    • O2 sat recovery 07/30/2024 98    • Max HR 07/30/2024 155    • Max HR Percent 07/30/2024 99    • Exercise duration (min) 07/30/2024 10    • Estimated workload 07/30/2024 11.7    • Rate Pressure Product 07/30/2024 27,900.0    • Angina Index 07/30/2024 0    • Stress Stage Reached 07/30/2024 4.0    • Protocol Name 07/30/2024 RUBY    • Exercise duration (min) 07/30/2024 10    • Exercise duration (sec) 07/30/2024 0    • Post Peak Systolic BP 07/30/2024 180    • Max Diastolic Bp 07/30/2024 70    • Peak HR 07/30/2024 155    • Max Predicted Heart Rate 07/30/2024 156    • Reason for Termination 07/30/2024                      Value:Fatigue  Dyspnea  Target Heart Rate Achieved     • Test Indication 07/30/2024 CHEST PAIN    • Target Hr Formular 07/30/2024 (220 - Age)*85%    • Arrhy During Ex 07/30/2024 ventricular premature beats-isolated    • Chest Pain Statement 07/30/2024 none          Radiology Results:   Stress test only, exercise    Result Date: 8/1/2024  Narrative: •  Stress test protocol: Exercise treadmill stress test •  Resting ECG: Normal sinus rhythm.  Resting ECG is normal. •  Exercise capacity: Excellent exercise capacity (total exercise time:  10:00 min; Work load: 11.70 METS).  Patient achieved 99% of patient's age-predicted maximal heart rate. •  Stress ECG: No diagnostic ST-T wave deviation is noted during stress test.  No significant arrhythmias were noted during the stress test. •  Symptoms: No angina symptoms reported during stress.  Patient had shortness of breath and fatigue. •  Blood Pressure: Normal resting blood pressure.  Appropriate blood pressure response noted  during the stress test. •  Study impression: Exercise treadmill stress test suggests low probability for significant coronary artery ischemia.  Eunice Domingo MD    Stress strip    Result Date: 7/30/2024  Narrative: Confirmed by JUAN ANTONIO SUMMERS ([id number]),  Lyle Linder ([id number]) on 7/30/2024 8:34:37 AM

## 2024-08-21 ENCOUNTER — ANESTHESIA (OUTPATIENT)
Dept: ANESTHESIOLOGY | Facility: HOSPITAL | Age: 64
End: 2024-08-21

## 2024-08-21 ENCOUNTER — ANESTHESIA EVENT (OUTPATIENT)
Dept: ANESTHESIOLOGY | Facility: HOSPITAL | Age: 64
End: 2024-08-21

## 2024-08-27 ENCOUNTER — TELEPHONE (OUTPATIENT)
Dept: GASTROENTEROLOGY | Facility: MEDICAL CENTER | Age: 64
End: 2024-08-27

## 2024-08-28 ENCOUNTER — ANESTHESIA (OUTPATIENT)
Dept: ANESTHESIOLOGY | Facility: HOSPITAL | Age: 64
End: 2024-08-28

## 2024-08-28 ENCOUNTER — ANESTHESIA EVENT (OUTPATIENT)
Dept: ANESTHESIOLOGY | Facility: HOSPITAL | Age: 64
End: 2024-08-28

## 2024-08-28 NOTE — TELEPHONE ENCOUNTER
Pt called for arrival time and called to have instructions resent to him. Instructions emailed, pt received.

## 2024-08-28 NOTE — ANESTHESIA PREPROCEDURE EVALUATION
Procedure:  PRE-OP ONLY  Colonoscopy for constipation    Stress test - low probability for significant coronary artery ischemia   ECG: NSR    HTN - amlodipine   Relevant Problems   CARDIO   (+) Benign hypertension   (+) REEVES (dyspnea on exertion)      GI/HEPATIC   (+) Gastroesophageal reflux disease      /RENAL   (+) Benign prostatic hyperplasia with urinary hesitancy   (+) CKD (chronic kidney disease) stage 2, GFR 60-89 ml/min   (+) Nephrolithiasis      MUSCULOSKELETAL   (+) Right-sided low back pain with right-sided sciatica      PULMONARY   (+) REEVES (dyspnea on exertion)             Anesthesia Plan  ASA Score- 2     Anesthesia Type- IV sedation with anesthesia with ASA Monitors.         Additional Monitors:     Airway Plan:            Plan Factors-    Chart reviewed. EKG reviewed.  Existing labs reviewed. Patient summary reviewed.                  Induction-     Postoperative Plan-         Informed Consent-

## 2024-08-29 ENCOUNTER — OFFICE VISIT (OUTPATIENT)
Dept: UROLOGY | Facility: CLINIC | Age: 64
End: 2024-08-29
Payer: COMMERCIAL

## 2024-08-29 ENCOUNTER — HOSPITAL ENCOUNTER (OUTPATIENT)
Dept: GASTROENTEROLOGY | Facility: MEDICAL CENTER | Age: 64
Setting detail: OUTPATIENT SURGERY
End: 2024-08-29
Payer: COMMERCIAL

## 2024-08-29 ENCOUNTER — ANESTHESIA EVENT (OUTPATIENT)
Dept: GASTROENTEROLOGY | Facility: MEDICAL CENTER | Age: 64
End: 2024-08-29

## 2024-08-29 ENCOUNTER — ANESTHESIA (OUTPATIENT)
Dept: GASTROENTEROLOGY | Facility: MEDICAL CENTER | Age: 64
End: 2024-08-29

## 2024-08-29 VITALS
OXYGEN SATURATION: 99 % | WEIGHT: 143 LBS | HEART RATE: 67 BPM | SYSTOLIC BLOOD PRESSURE: 130 MMHG | DIASTOLIC BLOOD PRESSURE: 72 MMHG | BODY MASS INDEX: 25.34 KG/M2 | HEIGHT: 63 IN

## 2024-08-29 VITALS
SYSTOLIC BLOOD PRESSURE: 123 MMHG | RESPIRATION RATE: 12 BRPM | TEMPERATURE: 97 F | DIASTOLIC BLOOD PRESSURE: 77 MMHG | OXYGEN SATURATION: 99 % | HEART RATE: 60 BPM

## 2024-08-29 DIAGNOSIS — K59.09 OTHER CONSTIPATION: ICD-10-CM

## 2024-08-29 DIAGNOSIS — N40.0 BENIGN PROSTATIC HYPERPLASIA WITHOUT LOWER URINARY TRACT SYMPTOMS: Primary | ICD-10-CM

## 2024-08-29 LAB — POST-VOID RESIDUAL VOLUME, ML POC: 8 ML

## 2024-08-29 PROCEDURE — 88305 TISSUE EXAM BY PATHOLOGIST: CPT | Performed by: PATHOLOGY

## 2024-08-29 PROCEDURE — 45380 COLONOSCOPY AND BIOPSY: CPT | Performed by: INTERNAL MEDICINE

## 2024-08-29 PROCEDURE — 51798 US URINE CAPACITY MEASURE: CPT | Performed by: PHYSICIAN ASSISTANT

## 2024-08-29 PROCEDURE — 88342 IMHCHEM/IMCYTCHM 1ST ANTB: CPT | Performed by: PATHOLOGY

## 2024-08-29 PROCEDURE — 99213 OFFICE O/P EST LOW 20 MIN: CPT | Performed by: PHYSICIAN ASSISTANT

## 2024-08-29 RX ORDER — ALBUTEROL SULFATE 0.83 MG/ML
2.5 SOLUTION RESPIRATORY (INHALATION) ONCE AS NEEDED
Status: CANCELLED | OUTPATIENT
Start: 2024-08-29

## 2024-08-29 RX ORDER — SODIUM CHLORIDE 9 MG/ML
125 INJECTION, SOLUTION INTRAVENOUS CONTINUOUS
Status: SHIPPED | OUTPATIENT
Start: 2024-08-29

## 2024-08-29 RX ORDER — SODIUM CHLORIDE 9 MG/ML
125 INJECTION, SOLUTION INTRAVENOUS CONTINUOUS
Status: CANCELLED | OUTPATIENT
Start: 2024-08-29

## 2024-08-29 RX ORDER — ONDANSETRON 2 MG/ML
4 INJECTION INTRAMUSCULAR; INTRAVENOUS ONCE AS NEEDED
Status: CANCELLED | OUTPATIENT
Start: 2024-08-29

## 2024-08-29 RX ORDER — ONDANSETRON 2 MG/ML
4 INJECTION INTRAMUSCULAR; INTRAVENOUS ONCE AS NEEDED
Status: SHIPPED | OUTPATIENT
Start: 2024-08-29

## 2024-08-29 RX ORDER — FENTANYL CITRATE/PF 50 MCG/ML
25 SYRINGE (ML) INJECTION
Status: SHIPPED | OUTPATIENT
Start: 2024-08-29

## 2024-08-29 RX ORDER — FENTANYL CITRATE/PF 50 MCG/ML
25 SYRINGE (ML) INJECTION
Status: CANCELLED | OUTPATIENT
Start: 2024-08-29

## 2024-08-29 RX ORDER — PROPOFOL 10 MG/ML
INJECTION, EMULSION INTRAVENOUS AS NEEDED
Status: DISCONTINUED | OUTPATIENT
Start: 2024-08-29 | End: 2024-08-29

## 2024-08-29 RX ORDER — ALBUTEROL SULFATE 0.83 MG/ML
2.5 SOLUTION RESPIRATORY (INHALATION) ONCE AS NEEDED
Status: SHIPPED | OUTPATIENT
Start: 2024-08-29

## 2024-08-29 RX ADMIN — PROPOFOL 50 MG: 10 INJECTION, EMULSION INTRAVENOUS at 12:30

## 2024-08-29 RX ADMIN — PROPOFOL 70 MG: 10 INJECTION, EMULSION INTRAVENOUS at 12:18

## 2024-08-29 RX ADMIN — PROPOFOL 50 MG: 10 INJECTION, EMULSION INTRAVENOUS at 12:29

## 2024-08-29 RX ADMIN — PROPOFOL 30 MG: 10 INJECTION, EMULSION INTRAVENOUS at 12:21

## 2024-08-29 RX ADMIN — PROPOFOL 50 MG: 10 INJECTION, EMULSION INTRAVENOUS at 12:28

## 2024-08-29 RX ADMIN — PROPOFOL 50 MG: 10 INJECTION, EMULSION INTRAVENOUS at 12:23

## 2024-08-29 NOTE — ANESTHESIA POSTPROCEDURE EVALUATION
Post-Op Assessment Note    CV Status:  Stable    Pain management: adequate       Mental Status:  Alert and awake   Hydration Status:  Euvolemic   PONV Controlled:  Controlled   Airway Patency:  Patent     Post Op Vitals Reviewed: Yes    No anethesia notable event occurred.    Staff: Anesthesiologist, with CRNAs               /77 (08/29/24 1239)    Temp (!) 97 °F (36.1 °C) (08/29/24 1239)    Pulse 67 (08/29/24 1239)   Resp 15 (08/29/24 1239)    SpO2 98 % (08/29/24 1239)

## 2024-08-29 NOTE — ANESTHESIA PREPROCEDURE EVALUATION
Procedure:  COLONOSCOPY  Colonoscopy for constipation     Stress test - low probability for significant coronary artery ischemia   ECG: NSR     HTN - amlodipine     Denies the following: CP/SOB with exertion, asthma, COPD, SUNSHINE, stroke/TIA, seizure    Relevant Problems   CARDIO   (+) Benign hypertension   (+) REEVES (dyspnea on exertion)      GI/HEPATIC   (+) Gastroesophageal reflux disease      /RENAL   (+) Benign prostatic hyperplasia with urinary hesitancy   (+) CKD (chronic kidney disease) stage 2, GFR 60-89 ml/min   (+) Nephrolithiasis      MUSCULOSKELETAL   (+) Right-sided low back pain with right-sided sciatica      PULMONARY   (+) REEVES (dyspnea on exertion)             Anesthesia Plan  ASA Score- 2     Anesthesia Type- IV sedation with anesthesia with ASA Monitors.         Additional Monitors:     Airway Plan:            Plan Factors-    Chart reviewed. EKG reviewed.  Existing labs reviewed. Patient summary reviewed.                  Induction-     Postoperative Plan-         Informed Consent-

## 2024-08-29 NOTE — PROGRESS NOTES
UROLOGY PROGRESS NOTE   Patient Identifiers: Bryson Escoto (MRN [id number])  Date of Service: 8/29/2024    Subjective:   63-year-old man history of BPH and orchialgia.  His PSA is 0.44.  He is on tamsulosin.  He complains of urge urinary incontinence.  He goes frequently at night 2-3 times.  PVR today is 8 mL.    Reason for visit: BPH and orchialgia follow-up    Objective:     VITALS:    Vitals:    08/29/24 1428   BP: 130/72   Pulse: 67   SpO2: 99%           LABS:  Lab Results   Component Value Date    HGB 13.7 05/29/2023    HCT 41.2 05/29/2023    WBC 4.13 (L) 05/29/2023     05/29/2023   ]    Lab Results   Component Value Date     09/15/2015    K 4.4 05/10/2024     05/10/2024    CO2 20 (L) 05/10/2024    BUN 18 05/10/2024    CREATININE 1.24 05/10/2024    CALCIUM 8.5 05/10/2024    GLUCOSE 113 09/15/2015   ]        INPATIENT MEDS:    Current Outpatient Medications:     amLODIPine (NORVASC) 10 mg tablet, Take 1 tablet (10 mg total) by mouth daily, Disp: 90 tablet, Rfl: 2    cetirizine (ZyrTEC) 10 mg tablet, Take 1 tablet (10 mg total) by mouth daily, Disp: 90 tablet, Rfl: 1    fluticasone (FLONASE) 50 mcg/act nasal spray, 1 spray into each nostril daily, Disp: 24 mL, Rfl: 1    linaCLOtide (Linzess) 290 MCG CAPS, Take 1 capsule by mouth in the morning, Disp: 90 capsule, Rfl: 3    methocarbamol (ROBAXIN) 500 mg tablet, Take 1 tablet (500 mg total) by mouth 2 (two) times a day, Disp: 20 tablet, Rfl: 0    omeprazole (PriLOSEC) 40 MG capsule, Take 1 capsule (40 mg total) by mouth daily before breakfast, Disp: 90 capsule, Rfl: 3    polyethylene glycol (GLYCOLAX) 17 GM/SCOOP, Take 17 g by mouth 2 (two) times a day, Disp: 850 g, Rfl: 1    psyllium (METAMUCIL) 58.6 % powder, Take 1 packet by mouth daily, Disp: 1040 g, Rfl: 2    senna (SENOKOT) 8.6 mg, Take 1 tablet (8.6 mg total) by mouth daily at bedtime, Disp: 90 tablet, Rfl: 0    simethicone (Gas-X Ultra Strength) 180 MG capsule, Take 1 capsule (180 mg  "total) by mouth every 6 (six) hours as needed for flatulence, Disp: 120 capsule, Rfl: 1    tamsulosin (FLOMAX) 0.4 mg, Take 1 capsule (0.4 mg total) by mouth daily with dinner, Disp: 90 capsule, Rfl: 3    lidocaine (LMX) 4 % cream, Apply topically as needed for mild pain (Patient not taking: Reported on 10/20/2023), Disp: 120 g, Rfl: 0    phenazopyridine (PYRIDIUM) 100 mg tablet, Take 1 tablet (100 mg total) by mouth 3 (three) times a day as needed for bladder spasms (Patient not taking: Reported on 5/24/2024), Disp: 10 tablet, Rfl: 0    polyethylene glycol-electrolytes (NULYTELY) 4000 mL solution, Take 4,000 mL by mouth once for 1 dose, Disp: 4000 mL, Rfl: 0  No current facility-administered medications for this visit.    Facility-Administered Medications Ordered in Other Visits:     albuterol inhalation solution 2.5 mg, 2.5 mg, Nebulization, Once PRN, Chauncey Weinstein MD    fentaNYL (SUBLIMAZE) injection 25 mcg, 25 mcg, Intravenous, Q3 min PRN, Chauncey Weinstein MD    ondansetron (ZOFRAN) injection 4 mg, 4 mg, Intravenous, Once PRN, Chauncey Weinstien MD    sodium chloride 0.9 % infusion, 125 mL/hr, Intravenous, Continuous, Chauncey Weinstein MD, Stopped at 08/29/24 1304      Physical Exam:   /72 (BP Location: Left arm, Patient Position: Sitting, Cuff Size: Standard)   Pulse 67   Ht 5' 2.5\" (1.588 m)   Wt 64.9 kg (143 lb)   SpO2 99%   BMI 25.74 kg/m²   GEN: no acute distress    RESP: breathing comfortably with no accessory muscle use    ABD: soft, non-tender, non-distended   INCISION:    EXT: no significant peripheral edema   (Male): Penis uncircumcised, phallus normal, meatus patent.  Testicles descended into scrotum bilaterally without masses nor tenderness.  No inguinal hernias bilaterally.  JENN: Prostate is enlarged at 35 grams. The prostate is not boggy. The prostate is not tender.  No nodules noted      RADIOLOGY:   None    Assessment:   #1.  BPH  #2.  Orchialgia    Plan:   -He has been on Flomax with persistent " symptoms  -Recommend cystoscopy and TRUS and discussion for outlet surgery  -  -

## 2024-08-30 ENCOUNTER — TELEPHONE (OUTPATIENT)
Dept: UROLOGY | Facility: CLINIC | Age: 64
End: 2024-08-30

## 2024-08-30 NOTE — TELEPHONE ENCOUNTER
LVM informing patient of cysto trus appointment with Dr Tate at [place].  Asking him to call if there are any issues.   Father  Still living? Unknown  Family history of diabetes mellitus (DM), Age at diagnosis: Age Unknown

## 2024-09-05 PROCEDURE — 88305 TISSUE EXAM BY PATHOLOGIST: CPT | Performed by: PATHOLOGY

## 2024-09-05 PROCEDURE — 88342 IMHCHEM/IMCYTCHM 1ST ANTB: CPT | Performed by: PATHOLOGY

## 2024-09-06 ENCOUNTER — TELEPHONE (OUTPATIENT)
Dept: GASTROENTEROLOGY | Facility: CLINIC | Age: 64
End: 2024-09-06

## 2024-09-06 NOTE — TELEPHONE ENCOUNTER
----- Message from Jonathan Guerrero MD sent at 9/6/2024  1:51 PM EDT -----  Dear staff: Please kindly communicate with patient that biopsy taken during the colonoscopy did not show any significant findings.  Good news.  Repeat colonoscopy will be due in 5 years for colon cancer screening purposes.  Please encourage patient to continue to follow-up in the GI office with Fabi Plasencia.    Also please place in recall for colonoscopy in 5 years.

## 2024-09-06 NOTE — RESULT ENCOUNTER NOTE
Dear staff: Please kindly communicate with patient that biopsy taken during the colonoscopy did not show any significant findings.  Good news.  Repeat colonoscopy will be due in 5 years for colon cancer screening purposes.  Please encourage patient to continue to follow-up in the GI office with Fabi Plasencia.    Also please place in recall for colonoscopy in 5 years.

## 2024-09-19 ENCOUNTER — OFFICE VISIT (OUTPATIENT)
Dept: FAMILY MEDICINE CLINIC | Facility: CLINIC | Age: 64
End: 2024-09-19

## 2024-09-19 VITALS
BODY MASS INDEX: 25.34 KG/M2 | OXYGEN SATURATION: 98 % | DIASTOLIC BLOOD PRESSURE: 75 MMHG | WEIGHT: 143 LBS | SYSTOLIC BLOOD PRESSURE: 116 MMHG | HEIGHT: 63 IN | RESPIRATION RATE: 14 BRPM | TEMPERATURE: 98.3 F | HEART RATE: 75 BPM

## 2024-09-19 DIAGNOSIS — G89.29 CHRONIC RLQ PAIN: Primary | ICD-10-CM

## 2024-09-19 DIAGNOSIS — I10 BENIGN HYPERTENSION: ICD-10-CM

## 2024-09-19 DIAGNOSIS — R10.31 CHRONIC RLQ PAIN: Primary | ICD-10-CM

## 2024-09-19 DIAGNOSIS — K42.9 UMBILICAL HERNIA WITHOUT OBSTRUCTION AND WITHOUT GANGRENE: ICD-10-CM

## 2024-09-19 PROCEDURE — 99214 OFFICE O/P EST MOD 30 MIN: CPT

## 2024-09-19 NOTE — ASSESSMENT & PLAN NOTE
BP Readings from Last 3 Encounters:   09/19/24 116/75   08/29/24 123/77   08/29/24 130/72      - At goal. Continue amlodipine 10 mg daily

## 2024-09-19 NOTE — ASSESSMENT & PLAN NOTE
Small fat containing hernia noted in October of 2023   Orders:    Ambulatory Referral to General Surgery; Future

## 2024-09-19 NOTE — ASSESSMENT & PLAN NOTE
Small fat containing hernia noted in October of 2023.   Orders:    Ambulatory Referral to General Surgery; Future

## 2024-09-19 NOTE — PROGRESS NOTES
Ambulatory Visit  Name: Bryson Escoto      : 1960      MRN: [id number]  Encounter Provider: ALE Rojas  Encounter Date: 2024   Encounter department: [facility]    Assessment & Plan  Chronic RLQ pain  F/u GI, has upcoming appt next week. Recommend bringing medications to GI appt.          Benign hypertension      BP Readings from Last 3 Encounters:   24 116/75   24 123/77   24 130/72      - At goal. Continue amlodipine 10 mg daily       Umbilical hernia without obstruction and without gangrene  Small fat containing hernia noted in 2023   Orders:    Ambulatory Referral to General Surgery; Future    BMI Counseling: Body mass index is 25.74 kg/m². The BMI is above normal. Nutrition recommendations include decreasing portion sizes, encouraging healthy choices of fruits and vegetables, decreasing fast food intake, consuming healthier snacks, limiting drinks that contain sugar, moderation in carbohydrate intake, increasing intake of lean protein, reducing intake of saturated and trans fat and reducing intake of cholesterol. Exercise recommendations include moderate physical activity 150 minutes/week. No pharmacotherapy was ordered. Rationale for BMI follow-up plan is due to patient being overweight or obese.       History of Present Illness     Bryson Escoto is a 63 y.o. male  has a past medical history of COVID-19 virus infection, Exposure to COVID-19 virus, Family history of parkinsonism, History of chronic constipation, History of neck pain, and Hypertension.  has a past surgical history that includes Cystoscopy (2014) and Tooth extraction.    He presents today for a chest pain follow-up. He had a stress test done and it was normal. He had an echo ordered but did not complete it. However, he reports that chest pain resolved.    He endorses chronic RLQ pain for over 2 years. He sees GI and they suspected pain was 2/2  "constipation. He was last rx linzess. He is unsure of constipation medications that he is currently taking.     Abdominal Pain  This is a chronic problem. The onset quality is gradual. The problem occurs constantly. The pain is located in the RLQ. The pain is at a severity of 5/10. The pain is moderate. The quality of the pain is sharp. The abdominal pain radiates to the RLQ. Associated symptoms include nausea. Pertinent negatives include no arthralgias, dysuria, fever, hematuria or vomiting. The pain is aggravated by eating. The pain is relieved by Nothing. He has tried nothing for the symptoms.   Chest Pain   Associated symptoms include abdominal pain and nausea. Pertinent negatives include no back pain, cough, fever, palpitations, shortness of breath or vomiting.   Pertinent negatives for past medical history include no seizures.         Review of Systems   Constitutional:  Negative for chills and fever.   HENT:  Negative for ear pain and sore throat.    Eyes:  Negative for pain and visual disturbance.   Respiratory:  Negative for cough and shortness of breath.    Cardiovascular:  Positive for chest pain. Negative for palpitations.   Gastrointestinal:  Positive for abdominal pain and nausea. Negative for vomiting.   Genitourinary:  Negative for dysuria and hematuria.   Musculoskeletal:  Negative for arthralgias and back pain.   Skin:  Negative for color change and rash.   Neurological:  Negative for seizures and syncope.   All other systems reviewed and are negative.          Objective     /75 (BP Location: Left arm, Patient Position: Sitting, Cuff Size: Standard)   Pulse 75   Temp 98.3 °F (36.8 °C) (Temporal)   Resp 14   Ht 5' 2.5\" (1.588 m)   Wt 64.9 kg (143 lb)   SpO2 98%   BMI 25.74 kg/m²     Physical Exam  Vitals and nursing note reviewed.   Constitutional:       General: He is not in acute distress.     Appearance: He is well-developed.   HENT:      Head: Normocephalic and atraumatic.   Eyes: "      Conjunctiva/sclera: Conjunctivae normal.   Cardiovascular:      Rate and Rhythm: Normal rate and regular rhythm.      Heart sounds: No murmur heard.  Pulmonary:      Effort: Pulmonary effort is normal. No respiratory distress.      Breath sounds: Normal breath sounds.   Abdominal:      General: Bowel sounds are normal.      Palpations: Abdomen is soft.      Tenderness: There is abdominal tenderness in the right lower quadrant.   Musculoskeletal:         General: No swelling.      Cervical back: Neck supple.   Skin:     General: Skin is warm and dry.      Capillary Refill: Capillary refill takes less than 2 seconds.   Neurological:      Mental Status: He is alert.   Psychiatric:         Mood and Affect: Mood normal.

## 2024-09-19 NOTE — PROGRESS NOTES
Ambulatory Visit  Name: Bryson Escoto      : 1960      MRN: [id number]  Encounter Provider: ALE Rojas  Encounter Date: 2024   Encounter department: [facility]    Assessment & Plan  Chronic RLQ pain  F/u GI, has upcoming appt next week. Recommend bringing medications to GI appt.       Benign hypertension  BP Readings from Last 3 Encounters:   24 116/75   24 123/77   24 130/72     - At goal. Continue amlodipine 10 mg daily         Umbilical hernia without obstruction and without gangrene  Small fat containing hernia noted in 2023.   Orders:    Ambulatory Referral to General Surgery; Future       History of Present Illness     Bryson Escoto is a 63 y.o. male  has a past medical history of COVID-19 virus infection, Exposure to COVID-19 virus, Family history of parkinsonism, History of chronic constipation, History of neck pain, and Hypertension.  has a past surgical history that includes Cystoscopy (2014) and Tooth extraction.    He presents today for a chest pain follow-up. He had a stress test done and it was normal. He had an echo ordered but did not complete it. However, he reports that chest pain resolved.    He endorses chronic RLQ pain for over 2 years. He sees GI and they suspected pain was 2/2 constipation. He was last rx linzess. He is unsure of constipation medications that he is currently taking.     Abdominal Pain  This is a chronic problem. The onset quality is gradual. The problem occurs constantly. The pain is located in the RLQ. The pain is at a severity of 5/10. The pain is moderate. The quality of the pain is sharp. The abdominal pain radiates to the RLQ. Associated symptoms include nausea. Pertinent negatives include no arthralgias, dysuria, fever, hematuria or vomiting. The pain is aggravated by eating. The pain is relieved by Nothing. He has tried nothing for the symptoms.         Review of  "Systems   Constitutional:  Negative for chills and fever.   HENT:  Negative for ear pain and sore throat.    Eyes:  Negative for pain and visual disturbance.   Respiratory:  Negative for cough and shortness of breath.    Cardiovascular:  Negative for chest pain and palpitations.   Gastrointestinal:  Positive for abdominal pain and nausea. Negative for vomiting.   Genitourinary:  Negative for dysuria and hematuria.   Musculoskeletal:  Negative for arthralgias and back pain.   Skin:  Negative for color change and rash.   Neurological:  Negative for seizures and syncope.   All other systems reviewed and are negative.          Objective     /75 (BP Location: Left arm, Patient Position: Sitting, Cuff Size: Standard)   Pulse 75   Temp 98.3 °F (36.8 °C) (Temporal)   Resp 14   Ht 5' 2.5\" (1.588 m)   Wt 64.9 kg (143 lb)   SpO2 98%   BMI 25.74 kg/m²     Physical Exam  Vitals and nursing note reviewed.   Constitutional:       General: He is not in acute distress.     Appearance: He is well-developed.   HENT:      Head: Normocephalic and atraumatic.   Eyes:      Conjunctiva/sclera: Conjunctivae normal.   Cardiovascular:      Rate and Rhythm: Normal rate and regular rhythm.      Heart sounds: No murmur heard.  Pulmonary:      Effort: Pulmonary effort is normal. No respiratory distress.      Breath sounds: Normal breath sounds.   Abdominal:      General: Bowel sounds are normal.      Palpations: Abdomen is soft.      Tenderness: There is abdominal tenderness in the right lower quadrant.   Musculoskeletal:         General: No swelling.      Cervical back: Neck supple.   Skin:     General: Skin is warm and dry.      Capillary Refill: Capillary refill takes less than 2 seconds.   Neurological:      Mental Status: He is alert.   Psychiatric:         Mood and Affect: Mood normal.         "

## 2024-09-24 ENCOUNTER — OFFICE VISIT (OUTPATIENT)
Dept: GASTROENTEROLOGY | Facility: MEDICAL CENTER | Age: 64
End: 2024-09-24
Payer: COMMERCIAL

## 2024-09-24 VITALS
BODY MASS INDEX: 25.3 KG/M2 | WEIGHT: 142.8 LBS | DIASTOLIC BLOOD PRESSURE: 81 MMHG | SYSTOLIC BLOOD PRESSURE: 115 MMHG | HEART RATE: 70 BPM | OXYGEN SATURATION: 99 % | TEMPERATURE: 99 F | HEIGHT: 63 IN

## 2024-09-24 DIAGNOSIS — R10.10 UPPER ABDOMINAL PAIN: ICD-10-CM

## 2024-09-24 DIAGNOSIS — K59.09 OTHER CONSTIPATION: Primary | ICD-10-CM

## 2024-09-24 DIAGNOSIS — Z12.11 COLON CANCER SCREENING: ICD-10-CM

## 2024-09-24 DIAGNOSIS — K21.9 GASTROESOPHAGEAL REFLUX DISEASE, UNSPECIFIED WHETHER ESOPHAGITIS PRESENT: ICD-10-CM

## 2024-09-24 DIAGNOSIS — K57.30 DIVERTICULOSIS OF COLON: ICD-10-CM

## 2024-09-24 DIAGNOSIS — K31.A0 GASTRIC INTESTINAL METAPLASIA: ICD-10-CM

## 2024-09-24 PROCEDURE — 99214 OFFICE O/P EST MOD 30 MIN: CPT | Performed by: PHYSICIAN ASSISTANT

## 2024-09-24 RX ORDER — POLYETHYLENE GLYCOL 3350 17 G/17G
17 POWDER, FOR SOLUTION ORAL DAILY
Qty: 510 G | Refills: 2 | Status: SHIPPED | OUTPATIENT
Start: 2024-09-24

## 2024-09-24 NOTE — PROGRESS NOTES
[facility] - Outpatient Follow-up Note  Bryson Escoto 63 y.o. male MRN: [id number]  Encounter: [id number]      Assessment and Plan    1. GERD  2. Gastric intestinal metaplasia  The patient's reflux is currently well-controlled on omeprazole 40 mg once daily.  Last EGD was 10/2023 revealing an irregular Z-line, Castellanos's esophagus, and gastritis.  Stomach and esophageal biopsies were obtained.  Stomach biopsies revealed gastric intestinal metaplasia and esophageal biopsies were normal.  Recall was recommended 3 years later.  The patient has had extensive gastric intestinal metaplasia dating back to 2022. Prior esophogeal biopsies showing intestinal metaplasia as well.  -Continue omeprazole 40 mg once daily  -EGD 10/2026 or sooner if clinically indicated    3. Constipation  4. Gas  Patient has chronic constipation and gas.  He states he is having a daily formed bowel movement but sometimes it is hard and he has to strain.  He also has continued gas.  Previous biopsies negative for H. pylori and previous celiac serologies negative.  -Start MiraLAX 17 g once daily for constipation control, if continued gas despite that add simethicone and a low fiber map diet    5.  Right upper quadrant pain  The patient states that he has intermittent right upper quadrant pain and has tenderness on exam.  He is requesting further evaluation of his liver, gallbladder, and pancreas.  He did have a CMP in May which was normal.  -Obtain abdominal ultrasound    6.  Diverticulosis  7.  Colorectal cancer screening  The patient had a colonoscopy 8/29/2024 which revealed diverticulosis, small hemorrhoids, and normal random colon biopsies  -High-fiber diet with 30 to 35 g of fiber daily    Follow-up 3 to 4 months    ______________________________________________________________________    History of Present Illness  Bryson Escoto is a 63 y.o. male here for follow up evaluation of constipation.  The patient was  previously on Linzess 290 mcg once daily but this caused bloating and diarrhea so he discontinued it.  He underwent recent colonoscopy in August which revealed diverticulosis, small internal and external hemorrhoids, and random biopsies were obtained and normal.  The patient states that he does have mild constipation still.  He is having a daily bowel movement but sometimes it is hard and he needs to strain.  His only other GI complaint at his visit today is right upper quadrant pain that is intermittent.      [employer] interpretor utilized       Review of Systems   Constitutional:  Negative for activity change, appetite change, chills, fatigue, fever and unexpected weight change.   Gastrointestinal:  Positive for abdominal pain and constipation.       Past Medical History  Past Medical History:   Diagnosis Date    COVID-19 virus infection 5/1/2020    Exposure to COVID-19 virus 11/23/2020    Family history of parkinsonism 4/30/2020    History of chronic constipation     History of neck pain 11/21/2017    Hypertension        Past Social history  Past Surgical History:   Procedure Laterality Date    CYSTOSCOPY  05/29/2014    diagnostic managed by Jalen Mora    TOOTH EXTRACTION       Social History     Socioeconomic History    Marital status: /Civil Union     Spouse name: Not on file    Number of children: Not on file    Years of education: Not on file    Highest education level: Not on file   Occupational History    Not on file   Tobacco Use    Smoking status: Former     Current packs/day: 0.00     Average packs/day: 0.3 packs/day for 20.0 years (5.0 ttl pk-yrs)     Types: Cigarettes     Start date: 1/8/1994     Quit date: 1/8/2014     Years since quitting: 10.7     Passive exposure: Never    Smokeless tobacco: Never   Vaping Use    Vaping status: Never Used   Substance and Sexual Activity    Alcohol use: Yes     Comment: social    Drug use: Never    Sexual activity: Not Currently   Other Topics Concern    Not on  file   Social History Narrative    No preference or Religion beliefs     Social Determinants of Health     Financial Resource Strain: Low Risk  (12/19/2023)    Overall Financial Resource Strain (CARDIA)     Difficulty of Paying Living Expenses: Not hard at all   Food Insecurity: No Food Insecurity (12/19/2023)    Hunger Vital Sign     Worried About Running Out of Food in the Last Year: Never true     Ran Out of Food in the Last Year: Never true   Transportation Needs: No Transportation Needs (12/19/2023)    PRAPARE - Transportation     Lack of Transportation (Medical): No     Lack of Transportation (Non-Medical): No   Physical Activity: Not on file   Stress: Not on file   Social Connections: Not on file   Intimate Partner Violence: Not on file   Housing Stability: Unknown (6/19/2024)    Housing Stability Vital Sign     Unable to Pay for Housing in the Last Year: No     Number of Times Moved in the Last Year: Not on file     Homeless in the Last Year: No     Social History     Substance and Sexual Activity   Alcohol Use Yes    Comment: social     Social History     Substance and Sexual Activity   Drug Use Never     Social History     Tobacco Use   Smoking Status Former    Current packs/day: 0.00    Average packs/day: 0.3 packs/day for 20.0 years (5.0 ttl pk-yrs)    Types: Cigarettes    Start date: 1/8/1994    Quit date: 1/8/2014    Years since quitting: 10.7    Passive exposure: Never   Smokeless Tobacco Never       Past Family History  Family History   Problem Relation Age of Onset    Cancer Sister         malignant neoplasm of breast    Diabetes Paternal Grandmother     Stomach cancer Paternal Uncle         unsure if colon or stomach    Diabetes Family     Arthritis Family     Cancer Family         malignant neoplasm    Substance Abuse Neg Hx         denied Mother and Father    Mental illness Neg Hx         no family history Mother and Father    Other Neg Hx         denied family history of Osteopertrosis        Current Medications  Current Outpatient Medications   Medication Sig Dispense Refill    amLODIPine (NORVASC) 10 mg tablet Take 1 tablet (10 mg total) by mouth daily 90 tablet 2    fluticasone (FLONASE) 50 mcg/act nasal spray 1 spray into each nostril daily 24 mL 1    lidocaine (LMX) 4 % cream Apply topically as needed for mild pain 120 g 0    linaCLOtide (Linzess) 290 MCG CAPS Take 1 capsule by mouth in the morning 90 capsule 3    methocarbamol (ROBAXIN) 500 mg tablet Take 1 tablet (500 mg total) by mouth 2 (two) times a day 20 tablet 0    omeprazole (PriLOSEC) 40 MG capsule Take 1 capsule (40 mg total) by mouth daily before breakfast 90 capsule 3    phenazopyridine (PYRIDIUM) 100 mg tablet Take 1 tablet (100 mg total) by mouth 3 (three) times a day as needed for bladder spasms 10 tablet 0    polyethylene glycol (GLYCOLAX) 17 GM/SCOOP Take 17 g by mouth 2 (two) times a day 850 g 1    psyllium (METAMUCIL) 58.6 % powder Take 1 packet by mouth daily 1040 g 2    senna (SENOKOT) 8.6 mg Take 1 tablet (8.6 mg total) by mouth daily at bedtime 90 tablet 0    simethicone (Gas-X Ultra Strength) 180 MG capsule Take 1 capsule (180 mg total) by mouth every 6 (six) hours as needed for flatulence 120 capsule 1    tamsulosin (FLOMAX) 0.4 mg Take 1 capsule (0.4 mg total) by mouth daily with dinner 90 capsule 3    cetirizine (ZyrTEC) 10 mg tablet Take 1 tablet (10 mg total) by mouth daily (Patient not taking: Reported on 9/24/2024) 90 tablet 1    polyethylene glycol-electrolytes (NULYTELY) 4000 mL solution Take 4,000 mL by mouth once for 1 dose 4000 mL 0     No current facility-administered medications for this visit.       Allergies  No Known Allergies      The following portions of the patient's history were reviewed and updated as appropriate: allergies, current medications, past medical history, past social history, past surgical history and problem list.      Vitals  Vitals:    09/24/24 1156   BP: 115/81   BP Location: Left  "arm   Patient Position: Sitting   Cuff Size: Large   Pulse: 70   Temp: 99 °F (37.2 °C)   TempSrc: Tympanic   SpO2: 99%   Weight: 64.8 kg (142 lb 12.8 oz)   Height: 5' 2.5\" (1.588 m)         Physical Exam  Constitutional   General appearance: Patient is seated and in no acute distress, well appearing and well nourished.   Head and Face   Head and face: Normal.    Eyes   Conjunctiva and lids: No erythema, swelling or discharge.  Anicteric.  Ears, Nose, Mouth, and Throat   Hearing: Normal.    Neck: Supple, trachea midline.  Pulmonary   Respiratory effort: No increased work of breathing or signs of respiratory distress.    Cardiovascular   Examination of extremities for edema and/or varicosities: Normal.    Abdomen   Abdomen: Soft, RUQ tender, no masses, no organomegaly.   Musculoskeletal   Gait and station: Normal    Skin   Skin and subcutaneous tissue: Warm, dry, and intact. No visible jaundice, lesions or rashes.  Psychiatric   Judgment and insight: Normal  Recent and remote memory:  Normal  Mood and affect: Normal      Results  No visits with results within 1 Day(s) from this visit.   Latest known visit with results is:   Hospital Outpatient Visit on 08/29/2024   Component Date Value    Case Report 08/29/2024                      Value:Surgical Pathology Report                         Case: [id number]                                  Authorizing Provider:  Jonathan Guerrero MD         Collected:           08/29/2024 1229              Ordering Location:     [facility]        Received:            08/29/2024 [address] [place] [address] Endoscopy                                                     Pathologist:           Frankie Aguilar MD                                                            Specimen:    Colon, random colon - R/O microscopic colitis                                              Final Diagnosis 08/29/2024                      Value:A. Colon, random colon - R/O " "microscopic colitis:  Fragments of colon mucosa with focal hyperplastic change and lymphoid aggregates  No active or microscopic colitis, dysplasia or malignancy identified     See note      Note 08/29/2024                      Value:CD3 immunostain supported the diagnosis    Interpretation performed at [address] [address]        Additional Information 08/29/2024                      Value:All reported additional testing was performed with appropriately reactive controls.  These tests were developed and their performance characteristics determined by [facility] or appropriate performing facility, though some tests may be performed on tissues which have not been validated for performance characteristics (such as staining performed on alcohol exposed cell blocks and decalcified tissues).  Results should be interpreted with caution and in the context of the patients’ clinical condition. These tests may not be cleared or approved by the U.S. Food and Drug Administration, though the FDA has determined that such clearance or approval is not necessary. These tests are used for clinical purposes and they should not be regarded as investigational or for research. This laboratory has been approved by CLIA 88, designated as a high-complexity laboratory and is qualified to perform these tests.  .      Gross Description 08/29/2024                      Value:A. The specimen is received in formalin, labeled with the patient's name and hospital number, and is designated \" random colon\".  The specimen consistent multiple tan soft tissue fragments measuring in aggregate of 0.7 x 0.4 x 0.1 cm.  Entirely submitted. One screened cassette.    Note: The estimated total formalin fixation time based upon information provided by the submitting clinician and the standard processing schedule is under 72 hours.      [place]      Clinical Information 08/29/2024                      " Value:Constipation  · Multiple diverticula in the sigmoid colon  · External and internal small hemorrhoids  · Performed multiple forceps biopsies in the ascending colon, transverse colon and descending colon to rule out colitis            Radiology Results  Colonoscopy    Result Date: 8/29/2024  Narrative: Table formatting from the original result was not included. [facility] [address] [place] [id number] [phone] DATE OF SERVICE: 8/29/24 PHYSICIAN(S): Attending: Jonathan Guerrero MD Fellow: No Staff Documented INDICATION: Other constipation POST-OP DIAGNOSIS: See the impression below. HISTORY: Prior colonoscopy: 5 years ago. BOWEL PREPARATION: Golytely/Colyte/Trilyte PREPROCEDURE: Informed consent was obtained for the procedure, including sedation. Risks including but not limited to bleeding, infection, perforation, adverse drug reaction and aspiration were explained in detail. Also explained about less than 100% sensitivity with the exam and other alternatives. The patient was placed in the left lateral decubitus position. Procedure: Colonoscopy DETAILS OF PROCEDURE: Patient was taken to the procedure room where a time out was performed to confirm correct patient and correct procedure. The patient underwent monitored anesthesia care, which was administered by an anesthesia professional. The patient's blood pressure, ECG, ETCO2, heart rate, level of consciousness, oxygen and respirations were monitored throughout the procedure. A digital rectal exam was performed. The scope was introduced through the anus and advanced to the cecum. Retroflexion was performed in the rectum. The quality of bowel preparation was evaluated using the [place] Bowel Preparation Scale with scores of: right colon = 2, transverse colon = 2, left colon = 2. The total BBPS score was 6. Bowel prep was adequate. The patient experienced no blood loss. The procedure was not difficult. The patient tolerated  the procedure well. There were no apparent adverse events. ANESTHESIA INFORMATION: ASA: II Anesthesia Type: Anesthesia type not filed in the log. MEDICATIONS: sodium chloride 0.9 % infusion 500 mL*  *From user-documented volume (Totals for administrations occurring from 1215 to 1236 on 08/29/24) FINDINGS: Multiple diverticula in the sigmoid colon External and internal small hemorrhoids Performed multiple forceps biopsies in the ascending colon, transverse colon and descending colon to rule out colitis EVENTS: Procedure Events Event Event Time ENDO CECUM REACHED 8/29/2024 12:27 PM ENDO SCOPE OUT TIME 8/29/2024 12:34 PM SPECIMENS: ID Type Source Tests Collected by Time Destination 1 : random colon - R/O microscopic colitis Tissue Colon TISSUE EXAM Jonathan Guerrero MD 8/29/2024 12:29 PM  EQUIPMENT: Colonoscope -[id number] ENDOCUFF VISION LRG GREEN ID [id number]     Impression: Diverticulosis in the sigmoid colon Small hemorrhoids Performed forceps biopsies in the ascending colon, transverse colon and descending colon to rule out colitis RECOMMENDATION: Await pathology results Repeat colonoscopy in 5 years for colon cancer screening purposes. Continue to follow-up in the GI office with Fabi Plasencia. Increase fiber in diet.    Jonathan Guerrero MD       Orders  No orders of the defined types were placed in this encounter.

## 2024-09-27 ENCOUNTER — HOSPITAL ENCOUNTER (OUTPATIENT)
Dept: ULTRASOUND IMAGING | Facility: HOSPITAL | Age: 64
Discharge: HOME | End: 2024-09-27
Payer: COMMERCIAL

## 2024-09-27 DIAGNOSIS — R10.10 UPPER ABDOMINAL PAIN: ICD-10-CM

## 2024-09-27 PROCEDURE — 76700 US EXAM ABDOM COMPLETE: CPT

## 2024-10-01 ENCOUNTER — TELEPHONE (OUTPATIENT)
Age: 64
End: 2024-10-01

## 2024-10-01 NOTE — TELEPHONE ENCOUNTER
----- Message from Nikki Juarez PA-C sent at 10/1/2024  8:02 AM EDT -----  Patient does not have my chart. Please call patient to let him know that his U/S looks completely normal. Thank you!

## 2024-10-01 NOTE — TELEPHONE ENCOUNTER
I spoke with pt regarding the result pt is extremely upset because everything turned out normal, he have a lot of pain in the liver part  and  he said  when he was having the US done they did not do it exactly where the pain is. As per pt want to repeat the test.  Please advise.

## 2024-10-02 ENCOUNTER — TELEPHONE (OUTPATIENT)
Age: 64
End: 2024-10-02

## 2024-10-02 NOTE — TELEPHONE ENCOUNTER
I spoke with pt regarding what Keshia suggest, pt refused when I try to explain for the second time, he  uses inappropriate work and disconnect the call.

## 2024-10-02 NOTE — TELEPHONE ENCOUNTER
----- Message from Nikki Juarez PA-C sent at 10/1/2024  2:32 PM EDT -----  It is unlikely insurance will cover a second U/S. We can try to order a CT scan and if normal or not covered consider repeat EGD  ----- Message -----  From: Roberto Saab MA  Sent: 10/1/2024   9:45 AM EDT  To: Nikki Juarez PA-C    Hi Nikki  I spoke with pt regarding the result pt is extremely upset because everything turned out normal, he have a lot of pain in the liver part  and  he said  when he was having the US done they did not do it exactly where the pain is. As per pt want to repeat the test.  Please advise.  ----- Message -----  From: Nikki Juarez PA-C  Sent: 10/1/2024   8:02 AM EDT  To: [facility]    Patient does not have my chart. Please call patient to let him know that his U/S looks completely normal. Thank you!

## 2024-10-03 ENCOUNTER — CONSULT (OUTPATIENT)
Dept: SURGERY | Facility: CLINIC | Age: 64
End: 2024-10-03
Payer: COMMERCIAL

## 2024-10-03 VITALS
OXYGEN SATURATION: 97 % | WEIGHT: 140 LBS | DIASTOLIC BLOOD PRESSURE: 76 MMHG | HEIGHT: 63 IN | TEMPERATURE: 97.7 F | SYSTOLIC BLOOD PRESSURE: 106 MMHG | HEART RATE: 78 BPM | RESPIRATION RATE: 16 BRPM | BODY MASS INDEX: 24.8 KG/M2

## 2024-10-03 DIAGNOSIS — K42.9 UMBILICAL HERNIA WITHOUT OBSTRUCTION AND WITHOUT GANGRENE: Primary | ICD-10-CM

## 2024-10-03 DIAGNOSIS — R10.31 CHRONIC RLQ PAIN: ICD-10-CM

## 2024-10-03 DIAGNOSIS — K59.00 CONSTIPATION, UNSPECIFIED CONSTIPATION TYPE: ICD-10-CM

## 2024-10-03 DIAGNOSIS — G89.29 CHRONIC RLQ PAIN: ICD-10-CM

## 2024-10-03 PROCEDURE — 99243 OFF/OP CNSLTJ NEW/EST LOW 30: CPT | Performed by: PHYSICIAN ASSISTANT

## 2024-10-03 NOTE — PROGRESS NOTES
Assessment/Plan:   Bryson Escoto is a 63 y.o.male who is here for   Chief Complaint   Patient presents with    Hernia     Patient is here today regarding pain on the right side by his ribs states he has has constipation ongoing for more then 1 year.            Plan:   Patient has no complaints of an umbilical hernia today. There is possibly a small <1 cm defect. No pain no bulging. CT from 2023 shows small fat containing umbilical hernia. I discussed no need for surgery at this time since hernia is not causing any symptoms. Patient does state he continues to have RUQ pain. No worse with eating or movement. States the pain is there all the time and started over a year ago.  On examination his pain is more RLQ pain. RUQ USshows no gallstones, x-ray was normal but did show moderate amount of stool int he colon and GI placed him on Linzess. At this time patient should continue work up with GI, possible HIDA?         Patient understands hernia occurrence or re-occurrence risk is higher in a diabetic, tobacco user, with elevated BMIs.   _____________________________________________      HPI:  Bryson Escoto is a 63 y.o.male who was referred for evaluation of Hernia (Patient is here today regarding pain on the right side by his ribs states he has has constipation ongoing for more then 1 year. )  .    Currently complaining of  pain right upper ribcage area x 1 year and constipation for 1 year. Patient was referred here for umbilical hernia but states he has no pain in umbilicus and no bulging. Patient states RUQ pain is 6/10 at all times. Nothing makes it better or worse. Patient has no nausea or vomiting but does have constipation. Last Colonoscopy was this past August and was normal. RUQ Us shows no gallstones. Patient stated on Miralax due to constipation, and takes omeprazole 40 mg daily. Last EGD was 10/2023 revealing an irregular Z-line, Castellanos's esophagus, and gastritis.        Prior abdominal surgery:    None    BMI: Body mass index is 25.2 kg/m².     Tobacco use:   Tobacco Use: Medium Risk (10/3/2024)    Patient History     Smoking Tobacco Use: Former     Smokeless Tobacco Use: Never     Passive Exposure: Never        Lab Results   Component Value Date    WBC 4.13 (L) 05/29/2023    HGB 13.7 05/29/2023    HCT 41.2 05/29/2023    MCV 90 05/29/2023     05/29/2023     Lab Results   Component Value Date     09/15/2015    K 4.4 05/10/2024     05/10/2024    CO2 20 (L) 05/10/2024    ANIONGAP 8 09/15/2015    BUN 18 05/10/2024    CREATININE 1.24 05/10/2024    GLUCOSE 113 09/15/2015    GLUF 87 02/17/2023    CALCIUM 8.5 05/10/2024    CORRECTEDCA 8.7 05/29/2023    AST 28 05/10/2024    ALT 16 05/10/2024    ALKPHOS 76 05/10/2024    PROT 6.9 09/15/2015    BILITOT 0.48 09/15/2015    EGFR 65 05/10/2024     Lab Results   Component Value Date    INR 0.90 05/12/2017    INR 0.97 09/07/2016    INR 0.93 07/30/2014    PROTIME 12.1 05/12/2017    PROTIME 12.9 09/07/2016    PROTIME 12.7 07/30/2014       ROS:  General ROS: negative  negative for - chills, fatigue, fever or night sweats, weight loss  Respiratory ROS: no cough, shortness of breath, or wheezing  Cardiovascular ROS: no chest pain or dyspnea on exertion  Genito-Urinary ROS: no dysuria, trouble voiding, or hematuria  Musculoskeletal ROS: negative for - gait disturbance, joint pain or muscle pain  Neurological ROS: no TIA or stroke symptoms  Abdominal ROS: see HPI  GI ROS: see HPI  Skin ROS: no new rashes or lesions   Lymphatic ROS: no new adenopathy noted by pt.   GYN ROS: see HPI, no new GYN history or bleeding noted  Psy ROS: no new mental or behavioral disturbances       Patient Active Problem List   Diagnosis    Benign colon polyp    Benign hypertension    Enlarged prostate without lower urinary tract symptoms (luts)    Varicocele    Left shoulder pain    Nephrolithiasis    Right-sided low back pain with right-sided sciatica    Chronic RLQ pain    Vocal fold  paresis, left    Sulcus vocalis of vocal fold    Gastroesophageal reflux disease    Chronic constipation    Tremor    Lumbar disc herniation    Epididymal cyst    CKD (chronic kidney disease) stage 2, GFR 60-89 ml/min    Persistent proteinuria    Chronic pain in testicle    Cervical pain (neck)    REEVES (dyspnea on exertion)    RUQ pain    Allergic rhinitis    Pain in both testicles    Dysuria    Benign prostatic hyperplasia with urinary hesitancy    Umbilical hernia without obstruction and without gangrene         Allergies: Patient has no known allergies.    Meds:  Current Outpatient Medications:     amLODIPine (NORVASC) 10 mg tablet, Take 1 tablet (10 mg total) by mouth daily, Disp: 90 tablet, Rfl: 2    fluticasone (FLONASE) 50 mcg/act nasal spray, 1 spray into each nostril daily, Disp: 24 mL, Rfl: 1    lidocaine (LMX) 4 % cream, Apply topically as needed for mild pain, Disp: 120 g, Rfl: 0    linaCLOtide (Linzess) 290 MCG CAPS, Take 1 capsule by mouth in the morning, Disp: 90 capsule, Rfl: 3    omeprazole (PriLOSEC) 40 MG capsule, Take 1 capsule (40 mg total) by mouth daily before breakfast, Disp: 90 capsule, Rfl: 3    polyethylene glycol (GLYCOLAX) 17 GM/SCOOP powder, Take 17 g by mouth daily, Disp: 510 g, Rfl: 2    senna (SENOKOT) 8.6 mg, Take 1 tablet (8.6 mg total) by mouth daily at bedtime, Disp: 90 tablet, Rfl: 0    tamsulosin (FLOMAX) 0.4 mg, Take 1 capsule (0.4 mg total) by mouth daily with dinner, Disp: 90 capsule, Rfl: 3    cetirizine (ZyrTEC) 10 mg tablet, Take 1 tablet (10 mg total) by mouth daily (Patient not taking: Reported on 9/24/2024), Disp: 90 tablet, Rfl: 1    methocarbamol (ROBAXIN) 500 mg tablet, Take 1 tablet (500 mg total) by mouth 2 (two) times a day (Patient not taking: Reported on 10/3/2024), Disp: 20 tablet, Rfl: 0    phenazopyridine (PYRIDIUM) 100 mg tablet, Take 1 tablet (100 mg total) by mouth 3 (three) times a day as needed for bladder spasms, Disp: 10 tablet, Rfl: 0    psyllium  (METAMUCIL) 58.6 % powder, Take 1 packet by mouth daily, Disp: 1040 g, Rfl: 2    simethicone (Gas-X Ultra Strength) 180 MG capsule, Take 1 capsule (180 mg total) by mouth every 6 (six) hours as needed for flatulence, Disp: 120 capsule, Rfl: 1    PMH:  Past Medical History:   Diagnosis Date    COVID-19 virus infection 5/1/2020    Exposure to COVID-19 virus 11/23/2020    Family history of parkinsonism 4/30/2020    History of chronic constipation     History of neck pain 11/21/2017    Hypertension        PSH:  Past Surgical History:   Procedure Laterality Date    CYSTOSCOPY  05/29/2014    diagnostic managed by Jalen Mora    TOOTH EXTRACTION         Family History   Problem Relation Age of Onset    Cancer Sister         malignant neoplasm of breast    Diabetes Paternal Grandmother     Stomach cancer Paternal Uncle         unsure if colon or stomach    Diabetes Family     Arthritis Family     Cancer Family         malignant neoplasm    Substance Abuse Neg Hx         denied Mother and Father    Mental illness Neg Hx         no family history Mother and Father    Other Neg Hx         denied family history of Osteopertrosis        reports that he quit smoking about 10 years ago. His smoking use included cigarettes. He started smoking about 30 years ago. He has a 5 pack-year smoking history. He has never been exposed to tobacco smoke. He has never used smokeless tobacco. He reports current alcohol use. He reports that he does not use drugs.    Vitals:    10/03/24 1537   BP: 106/76   Pulse: 78   Resp: 16   Temp: 97.7 °F (36.5 °C)   SpO2: 97%       PHYSICAL EXAM  General Appearance:    Alert, cooperative, no distress,    Head:    Normocephalic without obvious abnormality   Eyes:    PERRL, conjunctiva/corneas clear,      Neck:   Supple, no adenopathy, no JVD   Back:     Symmetric, no spinal or CVA tenderness   Lungs:     Clear to auscultation bilaterally, no wheezing or rhonchi   Heart:    Regular rate and rhythm, S1 and S2  normal, no murmur   Abdomen:      mild tenderness in the in the RLQ.     no inguinal or femoral hernias noted  No umbilical hernia   Extremities:   Extremities normal. No clubbing, cyanosis or edema   Psych:   Normal Affect, AOx3.    Neurologic:  Skin:   CNII-XII intact. Strength symmetric, speech intact    Warm, dry, intact, no visible rashes or lesions               Signature:   Nidhi Payne PA-C    Date: 10/3/2024 Time: 3:55 PM

## 2024-10-08 ENCOUNTER — NURSE TRIAGE (OUTPATIENT)
Age: 64
End: 2024-10-08

## 2024-10-08 NOTE — TELEPHONE ENCOUNTER
Regarding: Stomach pain/Gas  ----- Message from Kandis LIZAMA sent at 10/8/2024 12:22 PM EDT -----  Patient called in requesting to speak with a nurse regarding stomach pain and lots of gas.

## 2024-10-08 NOTE — TELEPHONE ENCOUNTER
"LOV 9/24/24 REYNALDO Juarez (GERD, constipation, gas, RUQ pain), Procedure colon 8/29/24 Dr. Guerrero    Called patient via Interp#[id number], was obtaining assessment information and patient no longer on call. I called back via interp #[id number].   He states he called patient, patient answered, he explained returned call and patient told him it doesn't work and to go to 'expletive' and patient ended call.      On original call from information obtained, patient experiencing stomach pain upon awakening every morning x years. I asked if discussed these symptoms with provider and he states it is not his job to tell them his symptoms. I asked if patient was taking omeprazole but could not obtain clear answer and I asked about simethicone and he asked if that was the large round pill. I explained I could not provide that information since it could depend on . There was silence after that,  could not get response and said patient left call. I was not able to complete assessment questions.    Answer Assessment - Initial Assessment Questions  1. LOCATION: \"Where does it hurt?\"       Stomach pain upon awakening x 3 years  2. RADIATION: \"Does the pain shoot anywhere else?\" (e.g., chest, back)      denies  3. ONSET: \"When did the pain begin?\" (Minutes, hours or days ago)       Years ago  4. SUDDEN: \"Gradual or sudden onset?\"      chronic  5. PATTERN \"Does the pain come and go, or is it constant?\"     - If constant: \"Is it getting better, staying the same, or worsening?\"       (Note: Constant means the pain never goes away completely; most serious pain is constant and it progresses)      - If intermittent: \"How long does it last?\" \"Do you have pain now?\"      (Note: Intermittent means the pain goes away completely between bouts)      Intermittent, occurs upon awakening  6. SEVERITY: \"How bad is the pain?\"  (e.g., Scale 1-10; mild, moderate, or severe)     - MILD (1-3): doesn't interfere with normal activities, abdomen " "soft and not tender to touch      - MODERATE (4-7): interferes with normal activities or awakens from sleep, tender to touch      - SEVERE (8-10): excruciating pain, doubled over, unable to do any normal activities        Mild to moderate  7. RECURRENT SYMPTOM: \"Have you ever had this type of stomach pain before?\" If Yes, ask: \"When was the last time?\" and \"What happened that time?\"       Chronic states has had it for years    Protocols used: Abdominal Pain - Male-ADULT-[place]    "

## 2024-10-08 NOTE — TELEPHONE ENCOUNTER
The patient mentioned intermittent right upper quadrant pain at his last visit.  It is possible he is talking about this.  I obtained an ultrasound which was normal.  Will defer further evaluation until his follow-up visit.

## 2024-10-14 ENCOUNTER — APPOINTMENT (OUTPATIENT)
Dept: LAB | Facility: HOSPITAL | Age: 64
End: 2024-10-14
Payer: COMMERCIAL

## 2024-10-14 ENCOUNTER — NURSE TRIAGE (OUTPATIENT)
Age: 64
End: 2024-10-14

## 2024-10-14 ENCOUNTER — OFFICE VISIT (OUTPATIENT)
Dept: GASTROENTEROLOGY | Facility: MEDICAL CENTER | Age: 64
End: 2024-10-14
Payer: COMMERCIAL

## 2024-10-14 VITALS
BODY MASS INDEX: 24.8 KG/M2 | HEIGHT: 63 IN | TEMPERATURE: 97.8 F | WEIGHT: 140 LBS | HEART RATE: 62 BPM | DIASTOLIC BLOOD PRESSURE: 80 MMHG | OXYGEN SATURATION: 98 % | SYSTOLIC BLOOD PRESSURE: 125 MMHG

## 2024-10-14 DIAGNOSIS — N39.0 URINARY TRACT INFECTION WITHOUT HEMATURIA, SITE UNSPECIFIED: ICD-10-CM

## 2024-10-14 DIAGNOSIS — N39.0 URINARY TRACT INFECTION WITHOUT HEMATURIA, SITE UNSPECIFIED: Primary | ICD-10-CM

## 2024-10-14 DIAGNOSIS — K31.A0 GASTRIC INTESTINAL METAPLASIA: ICD-10-CM

## 2024-10-14 DIAGNOSIS — K21.9 GASTROESOPHAGEAL REFLUX DISEASE WITHOUT ESOPHAGITIS: ICD-10-CM

## 2024-10-14 DIAGNOSIS — K59.09 CHRONIC CONSTIPATION: Primary | ICD-10-CM

## 2024-10-14 PROCEDURE — 99214 OFFICE O/P EST MOD 30 MIN: CPT | Performed by: NURSE PRACTITIONER

## 2024-10-14 PROCEDURE — 87086 URINE CULTURE/COLONY COUNT: CPT

## 2024-10-14 PROCEDURE — 81001 URINALYSIS AUTO W/SCOPE: CPT

## 2024-10-14 RX ORDER — LACTULOSE 10 G/15ML
20 SOLUTION ORAL 2 TIMES DAILY
Qty: 240 ML | Refills: 3 | Status: SHIPPED | OUTPATIENT
Start: 2024-10-14

## 2024-10-14 NOTE — TELEPHONE ENCOUNTER
Patient called in with help of . Reports having testicular pain that started around the time of his last office visit in August. Reports also having dysuria. Call was then disconnected by patient. Unable to finish triage.       Answer Assessment - Initial Assessment Questions  1. When did this start?   Since last office visit   2. Did you suffer an injury to the groin area?   No   3. Have you noticed any penile swelling? If yes, is it mild, moderate, or severe?   No  6. Is there any redness or bruising?   No  7. Do you have a history of penile issues?   *No Answer*  8. Do you have pain? If yes, is it constant or intermittent? Can you qualify the pain: aching, dull, sharp, something else?   *No Answer*  9. Do you have any aggravating or relieving factors?   *No Answer*  10. Do you have any other areas of pain?   *No Answer*  11. Do you have any urinary symptoms? Dysuria?   *No Answer*  12. Do you have a temperature? If yes, what is it?   *No Answer*  13. Have you had any recent urological procedure or surgery?   *No Answer*  14. Were you referred by your PCP, the ED, urgent care, or another specialty?   *No Answer*  15. Were you prescribed any medications for this?   *No Answer*    Protocols used: Urology-Penile Pain / Paraphimosis-ADULT-[place]

## 2024-10-14 NOTE — TELEPHONE ENCOUNTER
Pt returned call after disconnected call    Please call pt back at [phone]    PT is complaining of pain in his testicle pain

## 2024-10-14 NOTE — PROGRESS NOTES
Ambulatory Visit  Name: Bryson Escoto      : 1960      MRN: [id number]  Encounter Provider: ALE Granger  Encounter Date: 10/14/2024   Encounter department: [facility] [place]    Assessment & Plan  Chronic constipation  Currently taking MiraLAX 1 capful twice daily and reporting abdominal bloating and BMs are every 3 to 4 days.  Denies any melena hematochezia.  Recent colonoscopy  noted multiple diverticula in the sigmoid colon, internal and external hemorrhoids.  Biopsies were negative for microscopic colitis.  Repeat colonoscopy recommended in 5 years.  He has tried and failed Linzess and Amitiza as well as fiber supplement due to abdominal bloating and lack of effectiveness.  Will trial lactulose twice daily.  Also discussed high-fiber diet with patient.  Written information given to patient today in Ukrainian.  Orders:    lactulose (CHRONULAC) 10 g/15 mL solution; Take 30 mL (20 g total) by mouth 2 (two) times a day  -High-fiber diet  -Follow-up in 4 months or sooner if needed  Gastroesophageal reflux disease without esophagitis  History of gastric intestinal metaplasia and inactive gastritis on EGD 10/24. Repeat EGD recommended in 3 years. He is currently taking 40 mg of omeprazole daily. Reflux is controlled.     -Repeat EGD 10/27  -Continue omeprazole 40 mg daily.  -Reflux diet       Gastric intestinal metaplasia  Refer above     -Repeat EGD 10/27    History of Present Illness     Bryson Escoto is a 63 y.o. male who presents for follow-up.  He was last seen by myself 2024 for GERD, history of gastric intestinal metaplasia and chronic constipation.  [employer] acquired today for translation as patient only speaks Ukrainian.    History of chronic constipation. He was on Amitiza 24 mcg twice daily with no help. He also took MiraLAX with that as well as fiber. At last visit patient was started on Linzess 290 mcg daily. He is now reporting that he is  "having loose stools and increased gas.  Linzess was stopped at last visit and simethicone was recommended every 6-8 hours as needed.  A colonoscopy was also recommended.    Interval history: Currently taking MiraLAX 1 capful twice daily and reporting abdominal bloating and BMs are every 3 to 4 days.  Denies any melena hematochezia.  Recent colonoscopy 8/24 noted multiple diverticula in the sigmoid colon, internal and external hemorrhoids.  Biopsies were negative for microscopic colitis.  Repeat colonoscopy recommended in 5 years.  He has tried and failed Linzess and Amitiza as well as fiber supplement due to abdominal bloating and lack of effectiveness.      Prior EGD/colonoscopy     Colonoscopy 8/24-multiple diverticula in the sigmoid colon.  Internal and external small hemorrhoids.  Biopsies performed to rule out colitis. Biopsies were negative for microscopic colitis.  Repeat colonoscopy recommended in 5 years.       EGD 10/24-Irregular Z-line; performed cold forceps biopsy  C0M1 Castellanos's esophagus  Moderate erythematous mucosa with loss of vascular pattern in the antrum. Mapping obtained given history of gastrointestinal metaplasia  The duodenum appeared normal.  Repeat EGD recommended in 3 years due to extensive gastric intestinal metaplasia.  Biopsies noted gastrointestinal.  No H. pylori.     He has had extensive gastric intestinal metaplasia found on 3 EGDs in 2022.  Recommended 1 year follow-up EGD.  He has a family history of gastric cancer in his uncle.     Colonoscopy 8/19-normal.  Recall colon in 10 years    Review of Systems   Gastrointestinal:  Positive for abdominal pain (Right lower quadrant that is better with a BM) and constipation.   All other systems reviewed and are negative.          Objective     /80 (BP Location: Left arm)   Pulse 62   Temp 97.8 °F (36.6 °C)   Ht 5' 2.5\" (1.588 m)   Wt 63.5 kg (140 lb)   SpO2 98%   BMI 25.20 kg/m²     Physical Exam  Constitutional:       " Appearance: Normal appearance.   Cardiovascular:      Rate and Rhythm: Normal rate and regular rhythm.      Heart sounds: Normal heart sounds.   Pulmonary:      Effort: Pulmonary effort is normal.      Breath sounds: Normal breath sounds.   Abdominal:      General: Bowel sounds are normal.      Palpations: Abdomen is soft.   Skin:     General: Skin is warm and dry.   Neurological:      Mental Status: He is alert.

## 2024-10-14 NOTE — TELEPHONE ENCOUNTER
Called patient back-Testicle pain both sides, that started last Tuesday. Patient also has burning with urination, urine frequency and urgency.     Denies swelling, fevers, nausea, vomiting, severe pain.      Advised patient to avoid bladder irritating foods and beverages.  Maintain adequate hydration of water intake, if not on fluid restrictions.    Ordered urine testing-urine micro and culture.    Reviewed ED precautions.

## 2024-10-14 NOTE — ASSESSMENT & PLAN NOTE
History of gastric intestinal metaplasia and inactive gastritis on EGD 10/24. Repeat EGD recommended in 3 years. He is currently taking 40 mg of omeprazole daily. Reflux is controlled.     -Repeat EGD 10/27  -Continue omeprazole 40 mg daily.  -Reflux diet

## 2024-10-14 NOTE — TELEPHONE ENCOUNTER
Pt states that he is still having ruq pain and would like another abdomen ultrasound done. He is willing to pay for it. He will discuss this at his office visit today

## 2024-10-14 NOTE — ASSESSMENT & PLAN NOTE
Currently taking MiraLAX 1 capful twice daily and reporting abdominal bloating and BMs are every 3 to 4 days.  Denies any melena hematochezia.  Recent colonoscopy 8/24 noted multiple diverticula in the sigmoid colon, internal and external hemorrhoids.  Biopsies were negative for microscopic colitis.  Repeat colonoscopy recommended in 5 years.  He has tried and failed Linzess and Amitiza as well as fiber supplement due to abdominal bloating and lack of effectiveness.  Will trial lactulose twice daily.  Also discussed high-fiber diet with patient.  Written information given to patient today in Indian.  Orders:    lactulose (CHRONULAC) 10 g/15 mL solution; Take 30 mL (20 g total) by mouth 2 (two) times a day  -High-fiber diet  -Follow-up in 4 months or sooner if needed

## 2024-10-15 LAB — BACTERIA UR CULT: NORMAL

## 2024-10-16 NOTE — TELEPHONE ENCOUNTER
Urine culture negative for UTI. He appears to have chronic orchalgia. Recommend scrotal elevation/support, NSAIDs, tylenol. Can use AZO otc for irritative voiding symptoms/dysuria. If having worsening testicular pain, repeat imaging can be ordered.

## 2024-10-21 NOTE — TELEPHONE ENCOUNTER
Spoke with Bryson Escoto in Khmer and advised of recommendations. States is having burning while urinating. Pt took down the spelling of AZO to get in pharmacy. Advised should he continue with symptoms/get worse, should contact office. Pt verbalized understanding.

## 2024-10-23 DIAGNOSIS — N39.0 URINARY TRACT INFECTION WITHOUT HEMATURIA, SITE UNSPECIFIED: Primary | ICD-10-CM

## 2024-10-23 RX ORDER — CIPROFLOXACIN 500 MG/1
500 TABLET, FILM COATED ORAL EVERY 12 HOURS SCHEDULED
Qty: 14 TABLET | Refills: 0 | Status: SHIPPED | OUTPATIENT
Start: 2024-10-23 | End: 2024-10-30

## 2024-10-23 NOTE — TELEPHONE ENCOUNTER
Patient of Sesar Moralez, last seen 8/29/2024, called in stating he is experiencing pain on his penis and both testicles that is constant and feels achy. He states the pain has not gone away from when he called on 10/14/2024. He states his pain today is 5-6/10. Patient is requesting a STAT ultrasound of his scrotum. He is leaving for vacation tomorrow for 15 days and wants to take care of this before he leaves. He denies fever, chills, swelling, redness, or bruising. He does have some frequency. He has not had any recent imaging or procedures. His UA and culture were negative for infection. I reviewed ED precautions.Please advise.  Art # [id number]     Please call patient if order is placed    Reason for Disposition   Affirmative: The patient has mild to moderate pain with no recent imaging    Answer Assessment - Initial Assessment Questions  1. When did your pain start, which side is it located on and is it constant or intermittent? Can you also qualify the pain in which you are feeling? (aching, dull, sharp, something else)  10/14/2024- pain has not gone away since- pain on penis and both testicles- constant- aching pain    2. Do you have any other areas of pain, particularly flank or back?  Waist area    3. Do you have any aggravating or alleviating factors?  OTC pain medication has not helped    4. Did you suffer an injury to the scrotal/groin area?  Denies    5. Have you noticed any mild, moderate, or severe scrotal or testicular swelling and is there any redness or bruising?  No     6. Are you experiencing any other symptoms such as a temperature of 101 or higher, any urinary symptoms such as frequency, urgency, inability to urinate?  Frequency    7. Do you have a history of testicular pain, swelling or other testicular issues?  No    8. Have you had any recent urological procedure or surgery? If yes, what procedure or surgery did you have?  No    9. Have you had a scrotal or groin ultrasound? If yes,  when?   No    11. Have you been prescribed any medications for this issue? If yes, what are those medications?  No    Protocols used: Urology-Scrotal / Testicular Pain-ADULT-[place]

## 2024-10-23 NOTE — TELEPHONE ENCOUNTER
"Called patient and informed of Sesar's message. Advised to take Ibuprofen, soak in warm bath and start antibiotics.    Patient began to get angry and using profanity and stating \" I have insurance he should do what I want\"    Attempted to provide emotional support and provide ED precautions and patient hung up abruptly.  "

## 2024-10-23 NOTE — TELEPHONE ENCOUNTER
Patient was still having testicle pain and discomfort.  He tried the AZO but it was not helping. He asked to speak to a nurse. Transferred to triage    Mauritanian Line:  Name: Art  ID: [id number]

## 2024-12-03 ENCOUNTER — NURSE TRIAGE (OUTPATIENT)
Age: 64
End: 2024-12-03

## 2024-12-03 DIAGNOSIS — R39.9 UTI SYMPTOMS: Primary | ICD-10-CM

## 2024-12-03 NOTE — TELEPHONE ENCOUNTER
" on phone with patient.  Patient reports pain in groin area, abdomen, and back; 5/10; urinary frequency and burning;     not taken any medication for pain;    Denies fever or hematuria; has cysto scheduled for January; would like to speak with provider sooner;      ER / hydration precautions reviewed    Urine labs ordered  Faxed to [facility]    Answer Assessment - Initial Assessment Questions  1. When did your symptoms start?   Urinary burning; ongoing symptoms for weeks  2. Do you experience pain or burning with every void?   yes  3. Do you have any other urinary symptoms such as urinary frequency, urgency, incontinence, blood in your urine?   Burning and frequency  4. Do you have any abdominal pain or flank pain?  Lower Abdominal and back pain. \"Many days\"  5. Do you have a fever of 101 or higher? How did you take your temperature?   No fever    7. Have you become unable to urinate?   Feels like he empties his bladder  8. Do you have a history of urinary tract infections?    Protocols used: Urology-Dysuria-ADULT-[place]    "

## 2024-12-04 ENCOUNTER — APPOINTMENT (OUTPATIENT)
Dept: LAB | Facility: HOSPITAL | Age: 64
End: 2024-12-04
Payer: COMMERCIAL

## 2024-12-04 ENCOUNTER — RESULTS FOLLOW-UP (OUTPATIENT)
Dept: NEPHROLOGY | Facility: CLINIC | Age: 64
End: 2024-12-04

## 2024-12-04 DIAGNOSIS — R80.1 PERSISTENT PROTEINURIA: ICD-10-CM

## 2024-12-04 DIAGNOSIS — R39.9 UTI SYMPTOMS: ICD-10-CM

## 2024-12-04 DIAGNOSIS — N18.2 CKD (CHRONIC KIDNEY DISEASE) STAGE 2, GFR 60-89 ML/MIN: ICD-10-CM

## 2024-12-04 DIAGNOSIS — N20.0 NEPHROLITHIASIS: ICD-10-CM

## 2024-12-04 LAB
ANION GAP SERPL CALCULATED.3IONS-SCNC: 3 MMOL/L (ref 4–13)
BACTERIA UR QL AUTO: ABNORMAL /HPF
BILIRUB UR QL STRIP: NEGATIVE
BUN SERPL-MCNC: 16 MG/DL (ref 5–25)
CALCIUM SERPL-MCNC: 8.8 MG/DL (ref 8.4–10.2)
CHLORIDE SERPL-SCNC: 110 MMOL/L (ref 96–108)
CLARITY UR: CLEAR
CO2 SERPL-SCNC: 27 MMOL/L (ref 21–32)
COLOR UR: ABNORMAL
CREAT SERPL-MCNC: 1.24 MG/DL (ref 0.6–1.3)
CREAT UR-MCNC: 172.4 MG/DL
GFR SERPL CREATININE-BSD FRML MDRD: 60 ML/MIN/1.73SQ M
GLUCOSE SERPL-MCNC: 82 MG/DL (ref 65–140)
GLUCOSE UR STRIP-MCNC: NEGATIVE MG/DL
HGB UR QL STRIP.AUTO: NEGATIVE
KETONES UR STRIP-MCNC: NEGATIVE MG/DL
LEUKOCYTE ESTERASE UR QL STRIP: NEGATIVE
MUCOUS THREADS UR QL AUTO: ABNORMAL
NITRITE UR QL STRIP: NEGATIVE
NON-SQ EPI CELLS URNS QL MICRO: ABNORMAL /HPF
PH UR STRIP.AUTO: 6 [PH]
POTASSIUM SERPL-SCNC: 4.2 MMOL/L (ref 3.5–5.3)
PROT UR STRIP-MCNC: ABNORMAL MG/DL
PROT UR-MCNC: 32.6 MG/DL
PROT/CREAT UR: 0.2 MG/G{CREAT} (ref 0–0.1)
RBC #/AREA URNS AUTO: ABNORMAL /HPF
SODIUM SERPL-SCNC: 140 MMOL/L (ref 135–147)
SP GR UR STRIP.AUTO: 1.02 (ref 1–1.03)
UROBILINOGEN UR STRIP-ACNC: <2 MG/DL
WBC #/AREA URNS AUTO: ABNORMAL /HPF

## 2024-12-04 PROCEDURE — 36415 COLL VENOUS BLD VENIPUNCTURE: CPT

## 2024-12-04 PROCEDURE — 80048 BASIC METABOLIC PNL TOTAL CA: CPT

## 2024-12-04 PROCEDURE — 82570 ASSAY OF URINE CREATININE: CPT

## 2024-12-04 PROCEDURE — 84156 ASSAY OF PROTEIN URINE: CPT

## 2024-12-04 PROCEDURE — 81001 URINALYSIS AUTO W/SCOPE: CPT

## 2024-12-04 PROCEDURE — 87086 URINE CULTURE/COLONY COUNT: CPT

## 2024-12-04 NOTE — TELEPHONE ENCOUNTER
Called patient and left a voicemail stating the following information:    Patients kidney function was good at 1.2 and stable and there are still very little protein in the urine so that is good to.    I asked the patient please call back with further questions.

## 2024-12-04 NOTE — TELEPHONE ENCOUNTER
----- Message from Dennis Aguirre MD sent at 12/4/2024  3:00 PM EST -----  Let him know the blood test for his kidney function was good at 1.2 and stable and there are still very little protein in the urine so that is good to.

## 2024-12-05 LAB — BACTERIA UR CULT: NORMAL

## 2025-01-03 ENCOUNTER — PROCEDURE VISIT (OUTPATIENT)
Dept: UROLOGY | Facility: CLINIC | Age: 65
End: 2025-01-03
Payer: COMMERCIAL

## 2025-01-03 VITALS
HEART RATE: 68 BPM | DIASTOLIC BLOOD PRESSURE: 80 MMHG | WEIGHT: 149 LBS | SYSTOLIC BLOOD PRESSURE: 134 MMHG | HEIGHT: 63 IN | BODY MASS INDEX: 26.4 KG/M2 | OXYGEN SATURATION: 98 %

## 2025-01-03 DIAGNOSIS — R39.15 URGENCY OF URINATION: Primary | ICD-10-CM

## 2025-01-03 PROCEDURE — 52000 CYSTOURETHROSCOPY: CPT | Performed by: UROLOGY

## 2025-01-03 RX ORDER — OXYBUTYNIN CHLORIDE 10 MG/1
10 TABLET, EXTENDED RELEASE ORAL DAILY
Qty: 90 TABLET | Refills: 3 | Status: SHIPPED | OUTPATIENT
Start: 2025-01-03

## 2025-01-03 NOTE — PROGRESS NOTES
Cystoscopy     Date/Time  1/3/2025 8:30 AM     Performed by  Kendall Tate MD   Authorized by  Kendall Tate MD         Procedure Details:  Procedure type: cystoscopy    Bryson is a 64-year-old male referred for BPH.  A recent PSA level is noted to be 0.44.  He is currently on tamsulosin.  A CT scan performed within the last 1.5 years shows a 4 x 4.75 x 4.75 cm prostate which calculates to an approximate prostate size of 45 g.  He presents today for cystoscopy.  TRUS was recommended but not required as the prostate size is measurable from the CT scan.  He states that his symptoms are more related to urgency.  He states that he voids with an adequate urinary stream but is now reporting nocturia up to 3-4 episodes per night.  He also reports daytime urgency and frequency stating that he often has to urinate every hour at work.      Male cystoscopy procedure note:  Risk and benefits of flexible cystoscopy were discussed. Informed consent was obtained. The patient was placed in the supine position. His genitalia was prepped and draped in a sterile fashion. Viscous lidocaine jelly was instilled into the urethra and flexible cystoscopy was then performed. The urethra, prostatic urethra, and bladder were all thoroughly inspected there was no evidence of mucosal abnormalities or lesions. Both ureteral orifices were visualized with clear efflux of urine.  The prostatic urethral channel was patent.  Retroflexion was normal. Overall this was a negative cystoscopy.    Impression: Urgency without significant BPH/obstruction    Plan: I do recommend that he continues the tamsulosin at this time.  I recommend adding oxybutynin XL 10 mg.  Side effect profile discussed and reviewed.  Follow-up in 3 months to assess the efficacy of the oxybutynin.  If the oxybutynin is working well would consider discontinuing the tamsulosin as he does not appear to have outlet obstruction on cystoscopy and his prostate measures  approximately 45 g by CT scan.  When he returns in 3 months time I recommend postvoid residual assessment and uroflow evaluation with the advanced practitioner.

## 2025-02-13 ENCOUNTER — NURSE TRIAGE (OUTPATIENT)
Age: 65
End: 2025-02-13

## 2025-02-13 DIAGNOSIS — N50.811 PAIN IN BOTH TESTICLES: ICD-10-CM

## 2025-02-13 DIAGNOSIS — N50.812 PAIN IN BOTH TESTICLES: ICD-10-CM

## 2025-02-13 DIAGNOSIS — N50.812 PAIN IN LEFT TESTICLE: Primary | ICD-10-CM

## 2025-02-13 NOTE — TELEPHONE ENCOUNTER
Last seen with Dr. Tate 01/03/25 for cystoscopy.    Ongoing b/l testicle pain that is worsening;  now radiates down legs at times; slight redness / warmth maybe;  denies fever or swelling; urinating ok.   Currently at work;  requesting Ultrasound.    ER precautions reviewed    Scheduled earlier follow-up visit for tomorrow 02/14/25 with Huey CULLEN     Answer Assessment - Initial Assessment Questions  1. When did your pain start, which side is it located on and is it constant or intermittent? Can you also qualify the pain in which you are feeling? (aching, dull, sharp, something else)  B/l front of testicles pain;  radiates both legs;  constant ache;  no swelling;  slight redness and have slight burning  2. Do you have any other areas of pain, particularly flank or back?  Ongoing back pain  3. Do you have any aggravating or alleviating factors?  Less pain at rest;     6/10 pain with standing/ walking  4. Did you suffer an injury to the scrotal/groin area?  No injury   5. Have you noticed any mild, moderate, or severe scrotal or testicular swelling and is there any redness or bruising?  Slight redness maybe;    6. Are you experiencing any other symptoms such as a temperature of 101 or higher, any urinary symptoms such as frequency, urgency, inability to urinate?  No fever;   voiding fine  7. Do you have a history of testicular pain, swelling or other testicular issues?  Yes;   testicular pain in december  8. Have you had any recent urological procedure or surgery? If yes, what procedure or surgery did you have?  no  9. Have you had a scrotal or groin ultrasound? If yes, when?     10. Were you referred by your PCP, ED (Emergency Department), urgent care or another specialty?    11. Have you been prescribed any medications for this issue? If yes, what are those medications?    Protocols used: Urology-Scrotal / Testicular Pain-ADULT-[place]

## 2025-02-13 NOTE — TELEPHONE ENCOUNTER
Reason for Disposition  • Affirmative: The patient has mild to moderate pain with no recent imaging    Protocols used: Urology-Scrotal / Testicular Pain-ADULT-[place]

## 2025-02-13 NOTE — TELEPHONE ENCOUNTER
I have placed an order for an ultrasound of the scrotum and testicles.  Patient can call the line to schedule ultrasound.  Will evaluate in the office tomorrow.

## 2025-02-14 ENCOUNTER — OFFICE VISIT (OUTPATIENT)
Dept: UROLOGY | Facility: MEDICAL CENTER | Age: 65
End: 2025-02-14
Payer: MEDICARE

## 2025-02-14 VITALS
HEIGHT: 63 IN | BODY MASS INDEX: 26.4 KG/M2 | OXYGEN SATURATION: 98 % | DIASTOLIC BLOOD PRESSURE: 80 MMHG | WEIGHT: 149 LBS | SYSTOLIC BLOOD PRESSURE: 120 MMHG | HEART RATE: 70 BPM

## 2025-02-14 DIAGNOSIS — N50.811 PAIN IN BOTH TESTICLES: Primary | ICD-10-CM

## 2025-02-14 DIAGNOSIS — N50.812 PAIN IN BOTH TESTICLES: Primary | ICD-10-CM

## 2025-02-14 PROCEDURE — 99213 OFFICE O/P EST LOW 20 MIN: CPT

## 2025-02-14 NOTE — PROGRESS NOTES
2/14/2025      Assessment and Plan    64 y.o. male managed by our office    Pain in both testicles  We discussed conservative measures with a combination of ibuprofen plus Tylenol, ice, elevation, and extra scrotal support.  Additionally, we discussed that lower back pain can be referred to the testicles and I would recommend lower back stretches for treatment of this.  We discussed obtaining an ultrasound of the scrotum and testicles as well as the groin and inguinal area to evaluate for any abnormalities that may be concerning for his testicular pain as well as evaluate for hernia.  Patient will trial conservative measures for treatment of his bilateral testicular pain and undergo the ultrasounds as noted above.  Our office will call with ultrasound results.  The patient return to the office in about 2 to 3 months to reevaluate his testicular pain.        History of Present Illness  Bryson Escoto is a 64 y.o. male here for evaluation of testicular pain.  Patient was last seen in the office for a cystoscopy which was performed 1/3/2025.  Cystoscopy noted a patent prostatic urethral channel and the patient was determined to have urinary urgency without significant BPH/obstruction.  Patient is currently on a combination of Flomax 0.4 mg and oxybutynin 10 mg daily for treatment of his lower urinary tract symptoms.  The patient's prostate was measured to be approximately 45 g on CT.  Patient called the office on 12/13/2025 in regards to ongoing bilateral testicle pain that has been worsening.  Today, the patient reports that over the last week he started to experience bilateral testicular pain that initiates in his groin region and radiates down to the testicles and then down his mid thigh.  Patient has not trialed any over-the-counter medications for pain relief.  Patient denies any infectious symptoms including fevers, chills, muscle aches, nausea, or vomiting.  Patient denies any changes in his lower urinary tract  "symptoms since his last office visit.        Review of Systems   Constitutional:  Negative for chills and fever.   HENT:  Negative for ear pain and sore throat.    Eyes:  Negative for pain and visual disturbance.   Respiratory:  Negative for cough and shortness of breath.    Cardiovascular:  Negative for chest pain and palpitations.   Gastrointestinal:  Negative for abdominal pain and vomiting.   Genitourinary:  Negative for decreased urine volume, difficulty urinating, dysuria, flank pain, frequency, hematuria and urgency.   Musculoskeletal:  Negative for arthralgias and back pain.   Skin:  Negative for color change and rash.   Neurological:  Negative for seizures and syncope.   All other systems reviewed and are negative.               Vitals  Vitals:    02/14/25 1554   BP: 120/80   BP Location: Left arm   Patient Position: Sitting   Cuff Size: Standard   Pulse: 70   SpO2: 98%   Weight: 67.6 kg (149 lb)   Height: 5' 2.5\" (1.588 m)       Physical Exam  Vitals reviewed.   Constitutional:       General: He is not in acute distress.     Appearance: Normal appearance. He is not ill-appearing.   HENT:      Head: Normocephalic and atraumatic.      Nose: Nose normal.   Eyes:      General: No scleral icterus.  Pulmonary:      Effort: No respiratory distress.   Abdominal:      General: Abdomen is flat. There is no distension.      Palpations: Abdomen is soft.      Tenderness: There is no abdominal tenderness.   Musculoskeletal:         General: Normal range of motion.      Cervical back: Normal range of motion.   Skin:     General: Skin is warm.      Coloration: Skin is not jaundiced.   Neurological:      Mental Status: He is alert and oriented to person, place, and time.      Gait: Gait normal.   Psychiatric:         Mood and Affect: Mood normal.         Behavior: Behavior normal.           Past History  Past Medical History:   Diagnosis Date    COVID-19 virus infection 5/1/2020    Exposure to COVID-19 virus 11/23/2020    " Family history of parkinsonism 2020    History of chronic constipation     History of neck pain 2017    Hypertension      Social History     Socioeconomic History    Marital status: /Civil Union     Spouse name: None    Number of children: None    Years of education: None    Highest education level: None   Occupational History    None   Tobacco Use    Smoking status: Former     Current packs/day: 0.00     Average packs/day: 0.3 packs/day for 20.0 years (5.0 ttl pk-yrs)     Types: Cigarettes     Start date: 1994     Quit date: 2014     Years since quittin.1     Passive exposure: Never    Smokeless tobacco: Never   Vaping Use    Vaping status: Never Used   Substance and Sexual Activity    Alcohol use: Yes     Comment: social    Drug use: Never    Sexual activity: Not Currently   Other Topics Concern    None   Social History Narrative    No preference or Jewish beliefs     Social Drivers of Health     Financial Resource Strain: Low Risk  (2023)    Overall Financial Resource Strain (CARDIA)     Difficulty of Paying Living Expenses: Not hard at all   Food Insecurity: No Food Insecurity (2023)    Nursing - Inadequate Food Risk Classification     Worried About Running Out of Food in the Last Year: Never true     Ran Out of Food in the Last Year: Never true     Ran Out of Food in the Last Year: Not on file   Transportation Needs: No Transportation Needs (2023)    PRAPARE - Transportation     Lack of Transportation (Medical): No     Lack of Transportation (Non-Medical): No   Physical Activity: Not on file   Stress: Not on file   Social Connections: Not on file   Intimate Partner Violence: Not on file   Housing Stability: Unknown (2024)    Housing Stability Vital Sign     Unable to Pay for Housing in the Last Year: No     Number of Times Moved in the Last Year: Not on file     Homeless in the Last Year: No     Social History     Tobacco Use   Smoking Status Former     Current packs/day: 0.00    Average packs/day: 0.3 packs/day for 20.0 years (5.0 ttl pk-yrs)    Types: Cigarettes    Start date: 1994    Quit date: 2014    Years since quittin.1    Passive exposure: Never   Smokeless Tobacco Never     Family History   Problem Relation Age of Onset    Cancer Sister         malignant neoplasm of breast    Diabetes Paternal Grandmother     Stomach cancer Paternal Uncle         unsure if colon or stomach    Diabetes Family     Arthritis Family     Cancer Family         malignant neoplasm    Substance Abuse Neg Hx         denied Mother and Father    Mental illness Neg Hx         no family history Mother and Father    Other Neg Hx         denied family history of Osteopertrosis       The following portions of the patient's history were reviewed and updated as appropriate: allergies, current medications, past medical history, past social history, past surgical history and problem list.    Results  No results found for this or any previous visit (from the past hour).]  Lab Results   Component Value Date    PSA 0.486 2024    PSA 0.448 2024    PSA 0.5 2023    PSA 0.6 2022     Lab Results   Component Value Date    GLUCOSE 113 09/15/2015    CALCIUM 8.8 2024     09/15/2015    K 4.2 2024    CO2 27 2024     (H) 2024    BUN 16 2024    CREATININE 1.24 2024     Lab Results   Component Value Date    WBC 4.13 (L) 2023    HGB 13.7 2023    HCT 41.2 2023    MCV 90 2023     2023

## 2025-02-14 NOTE — ASSESSMENT & PLAN NOTE
We discussed conservative measures with a combination of ibuprofen plus Tylenol, ice, elevation, and extra scrotal support.  Additionally, we discussed that lower back pain can be referred to the testicles and I would recommend lower back stretches for treatment of this.  We discussed obtaining an ultrasound of the scrotum and testicles as well as the groin and inguinal area to evaluate for any abnormalities that may be concerning for his testicular pain as well as evaluate for hernia.  Patient will trial conservative measures for treatment of his bilateral testicular pain and undergo the ultrasounds as noted above.  Our office will call with ultrasound results.  The patient return to the office in about 2 to 3 months to reevaluate his testicular pain.  
no

## 2025-02-24 ENCOUNTER — HOSPITAL ENCOUNTER (OUTPATIENT)
Dept: ULTRASOUND IMAGING | Facility: HOSPITAL | Age: 65
Discharge: HOME | End: 2025-02-24
Payer: COMMERCIAL

## 2025-02-24 DIAGNOSIS — N50.811 PAIN IN BOTH TESTICLES: ICD-10-CM

## 2025-02-24 DIAGNOSIS — N50.812 PAIN IN BOTH TESTICLES: ICD-10-CM

## 2025-02-24 PROCEDURE — 76705 ECHO EXAM OF ABDOMEN: CPT

## 2025-02-24 PROCEDURE — 76870 US EXAM SCROTUM: CPT

## 2025-02-28 ENCOUNTER — RESULTS FOLLOW-UP (OUTPATIENT)
Dept: UROLOGY | Facility: MEDICAL CENTER | Age: 65
End: 2025-02-28

## 2025-02-28 NOTE — TELEPHONE ENCOUNTER
Please call the patient advise him that I reviewed his most recent ultrasounds.  Patient's ultrasound of the groin/inguinal area did not show any concern for a hernia.  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  The cyst or collection of protein that sit on the epididymis and spermatic cord and are benign, noncancerous findings.  Patient was noted to have good blood flow bilaterally to the testicles.  No findings at this time that could be contributing to the patient's bilateral testicular pain.  Would proceed with conservative measures with ice, elevation, extra scrotal support, and a combination of ibuprofen plus Tylenol for pain relief.  I would advise the patient to continue to monitor his testicular pain and return to the office as scheduled on April if the pain continues to persist.

## 2025-03-14 ENCOUNTER — OFFICE VISIT (OUTPATIENT)
Dept: UROLOGY | Facility: MEDICAL CENTER | Age: 65
End: 2025-03-14
Payer: COMMERCIAL

## 2025-03-14 VITALS
DIASTOLIC BLOOD PRESSURE: 84 MMHG | HEIGHT: 63 IN | HEART RATE: 75 BPM | OXYGEN SATURATION: 98 % | BODY MASS INDEX: 26.4 KG/M2 | SYSTOLIC BLOOD PRESSURE: 138 MMHG | WEIGHT: 149 LBS

## 2025-03-14 DIAGNOSIS — N50.819 CHRONIC PAIN IN TESTICLE: ICD-10-CM

## 2025-03-14 DIAGNOSIS — R39.11 BENIGN PROSTATIC HYPERPLASIA WITH URINARY HESITANCY: ICD-10-CM

## 2025-03-14 DIAGNOSIS — N40.1 BENIGN PROSTATIC HYPERPLASIA WITH URINARY HESITANCY: ICD-10-CM

## 2025-03-14 DIAGNOSIS — R31.29 MICROSCOPIC HEMATURIA: ICD-10-CM

## 2025-03-14 DIAGNOSIS — N50.819 PAIN IN TESTICLE, UNSPECIFIED LATERALITY: Primary | ICD-10-CM

## 2025-03-14 DIAGNOSIS — G89.29 CHRONIC PAIN IN TESTICLE: ICD-10-CM

## 2025-03-14 LAB
BACTERIA UR QL AUTO: ABNORMAL /HPF
BILIRUB UR QL STRIP: NEGATIVE
CLARITY UR: CLEAR
COLOR UR: ABNORMAL
GLUCOSE UR STRIP-MCNC: NEGATIVE MG/DL
HGB UR QL STRIP.AUTO: NEGATIVE
KETONES UR STRIP-MCNC: NEGATIVE MG/DL
LEUKOCYTE ESTERASE UR QL STRIP: NEGATIVE
MUCOUS THREADS UR QL AUTO: ABNORMAL
NITRITE UR QL STRIP: NEGATIVE
NON-SQ EPI CELLS URNS QL MICRO: ABNORMAL /HPF
PH UR STRIP.AUTO: 6 [PH]
PROT UR STRIP-MCNC: ABNORMAL MG/DL
RBC #/AREA URNS AUTO: ABNORMAL /HPF
SL AMB  POCT GLUCOSE, UA: ABNORMAL
SL AMB LEUKOCYTE ESTERASE,UA: ABNORMAL
SL AMB POCT BILIRUBIN,UA: ABNORMAL
SL AMB POCT BLOOD,UA: ABNORMAL
SL AMB POCT CLARITY,UA: CLEAR
SL AMB POCT COLOR,UA: YELLOW
SL AMB POCT KETONES,UA: ABNORMAL
SL AMB POCT NITRITE,UA: ABNORMAL
SL AMB POCT PH,UA: 6
SL AMB POCT SPECIFIC GRAVITY,UA: 1.02
SL AMB POCT URINE PROTEIN: 100
SL AMB POCT UROBILINOGEN: 0.2
SP GR UR STRIP.AUTO: 1.02 (ref 1–1.03)
UROBILINOGEN UR STRIP-ACNC: <2 MG/DL
WBC #/AREA URNS AUTO: ABNORMAL /HPF

## 2025-03-14 PROCEDURE — 99213 OFFICE O/P EST LOW 20 MIN: CPT

## 2025-03-14 PROCEDURE — 81003 URINALYSIS AUTO W/O SCOPE: CPT

## 2025-03-14 PROCEDURE — 81001 URINALYSIS AUTO W/SCOPE: CPT

## 2025-03-14 RX ORDER — TAMSULOSIN HYDROCHLORIDE 0.4 MG/1
0.4 CAPSULE ORAL
Qty: 90 CAPSULE | Refills: 3 | Status: SHIPPED | OUTPATIENT
Start: 2025-03-14

## 2025-03-14 NOTE — PROGRESS NOTES
3/14/2025      Assessment and Plan    64 y.o. male managed by our office    Chronic pain in testicle  Ultrasound of the scrotum and testicle performed 2/20/2025 noted an unchanged right spermatic cord cyst, subcentimeter epididymal cysts, but no hydroceles, varicoceles, or concern fracture testicular mass.  Good blood flow bilaterally was noted  Ultrasound of the groin/inguinal area completed 2/20/2025 was unremarkable and did not reveal any suspicious inguinal hernia  We discussed conservative measures with ice, elevation, extra scrotal support, and combination ibuprofen and Tylenol for his discomfort/sensitivity  We discussed a referral to pelvic floor physical therapy for treatment of his chronic bilateral testicular pain as this may be due to hypertonic pelvic muscles causing referred pain to the testicles.  Additionally, I recommend the patient perform lower back stretches as chronic low back pain can cause referred pain to the testicles.  Ambulatory referral to pelvic floor physical therapy placed at today's office visit and the patient will return to our office in 3 months to reevaluate    Microscopic hematuria  Urine dip in the office today revealed trace blood  We discussed that the patient's urine from today's office visit will be sent for analysis.  We discussed that if it shows greater than 2-3 RBCs then we may need to consider further workup for microscopic hematuria.  Our office will call with urinalysis results.    Benign prostatic hyperplasia with urinary hesitancy  Patient currently controlled on a combination of Flomax 0.4 mg and oxybutynin 10 mg daily  Cystoscopy performed 1/3/2025 noted a patent prostatic urethral channel and it was determined that the patient has urinary urgency without significant BPH/obstruction  Patient currently content with his voiding pattern on combination therapy and we will continue to monitor for worsening/progression of lower urinary tract symptoms        History of  Present Illness  Brysno Escoto is a 64 y.o. male here for evaluation of bilateral testicular pain.  Patient was last seen in the office on 2/14/2025.  Patient previously underwent a cystoscopy on 1/3/2025 which noted a patent prostatic urethral channel and the patient was noted to have urinary urgency without significant BPH/obstruction.  Patient is currently on a combination of Flomax 0.4 mg and oxybutynin 10 mg daily for treatment of his lower urinary tract symptoms.  Patient's prostate was measured to be approximately 45 g on CT.    At our last office visit, the patient reported that for more than a week he had been experiencing bilateral testicular pain that initiated in the groin region and radiated down to the testicles.  Patient noted to not have been trialing any over-the-counter medications for pain relief.  Patient was advised to undergo an ultrasound of the scrotum and testicle and groin area.  Ultrasound of the scrotum and testicle completed 2/20/2025 noted a unchanged right spermatic cord cyst and subcentimeter epididymal cyst, but no hydroceles, varicoceles, or concern for extratesticular mass.  Ultrasound of the groin/inguinal area completed 2/20/2025 noted a nonspecific, but prominent enlarged inguinal lymph node, possibly reactive.  Otherwise, ultrasound was unremarkable.    Today, the patient reports that he does continue to experience bilateral testicular discomfort/sensitivity.  Patient offers no other lower urinary tract complaints at today's office visit.  Patient notes that he has not trialed any over-the-counter medications since her last office visit for relief.    Review of Systems   Constitutional:  Negative for chills and fever.   HENT:  Negative for ear pain and sore throat.    Eyes:  Negative for pain and visual disturbance.   Respiratory:  Negative for cough and shortness of breath.    Cardiovascular:  Negative for chest pain and palpitations.   Gastrointestinal:  Negative for  "abdominal pain and vomiting.   Genitourinary:  Positive for testicular pain. Negative for decreased urine volume, difficulty urinating, dysuria, flank pain, frequency, hematuria, scrotal swelling and urgency.   Musculoskeletal:  Negative for arthralgias and back pain.   Skin:  Negative for color change and rash.   Neurological:  Negative for seizures and syncope.   All other systems reviewed and are negative.               Vitals  Vitals:    03/14/25 1304   BP: 138/84   BP Location: Left arm   Patient Position: Sitting   Cuff Size: Adult   Pulse: 75   SpO2: 98%   Weight: 67.6 kg (149 lb)   Height: 5' 2.5\" (1.588 m)       Physical Exam  Vitals reviewed.   Constitutional:       General: He is not in acute distress.     Appearance: Normal appearance. He is not ill-appearing.   HENT:      Head: Normocephalic and atraumatic.      Nose: Nose normal.   Eyes:      General: No scleral icterus.  Pulmonary:      Effort: No respiratory distress.   Abdominal:      General: Abdomen is flat. There is no distension.      Palpations: Abdomen is soft.      Tenderness: There is no abdominal tenderness.   Musculoskeletal:         General: Normal range of motion.      Cervical back: Normal range of motion.   Skin:     General: Skin is warm.      Coloration: Skin is not jaundiced.   Neurological:      Mental Status: He is alert and oriented to person, place, and time.      Gait: Gait normal.   Psychiatric:         Mood and Affect: Mood normal.         Behavior: Behavior normal.           Past History  Past Medical History:   Diagnosis Date    COVID-19 virus infection 5/1/2020    Exposure to COVID-19 virus 11/23/2020    Family history of parkinsonism 4/30/2020    History of chronic constipation     History of neck pain 11/21/2017    Hypertension      Social History     Socioeconomic History    Marital status: /Civil Union     Spouse name: None    Number of children: None    Years of education: None    Highest education level: None "   Occupational History    None   Tobacco Use    Smoking status: Former     Current packs/day: 0.00     Average packs/day: 0.3 packs/day for 20.0 years (5.0 ttl pk-yrs)     Types: Cigarettes     Start date: 1994     Quit date: 2014     Years since quittin.1     Passive exposure: Never    Smokeless tobacco: Never   Vaping Use    Vaping status: Never Used   Substance and Sexual Activity    Alcohol use: Yes     Comment: social    Drug use: Never    Sexual activity: Not Currently   Other Topics Concern    None   Social History Narrative    No preference or Lutheran beliefs     Social Drivers of Health     Financial Resource Strain: Low Risk  (2023)    Overall Financial Resource Strain (CARDIA)     Difficulty of Paying Living Expenses: Not hard at all   Food Insecurity: No Food Insecurity (2023)    Nursing - Inadequate Food Risk Classification     Worried About Running Out of Food in the Last Year: Never true     Ran Out of Food in the Last Year: Never true     Ran Out of Food in the Last Year: Not on file   Transportation Needs: No Transportation Needs (2023)    PRAPARE - Transportation     Lack of Transportation (Medical): No     Lack of Transportation (Non-Medical): No   Physical Activity: Not on file   Stress: Not on file   Social Connections: Not on file   Intimate Partner Violence: Not on file   Housing Stability: Unknown (2024)    Housing Stability Vital Sign     Unable to Pay for Housing in the Last Year: No     Number of Times Moved in the Last Year: Not on file     Homeless in the Last Year: No     Social History     Tobacco Use   Smoking Status Former    Current packs/day: 0.00    Average packs/day: 0.3 packs/day for 20.0 years (5.0 ttl pk-yrs)    Types: Cigarettes    Start date: 1994    Quit date: 2014    Years since quittin.1    Passive exposure: Never   Smokeless Tobacco Never     Family History   Problem Relation Age of Onset    Cancer Sister          malignant neoplasm of breast    Diabetes Paternal Grandmother     Stomach cancer Paternal Uncle         unsure if colon or stomach    Diabetes Family     Arthritis Family     Cancer Family         malignant neoplasm    Substance Abuse Neg Hx         denied Mother and Father    Mental illness Neg Hx         no family history Mother and Father    Other Neg Hx         denied family history of Osteopertrosis       The following portions of the patient's history were reviewed and updated as appropriate: allergies, current medications, past medical history, past social history, past surgical history and problem list.    Results  Recent Results (from the past hour)   POCT urine dip auto non-scope    Collection Time: 03/14/25  1:57 PM   Result Value Ref Range     COLOR,UA yellow     CLARITY,UA clear     SPECIFIC GRAVITY,UA 1.025      PH,UA 6.0     LEUKOCYTE ESTERASE,UA neg     NITRITE,UA neg     GLUCOSE, UA neg     KETONES,UA neg     BILIRUBIN,UA neg     BLOOD,UA trace-intact     POCT URINE PROTEIN 100     SL AMB POCT UROBILINOGEN 0.2    ]  Lab Results   Component Value Date    PSA 0.486 06/20/2024    PSA 0.448 06/20/2024    PSA 0.5 01/13/2023    PSA 0.6 04/08/2022     Lab Results   Component Value Date    GLUCOSE 113 09/15/2015    CALCIUM 8.8 12/04/2024     09/15/2015    K 4.2 12/04/2024    CO2 27 12/04/2024     (H) 12/04/2024    BUN 16 12/04/2024    CREATININE 1.24 12/04/2024     Lab Results   Component Value Date    WBC 4.13 (L) 05/29/2023    HGB 13.7 05/29/2023    HCT 41.2 05/29/2023    MCV 90 05/29/2023     05/29/2023

## 2025-03-14 NOTE — ASSESSMENT & PLAN NOTE
Urine dip in the office today revealed trace blood  We discussed that the patient's urine from today's office visit will be sent for analysis.  We discussed that if it shows greater than 2-3 RBCs then we may need to consider further workup for microscopic hematuria.  Our office will call with urinalysis results.

## 2025-03-14 NOTE — ASSESSMENT & PLAN NOTE
Patient currently controlled on a combination of Flomax 0.4 mg and oxybutynin 10 mg daily  Cystoscopy performed 1/3/2025 noted a patent prostatic urethral channel and it was determined that the patient has urinary urgency without significant BPH/obstruction  Patient currently content with his voiding pattern on combination therapy and we will continue to monitor for worsening/progression of lower urinary tract symptoms

## 2025-03-14 NOTE — ASSESSMENT & PLAN NOTE
Ultrasound of the scrotum and testicle performed 2/20/2025 noted an unchanged right spermatic cord cyst, subcentimeter epididymal cysts, but no hydroceles, varicoceles, or concern fracture testicular mass.  Good blood flow bilaterally was noted  Ultrasound of the groin/inguinal area completed 2/20/2025 was unremarkable and did not reveal any suspicious inguinal hernia  We discussed conservative measures with ice, elevation, extra scrotal support, and combination ibuprofen and Tylenol for his discomfort/sensitivity  We discussed a referral to pelvic floor physical therapy for treatment of his chronic bilateral testicular pain as this may be due to hypertonic pelvic muscles causing referred pain to the testicles.  Additionally, I recommend the patient perform lower back stretches as chronic low back pain can cause referred pain to the testicles.  Ambulatory referral to pelvic floor physical therapy placed at today's office visit and the patient will return to our office in 3 months to reevaluate

## 2025-03-17 ENCOUNTER — RESULTS FOLLOW-UP (OUTPATIENT)
Dept: UROLOGY | Facility: AMBULATORY SURGERY CENTER | Age: 65
End: 2025-03-17

## 2025-03-17 NOTE — TELEPHONE ENCOUNTER
Call placed to Pt and gave him his normal results and he would like to see AP Sooner because he is going to the Bathroom every 20 mins.     Forward to clerical for assisting with sooner Appointment.

## 2025-03-17 NOTE — TELEPHONE ENCOUNTER
----- Message from Huey Slade PA-C sent at 3/17/2025  8:07 AM EDT -----  Please call the patient advise him I reviewed his urinalysis from the urine sample sent from our last office visit.  Urinalysis noted no blood.  No further workup for microscopic hematuria needed at this time.

## 2025-03-17 NOTE — RESULT ENCOUNTER NOTE
Called pt to give him an sooner appt with Huey.  Offered him Thursday, 3/20/25.    Patient swore numerous times and then hung up the phone.

## 2025-03-31 DIAGNOSIS — I10 BENIGN HYPERTENSION: ICD-10-CM

## 2025-03-31 RX ORDER — AMLODIPINE BESYLATE 10 MG/1
10 TABLET ORAL DAILY
Qty: 90 TABLET | Refills: 2 | OUTPATIENT
Start: 2025-03-31

## 2025-04-14 ENCOUNTER — NURSE TRIAGE (OUTPATIENT)
Age: 65
End: 2025-04-14

## 2025-04-14 NOTE — TELEPHONE ENCOUNTER
Regarding: Contant nausea when eating and ABD pain  ----- Message from Fabi LEZAMA sent at 4/14/2025  5:18 PM EDT -----  The patient called to advise that he is currently experiencing a lot of symptoms of nausea whenever he eats and has lots of abdominal pain. He is interested in possibly having some labs done to find out what is causing the issue. I confirmed with the patient that he is scheduled for an appointment in our office on 04/18, but he would like a callback regarding his symptoms to discuss this.

## 2025-04-15 NOTE — TELEPHONE ENCOUNTER
Spoke with pt in Indonesian. Reports abdominal pain and nausea. Last BM yesterday. Has BM's typically every 3-4 days. When I inquired on Miralax for chronic constipation; pt disconnected call. Attempted to speak with pt again to complete triage. Pt again disconnected call.

## 2025-04-18 ENCOUNTER — OFFICE VISIT (OUTPATIENT)
Dept: GASTROENTEROLOGY | Facility: MEDICAL CENTER | Age: 65
End: 2025-04-18
Payer: COMMERCIAL

## 2025-04-18 VITALS
TEMPERATURE: 97.5 F | HEART RATE: 77 BPM | WEIGHT: 149 LBS | BODY MASS INDEX: 26.4 KG/M2 | OXYGEN SATURATION: 99 % | DIASTOLIC BLOOD PRESSURE: 78 MMHG | SYSTOLIC BLOOD PRESSURE: 132 MMHG | HEIGHT: 63 IN

## 2025-04-18 DIAGNOSIS — K59.09 CHRONIC CONSTIPATION: ICD-10-CM

## 2025-04-18 DIAGNOSIS — R11.0 NAUSEA: Primary | ICD-10-CM

## 2025-04-18 DIAGNOSIS — K21.9 GASTROESOPHAGEAL REFLUX DISEASE WITHOUT ESOPHAGITIS: ICD-10-CM

## 2025-04-18 DIAGNOSIS — K31.A0 GASTRIC INTESTINAL METAPLASIA: ICD-10-CM

## 2025-04-18 PROCEDURE — 99214 OFFICE O/P EST MOD 30 MIN: CPT | Performed by: NURSE PRACTITIONER

## 2025-04-18 RX ORDER — LACTULOSE 10 G/15ML
20 SOLUTION ORAL 2 TIMES DAILY
Qty: 240 ML | Refills: 3 | Status: SHIPPED | OUTPATIENT
Start: 2025-04-18

## 2025-04-18 NOTE — ASSESSMENT & PLAN NOTE
History of chronic constipation.  Reports BMs are every other day.  Reports he never started the lactulose.  He never picked it up from the pharmacy.  He is currently on Linzess 290 mcg and reports he does not think that it is working as well as he noted before.  Did fail fiber supplement, MiraLAX and Amitiza.  Orders:    lactulose (CHRONULAC) 10 g/15 mL solution; Take 30 mL (20 g total) by mouth 2 (two) times a day

## 2025-04-18 NOTE — ASSESSMENT & PLAN NOTE
History of gastric intestinal metaplasia and inactive gastritis on EGD 10/24. Repeat EGD recommended in 3 years. He is currently taking 40 mg of omeprazole daily. Reflux is controlled.     - Repeat EGD 10/27  Continue omeprazole 40 mg daily  -Continue antireflux diet

## 2025-04-18 NOTE — PROGRESS NOTES
Name: Bryson Escoto      : 1960      MRN: [id number]  Encounter Provider: ALE Granger  Encounter Date: 2025   Encounter department: [facility]  :  Assessment & Plan  Nausea  Started with intermittent bouts of epigastric pain and nausea several weeks ago.  Can be worse after eating but can occur on an empty stomach.  No new medications.  No sick contacts or recent travel.  No dietary changes.  Currently on omeprazole 40 mg daily.  Will rule out H. pylori and upper abdominal pathology.    Orders:    H. pylori antigen, stool; Future    US abdomen complete; Future  Continue omeprazole 40 mg daily except hold for 2 weeks prior to H. pylori stool test   Follow-up in office in 3 to 4 months or sooner if needed  Gastroesophageal reflux disease without esophagitis  History of gastric intestinal metaplasia and inactive gastritis on EGD 10/24. Repeat EGD recommended in 3 years. He is currently taking 40 mg of omeprazole daily. Reflux is controlled.     - Repeat EGD 10/27  Continue omeprazole 40 mg daily  -Continue antireflux diet       Gastric intestinal metaplasia  Refer above  -Repeat EGD 10/27       Chronic constipation  History of chronic constipation.  Reports BMs are every other day.  Reports he never started the lactulose.  He never picked it up from the pharmacy.  He is currently on Linzess 290 mcg and reports he does not think that it is working as well as he noted before.  Did fail fiber supplement, MiraLAX and Amitiza.  Orders:    lactulose (CHRONULAC) 10 g/15 mL solution; Take 30 mL (20 g total) by mouth 2 (two) times a day        History of Present Illness   Bryson Escoto is a 64 y.o. male who presents for follow-up.  He was last seen by myself 10/24 for history of chronic constipation, GERD and gastric intestinal metaplasia.  [employer] required today for translation as patient only speaks Nepalese.    HPI    History of chronic constipation.   Felt abdominal bloating while taking MiraLAX 1 capful twice a day and BMs were only every 3 to 4 days.  No melena or medic easier.  Recent colonoscopy 8/24 noted multiple diverticula in the sigmoid colon, internal and external hemorrhoids.  Biopsies were negative for microscopic colitis.  Colonoscopy recommended in 5 years.  Tried and failed Linzess and Amitiza as well as fiber supplement due to abdominal bloating and lack of effectiveness.  Lactulose was recommended at last visit.    History of GERD.  Last EGD 10/24 noted gastric intestinal metaplasia.  Repeat EGD recommended in 3 years.  Taking omeprazole 40 mg daily and reflux is controlled.    Interval history: Started with intermittent bouts of epigastric pain and nausea several weeks ago.  Can be worse after eating but can occur on an empty stomach.  No new medications.  No sick contacts or recent travel.  No dietary changes.  Currently on omeprazole 40 mg daily.    Inguinal ultrasound 2/25-nonspecific prominent but not pathologically enlarged inguinal lymph nodes possibly reactive.  Otherwise unremarkable exam.    Ultrasound the abdomen 9/24-normal.    Labs 12/24-BMP normal other than chloride 110.    Prior EGD/colonoscopy      Colonoscopy 8/24-multiple diverticula in the sigmoid colon.  Internal and external small hemorrhoids.  Biopsies performed to rule out colitis. Biopsies were negative for microscopic colitis.  Repeat colonoscopy recommended in 5 years.        EGD 10/24-Irregular Z-line; performed cold forceps biopsy  C0M1 Castellanos's esophagus  Moderate erythematous mucosa with loss of vascular pattern in the antrum. Mapping obtained given history of gastrointestinal metaplasia  The duodenum appeared normal.  Repeat EGD recommended in 3 years due to extensive gastric intestinal metaplasia.  Biopsies noted gastrointestinal.  No H. pylori.     He has had extensive gastric intestinal metaplasia found on 3 EGDs in 2022.  Recommended 1 year follow-up EGD.  He  has a family history of gastric cancer in his uncle.     Colonoscopy 8/19-normal.  Recall colon in 10 years      Review of Systems   Constitutional:  Negative for chills and fever.   HENT:  Negative for ear pain and sore throat.    Eyes:  Negative for pain and visual disturbance.   Respiratory:  Negative for cough and shortness of breath.    Cardiovascular:  Negative for chest pain and palpitations.   Gastrointestinal:  Positive for abdominal pain and nausea. Negative for vomiting.   Genitourinary:  Negative for dysuria and hematuria.   Musculoskeletal:  Negative for arthralgias and back pain.   Skin:  Negative for color change and rash.   Neurological:  Negative for seizures and syncope.   All other systems reviewed and are negative.   A complete review of systems is negative other than that noted above in the HPI.      Current Outpatient Medications   Medication Sig Dispense Refill    amLODIPine (NORVASC) 10 mg tablet Take 1 tablet (10 mg total) by mouth daily 90 tablet 2    cetirizine (ZyrTEC) 10 mg tablet Take 1 tablet (10 mg total) by mouth daily 90 tablet 1    fluticasone (FLONASE) 50 mcg/act nasal spray 1 spray into each nostril daily 24 mL 1    lactulose (CHRONULAC) 10 g/15 mL solution Take 30 mL (20 g total) by mouth 2 (two) times a day 240 mL 3    lidocaine (LMX) 4 % cream Apply topically as needed for mild pain 120 g 0    linaCLOtide (Linzess) 290 MCG CAPS Take 1 capsule by mouth in the morning 90 capsule 3    methocarbamol (ROBAXIN) 500 mg tablet Take 1 tablet (500 mg total) by mouth 2 (two) times a day 20 tablet 0    omeprazole (PriLOSEC) 40 MG capsule Take 1 capsule (40 mg total) by mouth daily before breakfast 90 capsule 3    oxybutynin (DITROPAN-XL) 10 MG 24 hr tablet Take 1 tablet (10 mg total) by mouth daily 90 tablet 3    phenazopyridine (PYRIDIUM) 100 mg tablet Take 1 tablet (100 mg total) by mouth 3 (three) times a day as needed for bladder spasms 10 tablet 0    polyethylene glycol (GLYCOLAX) 17  GM/SCOOP powder Take 17 g by mouth daily 510 g 2    psyllium (METAMUCIL) 58.6 % powder Take 1 packet by mouth daily 1040 g 2    senna (SENOKOT) 8.6 mg Take 1 tablet (8.6 mg total) by mouth daily at bedtime 90 tablet 0    simethicone (Gas-X Ultra Strength) 180 MG capsule Take 1 capsule (180 mg total) by mouth every 6 (six) hours as needed for flatulence 120 capsule 1    tamsulosin (FLOMAX) 0.4 mg Take 1 capsule (0.4 mg total) by mouth daily with dinner 90 capsule 3     No current facility-administered medications for this visit.     Objective   There were no vitals taken for this visit.    Physical Exam  Vitals and nursing note reviewed.   Constitutional:       General: He is not in acute distress.     Appearance: He is well-developed.   HENT:      Head: Normocephalic and atraumatic.   Eyes:      Conjunctiva/sclera: Conjunctivae normal.   Cardiovascular:      Rate and Rhythm: Normal rate and regular rhythm.      Heart sounds: No murmur heard.  Pulmonary:      Effort: Pulmonary effort is normal. No respiratory distress.      Breath sounds: Normal breath sounds.   Abdominal:      Palpations: Abdomen is soft.      Tenderness: There is no abdominal tenderness.   Musculoskeletal:         General: No swelling.      Cervical back: Neck supple.   Skin:     General: Skin is warm and dry.      Capillary Refill: Capillary refill takes less than 2 seconds.   Neurological:      Mental Status: He is alert and oriented to person, place, and time.   Psychiatric:         Mood and Affect: Mood normal.            Lab Results: I personally reviewed relevant lab results.       Results for orders placed during the hospital encounter of 08/29/24    Colonoscopy    Impression  Diverticulosis in the sigmoid colon  Small hemorrhoids  Performed forceps biopsies in the ascending colon, transverse colon and descending colon to rule out colitis        RECOMMENDATION:  Await pathology results  Repeat colonoscopy in 5 years for colon cancer  screening purposes.  Continue to follow-up in the GI office with Fabi Plasencia.  Increase fiber in diet.            Jonathan Guerrero MD

## 2025-04-24 ENCOUNTER — HOSPITAL ENCOUNTER (OUTPATIENT)
Dept: ULTRASOUND IMAGING | Facility: HOSPITAL | Age: 65
Discharge: HOME | End: 2025-04-24
Attending: NURSE PRACTITIONER
Payer: COMMERCIAL

## 2025-04-24 DIAGNOSIS — R11.0 NAUSEA: ICD-10-CM

## 2025-04-24 PROCEDURE — 76700 US EXAM ABDOM COMPLETE: CPT

## 2025-05-01 ENCOUNTER — RESULTS FOLLOW-UP (OUTPATIENT)
Dept: GASTROENTEROLOGY | Facility: MEDICAL CENTER | Age: 65
End: 2025-05-01

## 2025-05-01 ENCOUNTER — EVALUATION (OUTPATIENT)
Age: 65
End: 2025-05-01
Payer: COMMERCIAL

## 2025-05-01 DIAGNOSIS — N50.819 PAIN IN TESTICLE, UNSPECIFIED LATERALITY: ICD-10-CM

## 2025-05-01 PROCEDURE — 97112 NEUROMUSCULAR REEDUCATION: CPT | Performed by: PHYSICAL THERAPIST

## 2025-05-01 PROCEDURE — 97162 PT EVAL MOD COMPLEX 30 MIN: CPT | Performed by: PHYSICAL THERAPIST

## 2025-05-01 NOTE — PROGRESS NOTES
PT Evaluation     Today's date: 2025  Patient name: Bryson Escoto  : 1960  MRN: [id number]  Referring provider: Huey Slade PA-C  Dx:   Encounter Diagnosis     ICD-10-CM    1. Pain in testicle, unspecified laterality  N50.819 Ambulatory Referral to Physical Therapy                     Assessment  Impairments: abnormal coordination, abnormal muscle firing, abnormal muscle tone, impaired physical strength, lacks appropriate home exercise program, poor posture  and poor body mechanics    Assessment details: Bryson Escoto is a 64 y.o. male who presents to physical therapy with Pain in testicle, unspecified laterality. Internal assessment may be performed at a future time if warranted. Functional impairments include testicular pain, occasional frequency when at home, and occasional leakage. Physical findings include deficits in lumbar AROM, deficits in hip strength, tightness in lumbar spine and R femoral nerve. Bryson Escoto would benefit from formal physical therapy to address impairments as detailed, decrease pain, and restore maximal level of function for all home, work, and mobility tasks. Thank you for this referral.    Understanding of Dx/Px/POC: good     Prognosis: good    Goals  Short term goals (to be met in 4 weeks):  1. Pt will decrease pain by 1-2/10 points.  2. Pt will demonstrate improved mobility in lumbar AROM in all planes.  3. Pt will increase hip strength by at least 1/2 grade.    Long term goals (to be met by discharge):  1. Pt will report intervoid period of at least 2 hours at home.  2. Pt will report testicular pain <3/10 90% of the time.  3. Pt will be compliant with HEP.      Plan  Patient would benefit from: PT eval and skilled physical therapy  Planned modality interventions: biofeedback    Planned therapy interventions: abdominal trunk stabilization, activity modification, joint mobilization, manual therapy, massage, Peacock taping, neuromuscular re-education, patient  education, postural training, strengthening, stretching, therapeutic activities, therapeutic exercise, behavior modification, body mechanics training, breathing training, home exercise program, IASTM and kinesiology taping    Frequency: Every other week  Duration in weeks: 12  Treatment plan discussed with: patient      SL AMB SF PT MEN'S HEALTH SUBJECTIVE EVAL:   History Of Present Illness:  Bryson reports that his testicle gives him pain. He has had ultrasounds and radiographs, all unremarkable. He has had the pain since the beginning of this year. He has pain bilaterally. Sometimes he is relaxed and not thinking about the pain. He has pain when he adducts his legs. He feels more pain when he has urgency to urinate, feels better after voiding. He had imaging of the back, did not have any significant findings. Sometimes he has pain associated with needing to have BM. He does not notice any blood in urine or feces. He has not had any surgeries to abdomen or low back.  Treatments:     Upcoming doctor appointment: Yes      Date:  7/25/2025  Patient Goals:     Patient goals for therapy:  Decreased pain    Other patient goals:  Get rid of the pain      Objective     Active Range of Motion     Lumbar   Flexion:  WFL  Extension: Active lumbar extension: feels the need to stretch the muscles.  Restriction level: minimal  Left lateral flexion:  Restriction level: moderate  Right lateral flexion:  Restriction level: moderate  Left rotation:  Restriction level: minimal  Right rotation:  Restriction level: minimal    Strength/Myotome Testing     Left Hip   Planes of Motion   Flexion: 4+ (pain)  External rotation: 5  Internal rotation: 5    Right Hip   Planes of Motion   Flexion: 4 (some pain)  External rotation: 5  Internal rotation: 5    Tests     Lumbar     Left   Negative crossed SLR, femoral stretch and passive SLR.     Right   Positive femoral stretch.   Negative crossed SLR and passive SLR.     Left Hip   Positive FADIR  "(pain).   Negative EDGARDO.   SLR: Negative.     Right Hip   Positive EDGARDO (limited ROM) and FADIR (pain in groin).   SLR: Negative.     Additional Tests Details  Pain in back with end range hip flexion on R  Limited ROM w/ B EDGARDO      Abdominal Assessment:      Position: supine exam  Abdominal Assessment: Tender over mid ascending and mid transverse colon  Good diaphragmatic breath  No significant restrictions with skin rolling    Diastatis   Connective tissue integrity at linea alba: firm  no tenderness at linea alba  able to engage transverse abdominis                Precautions:   Patient Active Problem List   Diagnosis    Benign colon polyp    Benign hypertension    Enlarged prostate without lower urinary tract symptoms (luts)    Varicocele    Microscopic hematuria    Left shoulder pain    Nephrolithiasis    Right-sided low back pain with right-sided sciatica    Chronic RLQ pain    Vocal fold paresis, left    Sulcus vocalis of vocal fold    Gastroesophageal reflux disease    Chronic constipation    Tremor    Lumbar disc herniation    Epididymal cyst    CKD (chronic kidney disease) stage 2, GFR 60-89 ml/min    Persistent proteinuria    Chronic pain in testicle    Cervical pain (neck)    REEVES (dyspnea on exertion)    RUQ pain    Allergic rhinitis    Pain in both testicles    Dysuria    Benign prostatic hyperplasia with urinary hesitancy    Umbilical hernia without obstruction and without gangrene       Lumbar component    Manuals 5/1            TLF skin rolling nv                                                   Neuro Re-Ed             Prone press ups 5\"x5            LTR 5\"x10 ea alt            R prone fem n glide 10x                                                                Ther Ex             Hip stretches nv                                                                                                       Ther Activity                                       Gait Training                                  "      Modalities

## 2025-05-01 NOTE — RESULT ENCOUNTER NOTE
Can you please call patient and let him know that his ultrasound was normal.  I believe he speaks Croatian.  Thanks.

## 2025-05-06 ENCOUNTER — TELEPHONE (OUTPATIENT)
Age: 65
End: 2025-05-06

## 2025-05-06 ENCOUNTER — TELEPHONE (OUTPATIENT)
Dept: FAMILY MEDICINE CLINIC | Facility: CLINIC | Age: 65
End: 2025-05-06

## 2025-05-06 NOTE — TELEPHONE ENCOUNTER
Patient called in requesting for the phone number to [facility], pt was transferred.

## 2025-05-06 NOTE — TELEPHONE ENCOUNTER
"Pt called nurse line inquiring why his amlodipine has not been sent to his pharmacy I explained that it was because he has not been in our practice since 09/2024. I tried scheduling an apt for pt to follow up and will send a message to his provider to send refills until apt. Pt got agitated and started to call me \" daughter of a bitch\" several times and then proceed to disconnect the call.   "

## 2025-05-07 DIAGNOSIS — I10 BENIGN HYPERTENSION: ICD-10-CM

## 2025-05-07 RX ORDER — AMLODIPINE BESYLATE 10 MG/1
10 TABLET ORAL DAILY
Qty: 10 TABLET | Refills: 0 | Status: SHIPPED | OUTPATIENT
Start: 2025-05-07 | End: 2025-05-09 | Stop reason: SDUPTHER

## 2025-05-09 ENCOUNTER — OFFICE VISIT (OUTPATIENT)
Dept: FAMILY MEDICINE CLINIC | Facility: CLINIC | Age: 65
End: 2025-05-09

## 2025-05-09 VITALS
TEMPERATURE: 97.4 F | HEART RATE: 66 BPM | RESPIRATION RATE: 16 BRPM | OXYGEN SATURATION: 98 % | BODY MASS INDEX: 27.2 KG/M2 | WEIGHT: 153.5 LBS | SYSTOLIC BLOOD PRESSURE: 120 MMHG | DIASTOLIC BLOOD PRESSURE: 80 MMHG | HEIGHT: 63 IN

## 2025-05-09 DIAGNOSIS — I10 BENIGN HYPERTENSION: Primary | ICD-10-CM

## 2025-05-09 DIAGNOSIS — R25.1 TREMOR: ICD-10-CM

## 2025-05-09 DIAGNOSIS — Z23 ENCOUNTER FOR IMMUNIZATION: ICD-10-CM

## 2025-05-09 PROCEDURE — 99214 OFFICE O/P EST MOD 30 MIN: CPT

## 2025-05-09 PROCEDURE — 90677 PCV20 VACCINE IM: CPT

## 2025-05-09 PROCEDURE — 90471 IMMUNIZATION ADMIN: CPT

## 2025-05-09 RX ORDER — AMLODIPINE BESYLATE 10 MG/1
10 TABLET ORAL DAILY
Qty: 90 TABLET | Refills: 1 | Status: SHIPPED | OUTPATIENT
Start: 2025-05-09

## 2025-05-09 NOTE — ASSESSMENT & PLAN NOTE
BP Readings from Last 3 Encounters:   05/09/25 120/80   04/18/25 132/78   03/14/25 138/84   Continue amlodipine 10 mg daily.   Orders:    amLODIPine (NORVASC) 10 mg tablet; Take 1 tablet (10 mg total) by mouth daily    Basic metabolic panel; Future    Hemoglobin A1C; Future    Lipid Panel with Direct LDL reflex; Future    Albumin / creatinine urine ratio; Future

## 2025-05-09 NOTE — PROGRESS NOTES
Name: Bryson Escoto      : 1960      MRN: [id number]  Encounter Provider: ALE Rojas  Encounter Date: 2025   Encounter department: [facility]  :  Assessment & Plan  Benign hypertension  BP Readings from Last 3 Encounters:   25 120/80   25 132/78   25 138/84   Continue amlodipine 10 mg daily.   Orders:    amLODIPine (NORVASC) 10 mg tablet; Take 1 tablet (10 mg total) by mouth daily    Basic metabolic panel; Future    Hemoglobin A1C; Future    Lipid Panel with Direct LDL reflex; Future    Albumin / creatinine urine ratio; Future    Tremor  Referred in  but pt did not make an appt, reminded pt to make an appt, new referral placed.   Orders:    Ambulatory Referral to Neurology; Future    Encounter for immunization    Orders:    Pneumococcal Conjugate Vaccine 20-valent (Pcv20)        BMI Counseling: Body mass index is 27.63 kg/m². The BMI is above normal. Nutrition recommendations include decreasing portion sizes, encouraging healthy choices of fruits and vegetables, decreasing fast food intake, consuming healthier snacks, limiting drinks that contain sugar, moderation in carbohydrate intake, increasing intake of lean protein, reducing intake of saturated and trans fat and reducing intake of cholesterol. Exercise recommendations include moderate physical activity 150 minutes/week. No pharmacotherapy was ordered. Rationale for BMI follow-up plan is due to patient being overweight or obese.       History of Present Illness   Bryson Escoto is a 64 y.o. male  has a past medical history of COVID-19 virus infection, Exposure to COVID-19 virus, Family history of parkinsonism, History of chronic constipation, History of neck pain, and Hypertension.  has a past surgical history that includes Cystoscopy (2014) and Tooth extraction.    He presents today for a HTN follow-up      Review of Systems   Constitutional:  Negative for chills and  "fever.   HENT:  Negative for ear pain and sore throat.    Eyes:  Negative for pain and visual disturbance.   Respiratory:  Negative for cough and shortness of breath.    Cardiovascular:  Negative for chest pain and palpitations.   Gastrointestinal:  Negative for abdominal pain and vomiting.   Genitourinary:  Negative for dysuria and hematuria.   Musculoskeletal:  Negative for arthralgias and back pain.   Skin:  Negative for color change and rash.   Neurological:  Negative for seizures and syncope.   All other systems reviewed and are negative.      Objective   /80 (BP Location: Left arm, Patient Position: Sitting, Cuff Size: Standard)   Pulse 66   Temp (!) 97.4 °F (36.3 °C) (Temporal)   Resp 16   Ht 5' 2.5\" (1.588 m)   Wt 69.6 kg (153 lb 8 oz)   SpO2 98%   BMI 27.63 kg/m²      Physical Exam  Vitals and nursing note reviewed.   Constitutional:       Appearance: He is well-developed and overweight.   HENT:      Head: Normocephalic and atraumatic.      Right Ear: External ear normal.      Left Ear: External ear normal.      Nose: Nose normal.   Eyes:      Conjunctiva/sclera: Conjunctivae normal.   Cardiovascular:      Rate and Rhythm: Normal rate and regular rhythm.      Pulses: Normal pulses.      Heart sounds: Normal heart sounds. No murmur heard.  Pulmonary:      Effort: Pulmonary effort is normal. No respiratory distress.      Breath sounds: Normal breath sounds.   Abdominal:      Palpations: Abdomen is soft.      Tenderness: There is no abdominal tenderness.   Musculoskeletal:         General: Normal range of motion.      Cervical back: Normal range of motion and neck supple.   Skin:     General: Skin is warm and dry.      Capillary Refill: Capillary refill takes less than 2 seconds.   Neurological:      Mental Status: He is alert and oriented to person, place, and time. Mental status is at baseline.   Psychiatric:         Mood and Affect: Mood normal.         Behavior: Behavior normal.         " Thought Content: Thought content normal.         Judgment: Judgment normal.

## 2025-05-09 NOTE — ASSESSMENT & PLAN NOTE
Referred in 2024 but pt did not make an appt, reminded pt to make an appt, new referral placed.   Orders:    Ambulatory Referral to Neurology; Future

## 2025-05-20 ENCOUNTER — OFFICE VISIT (OUTPATIENT)
Dept: UROLOGY | Facility: MEDICAL CENTER | Age: 65
End: 2025-05-20
Payer: COMMERCIAL

## 2025-05-20 VITALS
DIASTOLIC BLOOD PRESSURE: 72 MMHG | WEIGHT: 145 LBS | BODY MASS INDEX: 25.69 KG/M2 | OXYGEN SATURATION: 96 % | HEART RATE: 79 BPM | SYSTOLIC BLOOD PRESSURE: 110 MMHG | HEIGHT: 63 IN

## 2025-05-20 DIAGNOSIS — R30.0 DYSURIA: Primary | ICD-10-CM

## 2025-05-20 DIAGNOSIS — R39.15 URGENCY OF URINATION: ICD-10-CM

## 2025-05-20 DIAGNOSIS — N50.811 PAIN IN BOTH TESTICLES: ICD-10-CM

## 2025-05-20 DIAGNOSIS — N50.812 PAIN IN BOTH TESTICLES: ICD-10-CM

## 2025-05-20 LAB
SL AMB  POCT GLUCOSE, UA: ABNORMAL
SL AMB LEUKOCYTE ESTERASE,UA: ABNORMAL
SL AMB POCT BILIRUBIN,UA: ABNORMAL
SL AMB POCT BLOOD,UA: ABNORMAL
SL AMB POCT CLARITY,UA: CLEAR
SL AMB POCT COLOR,UA: ABNORMAL
SL AMB POCT KETONES,UA: ABNORMAL
SL AMB POCT NITRITE,UA: POSITIVE
SL AMB POCT PH,UA: 5.5
SL AMB POCT SPECIFIC GRAVITY,UA: 1.02
SL AMB POCT URINE PROTEIN: 100
SL AMB POCT UROBILINOGEN: 0.2

## 2025-05-20 PROCEDURE — 87086 URINE CULTURE/COLONY COUNT: CPT

## 2025-05-20 PROCEDURE — 81003 URINALYSIS AUTO W/O SCOPE: CPT

## 2025-05-20 PROCEDURE — 81001 URINALYSIS AUTO W/SCOPE: CPT

## 2025-05-20 PROCEDURE — 99213 OFFICE O/P EST LOW 20 MIN: CPT

## 2025-05-20 RX ORDER — DOXYCYCLINE 100 MG/1
100 CAPSULE ORAL 2 TIMES DAILY
Qty: 20 CAPSULE | Refills: 0 | Status: SHIPPED | OUTPATIENT
Start: 2025-05-20 | End: 2025-05-30

## 2025-05-20 RX ORDER — OXYBUTYNIN CHLORIDE 10 MG/1
10 TABLET, EXTENDED RELEASE ORAL DAILY
Qty: 90 TABLET | Refills: 3 | Status: SHIPPED | OUTPATIENT
Start: 2025-05-20

## 2025-05-20 NOTE — ASSESSMENT & PLAN NOTE
Dysuria, testicular pain, perineal pain and significant LUTS  UA today in the office negative for leukocytes, positive nitrites, trace blood. Pt reported taking medication to help with burning presumably pyridium as urine is orange.   Urine sent for micro and culture.  I recommend the patient avoid bladder irritants, stay well hydrated, and avoid constipation.   Will treat with course of doxycycline due to significant LUTS, testicle, and perineal pain- for possible prostatitis.  Follow up in 4-6 weeks to assess symptoms.    Orders:    doxycycline monohydrate (MONODOX) 100 mg capsule; Take 1 capsule (100 mg total) by mouth 2 (two) times a day for 10 days    Urinalysis with microscopic    Urine culture     Sara has had intermittent episodes of morning nausea and vomiting. This may be due to acid reflux occurring at night.  She also has abdominal pain with these episodes.    - begin esomeprazole (Nexium) 1 capsule daily  - use hyoscyamine SL 0.125 mg, 1 pill under the tongue for abdominal pain every 6 hours as needed  - Call in two weeks if the symptoms continue, she will be scheduled for an upper endoscopy  - Call the GI office at [facility] if you have any questions or concerns. Office number: [phone]    Schedule a follow-up Pediatric Gastroenterology appointment in 3 months.

## 2025-05-20 NOTE — ASSESSMENT & PLAN NOTE
Recommend continue with scrotal supportive underwear, rest, ice, and NSAIDs.   Orders:    POCT urine dip auto non-scope

## 2025-05-20 NOTE — PROGRESS NOTES
Name: Bryson Escoto      : 1960      MRN: [id number]  Encounter Provider: ALE Soto  Encounter Date: 2025   [facility]  :  Assessment & Plan  Dysuria  Dysuria, testicular pain, perineal pain and significant LUTS  UA today in the office negative for leukocytes, positive nitrites, trace blood. Pt reported taking medication to help with burning presumably pyridium as urine is orange.   Urine sent for micro and culture.  I recommend the patient avoid bladder irritants, stay well hydrated, and avoid constipation.   Will treat with course of doxycycline due to significant LUTS, testicle, and perineal pain- for possible prostatitis.  Follow up in 4-6 weeks to assess symptoms.    Orders:    doxycycline monohydrate (MONODOX) 100 mg capsule; Take 1 capsule (100 mg total) by mouth 2 (two) times a day for 10 days    Urinalysis with microscopic    Urine culture    Urgency of urination    Orders:    oxybutynin (DITROPAN-XL) 10 MG 24 hr tablet; Take 1 tablet (10 mg total) by mouth daily    Urinalysis with microscopic    Urine culture    Pain in both testicles  Recommend continue with scrotal supportive underwear, rest, ice, and NSAIDs.   Orders:    POCT urine dip auto non-scope        History of Present Illness   Bryson Escoto is a 64 y.o. male who presents for follow up. He was last seen in the office 3/14/25 with chronic testicle pain, and BPH. At that time pt did have trace blood in his urine. When sent for microscopy it did not reveal any significant microscopic hematuria. He was referred to PFPT for his chronic testicular pain. He is currently managed on flomax and oxybutynin for LUTS symptoms. He recently underwent cystoscopy on 1/3/25 which noted an open urethral channel without significant BPH/obstruction.   He also had Ultrasound of the scrotum and testicle completed 2025 noted a unchanged right spermatic cord cyst and subcentimeter  "epididymal cyst, but no hydroceles, varicoceles, or concern for extratesticular mass. Ultrasound of the groin/inguinal area completed 2/20/2025 noted a nonspecific, but prominent enlarged inguinal lymph node, possibly reactive. Otherwise, ultrasound was unremarkable.   Today in the office pt states that he has experienced for the last week pain and burning on urination, and pain at the tip of his penis on urination. He also complains of testicular pain and a palpating sensation under his scrotum. He states he has frequency and urgency. He continues to take flomax and oxybutynin although he did ask for me to write these down to make sure that he was taking them. He denies any fevers, chills, N/V, blood in the urine or feelings of incomplete bladder emptying.     Review of Systems   Constitutional:  Negative for chills and fever.   Gastrointestinal:  Negative for abdominal pain.   Genitourinary:  Positive for dysuria, frequency, penile pain, testicular pain and urgency. Negative for difficulty urinating, flank pain, hematuria and penile discharge.          Objective   /72 (BP Location: Left arm, Patient Position: Sitting, Cuff Size: Adult)   Pulse 79   Ht 5' 2.5\" (1.588 m)   Wt 65.8 kg (145 lb)   SpO2 96%   BMI 26.10 kg/m²     Physical Exam  Vitals reviewed.   Constitutional:       General: He is not in acute distress.     Appearance: Normal appearance. He is normal weight. He is not toxic-appearing.   HENT:      Head: Normocephalic and atraumatic.     Eyes:      Conjunctiva/sclera: Conjunctivae normal.       Cardiovascular:      Rate and Rhythm: Normal rate.   Pulmonary:      Effort: Pulmonary effort is normal.   Abdominal:      Palpations: Abdomen is soft.   Genitourinary:     Penis: Normal.       Testes: Normal.     Musculoskeletal:         General: Normal range of motion.      Cervical back: Normal range of motion.     Skin:     General: Skin is warm and dry.     Neurological:      Mental Status: He is " alert and oriented to person, place, and time.     Psychiatric:         Mood and Affect: Mood normal.         Behavior: Behavior normal.           Results   Lab Results   Component Value Date    PSA 0.486 06/20/2024    PSA 0.448 06/20/2024    PSA 0.5 01/13/2023     Lab Results   Component Value Date    GLUCOSE 113 09/15/2015    CALCIUM 8.8 12/04/2024     09/15/2015    K 4.2 12/04/2024    CO2 27 12/04/2024     (H) 12/04/2024    BUN 16 12/04/2024    CREATININE 1.24 12/04/2024     Lab Results   Component Value Date    WBC 4.13 (L) 05/29/2023    HGB 13.7 05/29/2023    HCT 41.2 05/29/2023    MCV 90 05/29/2023     05/29/2023       Office Urine Dip  Recent Results (from the past hour)   POCT urine dip auto non-scope    Collection Time: 05/20/25  3:49 PM   Result Value Ref Range     COLOR,UA orange     CLARITY,UA clear     SPECIFIC GRAVITY,UA 1.025      PH,UA 5.5     LEUKOCYTE ESTERASE,UA neg     NITRITE,UA positive     GLUCOSE, UA neg     KETONES,UA neg     BILIRUBIN,UA neg     BLOOD,UA trace-intact     POCT URINE PROTEIN 100     SL AMB POCT UROBILINOGEN 0.2

## 2025-05-21 LAB
AMORPH URATE CRY URNS QL MICRO: ABNORMAL
BACTERIA UR QL AUTO: ABNORMAL /HPF
BILIRUB UR QL STRIP: NEGATIVE
CAOX CRY URNS QL MICRO: ABNORMAL /HPF
CLARITY UR: ABNORMAL
COLOR UR: ABNORMAL
GLUCOSE UR STRIP-MCNC: NEGATIVE MG/DL
HGB UR QL STRIP.AUTO: ABNORMAL
KETONES UR STRIP-MCNC: NEGATIVE MG/DL
LEUKOCYTE ESTERASE UR QL STRIP: NEGATIVE
NITRITE UR QL STRIP: NEGATIVE
NON-SQ EPI CELLS URNS QL MICRO: ABNORMAL /HPF
PH UR STRIP.AUTO: 5.5 [PH]
PROT UR STRIP-MCNC: ABNORMAL MG/DL
RBC #/AREA URNS AUTO: ABNORMAL /HPF
SP GR UR STRIP.AUTO: 1.03 (ref 1–1.03)
UROBILINOGEN UR STRIP-ACNC: <2 MG/DL
WBC #/AREA URNS AUTO: ABNORMAL /HPF

## 2025-05-22 ENCOUNTER — RESULTS FOLLOW-UP (OUTPATIENT)
Dept: UROLOGY | Facility: AMBULATORY SURGERY CENTER | Age: 65
End: 2025-05-22

## 2025-05-22 LAB — BACTERIA UR CULT: NORMAL

## 2025-05-23 ENCOUNTER — TELEPHONE (OUTPATIENT)
Dept: UROLOGY | Facility: MEDICAL CENTER | Age: 65
End: 2025-05-23

## 2025-06-09 NOTE — ASSESSMENT & PLAN NOTE
-hx of renal calculus  -prior workup unremarkable  -nephrology referral placed cbc, cmp cta and see me in end of july

## 2025-06-11 ENCOUNTER — TELEPHONE (OUTPATIENT)
Dept: FAMILY MEDICINE CLINIC | Facility: CLINIC | Age: 65
End: 2025-06-11

## 2025-06-11 NOTE — TELEPHONE ENCOUNTER
Patient called office today requesting routine lab work because he is having chest discomfort and other medical concerns. Pt also requesting referral for throat specialist. Advise pt that he will need an appt for evaluation, pt asked for after 4pm and I informed that pcp next available at that time will be for Monday and there nothing available for rest of week at that time even with different pcp. Pt stated that he need emergency appt and will not wait until Monday and that there should be walk ins available when needed. Pt stated he will come to office tomorrow. Please advise. Thank you!

## 2025-07-03 ENCOUNTER — TELEPHONE (OUTPATIENT)
Age: 65
End: 2025-07-03

## 2025-07-03 NOTE — TELEPHONE ENCOUNTER
Pt called complaining of burning with urination   pt is having frequency, testicle  pain, leakage.  PT follow up has been moved to Monday  PT requesting a urine test    While transferring call to [facility] call was disconnected by pt

## 2025-07-03 NOTE — TELEPHONE ENCOUNTER
Called and left message for patient to return call to office to discuss symptoms.     When patient returns call , please warm transfer to CTS to review symptoms

## 2025-07-03 NOTE — TELEPHONE ENCOUNTER
"Call placed to patient, someone answered the phone and stated  \"what do you want\" when introducing self he hung up  "

## 2025-07-06 NOTE — PROGRESS NOTES
Name: Bryson Escoto      : 1960      MRN: [id number]  Encounter Provider: ALE Soto  Encounter Date: 2025   [facility]  :  Assessment & Plan  Chronic prostatitis  Presumed chronic prostatitis with ongoing pelvic/testicle pain, dysuria with burning on the end of urination and tenderness on JENN. His UA today is negative for signs of blood or infection.   Will initiate course of antibiotics. He will attend PFPT as previously recommended and ordered. He does have an appointment scheduled.   We will follow up in 8-10 weeks to revaluate symptoms.   Orders:    Ambulatory Referral to Physical Therapy; Future    Dysuria  UA today negative for any signs of infection or blood and PVR 10mL   Recent CT scan  with No acute intra-abdominal or pelvic process, nonobstructive 1-2 mm right nephrolithiasis. He denies any concern for STI.   Recommend limit bladder irritants such as coffee, tea, and carbonated beverages.   Orders:    POCT urine dip auto non-scope    POCT Measure PVR    sulfamethoxazole-trimethoprim (BACTRIM DS) 800-160 mg per tablet; Take 1 tablet by mouth every 12 (twelve) hours for 14 days    Pain in both testicles  Ongoing for several months. Was referred to pelvic floor physical therapy and has upcoming initial visit.   Recommend scrotal supportive underwear, rest, ice, and nsaids.   Orders:    Ambulatory Referral to Physical Therapy; Future        History of Present Illness   Bryson Escoto is a 64 y.o. male who presents for follow up of continued dysuria and testicle pain. Pt has been seen several times in the past for similar complaints. At  last OV was given course of antibiotics for frequency, pelvic pain and pressure. He states that he did take this and his symptoms cleared up for a period of time. He did undergo cystoscopy with Dr. Tate in January which was negative for any tumors or lesions. Prostate size was estimated to be  about 45 g. He is currently on flomax and oxybutynin for symptoms of frequency and urgency that started at that time. He did present to the ED 5/27 with generalized abdominal pain. CT scan was completed which demonstrated no acute intra-abdominal process and nonobstructive 1-2 mm right nephrolithiasis.  Today in the office pt states that for the last two weeks he has had ongoing burning on urination every time he urinates. He also notes that he has to urinate frequently especially during the night. He also states that he has had bilateral testicle pain. He experiences a dull intermittent ache in both of his testicles. He denies any fever, chills, nausea, vomiting, flank pain or blood in the urine. He denies any risk of STI and is here today with his SO.    Review of Systems   Constitutional:  Negative for chills and fever.   Gastrointestinal:  Negative for abdominal pain, constipation, diarrhea, nausea, rectal pain and vomiting.   Genitourinary:  Positive for frequency, testicular pain and urgency. Negative for difficulty urinating, dysuria, flank pain, hematuria and scrotal swelling.          Objective   There were no vitals taken for this visit.    Physical Exam  Vitals reviewed.   Constitutional:       General: He is not in acute distress.     Appearance: Normal appearance. He is normal weight. He is not toxic-appearing.   HENT:      Head: Normocephalic and atraumatic.     Eyes:      Conjunctiva/sclera: Conjunctivae normal.       Cardiovascular:      Rate and Rhythm: Normal rate.   Pulmonary:      Effort: Pulmonary effort is normal.   Abdominal:      Palpations: Abdomen is soft.   Genitourinary:     Comments: Uncircumcised penis, normal phallus, patent meatus.  Testes smooth descended bilaterally into the scrotum nontender with no palpable mass. Nontender to palpation.  Digital rectal exam smooth prostate, without appreciable nodule. Pt does report tenderness to palpation.      Musculoskeletal:         General:  Normal range of motion.      Cervical back: Normal range of motion.     Skin:     General: Skin is warm and dry.     Neurological:      Mental Status: He is alert and oriented to person, place, and time.     Psychiatric:         Mood and Affect: Mood normal.         Behavior: Behavior normal.           Results   Lab Results   Component Value Date    PSA 0.486 06/20/2024    PSA 0.448 06/20/2024    PSA 0.5 01/13/2023     Lab Results   Component Value Date    GLUCOSE 113 09/15/2015    CALCIUM 9.1 05/27/2025     09/15/2015    K 4.6 05/27/2025    CO2 26 05/27/2025     (H) 05/27/2025    BUN 21 05/27/2025    CREATININE 1.36 (H) 05/27/2025     Lab Results   Component Value Date    WBC 4.13 (L) 05/29/2023    HGB 13.7 05/29/2023    HCT 41.2 05/29/2023    MCV 90 05/29/2023     05/29/2023       Office Urine Dip  No results found for this or any previous visit (from the past hour).

## 2025-07-06 NOTE — ASSESSMENT & PLAN NOTE
UA today negative for any signs of infection or blood and PVR 10mL   Recent CT scan 5/27 with No acute intra-abdominal or pelvic process, nonobstructive 1-2 mm right nephrolithiasis. He denies any concern for STI.   Recommend limit bladder irritants such as coffee, tea, and carbonated beverages.   Orders:    POCT urine dip auto non-scope    POCT Measure PVR    sulfamethoxazole-trimethoprim (BACTRIM DS) 800-160 mg per tablet; Take 1 tablet by mouth every 12 (twelve) hours for 14 days

## 2025-07-07 ENCOUNTER — OFFICE VISIT (OUTPATIENT)
Dept: UROLOGY | Facility: MEDICAL CENTER | Age: 65
End: 2025-07-07
Payer: COMMERCIAL

## 2025-07-07 VITALS
OXYGEN SATURATION: 98 % | DIASTOLIC BLOOD PRESSURE: 64 MMHG | SYSTOLIC BLOOD PRESSURE: 102 MMHG | HEART RATE: 70 BPM | HEIGHT: 63 IN | BODY MASS INDEX: 25.87 KG/M2 | WEIGHT: 146 LBS

## 2025-07-07 DIAGNOSIS — N50.812 PAIN IN BOTH TESTICLES: ICD-10-CM

## 2025-07-07 DIAGNOSIS — N50.811 PAIN IN BOTH TESTICLES: ICD-10-CM

## 2025-07-07 DIAGNOSIS — N41.1 CHRONIC PROSTATITIS: Primary | ICD-10-CM

## 2025-07-07 DIAGNOSIS — R30.0 DYSURIA: ICD-10-CM

## 2025-07-07 LAB
POST-VOID RESIDUAL VOLUME, ML POC: 10 ML
SL AMB  POCT GLUCOSE, UA: ABNORMAL
SL AMB LEUKOCYTE ESTERASE,UA: ABNORMAL
SL AMB POCT BILIRUBIN,UA: ABNORMAL
SL AMB POCT BLOOD,UA: ABNORMAL
SL AMB POCT CLARITY,UA: CLEAR
SL AMB POCT COLOR,UA: YELLOW
SL AMB POCT KETONES,UA: ABNORMAL
SL AMB POCT NITRITE,UA: ABNORMAL
SL AMB POCT PH,UA: 6.5
SL AMB POCT SPECIFIC GRAVITY,UA: 1.02
SL AMB POCT URINE PROTEIN: ABNORMAL
SL AMB POCT UROBILINOGEN: 0.2

## 2025-07-07 PROCEDURE — 99214 OFFICE O/P EST MOD 30 MIN: CPT

## 2025-07-07 PROCEDURE — 51798 US URINE CAPACITY MEASURE: CPT

## 2025-07-07 PROCEDURE — 81003 URINALYSIS AUTO W/O SCOPE: CPT

## 2025-07-07 RX ORDER — ONDANSETRON 4 MG/1
4 TABLET, ORALLY DISINTEGRATING ORAL EVERY 8 HOURS PRN
COMMUNITY
Start: 2025-05-27

## 2025-07-07 RX ORDER — SULFAMETHOXAZOLE AND TRIMETHOPRIM 800; 160 MG/1; MG/1
1 TABLET ORAL EVERY 12 HOURS SCHEDULED
Qty: 28 TABLET | Refills: 0 | Status: SHIPPED | OUTPATIENT
Start: 2025-07-07 | End: 2025-07-21

## 2025-07-07 NOTE — ASSESSMENT & PLAN NOTE
Ongoing for several months. Was referred to pelvic floor physical therapy and has upcoming initial visit.   Recommend scrotal supportive underwear, rest, ice, and nsaids.   Orders:    Ambulatory Referral to Physical Therapy; Future

## 2025-07-10 ENCOUNTER — OFFICE VISIT (OUTPATIENT)
Age: 65
End: 2025-07-10
Payer: COMMERCIAL

## 2025-07-10 DIAGNOSIS — N50.819 PAIN IN TESTICLE, UNSPECIFIED LATERALITY: Primary | ICD-10-CM

## 2025-07-10 PROCEDURE — 97110 THERAPEUTIC EXERCISES: CPT | Performed by: PHYSICAL THERAPIST

## 2025-07-10 PROCEDURE — 97112 NEUROMUSCULAR REEDUCATION: CPT | Performed by: PHYSICAL THERAPIST

## 2025-07-10 NOTE — PROGRESS NOTES
Daily Note     Today's date: 7/10/2025  Patient name: Bryson Escoto  : 1960  MRN: [id number]  Referring provider: Huey Slade PA-C  Dx:   Encounter Diagnosis     ICD-10-CM    1. Pain in testicle, unspecified laterality  N50.819                      Subjective: Chel (#[id number])  used throughout session. Bryson has felt the same since initial evaluation. He has the most pain with sitting and closing his legs, feeling pain in the front. He has pain with working, walking, and sitting. He does not have any activity limitations currently. He has not had any recent imaging. He has no difficulty with urination but does have some difficulty with defecation.      Objective: See treatment diary below      Assessment: Tolerated treatment well. Patient demonstrated fatigue post treatment, exhibited good technique with therapeutic exercises, and would benefit from continued PT to decrease pain in the pelvic floor. Bryson had good tolerance to initiation of exercises and stretches. He noted feeling more relaxed and denied pain at end of session. He was given updated copy of HEP and verbalized understanding.      Plan: Continue per plan of care.  Progress treatment as tolerated.       Precautions:   Patient Active Problem List   Diagnosis    Benign colon polyp    Benign hypertension    Enlarged prostate without lower urinary tract symptoms (luts)    Varicocele    Microscopic hematuria    Left shoulder pain    Nephrolithiasis    Right-sided low back pain with right-sided sciatica    Chronic RLQ pain    Vocal fold paresis, left    Sulcus vocalis of vocal fold    Gastroesophageal reflux disease    Chronic constipation    Tremor    Lumbar disc herniation    Epididymal cyst    CKD (chronic kidney disease) stage 2, GFR 60-89 ml/min    Persistent proteinuria    Chronic pain in testicle    Cervical pain (neck)    REEVES (dyspnea on exertion)    RUQ pain    Allergic rhinitis    Pain in both testicles    Dysuria    Benign  "prostatic hyperplasia with urinary hesitancy    Umbilical hernia without obstruction and without gangrene       Lumbar component    Manuals 5/1 7/10           TLF skin rolling nv nv                                                  Neuro Re-Ed             Prone press ups 5\"x5            LTR 5\"x10 ea alt 5\"x10 ea alt           R prone fem n glide 10x            bridge  3\"x20                                                  Ther Ex             SKC stretch nv 30\"x3 ea           DKC stretch  30\"x3           Supine piriformis stretch  30\"x3 ea           Butterfly adductor stretch supine  30\"x3           Seated adductor stretch  30\"x3 ea           Seated piriformis stretch  30\"x3 ea                                     Ther Activity                                       Gait Training                                       Modalities                                            "

## 2025-07-22 DIAGNOSIS — N40.1 BENIGN PROSTATIC HYPERPLASIA WITH URINARY HESITANCY: ICD-10-CM

## 2025-07-22 DIAGNOSIS — J30.89 ALLERGIC RHINITIS DUE TO OTHER ALLERGIC TRIGGER, UNSPECIFIED SEASONALITY: ICD-10-CM

## 2025-07-22 DIAGNOSIS — R39.11 BENIGN PROSTATIC HYPERPLASIA WITH URINARY HESITANCY: ICD-10-CM

## 2025-07-22 RX ORDER — TAMSULOSIN HYDROCHLORIDE 0.4 MG/1
0.4 CAPSULE ORAL
Qty: 90 CAPSULE | Refills: 3 | Status: SHIPPED | OUTPATIENT
Start: 2025-07-22

## 2025-07-22 RX ORDER — FLUTICASONE PROPIONATE 50 MCG
1 SPRAY, SUSPENSION (ML) NASAL DAILY
Qty: 24 ML | Refills: 1 | Status: SHIPPED | OUTPATIENT
Start: 2025-07-22